# Patient Record
Sex: FEMALE | Race: BLACK OR AFRICAN AMERICAN | Employment: OTHER | URBAN - METROPOLITAN AREA
[De-identification: names, ages, dates, MRNs, and addresses within clinical notes are randomized per-mention and may not be internally consistent; named-entity substitution may affect disease eponyms.]

---

## 2017-05-23 ENCOUNTER — TELEPHONE (OUTPATIENT)
Dept: FAMILY MEDICINE CLINIC | Age: 82
End: 2017-05-23

## 2017-05-23 DIAGNOSIS — L30.9 CHRONIC ECZEMA: ICD-10-CM

## 2017-05-23 DIAGNOSIS — E78.00 PURE HYPERCHOLESTEROLEMIA: ICD-10-CM

## 2017-05-23 DIAGNOSIS — I10 ESSENTIAL HYPERTENSION, MALIGNANT: Primary | ICD-10-CM

## 2017-05-23 DIAGNOSIS — M19.90 ARTHRITIS: ICD-10-CM

## 2017-05-23 DIAGNOSIS — E03.9 ACQUIRED HYPOTHYROIDISM: ICD-10-CM

## 2017-05-24 ENCOUNTER — HOSPITAL ENCOUNTER (OUTPATIENT)
Dept: LAB | Age: 82
Discharge: HOME | End: 2017-05-24
Payer: MEDICARE

## 2017-05-24 DIAGNOSIS — I10 ESSENTIAL HYPERTENSION, MALIGNANT: ICD-10-CM

## 2017-05-24 DIAGNOSIS — M19.90 ARTHRITIS: ICD-10-CM

## 2017-05-24 DIAGNOSIS — E03.9 ACQUIRED HYPOTHYROIDISM: ICD-10-CM

## 2017-05-24 DIAGNOSIS — E78.00 PURE HYPERCHOLESTEROLEMIA: ICD-10-CM

## 2017-05-24 DIAGNOSIS — L30.9 CHRONIC ECZEMA: ICD-10-CM

## 2017-05-24 LAB
ALBUMIN SERPL BCP-MCNC: 3.6 G/DL (ref 3.4–5)
ALBUMIN/GLOB SERPL: 1 {RATIO} (ref 0.8–1.7)
ALP SERPL-CCNC: 110 U/L (ref 45–117)
ALT SERPL-CCNC: 23 U/L (ref 13–56)
ANION GAP BLD CALC-SCNC: 6 MMOL/L (ref 3–18)
APPEARANCE UR: CLEAR
AST SERPL W P-5'-P-CCNC: 21 U/L (ref 15–37)
BASOPHILS # BLD AUTO: 0 K/UL (ref 0–0.06)
BASOPHILS # BLD: 0 % (ref 0–2)
BILIRUB SERPL-MCNC: 0.4 MG/DL (ref 0.2–1)
BILIRUB UR QL: NEGATIVE
BUN SERPL-MCNC: 17 MG/DL (ref 7–18)
BUN/CREAT SERPL: 13 (ref 12–20)
CALCIUM SERPL-MCNC: 9.2 MG/DL (ref 8.5–10.1)
CHLORIDE SERPL-SCNC: 103 MMOL/L (ref 100–108)
CHOLEST SERPL-MCNC: 204 MG/DL
CO2 SERPL-SCNC: 33 MMOL/L (ref 21–32)
COLOR UR: YELLOW
CREAT SERPL-MCNC: 1.32 MG/DL (ref 0.6–1.3)
DIFFERENTIAL METHOD BLD: ABNORMAL
EOSINOPHIL # BLD: 0.6 K/UL (ref 0–0.4)
EOSINOPHIL NFR BLD: 8 % (ref 0–5)
ERYTHROCYTE [DISTWIDTH] IN BLOOD BY AUTOMATED COUNT: 13.9 % (ref 11.6–14.5)
GLOBULIN SER CALC-MCNC: 3.5 G/DL (ref 2–4)
GLUCOSE SERPL-MCNC: 79 MG/DL (ref 74–99)
GLUCOSE UR STRIP.AUTO-MCNC: NEGATIVE MG/DL
HCT VFR BLD AUTO: 40.6 % (ref 35–45)
HDLC SERPL-MCNC: 56 MG/DL (ref 40–60)
HDLC SERPL: 3.6 {RATIO} (ref 0–5)
HGB BLD-MCNC: 12.5 G/DL (ref 12–16)
HGB UR QL STRIP: NEGATIVE
KETONES UR QL STRIP.AUTO: NEGATIVE MG/DL
LDLC SERPL CALC-MCNC: 126.2 MG/DL (ref 0–100)
LEUKOCYTE ESTERASE UR QL STRIP.AUTO: NEGATIVE
LIPID PROFILE,FLP: ABNORMAL
LYMPHOCYTES # BLD AUTO: 31 % (ref 21–52)
LYMPHOCYTES # BLD: 2.2 K/UL (ref 0.9–3.6)
MCH RBC QN AUTO: 26.1 PG (ref 24–34)
MCHC RBC AUTO-ENTMCNC: 30.8 G/DL (ref 31–37)
MCV RBC AUTO: 84.8 FL (ref 74–97)
MONOCYTES # BLD: 0.5 K/UL (ref 0.05–1.2)
MONOCYTES NFR BLD AUTO: 7 % (ref 3–10)
NEUTS SEG # BLD: 3.7 K/UL (ref 1.8–8)
NEUTS SEG NFR BLD AUTO: 54 % (ref 40–73)
NITRITE UR QL STRIP.AUTO: NEGATIVE
PH UR STRIP: 7.5 [PH] (ref 5–8)
PLATELET # BLD AUTO: 159 K/UL (ref 135–420)
PMV BLD AUTO: 12.6 FL (ref 9.2–11.8)
POTASSIUM SERPL-SCNC: 4.6 MMOL/L (ref 3.5–5.5)
PROT SERPL-MCNC: 7.1 G/DL (ref 6.4–8.2)
PROT UR STRIP-MCNC: NEGATIVE MG/DL
RBC # BLD AUTO: 4.79 M/UL (ref 4.2–5.3)
SODIUM SERPL-SCNC: 142 MMOL/L (ref 136–145)
SP GR UR REFRACTOMETRY: 1.02 (ref 1–1.03)
T4 FREE SERPL-MCNC: 1.1 NG/DL (ref 0.7–1.5)
TRIGL SERPL-MCNC: 109 MG/DL (ref ?–150)
TSH SERPL DL<=0.05 MIU/L-ACNC: <0.01 UIU/ML (ref 0.36–3.74)
UROBILINOGEN UR QL STRIP.AUTO: 1 EU/DL (ref 0.2–1)
VLDLC SERPL CALC-MCNC: 21.8 MG/DL
WBC # BLD AUTO: 6.9 K/UL (ref 4.6–13.2)

## 2017-05-24 PROCEDURE — 80053 COMPREHEN METABOLIC PANEL: CPT | Performed by: FAMILY MEDICINE

## 2017-05-24 PROCEDURE — 84443 ASSAY THYROID STIM HORMONE: CPT | Performed by: FAMILY MEDICINE

## 2017-05-24 PROCEDURE — 81003 URINALYSIS AUTO W/O SCOPE: CPT | Performed by: FAMILY MEDICINE

## 2017-05-24 PROCEDURE — 36415 COLL VENOUS BLD VENIPUNCTURE: CPT | Performed by: FAMILY MEDICINE

## 2017-05-24 PROCEDURE — 80061 LIPID PANEL: CPT | Performed by: FAMILY MEDICINE

## 2017-05-24 PROCEDURE — 85025 COMPLETE CBC W/AUTO DIFF WBC: CPT | Performed by: FAMILY MEDICINE

## 2017-05-24 PROCEDURE — 84439 ASSAY OF FREE THYROXINE: CPT | Performed by: FAMILY MEDICINE

## 2017-05-30 ENCOUNTER — OFFICE VISIT (OUTPATIENT)
Dept: FAMILY MEDICINE CLINIC | Age: 82
End: 2017-05-30

## 2017-05-30 ENCOUNTER — HOSPITAL ENCOUNTER (OUTPATIENT)
Dept: LAB | Age: 82
Discharge: HOME | End: 2017-05-30
Payer: MEDICARE

## 2017-05-30 VITALS
DIASTOLIC BLOOD PRESSURE: 59 MMHG | BODY MASS INDEX: 37.61 KG/M2 | TEMPERATURE: 97.3 F | RESPIRATION RATE: 16 BRPM | OXYGEN SATURATION: 95 % | SYSTOLIC BLOOD PRESSURE: 140 MMHG | HEIGHT: 66 IN | HEART RATE: 65 BPM | WEIGHT: 234 LBS

## 2017-05-30 DIAGNOSIS — Z00.00 ROUTINE GENERAL MEDICAL EXAMINATION AT A HEALTH CARE FACILITY: ICD-10-CM

## 2017-05-30 DIAGNOSIS — E78.00 PURE HYPERCHOLESTEROLEMIA: ICD-10-CM

## 2017-05-30 DIAGNOSIS — I10 ESSENTIAL HYPERTENSION, MALIGNANT: ICD-10-CM

## 2017-05-30 DIAGNOSIS — R60.0 LOCALIZED EDEMA: ICD-10-CM

## 2017-05-30 DIAGNOSIS — M19.90 ARTHRITIS: ICD-10-CM

## 2017-05-30 DIAGNOSIS — E05.90 SUBCLINICAL HYPERTHYROIDISM: ICD-10-CM

## 2017-05-30 DIAGNOSIS — Z00.00 ROUTINE GENERAL MEDICAL EXAMINATION AT A HEALTH CARE FACILITY: Primary | ICD-10-CM

## 2017-05-30 DIAGNOSIS — L30.9 CHRONIC ECZEMA: ICD-10-CM

## 2017-05-30 PROCEDURE — 80307 DRUG TEST PRSMV CHEM ANLYZR: CPT | Performed by: FAMILY MEDICINE

## 2017-05-30 RX ORDER — HYDROCODONE BITARTRATE AND ACETAMINOPHEN 5; 325 MG/1; MG/1
1 TABLET ORAL
Qty: 60 TAB | Refills: 0 | Status: SHIPPED | OUTPATIENT
Start: 2017-05-30 | End: 2017-07-19 | Stop reason: SDUPTHER

## 2017-05-30 RX ORDER — FUROSEMIDE 40 MG/1
40 TABLET ORAL DAILY
Qty: 30 TAB | Refills: 3 | Status: SHIPPED | OUTPATIENT
Start: 2017-05-30 | End: 2017-07-19 | Stop reason: SDUPTHER

## 2017-05-30 RX ORDER — NAPROXEN 500 MG/1
500 TABLET ORAL DAILY
Qty: 90 TAB | Refills: 4 | Status: SHIPPED | OUTPATIENT
Start: 2017-05-30 | End: 2017-10-16 | Stop reason: SDUPTHER

## 2017-05-30 RX ORDER — FLUOCINONIDE 0.5 MG/G
OINTMENT TOPICAL 2 TIMES DAILY
Qty: 60 G | Refills: 4 | Status: SHIPPED | OUTPATIENT
Start: 2017-05-30 | End: 2017-10-16 | Stop reason: SDUPTHER

## 2017-05-30 RX ORDER — LISINOPRIL AND HYDROCHLOROTHIAZIDE 20; 25 MG/1; MG/1
1 TABLET ORAL DAILY
Qty: 90 TAB | Refills: 4 | Status: SHIPPED | OUTPATIENT
Start: 2017-05-30 | End: 2017-10-16 | Stop reason: SDUPTHER

## 2017-05-30 RX ORDER — OMEPRAZOLE 20 MG/1
20 CAPSULE, DELAYED RELEASE ORAL DAILY
COMMUNITY
End: 2019-03-26 | Stop reason: SDUPTHER

## 2017-05-30 NOTE — ACP (ADVANCE CARE PLANNING)
Advance Care Planning (ACP) Provider Note - Comprehensive     Date of ACP Conversation: 05/30/17  Persons included in Conversation:  patient and family  Length of ACP Conversation in minutes:  16 minutes    Authorized Decision Maker (if patient is incapable of making informed decisions): This person is:  Healthcare Agent/Medical Power of  under Advance Directive          General ACP for ALL Patients with Decision Making Capacity:   Importance of advance care planning, including choosing a healthcare agent to communicate patient's healthcare decisions if patient lost the ability to make decisions, such as after a sudden illness or accident    Review of Existing Advance Directive:       For Serious or Chronic Illness:  Understanding of medical condition      Interventions Provided:  Reviewed existing Advance Directive

## 2017-05-30 NOTE — PROGRESS NOTES
THE SUBSEQUENT MEDICARE ANNUAL WELLNESS VISIT PROGRESS NOTES    This is a Subsequent Medicare Annual Wellness Visit providing Personalized Prevention Plan Services (PPPS) (Performed 12 months after initial AWV and PPPS )    I have reviewed the patient's medical history in detail and updated the computerized patient record. Ruslan Valdez is a [de-identified] y.o.  female and presents for an subsequent annual wellness exam     Patient Active Problem List    Diagnosis Date Noted    Subclinical hyperthyroidism 05/30/2017    Arthritis 09/20/2010    Essential hypertension, malignant 03/22/2010    Pure hypercholesterolemia 03/22/2010    Edema 03/22/2010    Chronic eczema 03/22/2010     Current Outpatient Prescriptions   Medication Sig Dispense Refill    omeprazole (PRILOSEC) 20 mg capsule Take 20 mg by mouth daily.  HYDROcodone-acetaminophen (NORCO) 5-325 mg per tablet Take 1 Tab by mouth every eight (8) hours as needed for Pain (no more than 2 per day). 60 Tab 0    furosemide (LASIX) 40 mg tablet Take 1 Tab by mouth daily. Only if needed for edema 30 Tab 3    lisinopril-hydroCHLOROthiazide (PRINZIDE, ZESTORETIC) 20-25 mg per tablet Take 1 Tab by mouth daily. 90 Tab 4    naproxen (NAPROSYN) 500 mg tablet Take 1 Tab by mouth daily. 90 Tab 4    fluocinoNIDE (LIDEX) 0.05 % ointment Apply  to affected area two (2) times a day.  60 g 4     No Known Allergies  Past Medical History:   Diagnosis Date    Arthritis 9/20/2010    Chronic eczema 3/22/2010    Edema 3/22/2010    Essential hypertension, malignant 3/22/2010    Pure hypercholesterolemia 3/22/2010    Subclinical hyperthyroidism 5/30/2017    Unspecified arthropathy, shoulder region 3/22/2010    Unspecified hypothyroidism 3/22/2010     Past Surgical History:   Procedure Laterality Date    HX GYN  1983    hysterectomy    HX HYSTERECTOMY  1984    fibroids    HX KNEE REPLACEMENT Right      Family History   Problem Relation Age of Onset    Hypertension Mother     Breast Cancer Mother 70    Breast Cancer Child 36     Social History   Substance Use Topics    Smoking status: Never Smoker    Smokeless tobacco: Never Used    Alcohol use 1.5 oz/week     3 Standard drinks or equivalent per week         ROS       All other systems reviewed and are negative. History     Past Medical History:   Diagnosis Date    Arthritis 9/20/2010    Chronic eczema 3/22/2010    Edema 3/22/2010    Essential hypertension, malignant 3/22/2010    Pure hypercholesterolemia 3/22/2010    Subclinical hyperthyroidism 5/30/2017    Unspecified arthropathy, shoulder region 3/22/2010    Unspecified hypothyroidism 3/22/2010      Past Surgical History:   Procedure Laterality Date    HX GYN  1983    hysterectomy    HX HYSTERECTOMY  1984    fibroids    HX KNEE REPLACEMENT Right      Current Outpatient Prescriptions   Medication Sig Dispense Refill    omeprazole (PRILOSEC) 20 mg capsule Take 20 mg by mouth daily.  HYDROcodone-acetaminophen (NORCO) 5-325 mg per tablet Take 1 Tab by mouth every eight (8) hours as needed for Pain (no more than 2 per day). 60 Tab 0    furosemide (LASIX) 40 mg tablet Take 1 Tab by mouth daily. Only if needed for edema 30 Tab 3    lisinopril-hydroCHLOROthiazide (PRINZIDE, ZESTORETIC) 20-25 mg per tablet Take 1 Tab by mouth daily. 90 Tab 4    naproxen (NAPROSYN) 500 mg tablet Take 1 Tab by mouth daily. 90 Tab 4    fluocinoNIDE (LIDEX) 0.05 % ointment Apply  to affected area two (2) times a day.  60 g 4     No Known Allergies  Family History   Problem Relation Age of Onset    Hypertension Mother     Breast Cancer Mother 70    Breast Cancer Child 36     Social History   Substance Use Topics    Smoking status: Never Smoker    Smokeless tobacco: Never Used    Alcohol use 1.5 oz/week     3 Standard drinks or equivalent per week     Patient Active Problem List   Diagnosis Code    Essential hypertension, malignant I10    Pure hypercholesterolemia E78.00    Edema R60.9    Chronic eczema L30.9    Arthritis M19.90    Subclinical hyperthyroidism E05.90       Health Maintenance History  Immunizations reviewed, dtap utd  , pneumovax utd , flu refused , zoster refused   Colonoscopy: utd soledad,   Chest CT :,  Eye exam:ud   Mammo utd   Dexascan utd     Health Care Directive or Living Will: yes    Depression Risk Factor Screening:      Patient Health Questionnaire (PHQ-2)   Over the last 2 weeks, how often have you been bothered by any of the following problems? · Little interest or pleasure in doing things? · Not at all. [0]  · Feeling down, depressed, or hopeless? · Not at all. [0]    Total Score: 0/6  PHQ-2 Assessment Scoring:   A score of 2 or more requires further screening with the PHQ-9    Alcohol Risk Factor Screening:     Women: On any occasion during the past 3 months, have you had more than 3 drinks containing alcohol? Do you average more than 7 drinks per week? Men: On any occasion during the past 3 months, have you had more than 4 drinks containing alcohol? Do you average more than 14 drinks per week? Functional Ability and Level of Safety:     Hearing Loss    mild    Activities of Daily Living   Self-care. Requires assistance with: no ADLs    Fall Risk   No fall risk factors    Abuse Screen   None      Examination   Physical Examination  Vitals:    05/30/17 0826   BP: 140/59   Pulse: 65   Resp: 16   Temp: 97.3 °F (36.3 °C)   TempSrc: Oral   SpO2: 95%   Weight: 234 lb (106.1 kg)   Height: 5' 6\" (1.676 m)   PainSc:   7   PainLoc: Knee     Body mass index is 37.77 kg/(m^2).     Evaluation of Cognitive Function:  Mood/affect:appropriate   Appearance: well groomed   Family member/caregiver input: na     alert, well appearing, and in no distress, oriented to person, place, and time and normal appearing weight    Patient Care Team:  Mi Sheldon.,  as PCP - Shefali Parnell MD (Orthopedic Surgery)  Tanner Johnson MD Jaime (Orthopedic Surgery)    Advice/Referrals/Counseling/Plan:   Education and counseling provided:  Are appropriate based on today's review and evaluation  End-of-Life planning (with patient's consent)  Include in education list (weight loss, physical activity, smoking cessation, fall prevention, and nutrition)  current treatment plan is effective, no change in therapy  lab results and schedule of future lab studies reviewed with patient. I have discussed the diagnosis with the patient and the intended plan as seen in the above orders. The patient has received an after-visit summary and questions were answered concerning future plans. I have discussed medication side effects and warnings with the patient as well. I have reviewed the plan of care with the patient, accepted their input and they are in agreement with the treatment goals. Follow-up Disposition:  Return in about 2 months (around 7/30/2017) for EOV, Narcotic contract,  and drug screen, Bring in Meds.    _____________________________________________________________    Problem Assessment    for treatment of   Chief Complaint   Patient presents with    Hypertension    Cholesterol Problem    Thyroid Problem    Arthritis    Rash         SUBJECTIVE      Cardiovascular Review:  The patient has hypertension and hyperlipidemia. Diet and Lifestyle: not attempting to follow a low fat, low cholesterol diet, not attempting to follow a low sodium diet  Home BP Monitoring: is not measured at home. Pertinent ROS: taking medications as instructed, no medication side effects noted, no TIA's, no chest pain on exertion, no dyspnea on exertion, no swelling of ankles. Thyroid Review:  Patient is seen for followup of subclinical hyperthyroidism. Thyroid ROS: denies fatigue, weight changes, heat/cold intolerance, bowel/skin changes or CVS symptoms. Osteoarthritis and Chronic Pain:  Patient has osteoarthritis, primarily affecting the diffuse. Symptoms onset: problem is longstanding. Rheumatological ROS: no current joint or muscle symptoms, essentially pain-free. Response to treatment plan: stable. Additional Concerns: ecema stavble on meds    Edema stable on prn lasic         Visit Vitals    /59 (BP 1 Location: Right arm, BP Patient Position: Sitting)    Pulse 65    Temp 97.3 °F (36.3 °C) (Oral)    Resp 16    Ht 5' 6\" (1.676 m)    Wt 234 lb (106.1 kg)    SpO2 95%    BMI 37.77 kg/m2     General:  Alert, cooperative, no distress, appears stated age. Head:  Normocephalic, without obvious abnormality, atraumatic. Eyes:  Conjunctivae/corneas clear. PERRL, EOMs intact. Fundi benign. Ears:  Normal TMs and external ear canals both ears. Nose: Nares normal. Septum midline. Mucosa normal. No drainage or sinus tenderness. Throat: Lips, mucosa, and tongue normal. Teeth and gums normal.   Neck: Supple, symmetrical, trachea midline, no adenopathy, thyroid: no enlargement/tenderness/nodules, no carotid bruit and no JVD. Back:   Symmetric, no curvature. ROM normal. No CVA tenderness. Lungs:   Clear to auscultation bilaterally. Chest wall:  No tenderness or deformity. Heart:  Regular rate and rhythm, S1, S2 normal, no murmur, click, rub or gallop. Breast Exam:  No tenderness, masses, or nipple abnormality. Abdomen:   Soft, non-tender. Bowel sounds normal. No masses,  No organomegaly. Genitalia:  Normal female without lesion, discharge or tenderness. Rectal:  Normal tone,  no masses or tenderness  Guaiac negative stool. Extremities: Extremities normal, atraumatic, no cyanosis or edema. Pulses: 2+ and symmetric all extremities. Skin: Skin color, texture, turgor normal. No rashes or lesions. Lymph nodes: Cervical, supraclavicular, and axillary nodes normal.   Neurologic: CNII-XII intact. Normal strength, sensation and reflexes throughout.            LABS   Component      Latest Ref Rng & Units 5/24/2017 5/24/2017 5/24/2017 5/24/2017          11:48 AM 11:48 AM 11:48 AM 11:48 AM   WBC      4.6 - 13.2 K/uL       RBC      4.20 - 5.30 M/uL       HGB      12.0 - 16.0 g/dL       HCT      35.0 - 45.0 %       MCV      74.0 - 97.0 FL       MCH      24.0 - 34.0 PG       MCHC      31.0 - 37.0 g/dL       RDW      11.6 - 14.5 %       PLATELET      049 - 878 K/uL       MPV      9.2 - 11.8 FL       NEUTROPHILS      40 - 73 %       LYMPHOCYTES      21 - 52 %       MONOCYTES      3 - 10 %       EOSINOPHILS      0 - 5 %       BASOPHILS      0 - 2 %       ABS. NEUTROPHILS      1.8 - 8.0 K/UL       ABS. LYMPHOCYTES      0.9 - 3.6 K/UL       ABS. MONOCYTES      0.05 - 1.2 K/UL       ABS. EOSINOPHILS      0.0 - 0.4 K/UL       ABS. BASOPHILS      0.0 - 0.06 K/UL       DF             Sodium      136 - 145 mmol/L   142    Potassium      3.5 - 5.5 mmol/L   4.6    Chloride      100 - 108 mmol/L   103    CO2      21 - 32 mmol/L   33 (H)    Anion gap      3.0 - 18 mmol/L   6    Glucose      74 - 99 mg/dL   79    BUN      7.0 - 18 MG/DL   17    Creatinine      0.6 - 1.3 MG/DL   1.32 (H)    BUN/Creatinine ratio      12 - 20     13    GFR est AA      >60 ml/min/1.73m2   47 (L)    GFR est non-AA      >60 ml/min/1.73m2   39 (L)    Calcium      8.5 - 10.1 MG/DL   9.2    Bilirubin, total      0.2 - 1.0 MG/DL   0.4    ALT      13 - 56 U/L   23    AST      15 - 37 U/L   21    Alk.  phosphatase      45 - 117 U/L   110    Protein, total      6.4 - 8.2 g/dL   7.1    Albumin      3.4 - 5.0 g/dL   3.6    Globulin      2.0 - 4.0 g/dL   3.5    A-G Ratio      0.8 - 1.7     1.0    Color       YELLOW      Appearance       CLEAR      Specific gravity      1.005 - 1.030   1.017      pH (UA)      5.0 - 8.0   7.5      Protein      NEG mg/dL NEGATIVE      Glucose      NEG mg/dL NEGATIVE      Ketone      NEG mg/dL NEGATIVE      Bilirubin      NEG   NEGATIVE      Blood      NEG   NEGATIVE      Urobilinogen      0.2 - 1.0 EU/dL 1.0      Nitrites      NEG   NEGATIVE      Leukocyte Esterase      NEG   NEGATIVE      Cholesterol, total      <200 MG/DL    204 (H)   Triglyceride      <150 MG/DL    109   HDL Cholesterol      40 - 60 MG/DL    56   LDL, calculated      0 - 100 MG/DL    126.2 (H)   VLDL, calculated      MG/DL    21.8   CHOL/HDL Ratio      0 - 5.0      3.6   TSH      0.36 - 3.74 uIU/mL  <0.01 (L)     T4, Free      0.7 - 1.5 NG/DL         Component      Latest Ref Rng & Units 5/24/2017 5/24/2017          11:48 AM 11:48 AM   WBC      4.6 - 13.2 K/uL  6.9   RBC      4.20 - 5.30 M/uL  4.79   HGB      12.0 - 16.0 g/dL  12.5   HCT      35.0 - 45.0 %  40.6   MCV      74.0 - 97.0 FL  84.8   MCH      24.0 - 34.0 PG  26.1   MCHC      31.0 - 37.0 g/dL  30.8 (L)   RDW      11.6 - 14.5 %  13.9   PLATELET      095 - 146 K/uL  159   MPV      9.2 - 11.8 FL  12.6 (H)   NEUTROPHILS      40 - 73 %  54   LYMPHOCYTES      21 - 52 %  31   MONOCYTES      3 - 10 %  7   EOSINOPHILS      0 - 5 %  8 (H)   BASOPHILS      0 - 2 %  0   ABS. NEUTROPHILS      1.8 - 8.0 K/UL  3.7   ABS. LYMPHOCYTES      0.9 - 3.6 K/UL  2.2   ABS. MONOCYTES      0.05 - 1.2 K/UL  0.5   ABS. EOSINOPHILS      0.0 - 0.4 K/UL  0.6 (H)   ABS. BASOPHILS      0.0 - 0.06 K/UL  0.0   DF        AUTOMATED   Sodium      136 - 145 mmol/L     Potassium      3.5 - 5.5 mmol/L     Chloride      100 - 108 mmol/L     CO2      21 - 32 mmol/L     Anion gap      3.0 - 18 mmol/L     Glucose      74 - 99 mg/dL     BUN      7.0 - 18 MG/DL     Creatinine      0.6 - 1.3 MG/DL     BUN/Creatinine ratio      12 - 20       GFR est AA      >60 ml/min/1.73m2     GFR est non-AA      >60 ml/min/1.73m2     Calcium      8.5 - 10.1 MG/DL     Bilirubin, total      0.2 - 1.0 MG/DL     ALT      13 - 56 U/L     AST      15 - 37 U/L     Alk.  phosphatase      45 - 117 U/L     Protein, total      6.4 - 8.2 g/dL     Albumin      3.4 - 5.0 g/dL     Globulin      2.0 - 4.0 g/dL     A-G Ratio      0.8 - 1.7       Color           Appearance           Specific gravity      1.005 - 1.030 pH (UA)      5.0 - 8.0       Protein      NEG mg/dL     Glucose      NEG mg/dL     Ketone      NEG mg/dL     Bilirubin      NEG       Blood      NEG       Urobilinogen      0.2 - 1.0 EU/dL     Nitrites      NEG       Leukocyte Esterase      NEG       Cholesterol, total      <200 MG/DL     Triglyceride      <150 MG/DL     HDL Cholesterol      40 - 60 MG/DL     LDL, calculated      0 - 100 MG/DL     VLDL, calculated      MG/DL     CHOL/HDL Ratio      0 - 5.0       TSH      0.36 - 3.74 uIU/mL     T4, Free      0.7 - 1.5 NG/DL 1.1      TESTS  ekg  nsr      Assessment/Plan:      Hypertension - stable  Hyperlipidemia - stable  Thyroid stable recheck 6 mo  arthrits note she takes occ vicodin  Up to 2 per day usually less than 4 per week. This means chronic pain and she falls under new guidelines. Will get drug screen today and f/u 2 mo.  Needs , contract, pill count, etc etc  Edema stable  Eczema stable          Lab review: labs are reviewed, up to date and normal, orders written for new lab studies as appropriate; see orders

## 2017-05-30 NOTE — MR AVS SNAPSHOT
Visit Information Date & Time Provider Department Dept. Phone Encounter #  
 5/30/2017  8:30 AM Dejan Mao., [place] [address] [phone] [id number] Follow-up Instructions Return in about 2 months (around 7/30/2017) for EOV, Narcotic contract,  and drug screen, Bring in Meds. Upcoming Health Maintenance Date Due DTaP/Tdap/Td series (1 - Tdap) 8/22/2013 MEDICARE YEARLY EXAM 5/24/2017 INFLUENZA AGE 9 TO ADULT 8/1/2017 GLAUCOMA SCREENING Q2Y 11/3/2018 Allergies as of 5/30/2017  Review Complete On: 5/30/2017 By: Dejan Vilchis, DO No Known Allergies Current Immunizations  Reviewed on 6/21/2010 Name Date Pneumococcal Conjugate (PCV-13) 8/19/2015 Pneumococcal Polysaccharide (PPSV-23) 8/21/2013  2:15 PM  
 TD Vaccine 3/22/2003 Td, Adsorbed PF 8/21/2013  2:15 PM  
  
 Not reviewed this visit You Were Diagnosed With   
  
 Codes Comments Routine general medical examination at a health care facility    -  Primary ICD-10-CM: Z00.00 ICD-9-CM: V70.0 Essential hypertension, malignant     ICD-10-CM: I10 
ICD-9-CM: 401.0 Arthritis     ICD-10-CM: M19.90 ICD-9-CM: 716.90 Localized edema     ICD-10-CM: R60.0 ICD-9-CM: 731. 3 Chronic eczema     ICD-10-CM: L30.9 ICD-9-CM: 692.9 Pure hypercholesterolemia     ICD-10-CM: E78.00 ICD-9-CM: 272.0 Subclinical hyperthyroidism     ICD-10-CM: E05.90 ICD-9-CM: 242.90 Vitals BP Pulse Temp Resp Height(growth percentile) Weight(growth percentile) 140/59 (BP 1 Location: Right arm, BP Patient Position: Sitting) 65 97.3 °F (36.3 °C) (Oral) 16 5' 6\" (1.676 m) 234 lb (106.1 kg) SpO2 BMI OB Status Smoking Status 95% 37.77 kg/m2 Hysterectomy Never Smoker BMI and BSA Data Body Mass Index Body Surface Area  
 37.77 kg/m 2 2.22 m 2 Preferred Pharmacy Pharmacy Name Phone [facility] [address] [phone] Your Updated Medication List  
  
   
This list is accurate as of: 5/30/17  9:15 AM.  Always use your most recent med list.  
  
  
  
  
 fluocinoNIDE 0.05 % ointment Commonly known as:  LIDEX Apply  to affected area two (2) times a day. furosemide 40 mg tablet Commonly known as:  LASIX Take 1 Tab by mouth daily. Only if needed for edema HYDROcodone-acetaminophen 5-325 mg per tablet Commonly known as:  1463 Jennifer Babin Take 1 Tab by mouth every eight (8) hours as needed for Pain (no more than 2 per day). lisinopril-hydroCHLOROthiazide 20-25 mg per tablet Commonly known as:  Aminah Karvonen Take 1 Tab by mouth daily. naproxen 500 mg tablet Commonly known as:  NAPROSYN Take 1 Tab by mouth daily. PriLOSEC 20 mg capsule Generic drug:  omeprazole Take 20 mg by mouth daily. Prescriptions Printed Refills HYDROcodone-acetaminophen (NORCO) 5-325 mg per tablet 0 Sig: Take 1 Tab by mouth every eight (8) hours as needed for Pain (no more than 2 per day). Class: Print Route: Oral  
 furosemide (LASIX) 40 mg tablet 3 Sig: Take 1 Tab by mouth daily. Only if needed for edema Class: Print Route: Oral  
 lisinopril-hydroCHLOROthiazide (PRINZIDE, ZESTORETIC) 20-25 mg per tablet 4 Sig: Take 1 Tab by mouth daily. Class: Print Route: Oral  
 naproxen (NAPROSYN) 500 mg tablet 4 Sig: Take 1 Tab by mouth daily. Class: Print Route: Oral  
 fluocinoNIDE (LIDEX) 0.05 % ointment 4 Sig: Apply  to affected area two (2) times a day. Class: Print Route: Topical  
  
We Performed the Following AMB POC EKG ROUTINE W/ 12 LEADS, INTER & REP [79285 CPT(R)] Follow-up Instructions Return in about 2 months (around 7/30/2017) for EOV, Narcotic contract,  and drug screen, Bring in Meds. To-Do List   
 05/30/2017 Lab:  COMPLIANCE DRUG SCREEN/PRESCRIPTION MONITORING Introducing [facility]! Dear Cj Bejarano: Thank you for requesting a X[employer] account. Our records indicate that you already have an active Xmybox account. You can access your account anytime at [email or web address] Did you know that you can access your hospital and ER discharge instructions at any time in [employer]? You can also review all of your test results from your hospital stay or ER visit. Additional Information If you have questions, please visit the Frequently Asked Questions section of the Xmybox website at [email or web address] Remember, Xmybox is NOT to be used for urgent needs. For medical emergencies, dial 911. Now available from your iPhone and Android! Please provide this summary of care documentation to your next provider. Your primary care clinician is listed as [address]. If you have any questions after today's visit, please call [phone].

## 2017-05-30 NOTE — PROGRESS NOTES
Al Nelson is a [de-identified] y.o. female presents today for her medicare wellness. Patient reports of a cough and wheezing for over a week. Patient also reports of some left knee pain. Pt is in Room # 6        1. Have you been to the ER, urgent care clinic since your last visit? Hospitalized since your last visit? No    2. Have you seen or consulted any other health care providers outside of the [address] [place] [address] since your last visit? Include any pap smears or colon screening.  No

## 2017-06-04 LAB
DRUGS UR: NORMAL
PDF, 451356: NORMAL

## 2017-06-26 ENCOUNTER — OFFICE VISIT (OUTPATIENT)
Dept: FAMILY MEDICINE CLINIC | Age: 82
End: 2017-06-26

## 2017-06-26 ENCOUNTER — TELEPHONE (OUTPATIENT)
Dept: FAMILY MEDICINE CLINIC | Age: 82
End: 2017-06-26

## 2017-06-26 VITALS
BODY MASS INDEX: 37.03 KG/M2 | SYSTOLIC BLOOD PRESSURE: 110 MMHG | TEMPERATURE: 98.1 F | HEIGHT: 66 IN | OXYGEN SATURATION: 100 % | WEIGHT: 230.4 LBS | HEART RATE: 74 BPM | DIASTOLIC BLOOD PRESSURE: 62 MMHG | RESPIRATION RATE: 18 BRPM

## 2017-06-26 DIAGNOSIS — J06.9 VIRAL UPPER RESPIRATORY TRACT INFECTION: Primary | ICD-10-CM

## 2017-06-26 DIAGNOSIS — M19.90 ARTHRITIS: ICD-10-CM

## 2017-06-26 RX ORDER — ALBUTEROL SULFATE 90 UG/1
2 AEROSOL, METERED RESPIRATORY (INHALATION)
Qty: 1 INHALER | Refills: 0 | Status: SHIPPED | OUTPATIENT
Start: 2017-06-26 | End: 2017-07-19

## 2017-06-26 RX ORDER — AZITHROMYCIN 250 MG/1
TABLET, FILM COATED ORAL
Qty: 6 TAB | Refills: 0 | Status: SHIPPED | OUTPATIENT
Start: 2017-06-26 | End: 2017-07-19

## 2017-06-26 NOTE — PROGRESS NOTES
Karrie Coe is a [de-identified] y.o. female presented to clinic for 8/10 right shoulder pain and wheezing. Pt denies any SOB. No acute dress at this time. 1. Have you been to the ER, urgent care clinic since your last visit? Hospitalized since your last visit? No    2. Have you seen or consulted any other health care providers outside of the [address] [place] [address] since your last visit? Include any pap smears or colon screening.  No     Learning Assessment 5/21/2014   PRIMARY LEARNER Patient   BARRIERS PRIMARY LEARNER NONE   CO-LEARNER CAREGIVER No   PRIMARY LANGUAGE ENGLISH   LEARNER PREFERENCE PRIMARY LISTENING   ANSWERED BY patient   RELATIONSHIP SELF

## 2017-06-26 NOTE — TELEPHONE ENCOUNTER
Patient called requesting an appointment for pain in right shoulder and wheezing. Her next appointment is scheduled for 7/17/17 and would like to come in for sooner appointment. Please advise.

## 2017-06-26 NOTE — PROGRESS NOTES
Jasmine Pat is a [de-identified] y.o.  female and presents with    Chief Complaint   Patient presents with    Shoulder Pain    Wheezing           Subjective:  1. Has a bit of chest cold with cough and wheezing. No fever. Non productive    2. Has aching in right shoulder --      Additional Concerns:          Patient Active Problem List    Diagnosis Date Noted    Subclinical hyperthyroidism 05/30/2017    Arthritis 09/20/2010    Essential hypertension, malignant 03/22/2010    Pure hypercholesterolemia 03/22/2010    Edema 03/22/2010    Chronic eczema 03/22/2010     Current Outpatient Prescriptions   Medication Sig Dispense Refill    azithromycin (ZITHROMAX) 250 mg tablet 2 today, then 1 daily till gone. 6 Tab 0    albuterol (PROVENTIL HFA, VENTOLIN HFA, PROAIR HFA) 90 mcg/actuation inhaler Take 2 Puffs by inhalation every six (6) hours as needed for Wheezing. 1 Inhaler 0    omeprazole (PRILOSEC) 20 mg capsule Take 20 mg by mouth daily.  HYDROcodone-acetaminophen (NORCO) 5-325 mg per tablet Take 1 Tab by mouth every eight (8) hours as needed for Pain (no more than 2 per day). 60 Tab 0    furosemide (LASIX) 40 mg tablet Take 1 Tab by mouth daily. Only if needed for edema 30 Tab 3    lisinopril-hydroCHLOROthiazide (PRINZIDE, ZESTORETIC) 20-25 mg per tablet Take 1 Tab by mouth daily. 90 Tab 4    naproxen (NAPROSYN) 500 mg tablet Take 1 Tab by mouth daily. 90 Tab 4    fluocinoNIDE (LIDEX) 0.05 % ointment Apply  to affected area two (2) times a day.  60 g 4     No Known Allergies  Past Medical History:   Diagnosis Date    Arthritis 9/20/2010    Chronic eczema 3/22/2010    Edema 3/22/2010    Essential hypertension, malignant 3/22/2010    Pure hypercholesterolemia 3/22/2010    Subclinical hyperthyroidism 5/30/2017    Unspecified arthropathy, shoulder region 3/22/2010    Unspecified hypothyroidism 3/22/2010     Past Surgical History:   Procedure Laterality Date   Mario Arm GYN  3679    hysterectomy  HX HYSTERECTOMY  1984    fibroids    HX KNEE REPLACEMENT Right      Family History   Problem Relation Age of Onset    Hypertension Mother     Breast Cancer Mother 70    Breast Cancer Child 36     Social History   Substance Use Topics    Smoking status: Never Smoker    Smokeless tobacco: Never Used    Alcohol use 1.5 oz/week     3 Standard drinks or equivalent per week       ROS       All other systems reviewed and are negative. Objective:  Vitals:    06/26/17 1234   BP: 110/62   Pulse: 74   Resp: 18   Temp: 98.1 °F (36.7 °C)   TempSrc: Oral   SpO2: 100%   Weight: 230 lb 6.4 oz (104.5 kg)   Height: 5' 6\" (1.676 m)   PainSc:   8   PainLoc: Shoulder                 alert, well appearing, and in no distress, oriented to person, place, and time and normal appearing weight  Chest - wheezing noted bilat   Heart - normal rate, regular rhythm, normal S1, S2, no murmurs, rubs, clicks or gallops  Abdomen - soft, nontender, nondistended, no masses or organomegaly  shouldr with mild tenderness           LABS     TESTS      Assessment/Plan:    Shoulder arthritsi already has naprosyn and vicodin available to take as needed    Wheezing appears to be chest cold will try zithreomax and albuterol       Lab review:       I have discussed the diagnosis with the patient and the intended plan as seen in the above orders. The patient has received an after-visit summary and questions were answered concerning future plans. I have discussed medication side effects and warnings with the patient as well. I have reviewed the plan of care with the patient, accepted their input and they are in agreement with the treatment goals.      Follow-up Disposition: Not on File

## 2017-06-26 NOTE — MR AVS SNAPSHOT
Visit Information Date & Time Provider Department Dept. Phone Encounter #  
 6/26/2017 12:30 PM Aide Mcelroy., [address] [facility] [phone] Follow-up Instructions Return if symptoms worsen or fail to improve, for already scheduled. Your Appointments 7/19/2017 11:00 AM  
[place] [address] [facility]) Appt Note: Return in about 2 months (around 7/30/2017) for EOV, Narcotic contract,  and drug screen, Bring in [address]. [address] [facility] [address]  
  
   
 [address] Upcoming Health Maintenance Date Due DTaP/Tdap/Td series (1 - Tdap) 8/22/2013 INFLUENZA AGE 9 TO ADULT 8/1/2017 MEDICARE YEARLY EXAM 5/31/2018 GLAUCOMA SCREENING Q2Y 11/3/2018 Allergies as of 6/26/2017  Review Complete On: 6/26/2017 By: Aide Mishra DO No Known Allergies Current Immunizations  Reviewed on 6/21/2010 Name Date Pneumococcal Conjugate (PCV-13) 8/19/2015 Pneumococcal Polysaccharide (PPSV-23) 8/21/2013  2:15 PM  
 TD Vaccine 3/22/2003 Td, Adsorbed PF 8/21/2013  2:15 PM  
  
 Not reviewed this visit You Were Diagnosed With   
  
 Codes Comments Viral upper respiratory tract infection    -  Primary ICD-10-CM: J06.9, B97.89 ICD-9-CM: 465.9 Arthritis     ICD-10-CM: M19.90 ICD-9-CM: 716.90 Vitals BP Pulse Temp Resp Height(growth percentile) Weight(growth percentile) 110/62 (BP 1 Location: Left arm, BP Patient Position: Sitting) 74 98.1 °F (36.7 °C) (Oral) 18 5' 6\" (1.676 m) 230 lb 6.4 oz (104.5 kg) SpO2 BMI OB Status Smoking Status 100% 37.19 kg/m2 Hysterectomy Never Smoker BMI and BSA Data Body Mass Index Body Surface Area  
 37.19 kg/m 2 2.21 m 2 Preferred Pharmacy Pharmacy Name Phone [address] [phone] Your Updated Medication List  
  
   
This list is accurate as of: 17 12:43 PM.  Always use your most recent med list.  
  
  
  
  
 albuterol 90 mcg/actuation inhaler Commonly known as:  PROVENTIL HFA, VENTOLIN HFA, PROAIR HFA Take 2 Puffs by inhalation every six (6) hours as needed for Wheezing. azithromycin 250 mg tablet Commonly known as:  Radha [place]  
2 today, then 1 daily till gone. fluocinoNIDE 0.05 % ointment Commonly known as:  LIDEX Apply  to affected area two (2) times a day. furosemide 40 mg tablet Commonly known as:  LASIX Take 1 Tab by mouth daily. Only if needed for edema HYDROcodone-acetaminophen 5-325 mg per tablet Commonly known as:  Rea Safe Take 1 Tab by mouth every eight (8) hours as needed for Pain (no more than 2 per day). lisinopril-hydroCHLOROthiazide 20-25 mg per tablet Commonly known as:  Beula Zan Take 1 Tab by mouth daily. naproxen 500 mg tablet Commonly known as:  NAPROSYN Take 1 Tab by mouth daily. PriLOSEC 20 mg capsule Generic drug:  omeprazole Take 20 mg by mouth daily. Prescriptions Printed Refills  
 azithromycin (ZITHROMAX) 250 mg tablet 0 Si today, then 1 daily till gone. Class: Print  
 albuterol (PROVENTIL HFA, VENTOLIN HFA, PROAIR HFA) 90 mcg/actuation inhaler 0 Sig: Take 2 Puffs by inhalation every six (6) hours as needed for Wheezing. Class: Print Route: Inhalation Follow-up Instructions Return if symptoms worsen or fail to improve, for already scheduled. Introducing [facility] & HEALTH SERVICES! Dear Mirna Farfan: 
Thank you for requesting a JLGOV account. Our records indicate that you already have an active JLGOV account. You can access your account anytime at [email or web address] Did you know that you can access your hospital and ER discharge instructions at any time in Seculert? You can also review all of your test results from your hospital stay or ER visit. Additional Information If you have questions, please visit the Frequently Asked Questions section of the Seculert website at [email or web address] Remember, Seculert is NOT to be used for urgent needs. For medical emergencies, dial 911. Now available from your iPhone and Android! Please provide this summary of care documentation to your next provider. Your primary care clinician is listed as [id number] [facility]. If you have any questions after today's visit, please call [phone].

## 2017-07-10 ENCOUNTER — HOSPITAL ENCOUNTER (OUTPATIENT)
Dept: MAMMOGRAPHY | Age: 82
Discharge: HOME | End: 2017-07-10
Attending: FAMILY MEDICINE
Payer: MEDICARE

## 2017-07-10 DIAGNOSIS — Z12.31 VISIT FOR SCREENING MAMMOGRAM: ICD-10-CM

## 2017-07-10 PROCEDURE — 77067 SCR MAMMO BI INCL CAD: CPT

## 2017-07-19 ENCOUNTER — OFFICE VISIT (OUTPATIENT)
Dept: FAMILY MEDICINE CLINIC | Age: 82
End: 2017-07-19

## 2017-07-19 VITALS
HEART RATE: 66 BPM | BODY MASS INDEX: 37.51 KG/M2 | WEIGHT: 233.4 LBS | RESPIRATION RATE: 16 BRPM | OXYGEN SATURATION: 96 % | HEIGHT: 66 IN | DIASTOLIC BLOOD PRESSURE: 62 MMHG | SYSTOLIC BLOOD PRESSURE: 124 MMHG | TEMPERATURE: 97.8 F

## 2017-07-19 DIAGNOSIS — E78.00 PURE HYPERCHOLESTEROLEMIA: ICD-10-CM

## 2017-07-19 DIAGNOSIS — M19.90 ARTHRITIS: ICD-10-CM

## 2017-07-19 DIAGNOSIS — L30.9 CHRONIC ECZEMA: ICD-10-CM

## 2017-07-19 DIAGNOSIS — R60.0 LOCALIZED EDEMA: ICD-10-CM

## 2017-07-19 DIAGNOSIS — I10 ESSENTIAL HYPERTENSION, MALIGNANT: ICD-10-CM

## 2017-07-19 DIAGNOSIS — J06.9 VIRAL UPPER RESPIRATORY TRACT INFECTION: ICD-10-CM

## 2017-07-19 RX ORDER — FUROSEMIDE 40 MG/1
40 TABLET ORAL DAILY
Qty: 30 TAB | Refills: 3 | Status: SHIPPED | OUTPATIENT
Start: 2017-07-19 | End: 2017-10-16 | Stop reason: SDUPTHER

## 2017-07-19 RX ORDER — HYDROCODONE BITARTRATE AND ACETAMINOPHEN 5; 325 MG/1; MG/1
1 TABLET ORAL
Qty: 60 TAB | Refills: 0 | Status: SHIPPED | OUTPATIENT
Start: 2017-07-19 | End: 2017-10-16 | Stop reason: SDUPTHER

## 2017-07-19 RX ORDER — ALBUTEROL SULFATE 90 UG/1
2 AEROSOL, METERED RESPIRATORY (INHALATION)
Qty: 1 INHALER | Refills: 0 | Status: SHIPPED | OUTPATIENT
Start: 2017-07-19 | End: 2017-10-16 | Stop reason: SDUPTHER

## 2017-07-19 NOTE — PROGRESS NOTES
Cintia Rodriguez is a [de-identified] y.o. female presented to clinic for a medication evaluation. Pt denies any pain or concerns at this time. 1. Have you been to the ER, urgent care clinic since your last visit? Hospitalized since your last visit? No    2. Have you seen or consulted any other health care providers outside of the [address] [place] [address] since your last visit? Include any pap smears or colon screening.  No     Learning Assessment 5/21/2014   PRIMARY LEARNER Patient   BARRIERS PRIMARY LEARNER NONE   CO-LEARNER CAREGIVER No   PRIMARY LANGUAGE ENGLISH   LEARNER PREFERENCE PRIMARY LISTENING   ANSWERED BY patient   RELATIONSHIP SELF

## 2017-07-19 NOTE — MR AVS SNAPSHOT
Visit Information Date & Time Provider Department Dept. Phone Encounter #  
 7/19/2017 11:00 [place] [address] [address] [phone] [id number] Follow-up Instructions Return in about 3 months (around 10/19/2017) for EOV,  and drug screen. Follow-up and Disposition History Upcoming Health Maintenance Date Due DTaP/Tdap/Td series (1 - Tdap) 8/22/2013 INFLUENZA AGE 9 TO ADULT 8/1/2017 MEDICARE YEARLY EXAM 5/31/2018 GLAUCOMA SCREENING Q2Y 11/3/2018 Allergies as of 7/19/2017  Review Complete On: 7/19/2017 By: Francesco Escalante DO No Known Allergies Current Immunizations  Reviewed on 6/21/2010 Name Date Pneumococcal Conjugate (PCV-13) 8/19/2015 Pneumococcal Polysaccharide (PPSV-23) 8/21/2013  2:15 PM  
 TD Vaccine 3/22/2003 Td, Adsorbed PF 8/21/2013  2:15 PM  
  
 Not reviewed this visit You Were Diagnosed With   
  
 Codes Comments Arthritis     ICD-10-CM: M19.90 ICD-9-CM: 716.90 Localized edema     ICD-10-CM: R60.0 ICD-9-CM: 916. 3 Chronic eczema     ICD-10-CM: L30.9 ICD-9-CM: 692.9 Pure hypercholesterolemia     ICD-10-CM: E78.00 ICD-9-CM: 272.0 Essential hypertension, malignant     ICD-10-CM: I10 
ICD-9-CM: 401.0 Viral upper respiratory tract infection     ICD-10-CM: J06.9, B97.89 ICD-9-CM: 465.9 Vitals BP Pulse Temp Resp Height(growth percentile) Weight(growth percentile) 124/62 (BP 1 Location: Left arm, BP Patient Position: Sitting) 66 97.8 °F (36.6 °C) (Oral) 16 5' 6\" (1.676 m) 233 lb 6.4 oz (105.9 kg) SpO2 BMI OB Status Smoking Status 96% 37.67 kg/m2 Hysterectomy Never Smoker BMI and BSA Data Body Mass Index Body Surface Area  
 37.67 kg/m 2 2.22 m 2 Preferred Pharmacy Pharmacy Name Phone [address] [phone] Your Updated Medication List  
  
   
 This list is accurate as of: 7/19/17 11:20 AM.  Always use your most recent med list.  
  
  
  
  
 albuterol 90 mcg/actuation inhaler Commonly known as:  PROVENTIL HFA, VENTOLIN HFA, PROAIR HFA Take 2 Puffs by inhalation every six (6) hours as needed for Wheezing. fluocinoNIDE 0.05 % ointment Commonly known as:  LIDEX Apply  to affected area two (2) times a day. furosemide 40 mg tablet Commonly known as:  LASIX Take 1 Tab by mouth daily. Only if needed for edema HYDROcodone-acetaminophen 5-325 mg per tablet Commonly known as:  Ruffus Homme Take 1 Tab by mouth every eight (8) hours as needed for Pain (no more than 2 per day). lisinopril-hydroCHLOROthiazide 20-25 mg per tablet Commonly known as:  Douglas Sinning Take 1 Tab by mouth daily. naproxen 500 mg tablet Commonly known as:  NAPROSYN Take 1 Tab by mouth daily. PriLOSEC 20 mg capsule Generic drug:  omeprazole Take 20 mg by mouth daily. Prescriptions Printed Refills HYDROcodone-acetaminophen (NORCO) 5-325 mg per tablet 0 Sig: Take 1 Tab by mouth every eight (8) hours as needed for Pain (no more than 2 per day). Class: Print Route: Oral  
 furosemide (LASIX) 40 mg tablet 3 Sig: Take 1 Tab by mouth daily. Only if needed for edema Class: Print Route: Oral  
 albuterol (PROVENTIL HFA, VENTOLIN HFA, PROAIR HFA) 90 mcg/actuation inhaler 0 Sig: Take 2 Puffs by inhalation every six (6) hours as needed for Wheezing. Class: Print Route: Inhalation Follow-up Instructions Return in about 3 months (around 10/19/2017) for EOV,  and drug screen. Introducing [facility] & HEALTH SERVICES! Dear Aliya Boyer: 
Thank you for requesting a Guanghetang account. Our records indicate that you already have an active Guanghetang account. You can access your account anytime at [email or web address] Did you know that you can access your hospital and ER discharge instructions at any time in [employer]? You can also review all of your test results from your hospital stay or ER visit. Additional Information If you have questions, please visit the Frequently Asked Questions section of the [employer] website at [email or web address] Remember, [employer] is NOT to be used for urgent needs. For medical emergencies, dial 911. Now available from your iPhone and Android! Please provide this summary of care documentation to your next provider. Your primary care clinician is listed as [phone] [facility]. If you have any questions after today's visit, please call [phone].

## 2017-07-19 NOTE — PROGRESS NOTES
Darnell Crowell is a [de-identified] y.o.  female and presents with    Chief Complaint   Patient presents with    Hypertension    Pain (Chronic)    Arthritis    GERD           Subjective:    Cardiovascular Review:  The patient has hypertension. Diet and Lifestyle: not attempting to follow a low fat, low cholesterol diet, not attempting to follow a low sodium diet  Home BP Monitoring: is not measured at home. Pertinent ROS: taking medications as instructed, no medication side effects noted, no TIA's, no chest pain on exertion, no dyspnea on exertion, no swelling of ankles. Osteoarthritis and Chronic Pain:  Patient has osteoarthritis, primarily affecting the diffuse. Symptoms onset: problem is longstanding. Rheumatological ROS: no current joint or muscle symptoms, essentially pain-free. Response to treatment plan: waxing and waning but worse overall. Darnell Crowell RTC today to discuss her osteoarthritis that is affecting her diffuse. Significant changes since last visit: none. She is  able to do her normal daily activities. She reports the following adverse side effects: none. Least pain over the last week has been 2/10. Worst pain over the last week has been 2/10. Aberrant behaviors: None. Urine Drug Screen: reviewed and up to date. Pain agreement on file: Completed today.  reviewed: yes.    Pill count is consistent with her prescription: yes  Concomitant use of a benzodiazepine: no    Opioid Risk Tool Reviewedplan; sports med: Pathophysiology, recovery and rehabilitation process discussed and questions answered   yes                      Additional Concerns:          Patient Active Problem List    Diagnosis Date Noted    Subclinical hyperthyroidism 05/30/2017    Arthritis 09/20/2010    Essential hypertension, malignant 03/22/2010    Pure hypercholesterolemia 03/22/2010    Edema 03/22/2010    Chronic eczema 03/22/2010     Current Outpatient Prescriptions   Medication Sig Dispense Refill    omeprazole (PRILOSEC) 20 mg capsule Take 20 mg by mouth daily.  HYDROcodone-acetaminophen (NORCO) 5-325 mg per tablet Take 1 Tab by mouth every eight (8) hours as needed for Pain (no more than 2 per day). 60 Tab 0    furosemide (LASIX) 40 mg tablet Take 1 Tab by mouth daily. Only if needed for edema 30 Tab 3    lisinopril-hydroCHLOROthiazide (PRINZIDE, ZESTORETIC) 20-25 mg per tablet Take 1 Tab by mouth daily. 90 Tab 4    naproxen (NAPROSYN) 500 mg tablet Take 1 Tab by mouth daily. 90 Tab 4    fluocinoNIDE (LIDEX) 0.05 % ointment Apply  to affected area two (2) times a day. 60 g 4     No Known Allergies  Past Medical History:   Diagnosis Date    Arthritis 9/20/2010    Chronic eczema 3/22/2010    Edema 3/22/2010    Essential hypertension, malignant 3/22/2010    Pure hypercholesterolemia 3/22/2010    Subclinical hyperthyroidism 5/30/2017    Unspecified arthropathy, shoulder region 3/22/2010    Unspecified hypothyroidism 3/22/2010     Past Surgical History:   Procedure Laterality Date    HX GYN  1983    hysterectomy    HX HYSTERECTOMY  1984    fibroids    HX KNEE REPLACEMENT Right      Family History   Problem Relation Age of Onset    Hypertension Mother     Breast Cancer Mother 70    Breast Cancer Child 36     Social History   Substance Use Topics    Smoking status: Never Smoker    Smokeless tobacco: Never Used    Alcohol use 1.5 oz/week     3 Standard drinks or equivalent per week       ROS       All other systems reviewed and are negative.       Objective:Vitals:    07/19/17 1112   BP: 124/62   Pulse: 66   Resp: 16   Temp: 97.8 °F (36.6 °C)   TempSrc: Oral   SpO2: 96%   Weight: 233 lb 6.4 oz (105.9 kg)   Height: 5' 6\" (1.676 m)   PainSc:   0 - No pain                 alert, well appearing, and in no distress, oriented to person, place, and time and normal appearing weight  Chest - clear to auscultation, no wheezes, rales or rhonchi, symmetric air entry  Heart - normal rate, regular rhythm, normal S1, S2, no murmurs, rubs, clicks or gallops  Abdomen - soft, nontender, nondistended, no masses or organomegaly        LABS     TESTS      Assessment/Plan:    Hypertension - stable  Chronic pain mnaged  arthritsi stable  gerd stable  Note handicap form completed today      Lab review: labs are reviewed, up to date and normal      I have discussed the diagnosis with the patient and the intended plan as seen in the above orders. The patient has received an after-visit summary and questions were answered concerning future plans. I have discussed medication side effects and warnings with the patient as well. I have reviewed the plan of care with the patient, accepted their input and they are in agreement with the treatment goals. Follow-up Disposition: Not on File   More than 1/2 of this 40 minute visit was spent in counselling and coordination of care, as described above.

## 2017-10-16 ENCOUNTER — OFFICE VISIT (OUTPATIENT)
Dept: FAMILY MEDICINE CLINIC | Age: 82
End: 2017-10-16

## 2017-10-16 ENCOUNTER — HOSPITAL ENCOUNTER (OUTPATIENT)
Dept: LAB | Age: 82
Discharge: HOME | End: 2017-10-16
Payer: MEDICARE

## 2017-10-16 VITALS
BODY MASS INDEX: 37.25 KG/M2 | OXYGEN SATURATION: 96 % | WEIGHT: 231.8 LBS | HEART RATE: 65 BPM | TEMPERATURE: 95.9 F | SYSTOLIC BLOOD PRESSURE: 140 MMHG | DIASTOLIC BLOOD PRESSURE: 71 MMHG | HEIGHT: 66 IN | RESPIRATION RATE: 16 BRPM

## 2017-10-16 DIAGNOSIS — I10 ESSENTIAL HYPERTENSION, MALIGNANT: ICD-10-CM

## 2017-10-16 DIAGNOSIS — M19.90 ARTHRITIS: ICD-10-CM

## 2017-10-16 DIAGNOSIS — E78.00 PURE HYPERCHOLESTEROLEMIA: ICD-10-CM

## 2017-10-16 DIAGNOSIS — G89.4 CHRONIC PAIN SYNDROME: Primary | ICD-10-CM

## 2017-10-16 DIAGNOSIS — L30.9 CHRONIC ECZEMA: ICD-10-CM

## 2017-10-16 DIAGNOSIS — R60.0 LOCALIZED EDEMA: ICD-10-CM

## 2017-10-16 DIAGNOSIS — G89.4 CHRONIC PAIN SYNDROME: ICD-10-CM

## 2017-10-16 LAB
ALBUMIN SERPL-MCNC: 3.5 G/DL (ref 3.4–5)
ALBUMIN/GLOB SERPL: 1 {RATIO} (ref 0.8–1.7)
ALP SERPL-CCNC: 157 U/L (ref 45–117)
ALT SERPL-CCNC: 29 U/L (ref 13–56)
ANION GAP SERPL CALC-SCNC: 4 MMOL/L (ref 3–18)
AST SERPL-CCNC: 22 U/L (ref 15–37)
BILIRUB SERPL-MCNC: 0.3 MG/DL (ref 0.2–1)
BUN SERPL-MCNC: 23 MG/DL (ref 7–18)
BUN/CREAT SERPL: 15 (ref 12–20)
CALCIUM SERPL-MCNC: 9.7 MG/DL (ref 8.5–10.1)
CHLORIDE SERPL-SCNC: 105 MMOL/L (ref 100–108)
CO2 SERPL-SCNC: 33 MMOL/L (ref 21–32)
CREAT SERPL-MCNC: 1.5 MG/DL (ref 0.6–1.3)
GLOBULIN SER CALC-MCNC: 3.6 G/DL (ref 2–4)
GLUCOSE SERPL-MCNC: 88 MG/DL (ref 74–99)
POTASSIUM SERPL-SCNC: 4 MMOL/L (ref 3.5–5.5)
PROT SERPL-MCNC: 7.1 G/DL (ref 6.4–8.2)
SODIUM SERPL-SCNC: 142 MMOL/L (ref 136–145)
T4 FREE SERPL-MCNC: 1.1 NG/DL (ref 0.7–1.5)
TSH SERPL DL<=0.05 MIU/L-ACNC: <0.01 UIU/ML (ref 0.36–3.74)

## 2017-10-16 PROCEDURE — 80053 COMPREHEN METABOLIC PANEL: CPT | Performed by: FAMILY MEDICINE

## 2017-10-16 PROCEDURE — 36415 COLL VENOUS BLD VENIPUNCTURE: CPT | Performed by: FAMILY MEDICINE

## 2017-10-16 PROCEDURE — 80307 DRUG TEST PRSMV CHEM ANLYZR: CPT | Performed by: FAMILY MEDICINE

## 2017-10-16 PROCEDURE — 84439 ASSAY OF FREE THYROXINE: CPT | Performed by: FAMILY MEDICINE

## 2017-10-16 PROCEDURE — 84443 ASSAY THYROID STIM HORMONE: CPT | Performed by: FAMILY MEDICINE

## 2017-10-16 RX ORDER — FUROSEMIDE 40 MG/1
40 TABLET ORAL DAILY
Qty: 30 TAB | Refills: 3 | Status: SHIPPED | OUTPATIENT
Start: 2017-10-16 | End: 2018-01-16 | Stop reason: SDUPTHER

## 2017-10-16 RX ORDER — LISINOPRIL AND HYDROCHLOROTHIAZIDE 20; 25 MG/1; MG/1
1 TABLET ORAL DAILY
Qty: 90 TAB | Refills: 4 | Status: SHIPPED | OUTPATIENT
Start: 2017-10-16 | End: 2018-01-16 | Stop reason: SDUPTHER

## 2017-10-16 RX ORDER — FLUOCINONIDE 0.5 MG/G
OINTMENT TOPICAL 2 TIMES DAILY
Qty: 60 G | Refills: 4 | Status: SHIPPED | OUTPATIENT
Start: 2017-10-16 | End: 2018-01-16 | Stop reason: SDUPTHER

## 2017-10-16 RX ORDER — NAPROXEN 500 MG/1
500 TABLET ORAL DAILY
Qty: 90 TAB | Refills: 4 | Status: SHIPPED | OUTPATIENT
Start: 2017-10-16 | End: 2018-01-16 | Stop reason: SDUPTHER

## 2017-10-16 RX ORDER — HYDROCODONE BITARTRATE AND ACETAMINOPHEN 5; 325 MG/1; MG/1
1 TABLET ORAL
Qty: 60 TAB | Refills: 0 | Status: SHIPPED | OUTPATIENT
Start: 2017-10-16 | End: 2018-01-16 | Stop reason: SDUPTHER

## 2017-10-16 RX ORDER — ALBUTEROL SULFATE 90 UG/1
2 AEROSOL, METERED RESPIRATORY (INHALATION)
Qty: 1 INHALER | Refills: 0 | Status: SHIPPED | OUTPATIENT
Start: 2017-10-16 | End: 2018-01-16 | Stop reason: SDUPTHER

## 2017-10-16 NOTE — PROGRESS NOTES
Tony Saldivar is a [de-identified] y.o. female presents today for follow up on her arthritis. Pt is in Room # 4        1. Have you been to the ER, urgent care clinic since your last visit? Hospitalized since your last visit? No    2. Have you seen or consulted any other health care providers outside of the [address] [place] [address] since your last visit? Include any pap smears or colon screening.  No

## 2017-10-16 NOTE — PROGRESS NOTES
Rosio Sainz is a [de-identified] y.o.  female and presents with    Chief Complaint   Patient presents with    Cholesterol Problem    Hypertension    Ankle swelling    Rash    Arthritis           Subjective:    Cardiovascular Review:  The patient has hypertension and hyperlipidemia. Diet and Lifestyle: not attempting to follow a low fat, low cholesterol diet, not attempting to follow a low sodium diet  Home BP Monitoring: is not measured at home. Pertinent ROS: taking medications as instructed, no medication side effects noted, no TIA's, no chest pain on exertion, no dyspnea on exertion, no swelling of ankles. Thyroid Review:  Patient is seen for followup of subclinical hyperthyroidism. Thyroid ROS: denies fatigue, weight changes, heat/cold intolerance, bowel/skin changes or CVS symptoms. Osteoarthritis and Chronic Pain:  Patient has osteoarthritis, primarily affecting the diffuse. Symptoms onset: problem is longstanding. Rheumatological ROS: stable, mild-to-moderate joint symptoms intermittently, reasonably well controlled by PRN meds. Response to treatment plan: stable. Eczema stable on meds  Edema mild stable on meds          Additional Concerns:          Patient Active Problem List    Diagnosis Date Noted    Chronic pain syndrome 10/16/2017    Subclinical hyperthyroidism 05/30/2017    Arthritis 09/20/2010    Essential hypertension, malignant 03/22/2010    Pure hypercholesterolemia 03/22/2010    Edema 03/22/2010    Chronic eczema 03/22/2010     Current Outpatient Prescriptions   Medication Sig Dispense Refill    HYDROcodone-acetaminophen (NORCO) 5-325 mg per tablet Take 1 Tab by mouth every eight (8) hours as needed for Pain (no more than 2 per day). 60 Tab 0    furosemide (LASIX) 40 mg tablet Take 1 Tab by mouth daily.  Only if needed for edema 30 Tab 3    albuterol (PROVENTIL HFA, VENTOLIN HFA, PROAIR HFA) 90 mcg/actuation inhaler Take 2 Puffs by inhalation every six (6) hours as needed for Wheezing. 1 Inhaler 0    lisinopril-hydroCHLOROthiazide (PRINZIDE, ZESTORETIC) 20-25 mg per tablet Take 1 Tab by mouth daily. 90 Tab 4    naproxen (NAPROSYN) 500 mg tablet Take 1 Tab by mouth daily. 90 Tab 4    fluocinoNIDE (LIDEX) 0.05 % ointment Apply  to affected area two (2) times a day. 60 g 4    omeprazole (PRILOSEC) 20 mg capsule Take 20 mg by mouth daily. No Known Allergies  Past Medical History:   Diagnosis Date    Arthritis 9/20/2010    Chronic eczema 3/22/2010    Edema 3/22/2010    Essential hypertension, malignant 3/22/2010    Pure hypercholesterolemia 3/22/2010    Subclinical hyperthyroidism 5/30/2017    Unspecified arthropathy, shoulder region 3/22/2010    Unspecified hypothyroidism 3/22/2010     Past Surgical History:   Procedure Laterality Date    HX GYN  1983    hysterectomy    HX HYSTERECTOMY  1984    fibroids    HX KNEE REPLACEMENT Right      Family History   Problem Relation Age of Onset    Hypertension Mother     Breast Cancer Mother 70    Breast Cancer Child 36     Social History   Substance Use Topics    Smoking status: Never Smoker    Smokeless tobacco: Never Used    Alcohol use 1.5 oz/week     3 Standard drinks or equivalent per week       ROS       All other systems reviewed and are negative.       Objective:  Vitals:    10/16/17 1115   BP: 140/71   Pulse: 65   Resp: 16   Temp: 95.9 °F (35.5 °C)   TempSrc: Oral   SpO2: 96%   Weight: 231 lb 12.8 oz (105.1 kg)   Height: 5' 6\" (1.676 m)   PainSc:   8   PainLoc: Knee                 alert, well appearing, and in no distress, oriented to person, place, and time and normal appearing weight  Chest - clear to auscultation, no wheezes, rales or rhonchi, symmetric air entry  Heart - normal rate, regular rhythm, normal S1, S2, no murmurs, rubs, clicks or gallops  Abdomen - soft, nontender, nondistended, no masses or organomegaly        LABS     TESTS      Assessment/Plan:    Hypertension - stable  Hyperlipidemia - stable  Thyroid stable  Edema stable  Eczema stable  Amalia Galvan RTC today to follow up on chronic pain diagnosis. We discussed her osteoarthritis that is affecting her back. Significant changes since last visit: none. She is  able to do her normal daily activities. She reports the following adverse side effects: none. Least pain over the last week has been 2/10. Worst pain over the last week has been 2/10. Opioid Risk Tool Reviewed: YES    Aberrant behaviors: None. Urine Drug Screen: due - order placed. Controlled substance agreement on file: YES.  reviewed:yes    Pill count is consistent with her prescription: yes    Concomitant use of a benzodiazepine: no             Also,  abstinence syndrome was reviewed and discussed with her today N/A      Lab review: labs are reviewed, up to date and normal    Diagnoses and all orders for this visit:    1. Chronic pain syndrome  -     HYDROcodone-acetaminophen (NORCO) 5-325 mg per tablet; Take 1 Tab by mouth every eight (8) hours as needed for Pain (no more than 2 per day). -     furosemide (LASIX) 40 mg tablet; Take 1 Tab by mouth daily. Only if needed for edema  -     albuterol (PROVENTIL HFA, VENTOLIN HFA, PROAIR HFA) 90 mcg/actuation inhaler; Take 2 Puffs by inhalation every six (6) hours as needed for Wheezing.  -     lisinopril-hydroCHLOROthiazide (PRINZIDE, ZESTORETIC) 20-25 mg per tablet; Take 1 Tab by mouth daily.  -     naproxen (NAPROSYN) 500 mg tablet; Take 1 Tab by mouth daily. -     fluocinoNIDE (LIDEX) 0.05 % ointment; Apply  to affected area two (2) times a day. -     TSH 3RD GENERATION; Future  -     T4, FREE; Future  -     METABOLIC PANEL, COMPREHENSIVE; Future  -     COMPLIANCE DRUG SCREEN/PRESCRIPTION MONITORING; Future    2. Arthritis  -     HYDROcodone-acetaminophen (NORCO) 5-325 mg per tablet; Take 1 Tab by mouth every eight (8) hours as needed for Pain (no more than 2 per day).   -     furosemide (LASIX) 40 mg tablet; Take 1 Tab by mouth daily. Only if needed for edema  -     albuterol (PROVENTIL HFA, VENTOLIN HFA, PROAIR HFA) 90 mcg/actuation inhaler; Take 2 Puffs by inhalation every six (6) hours as needed for Wheezing.  -     lisinopril-hydroCHLOROthiazide (PRINZIDE, ZESTORETIC) 20-25 mg per tablet; Take 1 Tab by mouth daily.  -     naproxen (NAPROSYN) 500 mg tablet; Take 1 Tab by mouth daily. -     fluocinoNIDE (LIDEX) 0.05 % ointment; Apply  to affected area two (2) times a day. -     TSH 3RD GENERATION; Future  -     T4, FREE; Future  -     METABOLIC PANEL, COMPREHENSIVE; Future  -     COMPLIANCE DRUG SCREEN/PRESCRIPTION MONITORING; Future    3. Localized edema  -     HYDROcodone-acetaminophen (NORCO) 5-325 mg per tablet; Take 1 Tab by mouth every eight (8) hours as needed for Pain (no more than 2 per day). -     furosemide (LASIX) 40 mg tablet; Take 1 Tab by mouth daily. Only if needed for edema  -     albuterol (PROVENTIL HFA, VENTOLIN HFA, PROAIR HFA) 90 mcg/actuation inhaler; Take 2 Puffs by inhalation every six (6) hours as needed for Wheezing.  -     lisinopril-hydroCHLOROthiazide (PRINZIDE, ZESTORETIC) 20-25 mg per tablet; Take 1 Tab by mouth daily.  -     naproxen (NAPROSYN) 500 mg tablet; Take 1 Tab by mouth daily. -     fluocinoNIDE (LIDEX) 0.05 % ointment; Apply  to affected area two (2) times a day. -     TSH 3RD GENERATION; Future  -     T4, FREE; Future  -     METABOLIC PANEL, COMPREHENSIVE; Future  -     COMPLIANCE DRUG SCREEN/PRESCRIPTION MONITORING; Future    4. Chronic eczema  -     HYDROcodone-acetaminophen (NORCO) 5-325 mg per tablet; Take 1 Tab by mouth every eight (8) hours as needed for Pain (no more than 2 per day). -     furosemide (LASIX) 40 mg tablet; Take 1 Tab by mouth daily. Only if needed for edema  -     albuterol (PROVENTIL HFA, VENTOLIN HFA, PROAIR HFA) 90 mcg/actuation inhaler;  Take 2 Puffs by inhalation every six (6) hours as needed for Wheezing.  - lisinopril-hydroCHLOROthiazide (PRINZIDE, ZESTORETIC) 20-25 mg per tablet; Take 1 Tab by mouth daily.  -     naproxen (NAPROSYN) 500 mg tablet; Take 1 Tab by mouth daily. -     fluocinoNIDE (LIDEX) 0.05 % ointment; Apply  to affected area two (2) times a day. -     TSH 3RD GENERATION; Future  -     T4, FREE; Future  -     METABOLIC PANEL, COMPREHENSIVE; Future  -     COMPLIANCE DRUG SCREEN/PRESCRIPTION MONITORING; Future    5. Pure hypercholesterolemia  -     HYDROcodone-acetaminophen (NORCO) 5-325 mg per tablet; Take 1 Tab by mouth every eight (8) hours as needed for Pain (no more than 2 per day). -     furosemide (LASIX) 40 mg tablet; Take 1 Tab by mouth daily. Only if needed for edema  -     albuterol (PROVENTIL HFA, VENTOLIN HFA, PROAIR HFA) 90 mcg/actuation inhaler; Take 2 Puffs by inhalation every six (6) hours as needed for Wheezing.  -     lisinopril-hydroCHLOROthiazide (PRINZIDE, ZESTORETIC) 20-25 mg per tablet; Take 1 Tab by mouth daily.  -     naproxen (NAPROSYN) 500 mg tablet; Take 1 Tab by mouth daily. -     fluocinoNIDE (LIDEX) 0.05 % ointment; Apply  to affected area two (2) times a day. -     TSH 3RD GENERATION; Future  -     T4, FREE; Future  -     METABOLIC PANEL, COMPREHENSIVE; Future  -     COMPLIANCE DRUG SCREEN/PRESCRIPTION MONITORING; Future    6. Essential hypertension, malignant  -     HYDROcodone-acetaminophen (NORCO) 5-325 mg per tablet; Take 1 Tab by mouth every eight (8) hours as needed for Pain (no more than 2 per day). -     furosemide (LASIX) 40 mg tablet; Take 1 Tab by mouth daily. Only if needed for edema  -     albuterol (PROVENTIL HFA, VENTOLIN HFA, PROAIR HFA) 90 mcg/actuation inhaler; Take 2 Puffs by inhalation every six (6) hours as needed for Wheezing.  -     lisinopril-hydroCHLOROthiazide (PRINZIDE, ZESTORETIC) 20-25 mg per tablet; Take 1 Tab by mouth daily.  -     naproxen (NAPROSYN) 500 mg tablet; Take 1 Tab by mouth daily.   -     fluocinoNIDE (LIDEX) 0.05 % ointment; Apply  to affected area two (2) times a day. -     TSH 3RD GENERATION; Future  -     T4, FREE; Future  -     METABOLIC PANEL, COMPREHENSIVE; Future  -     COMPLIANCE DRUG SCREEN/PRESCRIPTION MONITORING; Future          I have discussed the diagnosis with the patient and the intended plan as seen in the above orders. The patient has received an after-visit summary and questions were answered concerning future plans. I have discussed medication side effects and warnings with the patient as well. I have reviewed the plan of care with the patient, accepted their input and they are in agreement with the treatment goals.      Follow-up Disposition: Not on File

## 2017-10-16 NOTE — MR AVS SNAPSHOT
Visit Information Date & Time Provider Department Dept. Phone Encounter #  
 10/16/2017 11:00 AM Katie Iglesias., [address] [facility] [phone] Follow-up Instructions Return in about 3 months (around 1/16/2018) for EOV,  and drug screen. Follow-up and Disposition History Upcoming Health Maintenance Date Due DTaP/Tdap/Td series (1 - Tdap) 8/22/2013 INFLUENZA AGE 9 TO ADULT 8/1/2017 MEDICARE YEARLY EXAM 5/31/2018 GLAUCOMA SCREENING Q2Y 11/3/2018 Allergies as of 10/16/2017  Review Complete On: 7/19/2017 By: Katie Iglesias., DO No Known Allergies Current Immunizations  Reviewed on 6/21/2010 Name Date Pneumococcal Conjugate (PCV-13) 8/19/2015 Pneumococcal Polysaccharide (PPSV-23) 8/21/2013  2:15 PM  
 TD Vaccine 3/22/2003 Td, Adsorbed PF 8/21/2013  2:15 PM  
  
 Not reviewed this visit You Were Diagnosed With   
  
 Codes Comments Chronic pain syndrome    -  Primary ICD-10-CM: G89.4 ICD-9-CM: 338.4 Arthritis     ICD-10-CM: M19.90 ICD-9-CM: 716.90 Localized edema     ICD-10-CM: R60.0 ICD-9-CM: 994. 3 Chronic eczema     ICD-10-CM: L30.9 ICD-9-CM: 692.9 Pure hypercholesterolemia     ICD-10-CM: E78.00 ICD-9-CM: 272.0 Essential hypertension, malignant     ICD-10-CM: I10 
ICD-9-CM: 401.0 Vitals BP Pulse Temp Resp Height(growth percentile) Weight(growth percentile) 140/71 (BP 1 Location: Right arm, BP Patient Position: Sitting) 65 95.9 °F (35.5 °C) (Oral) 16 5' 6\" (1.676 m) 231 lb 12.8 oz (105.1 kg) SpO2 BMI OB Status Smoking Status 96% 37.41 kg/m2 Hysterectomy Never Smoker BMI and BSA Data Body Mass Index Body Surface Area  
 37.41 kg/m 2 2.21 m 2 Preferred Pharmacy Pharmacy Name Phone [address] [phone] Your Updated Medication List  
  
   
 This list is accurate as of: 10/16/17 11:26 AM.  Always use your most recent med list.  
  
  
  
  
 albuterol 90 mcg/actuation inhaler Commonly known as:  PROVENTIL HFA, VENTOLIN HFA, PROAIR HFA Take 2 Puffs by inhalation every six (6) hours as needed for Wheezing. fluocinoNIDE 0.05 % ointment Commonly known as:  LIDEX Apply  to affected area two (2) times a day. furosemide 40 mg tablet Commonly known as:  LASIX Take 1 Tab by mouth daily. Only if needed for edema HYDROcodone-acetaminophen 5-325 mg per tablet Commonly known as:  Iven Zazueta Take 1 Tab by mouth every eight (8) hours as needed for Pain (no more than 2 per day). lisinopril-hydroCHLOROthiazide 20-25 mg per tablet Commonly known as:  Sergey Reeks Take 1 Tab by mouth daily. naproxen 500 mg tablet Commonly known as:  NAPROSYN Take 1 Tab by mouth daily. PriLOSEC 20 mg capsule Generic drug:  omeprazole Take 20 mg by mouth daily. Prescriptions Printed Refills HYDROcodone-acetaminophen (NORCO) 5-325 mg per tablet 0 Sig: Take 1 Tab by mouth every eight (8) hours as needed for Pain (no more than 2 per day). Class: Print Route: Oral  
 furosemide (LASIX) 40 mg tablet 3 Sig: Take 1 Tab by mouth daily. Only if needed for edema Class: Print Route: Oral  
 albuterol (PROVENTIL HFA, VENTOLIN HFA, PROAIR HFA) 90 mcg/actuation inhaler 0 Sig: Take 2 Puffs by inhalation every six (6) hours as needed for Wheezing. Class: Print Route: Inhalation  
 lisinopril-hydroCHLOROthiazide (PRINZIDE, ZESTORETIC) 20-25 mg per tablet 4 Sig: Take 1 Tab by mouth daily. Class: Print Route: Oral  
 naproxen (NAPROSYN) 500 mg tablet 4 Sig: Take 1 Tab by mouth daily. Class: Print Route: Oral  
 fluocinoNIDE (LIDEX) 0.05 % ointment 4 Sig: Apply  to affected area two (2) times a day. Class: Print Route: Topical  
  
Follow-up Instructions Return in about 3 months (around 1/16/2018) for EOV,  and drug screen. To-Do List   
 10/16/2017 Lab:  COMPLIANCE DRUG SCREEN/PRESCRIPTION MONITORING   
  
 10/16/2017 Lab:  METABOLIC PANEL, COMPREHENSIVE   
  
 10/16/2017 Lab:  T4, FREE   
  
 10/16/2017 Lab:  TSH 3RD GENERATION Introducing [facility] SERVICES! Dear Guadalupe Guevara: 
Thank you for requesting a [facility] account. Our records indicate that you already have an active [facility] account. You can access your account anytime at [email or web address] Did you know that you can access your hospital and ER discharge instructions at any time in [facility]? You can also review all of your test results from your hospital stay or ER visit. Additional Information If you have questions, please visit the Frequently Asked Questions section of the [facility] website at [email or web address] Remember, [facility] is NOT to be used for urgent needs. For medical emergencies, dial 911. Now available from your iPhone and Android! Please provide this summary of care documentation to your next provider. Your primary care clinician is listed as [id number] [facility]. If you have any questions after today's visit, please call [phone].

## 2017-10-22 LAB — DRUGS UR: NORMAL

## 2018-01-16 ENCOUNTER — OFFICE VISIT (OUTPATIENT)
Dept: FAMILY MEDICINE CLINIC | Age: 83
End: 2018-01-16

## 2018-01-16 VITALS
HEIGHT: 66 IN | DIASTOLIC BLOOD PRESSURE: 65 MMHG | OXYGEN SATURATION: 97 % | SYSTOLIC BLOOD PRESSURE: 125 MMHG | RESPIRATION RATE: 16 BRPM | TEMPERATURE: 97.6 F | HEART RATE: 68 BPM | WEIGHT: 221.2 LBS | BODY MASS INDEX: 35.55 KG/M2

## 2018-01-16 DIAGNOSIS — J06.9 VIRAL UPPER RESPIRATORY TRACT INFECTION: Primary | ICD-10-CM

## 2018-01-16 DIAGNOSIS — L30.9 CHRONIC ECZEMA: ICD-10-CM

## 2018-01-16 DIAGNOSIS — E05.90 SUBCLINICAL HYPERTHYROIDISM: ICD-10-CM

## 2018-01-16 DIAGNOSIS — R60.0 LOCALIZED EDEMA: ICD-10-CM

## 2018-01-16 DIAGNOSIS — G89.4 CHRONIC PAIN SYNDROME: ICD-10-CM

## 2018-01-16 DIAGNOSIS — M19.90 ARTHRITIS: ICD-10-CM

## 2018-01-16 DIAGNOSIS — I10 ESSENTIAL HYPERTENSION, MALIGNANT: ICD-10-CM

## 2018-01-16 DIAGNOSIS — E78.00 PURE HYPERCHOLESTEROLEMIA: ICD-10-CM

## 2018-01-16 LAB
QUICKVUE INFLUENZA TEST: NEGATIVE
S PYO AG THROAT QL: NEGATIVE
VALID INTERNAL CONTROL?: YES
VALID INTERNAL CONTROL?: YES

## 2018-01-16 RX ORDER — FUROSEMIDE 40 MG/1
40 TABLET ORAL DAILY
Qty: 30 TAB | Refills: 3 | Status: SHIPPED | OUTPATIENT
Start: 2018-01-16 | End: 2018-05-16 | Stop reason: SDUPTHER

## 2018-01-16 RX ORDER — FLUOCINONIDE 0.5 MG/G
OINTMENT TOPICAL 2 TIMES DAILY
Qty: 60 G | Refills: 4 | Status: SHIPPED | OUTPATIENT
Start: 2018-01-16 | End: 2018-05-16 | Stop reason: SDUPTHER

## 2018-01-16 RX ORDER — CODEINE PHOSPHATE AND GUAIFENESIN 10; 100 MG/5ML; MG/5ML
5 SOLUTION ORAL
Qty: 120 ML | Refills: 1 | Status: SHIPPED | OUTPATIENT
Start: 2018-01-16 | End: 2018-05-16

## 2018-01-16 RX ORDER — ALBUTEROL SULFATE 90 UG/1
2 AEROSOL, METERED RESPIRATORY (INHALATION)
Qty: 1 INHALER | Refills: 0 | Status: SHIPPED | OUTPATIENT
Start: 2018-01-16 | End: 2018-05-16 | Stop reason: SDUPTHER

## 2018-01-16 RX ORDER — HYDROCODONE BITARTRATE AND ACETAMINOPHEN 5; 325 MG/1; MG/1
1 TABLET ORAL
Qty: 60 TAB | Refills: 0 | Status: SHIPPED | OUTPATIENT
Start: 2018-01-16 | End: 2018-05-16 | Stop reason: SDUPTHER

## 2018-01-16 RX ORDER — LISINOPRIL AND HYDROCHLOROTHIAZIDE 20; 25 MG/1; MG/1
1 TABLET ORAL DAILY
Qty: 90 TAB | Refills: 4 | Status: SHIPPED | OUTPATIENT
Start: 2018-01-16 | End: 2018-05-16 | Stop reason: SDUPTHER

## 2018-01-16 RX ORDER — NAPROXEN 500 MG/1
500 TABLET ORAL DAILY
Qty: 90 TAB | Refills: 4 | Status: SHIPPED | OUTPATIENT
Start: 2018-01-16 | End: 2018-05-16 | Stop reason: SDUPTHER

## 2018-01-16 NOTE — PROGRESS NOTES
Yancy Butt is a [de-identified] y.o.  female and presents with    Chief Complaint   Patient presents with    URI    Arthritis    Hypertension    Cholesterol Problem    Swelling    Thyroid Problem    Pain (Chronic)           Subjective:    Cardiovascular Review:  The patient has hypertension and hyperlipidemia. Diet and Lifestyle: not attempting to follow a low fat, low cholesterol diet, not attempting to follow a low sodium diet  Home BP Monitoring: is not measured at home. Pertinent ROS: taking medications as instructed, no medication side effects noted, no TIA's, no chest pain on exertion, no dyspnea on exertion, no swelling of ankles. Thyroid Review:  Patient is seen for followup of hypothyroidism. Thyroid ROS: denies fatigue, weight changes, heat/cold intolerance, bowel/skin changes or CVS symptoms. Osteoarthritis and Chronic Pain:  Patient has osteoarthritis, primarily affecting the diffuse. Symptoms onset: problem is longstanding. Rheumatological ROS: no current joint or muscle symptoms, essentially pain-free. Response to treatment plan: stable. Additional Concerns: uri runny nose cough cold congestion         Patient Active Problem List    Diagnosis Date Noted    Chronic pain syndrome 10/16/2017    Subclinical hyperthyroidism 05/30/2017    Arthritis 09/20/2010    Essential hypertension, malignant 03/22/2010    Pure hypercholesterolemia 03/22/2010    Edema 03/22/2010    Chronic eczema 03/22/2010     Current Outpatient Prescriptions   Medication Sig Dispense Refill    HYDROcodone-acetaminophen (NORCO) 5-325 mg per tablet Take 1 Tab by mouth every eight (8) hours as needed for Pain (no more than 2 per day). 60 Tab 0    furosemide (LASIX) 40 mg tablet Take 1 Tab by mouth daily. Only if needed for edema 30 Tab 3    albuterol (PROVENTIL HFA, VENTOLIN HFA, PROAIR HFA) 90 mcg/actuation inhaler Take 2 Puffs by inhalation every six (6) hours as needed for Wheezing.  1 Inhaler 0  lisinopril-hydroCHLOROthiazide (PRINZIDE, ZESTORETIC) 20-25 mg per tablet Take 1 Tab by mouth daily. 90 Tab 4    naproxen (NAPROSYN) 500 mg tablet Take 1 Tab by mouth daily. 90 Tab 4    fluocinoNIDE (LIDEX) 0.05 % ointment Apply  to affected area two (2) times a day. 60 g 4    guaiFENesin-codeine (ROBITUSSIN AC) 100-10 mg/5 mL solution Take 5 mL by mouth four (4) times daily as needed for Cough. Max Daily Amount: 20 mL. 120 mL 1    omeprazole (PRILOSEC) 20 mg capsule Take 20 mg by mouth daily. No Known Allergies  Past Medical History:   Diagnosis Date    Arthritis 9/20/2010    Chronic eczema 3/22/2010    Edema 3/22/2010    Essential hypertension, malignant 3/22/2010    Pure hypercholesterolemia 3/22/2010    Subclinical hyperthyroidism 5/30/2017    Unspecified arthropathy, shoulder region 3/22/2010    Unspecified hypothyroidism 3/22/2010     Past Surgical History:   Procedure Laterality Date    HX GYN  1983    hysterectomy    HX HYSTERECTOMY  1984    fibroids    HX KNEE REPLACEMENT Right      Family History   Problem Relation Age of Onset    Hypertension Mother     Breast Cancer Mother 70    Breast Cancer Child 36     Social History   Substance Use Topics    Smoking status: Never Smoker    Smokeless tobacco: Never Used    Alcohol use 1.5 oz/week     3 Standard drinks or equivalent per week       ROS       All other systems reviewed and are negative.       Objective:  Vitals:    01/16/18 0921   BP: 125/65   Pulse: 68   Resp: 16   Temp: 97.6 °F (36.4 °C)   TempSrc: Oral   SpO2: 97%   Weight: 221 lb 3.2 oz (100.3 kg)   Height: 5' 6\" (1.676 m)   PainSc:   0 - No pain                 alert, well appearing, and in no distress, oriented to person, place, and time and normal appearing weight  Ears - bilateral TM's and external ear canals normal  Nose - normal and patent, no erythema, discharge or polyps  Mouth - mucous membranes moist, pharynx normal without lesions  Neck - supple, no significant adenopathy  Lymphatics - no palpable lymphadenopathy, no hepatosplenomegaly  Chest - clear to auscultation, no wheezes, rales or rhonchi, symmetric air entry  Heart - normal rate, regular rhythm, normal S1, S2, no murmurs, rubs, clicks or gallops  Abdomen - soft, nontender, nondistended, no masses or organomegaly        LABS     TESTS      Assessment/Plan:    Hypertension - stable  Hyperlipidemia - stable  arthrits ongiong  This is a chronic problem that is not changed. Per review of available records and patients , there are not sign of overuse, misuse, diversion, or concerning side effects. Today we reviewed: the risk of overdose, addiction, and dependency  The following changes were made to the patients current treatment plan: nothing, medications refilled. Edema stable  Thyroid stable  Uri strep and flu neg            Lab review: labs are reviewed, up to date and normal    Diagnoses and all orders for this visit:    1. Viral upper respiratory tract infection  -     AMB POC RAPID INFLUENZA TEST  -     AMB POC RAPID STREP A  -     guaiFENesin-codeine (ROBITUSSIN AC) 100-10 mg/5 mL solution; Take 5 mL by mouth four (4) times daily as needed for Cough. Max Daily Amount: 20 mL. 2. Chronic pain syndrome  -     HYDROcodone-acetaminophen (NORCO) 5-325 mg per tablet; Take 1 Tab by mouth every eight (8) hours as needed for Pain (no more than 2 per day). -     furosemide (LASIX) 40 mg tablet; Take 1 Tab by mouth daily. Only if needed for edema  -     albuterol (PROVENTIL HFA, VENTOLIN HFA, PROAIR HFA) 90 mcg/actuation inhaler; Take 2 Puffs by inhalation every six (6) hours as needed for Wheezing.  -     lisinopril-hydroCHLOROthiazide (PRINZIDE, ZESTORETIC) 20-25 mg per tablet; Take 1 Tab by mouth daily.  -     naproxen (NAPROSYN) 500 mg tablet; Take 1 Tab by mouth daily. -     fluocinoNIDE (LIDEX) 0.05 % ointment; Apply  to affected area two (2) times a day. 3. Subclinical hyperthyroidism    4. Arthritis  -     HYDROcodone-acetaminophen (NORCO) 5-325 mg per tablet; Take 1 Tab by mouth every eight (8) hours as needed for Pain (no more than 2 per day). -     furosemide (LASIX) 40 mg tablet; Take 1 Tab by mouth daily. Only if needed for edema  -     albuterol (PROVENTIL HFA, VENTOLIN HFA, PROAIR HFA) 90 mcg/actuation inhaler; Take 2 Puffs by inhalation every six (6) hours as needed for Wheezing.  -     lisinopril-hydroCHLOROthiazide (PRINZIDE, ZESTORETIC) 20-25 mg per tablet; Take 1 Tab by mouth daily.  -     naproxen (NAPROSYN) 500 mg tablet; Take 1 Tab by mouth daily. -     fluocinoNIDE (LIDEX) 0.05 % ointment; Apply  to affected area two (2) times a day. 5. Localized edema  -     HYDROcodone-acetaminophen (NORCO) 5-325 mg per tablet; Take 1 Tab by mouth every eight (8) hours as needed for Pain (no more than 2 per day). -     furosemide (LASIX) 40 mg tablet; Take 1 Tab by mouth daily. Only if needed for edema  -     albuterol (PROVENTIL HFA, VENTOLIN HFA, PROAIR HFA) 90 mcg/actuation inhaler; Take 2 Puffs by inhalation every six (6) hours as needed for Wheezing.  -     lisinopril-hydroCHLOROthiazide (PRINZIDE, ZESTORETIC) 20-25 mg per tablet; Take 1 Tab by mouth daily.  -     naproxen (NAPROSYN) 500 mg tablet; Take 1 Tab by mouth daily. -     fluocinoNIDE (LIDEX) 0.05 % ointment; Apply  to affected area two (2) times a day. 6. Chronic eczema  -     HYDROcodone-acetaminophen (NORCO) 5-325 mg per tablet; Take 1 Tab by mouth every eight (8) hours as needed for Pain (no more than 2 per day). -     furosemide (LASIX) 40 mg tablet; Take 1 Tab by mouth daily. Only if needed for edema  -     albuterol (PROVENTIL HFA, VENTOLIN HFA, PROAIR HFA) 90 mcg/actuation inhaler; Take 2 Puffs by inhalation every six (6) hours as needed for Wheezing.  -     lisinopril-hydroCHLOROthiazide (PRINZIDE, ZESTORETIC) 20-25 mg per tablet; Take 1 Tab by mouth daily.  -     naproxen (NAPROSYN) 500 mg tablet;  Take 1 Tab by mouth daily. -     fluocinoNIDE (LIDEX) 0.05 % ointment; Apply  to affected area two (2) times a day. 7. Pure hypercholesterolemia  -     HYDROcodone-acetaminophen (NORCO) 5-325 mg per tablet; Take 1 Tab by mouth every eight (8) hours as needed for Pain (no more than 2 per day). -     furosemide (LASIX) 40 mg tablet; Take 1 Tab by mouth daily. Only if needed for edema  -     albuterol (PROVENTIL HFA, VENTOLIN HFA, PROAIR HFA) 90 mcg/actuation inhaler; Take 2 Puffs by inhalation every six (6) hours as needed for Wheezing.  -     lisinopril-hydroCHLOROthiazide (PRINZIDE, ZESTORETIC) 20-25 mg per tablet; Take 1 Tab by mouth daily.  -     naproxen (NAPROSYN) 500 mg tablet; Take 1 Tab by mouth daily. -     fluocinoNIDE (LIDEX) 0.05 % ointment; Apply  to affected area two (2) times a day. 8. Essential hypertension, malignant  -     HYDROcodone-acetaminophen (NORCO) 5-325 mg per tablet; Take 1 Tab by mouth every eight (8) hours as needed for Pain (no more than 2 per day). -     furosemide (LASIX) 40 mg tablet; Take 1 Tab by mouth daily. Only if needed for edema  -     albuterol (PROVENTIL HFA, VENTOLIN HFA, PROAIR HFA) 90 mcg/actuation inhaler; Take 2 Puffs by inhalation every six (6) hours as needed for Wheezing.  -     lisinopril-hydroCHLOROthiazide (PRINZIDE, ZESTORETIC) 20-25 mg per tablet; Take 1 Tab by mouth daily.  -     naproxen (NAPROSYN) 500 mg tablet; Take 1 Tab by mouth daily. -     fluocinoNIDE (LIDEX) 0.05 % ointment; Apply  to affected area two (2) times a day. I have discussed the diagnosis with the patient and the intended plan as seen in the above orders. The patient has received an after-visit summary and questions were answered concerning future plans. I have discussed medication side effects and warnings with the patient as well. I have reviewed the plan of care with the patient, accepted their input and they are in agreement with the treatment goals.      Follow-up Disposition:  Return in about 4 months (around 5/16/2018) for MWE, physical, labs at next visit, EKG next visit.

## 2018-01-16 NOTE — MR AVS SNAPSHOT
Geovani Pizano 
 
 
 [address] [facility] [id number] 
[phone] Patient: Natalia Poole MRN: [id number] GMR:0/61/1985 Visit Information Date & Time Provider Department Dept. Phone Encounter #  
 1/16/2018  9:00 AM Jeanne Babin [phone] [id number] Follow-up Instructions Return in about 4 months (around 5/16/2018) for MWE, physical, labs at next visit, EKG next visit. Follow-up and Disposition History Upcoming Health Maintenance Date Due DTaP/Tdap/Td series (1 - Tdap) 8/22/2013 Influenza Age 5 to Adult 8/1/2017 MEDICARE YEARLY EXAM 5/31/2018 GLAUCOMA SCREENING Q2Y 11/3/2018 Allergies as of 1/16/2018  Review Complete On: 1/16/2018 By: Velma Perez., DO No Known Allergies Current Immunizations  Reviewed on 6/21/2010 Name Date Pneumococcal Conjugate (PCV-13) 8/19/2015 Pneumococcal Polysaccharide (PPSV-23) 8/21/2013  2:15 PM  
 TD Vaccine 3/22/2003 Td, Adsorbed PF 8/21/2013  2:15 PM  
  
 Not reviewed this visit You Were Diagnosed With   
  
 Codes Comments Viral upper respiratory tract infection    -  Primary ICD-10-CM: J06.9, B97.89 ICD-9-CM: 465.9 Chronic pain syndrome     ICD-10-CM: G89.4 ICD-9-CM: 338.4 Subclinical hyperthyroidism     ICD-10-CM: E05.90 ICD-9-CM: 242.90 Arthritis     ICD-10-CM: M19.90 ICD-9-CM: 716.90 Localized edema     ICD-10-CM: R60.0 ICD-9-CM: 935. 3 Chronic eczema     ICD-10-CM: L30.9 ICD-9-CM: 692.9 Pure hypercholesterolemia     ICD-10-CM: E78.00 ICD-9-CM: 272.0 Essential hypertension, malignant     ICD-10-CM: I10 
ICD-9-CM: 401.0 Vitals BP Pulse Temp Resp Height(growth percentile) Weight(growth percentile) 125/65 (BP 1 Location: Right arm, BP Patient Position: Sitting) 68 97.6 °F (36.4 °C) (Oral) 16 5' 6\" (1.676 m) 221 lb 3.2 oz (100.3 kg) SpO2 BMI OB Status Smoking Status 97% 35.7 kg/m2 Hysterectomy Never Smoker BMI and BSA Data Body Mass Index Body Surface Area 35.7 kg/m 2 2.16 m 2 Preferred Pharmacy Pharmacy Name Phone [address] [phone] Your Updated Medication List  
  
   
This list is accurate as of: 1/16/18  9:44 AM.  Always use your most recent med list.  
  
  
  
  
 albuterol 90 mcg/actuation inhaler Commonly known as:  PROVENTIL HFA, VENTOLIN HFA, PROAIR HFA Take 2 Puffs by inhalation every six (6) hours as needed for Wheezing. fluocinoNIDE 0.05 % ointment Commonly known as:  LIDEX Apply  to affected area two (2) times a day. furosemide 40 mg tablet Commonly known as:  LASIX Take 1 Tab by mouth daily. Only if needed for edema  
  
 guaiFENesin-codeine 100-10 mg/5 mL solution Commonly known as:  ROBITUSSIN AC Take 5 mL by mouth four (4) times daily as needed for Cough. Max Daily Amount: 20 mL. HYDROcodone-acetaminophen 5-325 mg per tablet Commonly known as:  Cheryl Rummage Take 1 Tab by mouth every eight (8) hours as needed for Pain (no more than 2 per day). lisinopril-hydroCHLOROthiazide 20-25 mg per tablet Commonly known as:  Pegge Micheal Take 1 Tab by mouth daily. naproxen 500 mg tablet Commonly known as:  NAPROSYN Take 1 Tab by mouth daily. PriLOSEC 20 mg capsule Generic drug:  omeprazole Take 20 mg by mouth daily. Prescriptions Printed Refills HYDROcodone-acetaminophen (NORCO) 5-325 mg per tablet 0 Sig: Take 1 Tab by mouth every eight (8) hours as needed for Pain (no more than 2 per day). Class: Print Route: Oral  
 furosemide (LASIX) 40 mg tablet 3 Sig: Take 1 Tab by mouth daily. Only if needed for edema Class: Print Route: Oral  
 albuterol (PROVENTIL HFA, VENTOLIN HFA, PROAIR HFA) 90 mcg/actuation inhaler 0  Sig: Take 2 Puffs by inhalation every six (6) hours as needed for Wheezing. Class: Print Route: Inhalation  
 lisinopril-hydroCHLOROthiazide (PRINZIDE, ZESTORETIC) 20-25 mg per tablet 4 Sig: Take 1 Tab by mouth daily. Class: Print Route: Oral  
 naproxen (NAPROSYN) 500 mg tablet 4 Sig: Take 1 Tab by mouth daily. Class: Print Route: Oral  
 fluocinoNIDE (LIDEX) 0.05 % ointment 4 Sig: Apply  to affected area two (2) times a day. Class: Print Route: Topical  
 guaiFENesin-codeine (ROBITUSSIN AC) 100-10 mg/5 mL solution 1 Sig: Take 5 mL by mouth four (4) times daily as needed for Cough. Max Daily Amount: 20 mL. Class: Print Route: Oral  
  
We Performed the Following AMB POC RAPID INFLUENZA TEST [46699 CPT(R)] AMB POC RAPID STREP A [99160 CPT(R)] Follow-up Instructions Return in about 4 months (around 5/16/2018) for MWE, physical, labs at next visit, EKG next visit. Introducing [facility]! Dear Jaye Cones: 
Thank you for requesting a [employer] account. Our records indicate that you already have an active [employer] account. You can access your account anytime at [email or web address] Did you know that you can access your hospital and ER discharge instructions at any time in [employer]? You can also review all of your test results from your hospital stay or ER visit. Additional Information If you have questions, please visit the Frequently Asked Questions section of the Qu[employer] website at [email or web address] Remember, [employer] is NOT to be used for urgent needs. For medical emergencies, dial 911. Now available from your iPhone and Android! Please provide this summary of care documentation to your next provider. Your primary care clinician is listed as [address]. If you have any questions after today's visit, please call [phone].

## 2018-01-16 NOTE — PROGRESS NOTES
Aram Sousa is a [de-identified] y.o. female presents today for follow up on her chronic pain, hypertension, and thyroid. She would also like to discuss continued dry cough, sore throat, chills at night for a week. Pt is in Room # 5        1. Have you been to the ER, urgent care clinic since your last visit? Hospitalized since your last visit? No    2. Have you seen or consulted any other health care providers outside of the [address] [place] [address] since your last visit? Include any pap smears or colon screening.  No

## 2018-03-06 ENCOUNTER — TELEPHONE (OUTPATIENT)
Dept: FAMILY MEDICINE CLINIC | Age: 83
End: 2018-03-06

## 2018-03-06 NOTE — TELEPHONE ENCOUNTER
Pharm called in ref to a medication change [place] Written for 30 day supply and script must state chronic pain for a 30 day supply

## 2018-03-07 NOTE — TELEPHONE ENCOUNTER
Spoke with pharmacist who reports that patient had prescription filled at [facility] instead of the pharmacy requesting clarification of medication. No further action required.

## 2018-05-16 ENCOUNTER — OFFICE VISIT (OUTPATIENT)
Dept: FAMILY MEDICINE CLINIC | Age: 83
End: 2018-05-16

## 2018-05-16 ENCOUNTER — HOSPITAL ENCOUNTER (OUTPATIENT)
Dept: LAB | Age: 83
Discharge: HOME | End: 2018-05-16
Payer: MEDICARE

## 2018-05-16 VITALS
BODY MASS INDEX: 35.58 KG/M2 | OXYGEN SATURATION: 98 % | DIASTOLIC BLOOD PRESSURE: 67 MMHG | HEART RATE: 74 BPM | SYSTOLIC BLOOD PRESSURE: 143 MMHG | HEIGHT: 66 IN | TEMPERATURE: 98.3 F | WEIGHT: 221.4 LBS | RESPIRATION RATE: 16 BRPM

## 2018-05-16 DIAGNOSIS — Z12.31 ENCOUNTER FOR SCREENING MAMMOGRAM FOR MALIGNANT NEOPLASM OF BREAST: ICD-10-CM

## 2018-05-16 DIAGNOSIS — I10 ESSENTIAL HYPERTENSION, MALIGNANT: ICD-10-CM

## 2018-05-16 DIAGNOSIS — M19.90 ARTHRITIS: ICD-10-CM

## 2018-05-16 DIAGNOSIS — E78.00 PURE HYPERCHOLESTEROLEMIA: ICD-10-CM

## 2018-05-16 DIAGNOSIS — R60.0 LOCALIZED EDEMA: ICD-10-CM

## 2018-05-16 DIAGNOSIS — G89.4 CHRONIC PAIN SYNDROME: ICD-10-CM

## 2018-05-16 DIAGNOSIS — L30.9 CHRONIC ECZEMA: ICD-10-CM

## 2018-05-16 DIAGNOSIS — Z00.00 ROUTINE GENERAL MEDICAL EXAMINATION AT A HEALTH CARE FACILITY: ICD-10-CM

## 2018-05-16 DIAGNOSIS — Z00.00 ROUTINE GENERAL MEDICAL EXAMINATION AT A HEALTH CARE FACILITY: Primary | ICD-10-CM

## 2018-05-16 PROBLEM — E66.01 SEVERE OBESITY (BMI 35.0-39.9) WITH COMORBIDITY (HCC): Status: ACTIVE | Noted: 2018-05-16

## 2018-05-16 LAB
ALBUMIN SERPL-MCNC: 3.3 G/DL (ref 3.4–5)
ALBUMIN/GLOB SERPL: 0.8 {RATIO} (ref 0.8–1.7)
ALP SERPL-CCNC: 196 U/L (ref 45–117)
ALT SERPL-CCNC: 22 U/L (ref 13–56)
ANION GAP SERPL CALC-SCNC: 5 MMOL/L (ref 3–18)
APPEARANCE UR: CLEAR
AST SERPL-CCNC: 19 U/L (ref 15–37)
BASOPHILS # BLD: 0 K/UL (ref 0–0.06)
BASOPHILS NFR BLD: 0 % (ref 0–2)
BILIRUB SERPL-MCNC: 0.6 MG/DL (ref 0.2–1)
BILIRUB UR QL: NEGATIVE
BUN SERPL-MCNC: 14 MG/DL (ref 7–18)
BUN/CREAT SERPL: 10 (ref 12–20)
CALCIUM SERPL-MCNC: 10.3 MG/DL (ref 8.5–10.1)
CHLORIDE SERPL-SCNC: 105 MMOL/L (ref 100–108)
CHOLEST SERPL-MCNC: 178 MG/DL
CO2 SERPL-SCNC: 34 MMOL/L (ref 21–32)
COLOR UR: YELLOW
CREAT SERPL-MCNC: 1.34 MG/DL (ref 0.6–1.3)
DIFFERENTIAL METHOD BLD: ABNORMAL
EOSINOPHIL # BLD: 0.5 K/UL (ref 0–0.4)
EOSINOPHIL NFR BLD: 6 % (ref 0–5)
ERYTHROCYTE [DISTWIDTH] IN BLOOD BY AUTOMATED COUNT: 13.8 % (ref 11.6–14.5)
GLOBULIN SER CALC-MCNC: 3.9 G/DL (ref 2–4)
GLUCOSE SERPL-MCNC: 86 MG/DL (ref 74–99)
GLUCOSE UR STRIP.AUTO-MCNC: NEGATIVE MG/DL
HCT VFR BLD AUTO: 38.7 % (ref 35–45)
HDLC SERPL-MCNC: 54 MG/DL (ref 40–60)
HDLC SERPL: 3.3 {RATIO} (ref 0–5)
HGB BLD-MCNC: 12.1 G/DL (ref 12–16)
HGB UR QL STRIP: NEGATIVE
KETONES UR QL STRIP.AUTO: NEGATIVE MG/DL
LDLC SERPL CALC-MCNC: 111.6 MG/DL (ref 0–100)
LEUKOCYTE ESTERASE UR QL STRIP.AUTO: NEGATIVE
LIPID PROFILE,FLP: ABNORMAL
LYMPHOCYTES # BLD: 2 K/UL (ref 0.9–3.6)
LYMPHOCYTES NFR BLD: 22 % (ref 21–52)
MCH RBC QN AUTO: 26.6 PG (ref 24–34)
MCHC RBC AUTO-ENTMCNC: 31.3 G/DL (ref 31–37)
MCV RBC AUTO: 85.1 FL (ref 74–97)
MONOCYTES # BLD: 0.9 K/UL (ref 0.05–1.2)
MONOCYTES NFR BLD: 10 % (ref 3–10)
NEUTS SEG # BLD: 5.6 K/UL (ref 1.8–8)
NEUTS SEG NFR BLD: 62 % (ref 40–73)
NITRITE UR QL STRIP.AUTO: NEGATIVE
PH UR STRIP: 5.5 [PH] (ref 5–8)
PLATELET # BLD AUTO: 175 K/UL (ref 135–420)
PMV BLD AUTO: 12.1 FL (ref 9.2–11.8)
POTASSIUM SERPL-SCNC: 4.4 MMOL/L (ref 3.5–5.5)
PROT SERPL-MCNC: 7.2 G/DL (ref 6.4–8.2)
PROT UR STRIP-MCNC: NEGATIVE MG/DL
RBC # BLD AUTO: 4.55 M/UL (ref 4.2–5.3)
SODIUM SERPL-SCNC: 144 MMOL/L (ref 136–145)
SP GR UR REFRACTOMETRY: 1.02 (ref 1–1.03)
T4 FREE SERPL-MCNC: 1.3 NG/DL (ref 0.7–1.5)
TRIGL SERPL-MCNC: 62 MG/DL (ref ?–150)
TSH SERPL DL<=0.05 MIU/L-ACNC: <0.01 UIU/ML (ref 0.36–3.74)
UROBILINOGEN UR QL STRIP.AUTO: 0.2 EU/DL (ref 0.2–1)
VLDLC SERPL CALC-MCNC: 12.4 MG/DL
WBC # BLD AUTO: 8.9 K/UL (ref 4.6–13.2)

## 2018-05-16 PROCEDURE — 36415 COLL VENOUS BLD VENIPUNCTURE: CPT | Performed by: FAMILY MEDICINE

## 2018-05-16 PROCEDURE — 80061 LIPID PANEL: CPT | Performed by: FAMILY MEDICINE

## 2018-05-16 PROCEDURE — 84443 ASSAY THYROID STIM HORMONE: CPT | Performed by: FAMILY MEDICINE

## 2018-05-16 PROCEDURE — 80307 DRUG TEST PRSMV CHEM ANLYZR: CPT | Performed by: FAMILY MEDICINE

## 2018-05-16 PROCEDURE — 81003 URINALYSIS AUTO W/O SCOPE: CPT | Performed by: FAMILY MEDICINE

## 2018-05-16 PROCEDURE — 80053 COMPREHEN METABOLIC PANEL: CPT | Performed by: FAMILY MEDICINE

## 2018-05-16 PROCEDURE — 85025 COMPLETE CBC W/AUTO DIFF WBC: CPT | Performed by: FAMILY MEDICINE

## 2018-05-16 PROCEDURE — 84439 ASSAY OF FREE THYROXINE: CPT | Performed by: FAMILY MEDICINE

## 2018-05-16 RX ORDER — FUROSEMIDE 40 MG/1
40 TABLET ORAL DAILY
Qty: 30 TAB | Refills: 3 | Status: SHIPPED | OUTPATIENT
Start: 2018-05-16 | End: 2018-11-19 | Stop reason: SDUPTHER

## 2018-05-16 RX ORDER — FLUOCINONIDE 0.5 MG/G
OINTMENT TOPICAL 2 TIMES DAILY
Qty: 60 G | Refills: 4 | Status: SHIPPED | OUTPATIENT
Start: 2018-05-16 | End: 2019-03-26 | Stop reason: SDUPTHER

## 2018-05-16 RX ORDER — HYDROCODONE BITARTRATE AND ACETAMINOPHEN 5; 325 MG/1; MG/1
1 TABLET ORAL
Qty: 60 TAB | Refills: 0 | Status: SHIPPED | OUTPATIENT
Start: 2018-05-16 | End: 2018-11-19 | Stop reason: SDUPTHER

## 2018-05-16 RX ORDER — ALBUTEROL SULFATE 90 UG/1
2 AEROSOL, METERED RESPIRATORY (INHALATION)
Qty: 1 INHALER | Refills: 0 | Status: SHIPPED | OUTPATIENT
Start: 2018-05-16 | End: 2018-11-19 | Stop reason: SDUPTHER

## 2018-05-16 RX ORDER — NAPROXEN 500 MG/1
500 TABLET ORAL DAILY
Qty: 90 TAB | Refills: 4 | Status: SHIPPED | OUTPATIENT
Start: 2018-05-16 | End: 2018-11-14

## 2018-05-16 RX ORDER — LISINOPRIL AND HYDROCHLOROTHIAZIDE 20; 25 MG/1; MG/1
1 TABLET ORAL DAILY
Qty: 90 TAB | Refills: 4 | Status: SHIPPED | OUTPATIENT
Start: 2018-05-16 | End: 2018-12-10 | Stop reason: SDUPTHER

## 2018-05-16 NOTE — PROGRESS NOTES
Latoya Goncalves is a 82 y.o. female presents today for her medicare wellness exam.  Patient reports that she fell on 5/4/18 on her right knee which she had previous knee surgery on. Patient reports that she did not seek emergency treatment. Patient reports of some right sided back pain that hurts more at night. Pt is in Room # 4        Learning Assessment (baseline): Completed  Depression Screening: Completed  Fall Risk Screening: Completed  Abuse screening: Completed  ADL Assessment: Completed    1. Have you been to the ER, urgent care clinic since your last visit? Hospitalized since your last visit? No    2. Have you seen or consulted any other health care providers outside of the [facility] since your last visit? Include any pap smears or colon screening. No     Health Maintenance reviewed - .

## 2018-05-16 NOTE — PROGRESS NOTES
THE SUBSEQUENT MEDICARE ANNUAL WELLNESS VISIT PROGRESS NOTES    This is a Subsequent Medicare Annual Wellness Visit providing Personalized Prevention Plan Services (PPPS) (Performed 12 months after initial AWV and PPPS )    I have reviewed the patient's medical history in detail and updated the computerized patient record. Wesley Farias is a 82 y.o.  female and presents for an subsequent annual wellness exam     Patient Active Problem List    Diagnosis Date Noted    Severe obesity (BMI 35.0-39. 9) with comorbidity ([address] 05/16/2018    Chronic pain syndrome 10/16/2017    Subclinical hyperthyroidism 05/30/2017    Arthritis 09/20/2010    Essential hypertension, malignant 03/22/2010    Pure hypercholesterolemia 03/22/2010    Edema 03/22/2010    Chronic eczema 03/22/2010     Current Outpatient Prescriptions   Medication Sig Dispense Refill    HYDROcodone-acetaminophen (NORCO) 5-325 mg per tablet Take 1 Tab by mouth every eight (8) hours as needed for Pain (no more than 2 per day). 60 Tab 0    furosemide (LASIX) 40 mg tablet Take 1 Tab by mouth daily. Only if needed for edema 30 Tab 3    albuterol (PROVENTIL HFA, VENTOLIN HFA, PROAIR HFA) 90 mcg/actuation inhaler Take 2 Puffs by inhalation every six (6) hours as needed for Wheezing. 1 Inhaler 0    lisinopril-hydroCHLOROthiazide (PRINZIDE, ZESTORETIC) 20-25 mg per tablet Take 1 Tab by mouth daily. 90 Tab 4    naproxen (NAPROSYN) 500 mg tablet Take 1 Tab by mouth daily. 90 Tab 4    fluocinoNIDE (LIDEX) 0.05 % ointment Apply  to affected area two (2) times a day. 60 g 4    omeprazole (PRILOSEC) 20 mg capsule Take 20 mg by mouth daily.        No Known Allergies  Past Medical History:   Diagnosis Date    Arthritis 9/20/2010    Chronic eczema 3/22/2010    Edema 3/22/2010    Essential hypertension, malignant 3/22/2010    Pure hypercholesterolemia 3/22/2010    Subclinical hyperthyroidism 5/30/2017    Unspecified arthropathy, shoulder region 3/22/2010    Unspecified hypothyroidism 3/22/2010     Past Surgical History:   Procedure Laterality Date    HX GYN  1983    hysterectomy    HX HYSTERECTOMY  1984    fibroids    HX KNEE REPLACEMENT Right      Family History   Problem Relation Age of Onset    Hypertension Mother     Breast Cancer Mother 70    Breast Cancer Child 36     Social History   Substance Use Topics    Smoking status: Never Smoker    Smokeless tobacco: Never Used    Alcohol use 1.5 oz/week     3 Standard drinks or equivalent per week         ROS       All other systems reviewed and are negative. History     Past Medical History:   Diagnosis Date    Arthritis 9/20/2010    Chronic eczema 3/22/2010    Edema 3/22/2010    Essential hypertension, malignant 3/22/2010    Pure hypercholesterolemia 3/22/2010    Subclinical hyperthyroidism 5/30/2017    Unspecified arthropathy, shoulder region 3/22/2010    Unspecified hypothyroidism 3/22/2010      Past Surgical History:   Procedure Laterality Date    HX GYN  1983    hysterectomy    HX HYSTERECTOMY  1984    fibroids    HX KNEE REPLACEMENT Right      Current Outpatient Prescriptions   Medication Sig Dispense Refill    HYDROcodone-acetaminophen (NORCO) 5-325 mg per tablet Take 1 Tab by mouth every eight (8) hours as needed for Pain (no more than 2 per day). 60 Tab 0    furosemide (LASIX) 40 mg tablet Take 1 Tab by mouth daily. Only if needed for edema 30 Tab 3    albuterol (PROVENTIL HFA, VENTOLIN HFA, PROAIR HFA) 90 mcg/actuation inhaler Take 2 Puffs by inhalation every six (6) hours as needed for Wheezing. 1 Inhaler 0    lisinopril-hydroCHLOROthiazide (PRINZIDE, ZESTORETIC) 20-25 mg per tablet Take 1 Tab by mouth daily. 90 Tab 4    naproxen (NAPROSYN) 500 mg tablet Take 1 Tab by mouth daily. 90 Tab 4    fluocinoNIDE (LIDEX) 0.05 % ointment Apply  to affected area two (2) times a day. 60 g 4    omeprazole (PRILOSEC) 20 mg capsule Take 20 mg by mouth daily.        No Known Allergies  Family History   Problem Relation Age of Onset    Hypertension Mother     Breast Cancer Mother 70    Breast Cancer Child 36     Social History   Substance Use Topics    Smoking status: Never Smoker    Smokeless tobacco: Never Used    Alcohol use 1.5 oz/week     3 Standard drinks or equivalent per week     Patient Active Problem List   Diagnosis Code    Essential hypertension, malignant I10    Pure hypercholesterolemia E78.00    Edema R60.9    Chronic eczema L30.9    Arthritis M19.90    Subclinical hyperthyroidism E05.90    Chronic pain syndrome G89.4    Severe obesity (BMI 35.0-39. 9) with comorbidity ([facility] [address] E66.01       Health Maintenance History  Immunizations reviewed, dtap utd  , pneumovax utd , flu utd , zoster utd   Colonoscopy: utd ,   Chest CT : na ,  Eye exam: na   Mammo 2017  [address] Directive or Living Will: yes    Depression Risk Factor Screening:      Patient Health Questionnaire (PHQ-2)   Over the last 2 weeks, how often have you been bothered by any of the following problems? · Little interest or pleasure in doing things? · Not at all. [0]  · Feeling down, depressed, or hopeless? · Not at all. [0]    Total Score: 0/6  PHQ-2 Assessment Scoring:   A score of 2 or more requires further screening with the PHQ-9    Alcohol Risk Factor Screening:     Women: On any occasion during the past 3 months, have you had more than 3 drinks containing alcohol? Do you average more than 7 drinks per week? Men: On any occasion during the past 3 months, have you had more than 4 drinks containing alcohol? Do you average more than 14 drinks per week? Functional Ability and Level of Safety:     Hearing Loss    Hearing is good. Activities of Daily Living   Self-care.    Requires assistance with: no ADLs    Fall Risk   No fall risk factors    Abuse Screen   None      Examination   Physical Examination  Vitals:    05/16/18 1013   BP: 143/67   Pulse: 74   Resp: 16 Temp: 98.3 °F (36.8 °C)   TempSrc: Oral   SpO2: 98%   Weight: 221 lb 6.4 oz (100.4 kg)   Height: 5' 6\" (1.676 m)   PainSc:   0 - No pain     Body mass index is 35.73 kg/(m^2). Evaluation of Cognitive Function:  Mood/affect:appropriate   Appearance: well groomed   Family member/caregiver input: daughter here     alert, well appearing, and in no distress, oriented to person, place, and time and overweight    Patient Care Team:  Ophelia Case,  as PCP - Shefali Parnell MD (Orthopedic Surgery)  Luz Elena Washington MD (Orthopedic Surgery)  Onnie Sandifer, MD (Gastroenterology)    Advice/Referrals/Counseling/Plan:   Education and counseling provided:  Are appropriate based on today's review and evaluation  Include in education list (weight loss, physical activity, smoking cessation, fall prevention, and nutrition)  current treatment plan is effective, no change in therapy. I have discussed the diagnosis with the patient and the intended plan as seen in the above orders. The patient has received an after-visit summary and questions were answered concerning future plans. I have discussed medication side effects and warnings with the patient as well. I have reviewed the plan of care with the patient, accepted their input and they are in agreement with the treatment goals. Follow-up Disposition:  Return in about 6 months (around 11/16/2018) for EOV, labs prior. _____________________________________________________________    Problem Assessment    for treatment of   Chief Complaint   Patient presents with    Hypertension    Cholesterol Problem    Ankle swelling    Rash    Thyroid Problem    Pain (Chronic)         SUBJECTIVE      Cardiovascular Review:  The patient has hypertension and hyperlipidemia. Diet and Lifestyle: not attempting to follow a low fat, low cholesterol diet, not attempting to follow a low sodium diet  Home BP Monitoring: is not measured at home.   Pertinent ROS: taking medications as instructed, no medication side effects noted, no TIA's, no chest pain on exertion, no dyspnea on exertion, no swelling of ankles. Thyroid Review:  Patient is seen for followup of hypothyroidism. Thyroid ROS: denies fatigue, weight changes, heat/cold intolerance, bowel/skin changes or CVS symptoms. Osteoarthritis and Chronic Pain:  Patient has osteoarthritis, primarily affecting the knees. Symptoms onset: problem is longstanding. Rheumatological ROS: joint swelling in knees. Response to treatment plan: gradually worsening. Chronic pain going  Edema chronic stable  Eczema stable      Additional Concerns:        Visit Vitals    /67 (BP 1 Location: Right arm, BP Patient Position: Sitting)    Pulse 74    Temp 98.3 °F (36.8 °C) (Oral)    Resp 16    Ht 5' 6\" (1.676 m)    Wt 221 lb 6.4 oz (100.4 kg)    SpO2 98%    BMI 35.73 kg/m2     General:  Alert, cooperative, no distress, appears stated age. Head:  Normocephalic, without obvious abnormality, atraumatic. Eyes:  Conjunctivae/corneas clear. PERRL, EOMs intact. Fundi benign. Ears:  Normal TMs and external ear canals both ears. Nose: Nares normal. Septum midline. Mucosa normal. No drainage or sinus tenderness. Throat: Lips, mucosa, and tongue normal. Teeth and gums normal.   Neck: Supple, symmetrical, trachea midline, no adenopathy, thyroid: no enlargement/tenderness/nodules, no carotid bruit and no JVD. Back:   Symmetric, no curvature. ROM normal. No CVA tenderness. Lungs:   Clear to auscultation bilaterally. Chest wall:  No tenderness or deformity. Heart:  Regular rate and rhythm, S1, S2 normal, no murmur, click, rub or gallop. Breast Exam:  No tenderness, masses, or nipple abnormality. Abdomen:   Soft, non-tender. Bowel sounds normal. No masses,  No organomegaly. Genitalia:  Normal female without lesion, discharge or tenderness. Rectal:  Normal tone,  no masses or tenderness  Guaiac negative stool. Extremities: Extremities normal, atraumatic, no cyanosis or edema. Pulses: 2+ and symmetric all extremities. Skin: Skin color, texture, turgor normal. No rashes or lesions. Lymph nodes: Cervical, supraclavicular, and axillary nodes normal.   Neurologic: CNII-XII intact. Normal strength, sensation and reflexes throughout. LABS   pending  TESTS    ekg  [id number]      Assessment/Plan:      Hypertension - stable  Hyperlipidemia - stable  Edema  Stable  Eczema  Stable  Thyroid ongoing  Chronic pain  This is a chronic problem that is worsened. Per review of available records and patients , there are not sign of overuse, misuse, diversion, or concerning side effects. Today we reviewed: the risk of overdose, addiction, and dependency  The following changes were made to the patients current treatment plan: nothing, medications refilled. Diagnoses and all orders for this visit:    1. Routine general medical examination at a health care facility  -     AMB POC EKG ROUTINE W/ 12 LEADS, INTER & REP  -     HYDROcodone-acetaminophen (NORCO) 5-325 mg per tablet; Take 1 Tab by mouth every eight (8) hours as needed for Pain (no more than 2 per day). -     furosemide (LASIX) 40 mg tablet; Take 1 Tab by mouth daily. Only if needed for edema  -     albuterol (PROVENTIL HFA, VENTOLIN HFA, PROAIR HFA) 90 mcg/actuation inhaler; Take 2 Puffs by inhalation every six (6) hours as needed for Wheezing.  -     lisinopril-hydroCHLOROthiazide (PRINZIDE, ZESTORETIC) 20-25 mg per tablet; Take 1 Tab by mouth daily.  -     naproxen (NAPROSYN) 500 mg tablet; Take 1 Tab by mouth daily. -     fluocinoNIDE (LIDEX) 0.05 % ointment; Apply  to affected area two (2) times a day. -     LIPID PANEL; Future  -     CBC WITH AUTOMATED DIFF; Future  -     METABOLIC PANEL, COMPREHENSIVE; Future  -     URINALYSIS W/ RFLX MICROSCOPIC; Future  -     TSH 3RD GENERATION; Future  -     T4, FREE;  Future  -     COMPLIANCE DRUG SCREEN/PRESCRIPTION MONITORING; Future  -     METABOLIC PANEL, COMPREHENSIVE; Future  -     TSH 3RD GENERATION; Future  -     T4, FREE; Future  -     [facility] MAMMO BI SCREENING INCL CAD; Future    2. Essential hypertension, malignant  -     AMB POC EKG ROUTINE W/ 12 LEADS, INTER & REP  -     HYDROcodone-acetaminophen (NORCO) 5-325 mg per tablet; Take 1 Tab by mouth every eight (8) hours as needed for Pain (no more than 2 per day). -     furosemide (LASIX) 40 mg tablet; Take 1 Tab by mouth daily. Only if needed for edema  -     albuterol (PROVENTIL HFA, VENTOLIN HFA, PROAIR HFA) 90 mcg/actuation inhaler; Take 2 Puffs by inhalation every six (6) hours as needed for Wheezing.  -     lisinopril-hydroCHLOROthiazide (PRINZIDE, ZESTORETIC) 20-25 mg per tablet; Take 1 Tab by mouth daily.  -     naproxen (NAPROSYN) 500 mg tablet; Take 1 Tab by mouth daily. -     fluocinoNIDE (LIDEX) 0.05 % ointment; Apply  to affected area two (2) times a day. -     LIPID PANEL; Future  -     CBC WITH AUTOMATED DIFF; Future  -     METABOLIC PANEL, COMPREHENSIVE; Future  -     URINALYSIS W/ RFLX MICROSCOPIC; Future  -     TSH 3RD GENERATION; Future  -     T4, FREE; Future  -     COMPLIANCE DRUG SCREEN/PRESCRIPTION MONITORING; Future  -     METABOLIC PANEL, COMPREHENSIVE; Future  -     TSH 3RD GENERATION; Future  -     T4, FREE; Future  -     [facility] MAMMO BI SCREENING INCL CAD; Future    3. Arthritis  -     AMB POC EKG ROUTINE W/ 12 LEADS, INTER & REP  -     HYDROcodone-acetaminophen (NORCO) 5-325 mg per tablet; Take 1 Tab by mouth every eight (8) hours as needed for Pain (no more than 2 per day). -     furosemide (LASIX) 40 mg tablet; Take 1 Tab by mouth daily. Only if needed for edema  -     albuterol (PROVENTIL HFA, VENTOLIN HFA, PROAIR HFA) 90 mcg/actuation inhaler; Take 2 Puffs by inhalation every six (6) hours as needed for Wheezing.  -     lisinopril-hydroCHLOROthiazide (PRINZIDE, ZESTORETIC) 20-25 mg per tablet;  Take 1 Tab by mouth daily.  -     naproxen (NAPROSYN) 500 mg tablet; Take 1 Tab by mouth daily. -     fluocinoNIDE (LIDEX) 0.05 % ointment; Apply  to affected area two (2) times a day. -     LIPID PANEL; Future  -     CBC WITH AUTOMATED DIFF; Future  -     METABOLIC PANEL, COMPREHENSIVE; Future  -     URINALYSIS W/ RFLX MICROSCOPIC; Future  -     TSH 3RD GENERATION; Future  -     T4, FREE; Future  -     COMPLIANCE DRUG SCREEN/PRESCRIPTION MONITORING; Future  -     METABOLIC PANEL, COMPREHENSIVE; Future  -     TSH 3RD GENERATION; Future  -     T4, FREE; Future  -     [facility] MAMMO BI SCREENING INCL CAD; Future    4. Localized edema  -     AMB POC EKG ROUTINE W/ 12 LEADS, INTER & REP  -     HYDROcodone-acetaminophen (NORCO) 5-325 mg per tablet; Take 1 Tab by mouth every eight (8) hours as needed for Pain (no more than 2 per day). -     furosemide (LASIX) 40 mg tablet; Take 1 Tab by mouth daily. Only if needed for edema  -     albuterol (PROVENTIL HFA, VENTOLIN HFA, PROAIR HFA) 90 mcg/actuation inhaler; Take 2 Puffs by inhalation every six (6) hours as needed for Wheezing.  -     lisinopril-hydroCHLOROthiazide (PRINZIDE, ZESTORETIC) 20-25 mg per tablet; Take 1 Tab by mouth daily.  -     naproxen (NAPROSYN) 500 mg tablet; Take 1 Tab by mouth daily. -     fluocinoNIDE (LIDEX) 0.05 % ointment; Apply  to affected area two (2) times a day. -     LIPID PANEL; Future  -     CBC WITH AUTOMATED DIFF; Future  -     METABOLIC PANEL, COMPREHENSIVE; Future  -     URINALYSIS W/ RFLX MICROSCOPIC; Future  -     TSH 3RD GENERATION; Future  -     T4, FREE; Future  -     COMPLIANCE DRUG SCREEN/PRESCRIPTION MONITORING; Future  -     METABOLIC PANEL, COMPREHENSIVE; Future  -     TSH 3RD GENERATION; Future  -     T4, FREE; Future  -     [facility] MAMMO BI SCREENING INCL CAD; Future    5. Chronic eczema  -     AMB POC EKG ROUTINE W/ 12 LEADS, INTER & REP  -     HYDROcodone-acetaminophen (NORCO) 5-325 mg per tablet;  Take 1 Tab by mouth every eight (8) hours as needed for Pain (no more than 2 per day). -     furosemide (LASIX) 40 mg tablet; Take 1 Tab by mouth daily. Only if needed for edema  -     albuterol (PROVENTIL HFA, VENTOLIN HFA, PROAIR HFA) 90 mcg/actuation inhaler; Take 2 Puffs by inhalation every six (6) hours as needed for Wheezing.  -     lisinopril-hydroCHLOROthiazide (PRINZIDE, ZESTORETIC) 20-25 mg per tablet; Take 1 Tab by mouth daily.  -     naproxen (NAPROSYN) 500 mg tablet; Take 1 Tab by mouth daily. -     fluocinoNIDE (LIDEX) 0.05 % ointment; Apply  to affected area two (2) times a day. -     LIPID PANEL; Future  -     CBC WITH AUTOMATED DIFF; Future  -     METABOLIC PANEL, COMPREHENSIVE; Future  -     URINALYSIS W/ RFLX MICROSCOPIC; Future  -     TSH 3RD GENERATION; Future  -     T4, FREE; Future  -     COMPLIANCE DRUG SCREEN/PRESCRIPTION MONITORING; Future  -     METABOLIC PANEL, COMPREHENSIVE; Future  -     TSH 3RD GENERATION; Future  -     T4, FREE; Future  -     [facility] MAMMO BI SCREENING INCL CAD; Future    6. Pure hypercholesterolemia  -     AMB POC EKG ROUTINE W/ 12 LEADS, INTER & REP  -     HYDROcodone-acetaminophen (NORCO) 5-325 mg per tablet; Take 1 Tab by mouth every eight (8) hours as needed for Pain (no more than 2 per day). -     furosemide (LASIX) 40 mg tablet; Take 1 Tab by mouth daily. Only if needed for edema  -     albuterol (PROVENTIL HFA, VENTOLIN HFA, PROAIR HFA) 90 mcg/actuation inhaler; Take 2 Puffs by inhalation every six (6) hours as needed for Wheezing.  -     lisinopril-hydroCHLOROthiazide (PRINZIDE, ZESTORETIC) 20-25 mg per tablet; Take 1 Tab by mouth daily.  -     naproxen (NAPROSYN) 500 mg tablet; Take 1 Tab by mouth daily. -     fluocinoNIDE (LIDEX) 0.05 % ointment; Apply  to affected area two (2) times a day. -     LIPID PANEL; Future  -     CBC WITH AUTOMATED DIFF; Future  -     METABOLIC PANEL, COMPREHENSIVE; Future  -     URINALYSIS W/ RFLX MICROSCOPIC; Future  -     TSH 3RD GENERATION; Future  -     T4, FREE;  Future  - COMPLIANCE DRUG SCREEN/PRESCRIPTION MONITORING; Future  -     METABOLIC PANEL, COMPREHENSIVE; Future  -     TSH 3RD GENERATION; Future  -     T4, FREE; Future  -     [facility] MAMMO BI SCREENING INCL CAD; Future    7. Chronic pain syndrome  -     AMB POC EKG ROUTINE W/ 12 LEADS, INTER & REP  -     HYDROcodone-acetaminophen (NORCO) 5-325 mg per tablet; Take 1 Tab by mouth every eight (8) hours as needed for Pain (no more than 2 per day). -     furosemide (LASIX) 40 mg tablet; Take 1 Tab by mouth daily. Only if needed for edema  -     albuterol (PROVENTIL HFA, VENTOLIN HFA, PROAIR HFA) 90 mcg/actuation inhaler; Take 2 Puffs by inhalation every six (6) hours as needed for Wheezing.  -     lisinopril-hydroCHLOROthiazide (PRINZIDE, ZESTORETIC) 20-25 mg per tablet; Take 1 Tab by mouth daily.  -     naproxen (NAPROSYN) 500 mg tablet; Take 1 Tab by mouth daily. -     fluocinoNIDE (LIDEX) 0.05 % ointment; Apply  to affected area two (2) times a day. -     LIPID PANEL; Future  -     CBC WITH AUTOMATED DIFF; Future  -     METABOLIC PANEL, COMPREHENSIVE; Future  -     URINALYSIS W/ RFLX MICROSCOPIC; Future  -     TSH 3RD GENERATION; Future  -     T4, FREE; Future  -     COMPLIANCE DRUG SCREEN/PRESCRIPTION MONITORING; Future  -     METABOLIC PANEL, COMPREHENSIVE; Future  -     TSH 3RD GENERATION; Future  -     T4, FREE; Future  -     [facility] MAMMO BI SCREENING INCL CAD; Future    8. Encounter for screening mammogram for malignant neoplasm of breast   -     [facility] MAMMO BI SCREENING INCL CAD;  Future          Lab review: orders written for new lab studies as appropriate; see orders, no lab studies available for review at time of visit

## 2018-05-16 NOTE — MR AVS SNAPSHOT
[id number] [place] [id number] 
[phone] Patient: Uday Ugalde MRN: [id number] SCD:[id number] Visit Information Date & Time Provider Department Dept. Phone Encounter #  
 5/16/2018 10:00 AM Jeanne Babin [phone] [id number] Follow-up Instructions Return in about 6 months (around 11/16/2018) for EOV, labs prior. Follow-up and Disposition History Upcoming Health Maintenance Date Due DTaP/Tdap/Td series (1 - Tdap) 8/22/2013 MEDICARE YEARLY EXAM 5/31/2018 Influenza Age 5 to Adult 8/1/2018 GLAUCOMA SCREENING Q2Y 11/3/2018 Allergies as of 5/16/2018  Review Complete On: 5/16/2018 By: Tammy Gupta., DO No Known Allergies Current Immunizations  Reviewed on 6/21/2010 Name Date Pneumococcal Conjugate (PCV-13) 8/19/2015 Pneumococcal Polysaccharide (PPSV-23) 8/21/2013  2:15 PM  
 TD Vaccine 3/22/2003 Td, Adsorbed PF 8/21/2013  2:15 PM  
  
 Not reviewed this visit You Were Diagnosed With   
  
 Codes Comments Routine general medical examination at a health care facility    -  Primary ICD-10-CM: Z00.00 ICD-9-CM: V70.0 Essential hypertension, malignant     ICD-10-CM: I10 
ICD-9-CM: 401.0 Arthritis     ICD-10-CM: M19.90 ICD-9-CM: 716.90 Localized edema     ICD-10-CM: R60.0 ICD-9-CM: 607. 3 Chronic eczema     ICD-10-CM: L30.9 ICD-9-CM: 692.9 Pure hypercholesterolemia     ICD-10-CM: E78.00 ICD-9-CM: 272.0 Chronic pain syndrome     ICD-10-CM: G89.4 ICD-9-CM: 338.4 Encounter for screening mammogram for malignant neoplasm of breast     ICD-10-CM: Z12.31 
ICD-9-CM: V76.12 Vitals BP Pulse Temp Resp Height(growth percentile) Weight(growth percentile) 143/67 (BP 1 Location: Right arm, BP Patient Position: Sitting) 74 98.3 °F (36.8 °C) (Oral) 16 5' 6\" (1.676 m) 221 lb 6.4 oz (100.4 kg) SpO2 BMI OB Status Smoking Status 98% 35.73 kg/m2 Hysterectomy Never Smoker BMI and BSA Data Body Mass Index Body Surface Area 35.73 kg/m 2 2.16 m 2 Preferred Pharmacy Pharmacy Name Phone [facility] [phone] Your Updated Medication List  
  
   
This list is accurate as of 5/16/18 10:40 AM.  Always use your most recent med list.  
  
  
  
  
 albuterol 90 mcg/actuation inhaler Commonly known as:  PROVENTIL HFA, VENTOLIN HFA, PROAIR HFA Take 2 Puffs by inhalation every six (6) hours as needed for Wheezing. fluocinoNIDE 0.05 % ointment Commonly known as:  LIDEX Apply  to affected area two (2) times a day. furosemide 40 mg tablet Commonly known as:  LASIX Take 1 Tab by mouth daily. Only if needed for edema HYDROcodone-acetaminophen 5-325 mg per tablet Commonly known as:  Janas [place] Take 1 Tab by mouth every eight (8) hours as needed for Pain (no more than 2 per day). lisinopril-hydroCHLOROthiazide 20-25 mg per tablet Commonly known as:  Pauline Dun Take 1 Tab by mouth daily. naproxen 500 mg tablet Commonly known as:  NAPROSYN Take 1 Tab by mouth daily. PriLOSEC 20 mg capsule Generic drug:  omeprazole Take 20 mg by mouth daily. Prescriptions Printed Refills HYDROcodone-acetaminophen (NORCO) 5-325 mg per tablet 0 Sig: Take 1 Tab by mouth every eight (8) hours as needed for Pain (no more than 2 per day). Class: Print Route: Oral  
 furosemide (LASIX) 40 mg tablet 3 Sig: Take 1 Tab by mouth daily. Only if needed for edema Class: Print Route: Oral  
 albuterol (PROVENTIL HFA, VENTOLIN HFA, PROAIR HFA) 90 mcg/actuation inhaler 0 Sig: Take 2 Puffs by inhalation every six (6) hours as needed for Wheezing. Class: Print  Route: Inhalation  
 lisinopril-hydroCHLOROthiazide (PRINZIDE, ZESTORETIC) 20-25 mg per tablet 4  
 Sig: Take 1 Tab by mouth daily. Class: Print Route: Oral  
 naproxen (NAPROSYN) 500 mg tablet 4 Sig: Take 1 Tab by mouth daily. Class: Print Route: Oral  
 fluocinoNIDE (LIDEX) 0.05 % ointment 4 Sig: Apply  to affected area two (2) times a day. Class: Print Route: Topical  
  
We Performed the Following AMB POC EKG ROUTINE W/ 12 LEADS, INTER & REP [56117 CPT(R)] Follow-up Instructions Return in about 6 months (around 11/16/2018) for EOV, labs prior. To-Do List   
 05/16/2018 Lab:  CBC WITH AUTOMATED DIFF   
  
 05/16/2018 Lab:  COMPLIANCE DRUG SCREEN/PRESCRIPTION MONITORING   
  
 05/16/2018 Lab:  LIPID PANEL   
  
 05/16/2018 Lab:  METABOLIC PANEL, COMPREHENSIVE   
  
 05/16/2018 Lab:  T4, FREE   
  
 05/16/2018 Lab:  TSH 3RD GENERATION   
  
 05/16/2018 Lab:  URINALYSIS W/ RFLX MICROSCOPIC   
  
 11/12/2018 Imaging:  [facility] MAMMO BI SCREENING INCL CAD Around 11/12/2018 Lab:  METABOLIC PANEL, COMPREHENSIVE Around 11/12/2018 Lab:  T4, FREE Around 11/12/2018 Lab:  TSH 3RD GENERATION [facility]! Dear Usha Velarde: 
Thank you for requesting a [employer] account. Our records indicate that you already have an active [employer] account. You can access your account anytime at [email or web address]. [employer] Did you know that you can access your hospital and ER discharge instructions at any time in [employer]? You can also review all of your test results from your hospital stay or ER visit. Additional Information If you have questions, please visit the Frequently Asked Questions section of the [employer] website at [email or web address]. [employer]/. Remember, [employer] is NOT to be used for urgent needs. For medical emergencies, dial 911. Now available from your iPhone and Android! Please provide this summary of care documentation to your next provider. Your primary care clinician is listed as [phone] [facility]. If you have any questions after today's visit, please call [phone].

## 2018-05-21 LAB — DRUGS UR: NORMAL

## 2018-07-16 ENCOUNTER — HOSPITAL ENCOUNTER (OUTPATIENT)
Dept: MAMMOGRAPHY | Age: 83
Discharge: HOME | End: 2018-07-16
Attending: FAMILY MEDICINE
Payer: MEDICARE

## 2018-07-16 DIAGNOSIS — Z12.31 ENCOUNTER FOR SCREENING MAMMOGRAM FOR MALIGNANT NEOPLASM OF BREAST: ICD-10-CM

## 2018-07-16 DIAGNOSIS — Z00.00 ROUTINE GENERAL MEDICAL EXAMINATION AT A HEALTH CARE FACILITY: ICD-10-CM

## 2018-07-16 DIAGNOSIS — L30.9 CHRONIC ECZEMA: ICD-10-CM

## 2018-07-16 DIAGNOSIS — I10 ESSENTIAL HYPERTENSION, MALIGNANT: ICD-10-CM

## 2018-07-16 DIAGNOSIS — G89.4 CHRONIC PAIN SYNDROME: ICD-10-CM

## 2018-07-16 DIAGNOSIS — E78.00 PURE HYPERCHOLESTEROLEMIA: ICD-10-CM

## 2018-07-16 DIAGNOSIS — M19.90 ARTHRITIS: ICD-10-CM

## 2018-07-16 DIAGNOSIS — R60.0 LOCALIZED EDEMA: ICD-10-CM

## 2018-07-16 PROCEDURE — 77063 BREAST TOMOSYNTHESIS BI: CPT

## 2018-10-02 ENCOUNTER — OFFICE VISIT (OUTPATIENT)
Dept: FAMILY MEDICINE CLINIC | Age: 83
End: 2018-10-02

## 2018-10-02 ENCOUNTER — HOSPITAL ENCOUNTER (OUTPATIENT)
Dept: LAB | Age: 83
Discharge: HOME | End: 2018-10-02
Payer: MEDICARE

## 2018-10-02 VITALS
WEIGHT: 224.2 LBS | OXYGEN SATURATION: 94 % | RESPIRATION RATE: 16 BRPM | HEART RATE: 64 BPM | TEMPERATURE: 98 F | BODY MASS INDEX: 36.03 KG/M2 | DIASTOLIC BLOOD PRESSURE: 60 MMHG | HEIGHT: 66 IN | SYSTOLIC BLOOD PRESSURE: 128 MMHG

## 2018-10-02 DIAGNOSIS — E78.00 PURE HYPERCHOLESTEROLEMIA: ICD-10-CM

## 2018-10-02 DIAGNOSIS — M19.90 ARTHRITIS: ICD-10-CM

## 2018-10-02 DIAGNOSIS — I10 ESSENTIAL HYPERTENSION, MALIGNANT: ICD-10-CM

## 2018-10-02 DIAGNOSIS — M79.605 PAIN OF LEFT LOWER EXTREMITY: ICD-10-CM

## 2018-10-02 DIAGNOSIS — M79.605 PAIN OF LEFT LOWER EXTREMITY: Primary | ICD-10-CM

## 2018-10-02 DIAGNOSIS — E05.90 SUBCLINICAL HYPERTHYROIDISM: ICD-10-CM

## 2018-10-02 DIAGNOSIS — G89.4 CHRONIC PAIN SYNDROME: ICD-10-CM

## 2018-10-02 LAB
ALBUMIN SERPL-MCNC: 3.6 G/DL (ref 3.4–5)
ALBUMIN/GLOB SERPL: 0.9 {RATIO} (ref 0.8–1.7)
ALP SERPL-CCNC: 160 U/L (ref 45–117)
ALT SERPL-CCNC: 31 U/L (ref 13–56)
ANION GAP SERPL CALC-SCNC: 5 MMOL/L (ref 3–18)
APPEARANCE UR: CLEAR
AST SERPL-CCNC: 26 U/L (ref 15–37)
BASOPHILS # BLD: 0 K/UL (ref 0–0.1)
BASOPHILS NFR BLD: 0 % (ref 0–2)
BILIRUB SERPL-MCNC: 0.3 MG/DL (ref 0.2–1)
BILIRUB UR QL: NEGATIVE
BUN SERPL-MCNC: 25 MG/DL (ref 7–18)
BUN/CREAT SERPL: 17 (ref 12–20)
CALCIUM SERPL-MCNC: 10 MG/DL (ref 8.5–10.1)
CHLORIDE SERPL-SCNC: 103 MMOL/L (ref 100–108)
CO2 SERPL-SCNC: 34 MMOL/L (ref 21–32)
COLOR UR: YELLOW
CREAT SERPL-MCNC: 1.5 MG/DL (ref 0.6–1.3)
DIFFERENTIAL METHOD BLD: ABNORMAL
EOSINOPHIL # BLD: 0.5 K/UL (ref 0–0.4)
EOSINOPHIL NFR BLD: 6 % (ref 0–5)
ERYTHROCYTE [DISTWIDTH] IN BLOOD BY AUTOMATED COUNT: 14.2 % (ref 11.6–14.5)
ERYTHROCYTE [SEDIMENTATION RATE] IN BLOOD: 30 MM/HR (ref 0–30)
GLOBULIN SER CALC-MCNC: 4.1 G/DL (ref 2–4)
GLUCOSE SERPL-MCNC: 85 MG/DL (ref 74–99)
GLUCOSE UR STRIP.AUTO-MCNC: NEGATIVE MG/DL
HCT VFR BLD AUTO: 41.2 % (ref 35–45)
HGB BLD-MCNC: 12.5 G/DL (ref 12–16)
HGB UR QL STRIP: NEGATIVE
KETONES UR QL STRIP.AUTO: NEGATIVE MG/DL
LEUKOCYTE ESTERASE UR QL STRIP.AUTO: NEGATIVE
LYMPHOCYTES # BLD: 2.4 K/UL (ref 0.9–3.6)
LYMPHOCYTES NFR BLD: 30 % (ref 21–52)
MCH RBC QN AUTO: 26 PG (ref 24–34)
MCHC RBC AUTO-ENTMCNC: 30.3 G/DL (ref 31–37)
MCV RBC AUTO: 85.7 FL (ref 74–97)
MONOCYTES # BLD: 0.7 K/UL (ref 0.05–1.2)
MONOCYTES NFR BLD: 9 % (ref 3–10)
NEUTS SEG # BLD: 4.2 K/UL (ref 1.8–8)
NEUTS SEG NFR BLD: 55 % (ref 40–73)
NITRITE UR QL STRIP.AUTO: NEGATIVE
PH UR STRIP: 6.5 [PH] (ref 5–8)
PLATELET # BLD AUTO: 175 K/UL (ref 135–420)
PMV BLD AUTO: 12.5 FL (ref 9.2–11.8)
POTASSIUM SERPL-SCNC: 4.6 MMOL/L (ref 3.5–5.5)
PROT SERPL-MCNC: 7.7 G/DL (ref 6.4–8.2)
PROT UR STRIP-MCNC: NEGATIVE MG/DL
RBC # BLD AUTO: 4.81 M/UL (ref 4.2–5.3)
SODIUM SERPL-SCNC: 142 MMOL/L (ref 136–145)
SP GR UR REFRACTOMETRY: 1.02 (ref 1–1.03)
T4 FREE SERPL-MCNC: 1.2 NG/DL (ref 0.7–1.5)
TSH SERPL DL<=0.05 MIU/L-ACNC: <0.01 UIU/ML (ref 0.36–3.74)
URATE SERPL-MCNC: 8.6 MG/DL (ref 2.6–7.2)
UROBILINOGEN UR QL STRIP.AUTO: 0.2 EU/DL (ref 0.2–1)
WBC # BLD AUTO: 7.8 K/UL (ref 4.6–13.2)

## 2018-10-02 PROCEDURE — 36415 COLL VENOUS BLD VENIPUNCTURE: CPT | Performed by: FAMILY MEDICINE

## 2018-10-02 PROCEDURE — 85025 COMPLETE CBC W/AUTO DIFF WBC: CPT | Performed by: FAMILY MEDICINE

## 2018-10-02 PROCEDURE — 84550 ASSAY OF BLOOD/URIC ACID: CPT | Performed by: FAMILY MEDICINE

## 2018-10-02 PROCEDURE — 84439 ASSAY OF FREE THYROXINE: CPT | Performed by: FAMILY MEDICINE

## 2018-10-02 PROCEDURE — 81003 URINALYSIS AUTO W/O SCOPE: CPT | Performed by: FAMILY MEDICINE

## 2018-10-02 PROCEDURE — 80053 COMPREHEN METABOLIC PANEL: CPT | Performed by: FAMILY MEDICINE

## 2018-10-02 PROCEDURE — 85652 RBC SED RATE AUTOMATED: CPT | Performed by: FAMILY MEDICINE

## 2018-10-02 PROCEDURE — 84443 ASSAY THYROID STIM HORMONE: CPT | Performed by: FAMILY MEDICINE

## 2018-10-02 NOTE — MR AVS SNAPSHOT
Ngozi Turner 
 
 
 [facility] [address] [place] [phone] 
[phone] Patient: Rin Watt MRN: [id number] FIP:[id number]/43/[id number] Visit Information Date & Time Provider Department Dept. Phone Encounter #  
 10/2/2018 12:30 PM Jeanne Babin [phone] [id number] Follow-up Instructions Return in about 1 day (around 10/3/2018) for EOV, labs today  duplex scan today. Follow-up and Disposition History Your Appointments 11/12/2018 10:00 AM  
R[place] [address] [address]) Appt Note: Return in about 6 months (around 11/16/2018) for EOV, labs prior. [facility] [address] [facility] [phone]  
  
   
 [facility] [address] [place] [address] [address] Upcoming Health Maintenance Date Due Shingrix Vaccine Age 50> (1 of 2) 1/18/1987 DTaP/Tdap/Td series (1 - Tdap) 8/22/2013 Influenza Age 5 to Adult 8/1/2018 GLAUCOMA SCREENING Q2Y 11/3/2018 MEDICARE YEARLY EXAM 5/17/2019 Allergies as of 10/2/2018  Review Complete On: 10/2/2018 By: Divya Garza DO No Known Allergies Current Immunizations  Reviewed on 6/21/2010 Name Date Pneumococcal Conjugate (PCV-13) 8/19/2015 Pneumococcal Polysaccharide (PPSV-23) 8/21/2013  2:15 PM  
 TD Vaccine 3/22/2003 Td, Adsorbed PF 8/21/2013  2:15 PM  
  
 Not reviewed this visit You Were Diagnosed With   
  
 Codes Comments Pain of left lower extremity    -  Primary ICD-10-CM: M79.605 ICD-9-CM: 729.5 Essential hypertension, malignant     ICD-10-CM: I10 
ICD-9-CM: 401.0 Pure hypercholesterolemia     ICD-10-CM: E78.00 ICD-9-CM: 272.0 Arthritis     ICD-10-CM: M19.90 ICD-9-CM: 716.90 Subclinical hyperthyroidism     ICD-10-CM: E05.90 ICD-9-CM: 242.90 Chronic pain syndrome     ICD-10-CM: G89.4 ICD-9-CM: 338. 4 Vitals BP Pulse Temp Resp Height(growth percentile) Weight(growth percentile) 128/60 (BP 1 Location: Right arm, BP Patient Position: Sitting) 64 98 °F (36.7 °C) (Oral) 16 5' 6\" (1.676 m) 224 lb 3.2 oz (101.7 kg) SpO2 BMI OB Status Smoking Status 94% 36.19 kg/m2 Hysterectomy Never Smoker BMI and BSA Data Body Mass Index Body Surface Area  
 36.19 kg/m 2 2.18 m 2 Preferred Pharmacy Pharmacy Name Phone [facility] [employer] [phone] Your Updated Medication List  
  
   
This list is accurate as of 10/2/18 12:49 PM.  Always use your most recent med list.  
  
  
  
  
 albuterol 90 mcg/actuation inhaler Commonly known as:  PROVENTIL HFA, VENTOLIN HFA, PROAIR HFA Take 2 Puffs by inhalation every six (6) hours as needed for Wheezing. fluocinoNIDE 0.05 % ointment Commonly known as:  LIDEX Apply  to affected area two (2) times a day. furosemide 40 mg tablet Commonly known as:  LASIX Take 1 Tab by mouth daily. Only if needed for edema HYDROcodone-acetaminophen 5-325 mg per tablet Commonly known as:  Marshia Raker Take 1 Tab by mouth every eight (8) hours as needed for Pain (no more than 2 per day). lisinopril-hydroCHLOROthiazide 20-25 mg per tablet Commonly known as:  José [place] Take 1 Tab by mouth daily. naproxen 500 mg tablet Commonly known as:  NAPROSYN Take 1 Tab by mouth daily. PriLOSEC 20 mg capsule Generic drug:  omeprazole Take 20 mg by mouth daily. We Performed the Following [address] [facility] [id number] CPT(R)] Follow-up Instructions Return in about 1 day (around 10/3/2018) for EOV, labs today  duplex scan today. To-Do List   
 10/02/2018 Lab:  CBC WITH AUTOMATED DIFF   
  
 10/02/2018 Lab:  METABOLIC PANEL, COMPREHENSIVE   
  
 10/02/2018 Lab:  SED RATE (ESR) 10/02/2018 Lab:  T4, FREE   
  
 10/02/2018 Lab:  TSH 3RD GENERATION   
  
 10/02/2018 Lab:  URIC ACID   
  
 10/02/2018 Lab:  URINALYSIS W/ RFLX MICROSCOPIC Introducing [facility] SERVICES! Dear Dearsofie Weaver: 
Thank you for requesting a [employer] account. Our records indicate that you already have an active [employer] account. You can access your account anytime at [email or web address]. [email or web address] Did you know that you can access your hospital and ER discharge instructions at any time in [employer]? You can also review all of your test results from your hospital stay or ER visit. Additional Information If you have questions, please visit the Frequently Asked Questions section of the [employer] website at [email or web address] Remember, [employer] is NOT to be used for urgent needs. For medical emergencies, dial 911. Now available from your iPhone and Android! Please provide this summary of care documentation to your next provider. Your primary care clinician is listed as [phone] [facility]. If you have any questions after today's visit, please call [phone].

## 2018-10-02 NOTE — PROGRESS NOTES
Joshua Bee is a 82 y.o.  female and presents with    Chief Complaint   Patient presents with    Leg Pain           Subjective:    Onset several weeks of swelling and pain in left leg  May have been bitten by insect? ?? No fever or chills. No dyspnea  No hemoptysis      Additional Concerns:          Patient Active Problem List    Diagnosis Date Noted    Severe obesity (BMI 35.0-39. 9) with comorbidity ([address] 05/16/2018    Chronic pain syndrome 10/16/2017    Subclinical hyperthyroidism 05/30/2017    Arthritis 09/20/2010    Essential hypertension, malignant 03/22/2010    Pure hypercholesterolemia 03/22/2010    Edema 03/22/2010    Chronic eczema 03/22/2010     Current Outpatient Prescriptions   Medication Sig Dispense Refill    HYDROcodone-acetaminophen (NORCO) 5-325 mg per tablet Take 1 Tab by mouth every eight (8) hours as needed for Pain (no more than 2 per day). 60 Tab 0    furosemide (LASIX) 40 mg tablet Take 1 Tab by mouth daily. Only if needed for edema 30 Tab 3    albuterol (PROVENTIL HFA, VENTOLIN HFA, PROAIR HFA) 90 mcg/actuation inhaler Take 2 Puffs by inhalation every six (6) hours as needed for Wheezing. 1 Inhaler 0    lisinopril-hydroCHLOROthiazide (PRINZIDE, ZESTORETIC) 20-25 mg per tablet Take 1 Tab by mouth daily. 90 Tab 4    naproxen (NAPROSYN) 500 mg tablet Take 1 Tab by mouth daily. 90 Tab 4    fluocinoNIDE (LIDEX) 0.05 % ointment Apply  to affected area two (2) times a day. 60 g 4    omeprazole (PRILOSEC) 20 mg capsule Take 20 mg by mouth daily.        No Known Allergies  Past Medical History:   Diagnosis Date    Arthritis 9/20/2010    Chronic eczema 3/22/2010    Edema 3/22/2010    Essential hypertension, malignant 3/22/2010    Menopause     Pure hypercholesterolemia 3/22/2010    Subclinical hyperthyroidism 5/30/2017    Unspecified arthropathy, shoulder region 3/22/2010    Unspecified hypothyroidism 3/22/2010     Past Surgical History:   Procedure Laterality Date    HX GYN  1983    hysterectomy    HX HYSTERECTOMY  1984    fibroids    HX KNEE REPLACEMENT Right      Family History   Problem Relation Age of Onset    Hypertension Mother     Breast Cancer Mother 70    Breast Cancer Child 36    Breast Cancer Sister 39     Social History   Substance Use Topics    Smoking status: Never Smoker    Smokeless tobacco: Never Used    Alcohol use 1.5 oz/week     3 Standard drinks or equivalent per week       ROS       All other systems reviewed and are negative. Objective:  Vitals:    10/02/18 1231   BP: 128/60   Pulse: 64   Resp: 16   Temp: 98 °F (36.7 °C)   TempSrc: Oral   SpO2: 94%   Weight: 224 lb 3.2 oz (101.7 kg)   Height: 5' 6\" (1.676 m)   PainSc:   7   PainLoc: Leg                 alert, well appearing, and in no distress and overweight  Chest - clear to auscultation, no wheezes, rales or rhonchi, symmetric air entry  Heart - normal rate, regular rhythm, normal S1, S2, no murmurs, rubs, clicks or gallops  Abdomen - soft, nontender, nondistended, no masses or organomegaly  Left leg swollen, warm,   4 to 5 mm pretib edema    Right leg minimally swollen    LABS     TESTS      Assessment/Plan:    Possible dvt vs cellulitis vs chf  Will check start duplex + labs and f/u   In 24 hours      Lab review:     Diagnoses and all orders for this visit:    1. Pain of left lower extremity  -     DUPLEX EXTREM VENOUS,UNI OR LTD  -     CBC WITH AUTOMATED DIFF; Future  -     METABOLIC PANEL, COMPREHENSIVE; Future  -     URINALYSIS W/ RFLX MICROSCOPIC; Future  -     TSH 3RD GENERATION; Future  -     T4, FREE; Future  -     SED RATE (ESR); Future  -     URIC ACID; Future    2. Essential hypertension, malignant  -     DUPLEX EXTREM VENOUS,UNI OR LTD  -     CBC WITH AUTOMATED DIFF; Future  -     METABOLIC PANEL, COMPREHENSIVE; Future  -     URINALYSIS W/ RFLX MICROSCOPIC; Future  -     TSH 3RD GENERATION; Future  -     T4, FREE; Future  -     SED RATE (ESR);  Future  - URIC ACID; Future    3. Pure hypercholesterolemia  -     DUPLEX EXTREM VENOUS,UNI OR LTD  -     CBC WITH AUTOMATED DIFF; Future  -     METABOLIC PANEL, COMPREHENSIVE; Future  -     URINALYSIS W/ RFLX MICROSCOPIC; Future  -     TSH 3RD GENERATION; Future  -     T4, FREE; Future  -     SED RATE (ESR); Future  -     URIC ACID; Future    4. Arthritis  -     DUPLEX EXTREM VENOUS,UNI OR LTD  -     CBC WITH AUTOMATED DIFF; Future  -     METABOLIC PANEL, COMPREHENSIVE; Future  -     URINALYSIS W/ RFLX MICROSCOPIC; Future  -     TSH 3RD GENERATION; Future  -     T4, FREE; Future  -     SED RATE (ESR); Future  -     URIC ACID; Future    5. Subclinical hyperthyroidism  -     DUPLEX EXTREM VENOUS,UNI OR LTD  -     CBC WITH AUTOMATED DIFF; Future  -     METABOLIC PANEL, COMPREHENSIVE; Future  -     URINALYSIS W/ RFLX MICROSCOPIC; Future  -     TSH 3RD GENERATION; Future  -     T4, FREE; Future  -     SED RATE (ESR); Future  -     URIC ACID; Future    6. Chronic pain syndrome  -     DUPLEX EXTREM VENOUS,UNI OR LTD  -     CBC WITH AUTOMATED DIFF; Future  -     METABOLIC PANEL, COMPREHENSIVE; Future  -     URINALYSIS W/ RFLX MICROSCOPIC; Future  -     TSH 3RD GENERATION; Future  -     T4, FREE; Future  -     SED RATE (ESR); Future  -     URIC ACID; Future          I have discussed the diagnosis with the patient and the intended plan as seen in the above orders. The patient has received an after-visit summary and questions were answered concerning future plans. I have discussed medication side effects and warnings with the patient as well. I have reviewed the plan of care with the patient, accepted their input and they are in agreement with the treatment goals. Follow-up Disposition:  Return in about 1 day (around 10/3/2018) for EOV, labs today  duplex scan today.

## 2018-10-02 NOTE — PROGRESS NOTES
Kelley Pedro is a 82 y.o. female presents today for follow up on her left leg swelling and pain for a month. Patient reports no change in the left leg. Patient reports that the leg itches. Palpated patient left leg and leg is warm to touch with +1 pitting edema. Patient reports that she does wear her compression stockings and soaks her feet but no change in swelling. Patient reports that the affected area is a 7/10. Pt is in Room # 5        1. Have you been to the ER, urgent care clinic since your last visit? Hospitalized since your last visit? No    2. Have you seen or consulted any other health care providers outside of the [address] [place] [address] since your last visit? Include any pap smears or colon screening. No     Health Maintenance reviewed - .

## 2018-10-03 ENCOUNTER — OFFICE VISIT (OUTPATIENT)
Dept: FAMILY MEDICINE CLINIC | Age: 83
End: 2018-10-03

## 2018-10-03 VITALS
RESPIRATION RATE: 19 BRPM | BODY MASS INDEX: 36.22 KG/M2 | DIASTOLIC BLOOD PRESSURE: 64 MMHG | HEIGHT: 66 IN | OXYGEN SATURATION: 93 % | WEIGHT: 225.4 LBS | HEART RATE: 66 BPM | SYSTOLIC BLOOD PRESSURE: 111 MMHG | TEMPERATURE: 97.8 F

## 2018-10-03 DIAGNOSIS — M19.90 ARTHRITIS: ICD-10-CM

## 2018-10-03 DIAGNOSIS — G89.4 CHRONIC PAIN SYNDROME: ICD-10-CM

## 2018-10-03 DIAGNOSIS — I82.90 BLOOD CLOT IN VEIN: Primary | ICD-10-CM

## 2018-10-03 DIAGNOSIS — I10 ESSENTIAL HYPERTENSION, MALIGNANT: ICD-10-CM

## 2018-10-03 DIAGNOSIS — E05.90 SUBCLINICAL HYPERTHYROIDISM: ICD-10-CM

## 2018-10-03 DIAGNOSIS — M1A.09X0 CHRONIC GOUT OF MULTIPLE SITES, UNSPECIFIED CAUSE: ICD-10-CM

## 2018-10-03 DIAGNOSIS — R60.9 EDEMA, UNSPECIFIED TYPE: ICD-10-CM

## 2018-10-03 RX ORDER — WARFARIN SODIUM 5 MG/1
5 TABLET ORAL DAILY
Qty: 30 TAB | Refills: 0 | Status: SHIPPED | OUTPATIENT
Start: 2018-10-03 | End: 2018-10-22

## 2018-10-03 RX ORDER — ALLOPURINOL 300 MG/1
300 TABLET ORAL DAILY
Qty: 90 TAB | Refills: 4 | Status: SHIPPED | OUTPATIENT
Start: 2018-10-03 | End: 2018-12-10 | Stop reason: SDUPTHER

## 2018-10-03 NOTE — PATIENT INSTRUCTIONS
1. Start coumadin 5mg daily in afternoon  2. Elevate leg and apply warm packs  3. Walk   4. Start allopurinal once a day  5. F/u with Dr Cecile Fair in 1 wk          Gout: Care Instructions  Your Care Instructions    Gout is a form of arthritis caused by a buildup of uric acid crystals in a joint. It causes sudden attacks of pain, swelling, redness, and stiffness, usually in one joint, especially the big toe. Gout usually comes on without a cause. But it can be brought on by drinking alcohol (especially beer) or eating seafood and red meat. Taking certain medicines, such as diuretics or aspirin, also can bring on an attack of gout. Taking your medicines as prescribed and following up with your doctor regularly can help you avoid gout attacks in the future. Follow-up care is a key part of your treatment and safety. Be sure to make and go to all appointments, and call your doctor if you are having problems. It's also a good idea to know your test results and keep a list of the medicines you take. How can you care for yourself at home? · If the joint is swollen, put ice or a cold pack on the area for 10 to 20 minutes at a time. Put a thin cloth between the ice and your skin. · Prop up the sore limb on a pillow when you ice it or anytime you sit or lie down during the next 3 days. Try to keep it above the level of your heart. This will help reduce swelling. · Rest sore joints. Avoid activities that put weight or strain on the joints for a few days. Take short rest breaks from your regular activities during the day. · Take your medicines exactly as prescribed. Call your doctor if you think you are having a problem with your medicine. · Take pain medicines exactly as directed. ¨ If the doctor gave you a prescription medicine for pain, take it as prescribed. ¨ If you are not taking a prescription pain medicine, ask your doctor if you can take an over-the-counter medicine. · Eat less seafood and red meat.   · Check with your doctor before drinking alcohol. · Losing weight, if you are overweight, may help reduce attacks of gout. But do not go on a [employer]. \" Losing a lot of weight in a short amount of time can cause a gout attack. When should you call for help? Call your doctor now or seek immediate medical care if:    · You have a fever.     · The joint is so painful you cannot use it.     · You have sudden, unexplained swelling, redness, warmth, or severe pain in one or more joints.    Watch closely for changes in your health, and be sure to contact your doctor if:    · You have joint pain.     · Your symptoms get worse or are not improving after 2 or 3 days. Where can you learn more? Go to http://eddie-ayah.info/. Enter X032 in the search box to learn more about \"Gout: Care Instructions. \"  Current as of: October 10, 2017  Content Version: 11.7  © 6316-2028 [employer]. Care instructions adapted under license by [employer] (which disclaims liability or warranty for this information). If you have questions about a medical condition or this instruction, always ask your healthcare professional. [place] [address] any warranty or liability for your use of this information. Consistent Vitamin K Diet: Care Instructions  Your Care Instructions    Your body needs vitamin K to clot blood and keep your bones strong. It's found in leafy green vegetables such as kale and spinach. If you take the blood thinner warfarin (Coumadin), you need to be careful about how much vitamin K you get. Vitamin K can keep your warfarin from working as it should. Most people who take warfarin can eat normally. The important thing is to get about the same amount of vitamin K each day. Don't suddenly start eating foods with a lot more or a lot less vitamin K. You can choose how much vitamin K you eat. For example, if you already eat a lot of leafy green vegetables, that's fine. Just keep it about the same amount each day. Follow-up care is a key part of your treatment and safety. Be sure to make and go to all appointments, and call your doctor if you are having problems. It's also a good idea to know your test results and keep a list of the medicines you take. How can you care for yourself at home? You don't need to stop eating food high in vitamin K. But you do need to know what foods contain vitamin K. Then you can try to eat about the same amount of vitamin K each day. · You might limit foods that are high in vitamin K to about 1 serving a day. These foods have about 250 to 500 micrograms (mcg) of vitamin K in each serving. They include:  ¨ Cooked leafy green vegetables. Examples are kale, spinach, turnip greens, debi greens, Swiss chard, and mustard greens. One serving is ½ cup. · You might limit foods that are medium-high in vitamin K to about 3 servings a day. These foods have about 50 to 150 mcg of vitamin K in each serving. These include:  ¨ Cooked brussels sprouts, broccoli, cabbage, and asparagus. One serving is ½ cup. ¨ Raw leafy green vegetables. Examples are spinach, green leaf lettuce, ronnie lettuce, and endive. One serving is 1 cup. · Vitamin K also is found in many multivitamins. You don't need to stop taking your multivitamin if it has vitamin K. But you do need to take it every day. · Check with your doctor before you start or stop taking any supplements or herbal products. Some of these may contain vitamin K. Where can you learn more? Go to http://eddie-ayah.info/. Enter R644 in the search box to learn more about \"Consistent Vitamin K Diet: Care Instructions. \"  Current as of: May 10, 2017  Content Version: 11.7  © 7521-3873 [employer] Care instructions adapted under license by [employer] (which disclaims liability or warranty for this information).  If you have questions about a medical condition or this instruction, always ask your healthcare professional. [address] any warranty or liability for your use of this information. Ã¯Â»Â¿  Anticoagulants: After Your Visit  Your Care Instructions  Your doctor prescribed an anticoagulant medicine. Anticoagulants, often called blood thinners, prevent new blood clots from forming and keep existing clots from getting larger. They do not actually thin the blood, but they make the blood take longer to clot. This lowers the risk of a blood clot moving to the lungs (pulmonary embolism) or moving to the brain and causing a stroke. Blood thinners come in two forms. Heparin is given by shot, either under your skin or through a needle in your vein, and starts working right away. Warfarin (Coumadin) comes in pill form and takes longer to work. Your doctor may have you begin taking both forms at the same time. As soon as the pills start to work, you will stop the shots but continue to take the pills. If you have a blood clot in your leg, you may need to take warfarin for several months. People who have heart conditions such as atrial fibrillation often need to take it for the rest of their lives. The right dose of this medicine is different for each person. You will need regular blood tests to see if your dose is correct. Follow-up care is a key part of your treatment and safety. Be sure to make and go to all appointments, and call your doctor if you are having problems. Itâs also a good idea to know your test results and keep a list of the medicines you take. How do you take blood thinners? · Take your medicines exactly as prescribed. Call your doctor if you think you are having a problem with your medicine. · Call your doctor if you are not sure what to do if you missed a dose of blood thinner. ¨ Your doctor can tell you exactly what to do so you do not take too much or too little blood thinner. Then you will be as safe as possible.   · Some general rules for what to do if you miss a dose:  ¨ If you remember it in the same day, take the missed dose. Then go back to your regular schedule. ¨ If it is the next day, or almost time to take the next dose, do not take the missed dose. Do not double the dose to make up for the missed one. At your next regularly scheduled time, take your normal dose. ¨ If you miss your dose for 2 or more days, call your doctor. · To help you stay on schedule, use a calendar to remind you when to take your blood thinner. When you take the medicine, note it on the calendar. · If you are going to give yourself shots, your doctor will give you instructions for how to safely inject the medicine. Follow the directions carefully. · Do not take any vitamins, over-the-counter medicines, or herbal products without talking to your doctor first.  · Avoid contact sports and other activities that could lead to injury. Make your home safe and take other measures to reduce your risk of falling. Always wear a seat belt while in a car. · Do not suddenly change your intake of vitamin Kârich foods, such as broccoli, cabbage, asparagus, lettuce, and spinach. This will help blood thinners work evenly from day to day. · Limit alcohol to 2 drinks a day for men and 1 drink a day for women. Alcohol may interfere with blood thinners. It also increases your risk of falls, which can cause bruising and bleeding. · Tell your dentist, pharmacist, and other health professionals that you take blood thinners. Wear medical alert jewelry that says you take blood thinners. You can buy this at most drugsDlyte.comes. When should you call for help? Call 911 anytime you think you may need emergency care. For example, call if:  · You cough up blood. · You vomit blood or what looks like coffee grounds. · You pass maroon or very bloody stools. Call your doctor now or seek immediate medical care if:  · You have new bruises or blood spots under your skin.   · You have a nosebleed. · Your gums bleed when you brush your teeth. · You have blood in your urine. · Your stools are black and tarlike or have streaks of blood. · You have vaginal bleeding when you are not having your period, or heavy period bleeding. Watch closely for changes in your health, and be sure to contact your doctor if:  · You have questions about your medicine. Where can you learn more? Go to [email or web address].be  Enter P256 in the search box to learn more about \"Anticoagulants: After Your Visit. \"   Â© 9747-5891 [employer] Incorporated. Care instructions adapted under license by [facility] (which disclaims liability or warranty for this information). This care instruction is for use with your licensed healthcare professional. If you have questions about a medical condition or this instruction, always ask your healthcare professional. [address] any warranty or liability for your use of this information. Content Version: 7.5.25405; Last Revised: July 1, 2009     Taking Warfarin Safely: Care Instructions  Your Care Instructions    Warfarin is a medicine that you take to prevent blood clots. It is often called a blood thinner. Doctors give warfarin (such as Coumadin) to reduce the risk of blood clots. You may be at risk for blood clots if you have atrial fibrillation or deep vein thrombosis. Some other health problems may also put you at risk. Warfarin slows the amount of time it takes for your blood to clot. It can cause bleeding problems. Even if you've been taking warfarin for a while, it's important to know how to take it safely. Foods and other medicines can affect the way warfarin works. Some can make warfarin work too well. This can cause bleeding problems. And some can make it work poorly, so that it does not prevent blood clots very well. You will need regular blood tests to check how long it takes for your blood to form a clot.  This test is called a PT or prothrombin time test. The result of the test is called an INR level. Depending on the test results, your doctor or anticoagulation clinic may adjust your dose of warfarin. Follow-up care is a key part of your treatment and safety. Be sure to make and go to all appointments, and call your doctor if you are having problems. It's also a good idea to know your test results and keep a list of the medicines you take. How can you care for yourself at home? Take warfarin safely  · Take your warfarin at the same time each day. · If you miss a dose of warfarin, don't take an extra dose to make up for it. Your doctor can tell you exactly what to do so you don't take too much or too little. · Wear medical alert jewelry that lets others know that you take warfarin. You can buy this at most drugstores. · Don't take warfarin if you are pregnant or planning to get pregnant. Talk to your doctor about how you can prevent getting pregnant while you are taking it. · Don't change your dose or stop taking warfarin unless your doctor tells you to. Effects of medicines and food on warfarin  · Don't start or stop taking any medicines, vitamins, or natural remedies unless you first talk to your doctor. Many medicines can affect how warfarin works. These include aspirin and other pain relievers, over-the-counter medicines, multivitamins, dietary supplements, and herbal products. · Tell all of your doctors and pharmacists that you take warfarin. Some prescription medicines can affect how warfarin works. · Keep the amount of vitamin K in your diet about the same from day to day. Do not suddenly eat a lot more or a lot less food that is rich in vitamin K than you usually do. Vitamin K affects how warfarin works and how your blood clots. Talk with your doctor before making big changes in your diet.  Foods that have a lot of vitamin K include cooked green vegetables, such as:  ¨ Kale, spinach, turnip greens, debi greens, Swiss chard, and mustard greens. ¨ [place] sprouts, broccoli, and asparagus. · Limit your use of alcohol. Avoid bleeding by preventing falls and injuries  · Wear slippers or shoes with nonskid soles. · Remove throw rugs and clutter. · Rearrange furniture and electrical cords to keep them out of walking paths. · Keep stairways, porches, and outside walkways well lit. Use night-lights in hallways and bathrooms. · Be extra careful when you work with sharp tools or knives. When should you call for help? Call 911 anytime you think you may need emergency care. For example, call if:    · You have a sudden, severe headache that is different from past headaches.    Call your doctor now or seek immediate medical care if:    · You have any abnormal bleeding, such as:  ¨ Nosebleeds. ¨ Vaginal bleeding that is different (heavier, more frequent, at a different time of the month) than what you are used to. ¨ Bloody or black stools, or rectal bleeding. ¨ Bloody or pink urine.    Watch closely for changes in your health, and be sure to contact your doctor if you have any problems. Where can you learn more? Go to http://eddieRetiDiagayah.info/. Enter N384 in the search box to learn more about \"Taking Warfarin Safely: Care Instructions. \"  Current as of: December 6, 2017  Content Version: 11.7  © 4453-8910 [employer]. Care instructions adapted under license by [employer] (which disclaims liability or warranty for this information). If you have questions about a medical condition or this instruction, always ask your healthcare professional. [place] [address] any warranty or liability for your use of this information. Taking Warfarin Safely (Short-Term): After Your Visit  Your Care Instructions  Warfarin is a medicine that you take to prevent blood clots. It is often called a blood thinner. Doctors give warfarin (such as Coumadin) to reduce the risk of blood clots.  You may be at risk for blood clots if you have certain kinds of surgery or some other health problems. Your doctor will tell you when you can stop taking warfarin. Warfarin slows the amount of time it takes for your blood to clot. It can cause bleeding problems. Even if you've been taking warfarin for a while, it's important to know how to take it safely. Foods and other medicines can affect the way warfarin works. Some can make warfarin work too well. This can cause bleeding problems. And some can make it work poorly, so that it does not prevent blood clots very well. You will need regular blood tests to check how long it takes for your blood to form a clot. This test is called a PT or prothrombin time test. The result of the test is called an INR level. Depending on the test results, your doctor or anticoagulation clinic may adjust your dose of warfarin. Follow-up care is a key part of your treatment and safety. Be sure to make and go to all appointments, and call your doctor if you are having problems. It's also a good idea to know your test results and keep a list of the medicines you take. How can you care for yourself at home? Take warfarin safely   · Take your warfarin at the same time each day. · If you miss a dose of warfarin, don't take an extra dose to make up for it. Your doctor can tell you exactly what to do so you don't take too much or too little. · Wear medical alert jewelry that lets others know that you take warfarin. You can buy this at most drugstores. · Don't take warfarin if you are pregnant or planning to get pregnant. Talk to your doctor about how you can prevent getting pregnant while you are taking it. · Don't change your dose or stop taking warfarin unless your doctor tells you to. Effects of medicines and food on warfarin  · Don't start or stop taking any medicines, vitamins, or natural remedies unless you first talk to your doctor. Many medicines can affect how warfarin works. These include aspirin and other pain relievers, over-the-counter medicines, multivitamins, dietary supplements, and herbal products. · Tell all of your doctors and pharmacists that you take warfarin. Some prescription medicines can affect how warfarin works. · Keep the amount of vitamin K in your diet about the same from day to day. Do not suddenly eat a lot more or a lot less food that is rich in vitamin K than you usually do. Vitamin K affects how warfarin works and how your blood clots. Talk with your doctor before making big changes in your diet. Vitamin K is in many foods, such as:  ¨ Leafy greens, such as kale, cabbage, spinach, Swiss chard, and lettuce. ¨ Canola and soybean oils. ¨ Green vegetables, such as asparagus, broccoli, and [place] sprouts. ¨ Vegetable drinks, green tea leaves, and some dietary supplement drinks. · Avoid cranberry juice and other cranberry products. They can increase the effects of warfarin. · Limit your use of alcohol. Avoid bleeding by preventing falls and injuries  · Wear slippers or shoes with nonskid soles. · Remove throw rugs and clutter. · Rearrange furniture and electrical cords to keep them out of walking paths. · Keep stairways, porches, and outside walkways well lit. Use night-lights in hallways and bathrooms. · Be extra careful when you work with sharp tools or knives. When should you call for help? Call 911 anytime you think you may need emergency care. For example, call if:  · You have a sudden, severe headache that is different from past headaches. Call your doctor now or seek immediate medical care if:  · You have any abnormal bleeding, such as:  ¨ Nosebleeds. ¨ Vaginal bleeding that is different (heavier, more frequent, at a different time of the month) than what you are used to. ¨ Bloody or black stools, or rectal bleeding. ¨ Bloody or pink urine.   Watch closely for changes in your health, and be sure to contact your doctor if you have any problems. Where can you learn more? Go to [employer]be  Enter N172 in the search box to learn more about \"Taking Warfarin Safely (Short-Term): After Your Visit. \"   © 2441-7404 Healthwise, Incorporated. Care instructions adapted under license by [employer] (which disclaims liability or warranty for this information). This care instruction is for use with your licensed healthcare professional. If you have questions about a medical condition or this instruction, always ask your healthcare professional. [place] [address] any warranty or liability for your use of this information.   Content Version: 57.0.727735; Current as of: March 12, 2014

## 2018-10-03 NOTE — MR AVS SNAPSHOT
[address] [facility], [place], operated by [facility] 
 
 
 [address] [facility] [address] [address] 
[phone] Patient: Erasto Srivastava MRN: [id number] MFY:[id number]/[id number] Visit Information Date & Time Provider Department Dept. Phone Encounter #  
 10/3/2018  8:30 AM Rm DelacruzNeda, [address] [place] [address] [phone] [id number] Follow-up Instructions Return in about 1 week (around 10/10/2018) for EOV, inr next visit. Follow-up and Disposition History Your Appointments 11/12/2018 10:00 AM  
ROUT[place] [id number] [address]) Appt Note: Return in about 6 months (around 11/16/2018) for EOV, labs prior. [address] [facility] [address] [facility]  
  
   
 [address] [facility] Upcoming Health Maintenance Date Due Shingrix Vaccine Age 50> (1 of 2) 1/18/1987 DTaP/Tdap/Td series (1 - Tdap) 8/22/2013 Influenza Age 5 to Adult 8/1/2018 GLAUCOMA SCREENING Q2Y 11/3/2018 MEDICARE YEARLY EXAM 5/17/2019 Allergies as of 10/3/2018  Review Complete On: 10/3/2018 By: Niurka Carrillo LPN No Known Allergies Current Immunizations  Reviewed on 6/21/2010 Name Date Pneumococcal Conjugate (PCV-13) 8/19/2015 Pneumococcal Polysaccharide (PPSV-23) 8/21/2013  2:15 PM  
 TD Vaccine 3/22/2003 Td, Adsorbed PF 8/21/2013  2:15 PM  
  
 Not reviewed this visit You Were Diagnosed With   
  
 Codes Comments Blood clot in vein    -  Primary ICD-10-CM: I82.90 ICD-9-CM: 453.9 Chronic gout of multiple sites, unspecified cause     ICD-10-CM: M1A. 56G5 ICD-9-CM: 274.02 Chronic pain syndrome     ICD-10-CM: G89.4 ICD-9-CM: 338.4 Subclinical hyperthyroidism     ICD-10-CM: E05.90 ICD-9-CM: 242.90 Arthritis     ICD-10-CM: M19.90 ICD-9-CM: 716.90 Edema, unspecified type     ICD-10-CM: R60.9 ICD-9-CM: 782.3 Essential hypertension, malignant     ICD-10-CM: I10 
ICD-9-CM: 401.0 Vitals BP Pulse Temp Resp Height(growth percentile) Weight(growth percentile) 111/64 (BP 1 Location: Right arm, BP Patient Position: Sitting) 66 97.8 °F (36.6 °C) (Oral) 19 5' 6\" (1.676 m) 225 lb 6.4 oz (102.2 kg) SpO2 BMI OB Status Smoking Status 93% 36.38 kg/m2 Hysterectomy Never Smoker Vitals History BMI and BSA Data Body Mass Index Body Surface Area  
 36.38 kg/m 2 2.18 m 2 Preferred Pharmacy Pharmacy Name Phone [facility] [phone] Your Updated Medication List  
  
   
This list is accurate as of 10/3/18  9:10 AM.  Always use your most recent med list.  
  
  
  
  
 albuterol 90 mcg/actuation inhaler Commonly known as:  PROVENTIL HFA, VENTOLIN HFA, PROAIR HFA Take 2 Puffs by inhalation every six (6) hours as needed for Wheezing. allopurinol 300 mg tablet Commonly known as:  Jacqueline Julissa Take 1 Tab by mouth daily. fluocinoNIDE 0.05 % ointment Commonly known as:  LIDEX Apply  to affected area two (2) times a day. furosemide 40 mg tablet Commonly known as:  LASIX Take 1 Tab by mouth daily. Only if needed for edema HYDROcodone-acetaminophen 5-325 mg per tablet Commonly known as:  Ericka Anand Take 1 Tab by mouth every eight (8) hours as needed for Pain (no more than 2 per day). lisinopril-hydroCHLOROthiazide 20-25 mg per tablet Commonly known as:  Salo Neas Take 1 Tab by mouth daily. naproxen 500 mg tablet Commonly known as:  NAPROSYN Take 1 Tab by mouth daily. PriLOSEC 20 mg capsule Generic drug:  omeprazole Take 20 mg by mouth daily. warfarin 5 mg tablet Commonly known as:  COUMADIN Take 1 Tab by mouth daily. Prescriptions Printed Refills  
 warfarin (COUMADIN) 5 mg tablet 0 Sig: Take 1 Tab by mouth daily. Class: Print Route: Oral  
 allopurinol (ZYLOPRIM) 300 mg tablet 4 Sig: Take 1 Tab by mouth daily. Class: Print Route: Oral  
  
Follow-up Instructions Return in about 1 week (around 10/10/2018) for EOV, inr next visit. Patient Instructions 1. Start coumadin 5mg daily in afternoon 2. Elevate leg and apply warm packs 3. Walk 4. Start allopurinal once a day 5. F/u with Dr Reagan Mayers in 1 wk Gout: Care Instructions Your Care Instructions Gout is a form of arthritis caused by a buildup of uric acid crystals in a joint. It causes sudden attacks of pain, swelling, redness, and stiffness, usually in one joint, especially the big toe. Gout usually comes on without a cause. But it can be brought on by drinking alcohol (especially beer) or eating seafood and red meat. Taking certain medicines, such as diuretics or aspirin, also can bring on an attack of gout. Taking your medicines as prescribed and following up with your doctor regularly can help you avoid gout attacks in the future. Follow-up care is a key part of your treatment and safety. Be sure to make and go to all appointments, and call your doctor if you are having problems. It's also a good idea to know your test results and keep a list of the medicines you take. How can you care for yourself at home? · If the joint is swollen, put ice or a cold pack on the area for 10 to 20 minutes at a time. Put a thin cloth between the ice and your skin. · Prop up the sore limb on a pillow when you ice it or anytime you sit or lie down during the next 3 days. Try to keep it above the level of your heart. This will help reduce swelling. · Rest sore joints. Avoid activities that put weight or strain on the joints for a few days. Take short rest breaks from your regular activities during the day. · Take your medicines exactly as prescribed. Call your doctor if you think you are having a problem with your medicine. · Take pain medicines exactly as directed. ¨ If the doctor gave you a prescription medicine for pain, take it as prescribed. ¨ If you are not taking a prescription pain medicine, ask your doctor if you can take an over-the-counter medicine. · Eat less seafood and red meat. · Check with your doctor before drinking alcohol. · Losing weight, if you are overweight, may help reduce attacks of gout. But do not go on a [employer]. \" Losing a lot of weight in a short amount of time can cause a gout attack. When should you call for help? Call your doctor now or seek immediate medical care if: 
  · You have a fever.  
  · The joint is so painful you cannot use it.  
  · You have sudden, unexplained swelling, redness, warmth, or severe pain in one or more joints.  
 Watch closely for changes in your health, and be sure to contact your doctor if: 
  · You have joint pain.  
  · Your symptoms get worse or are not improving after 2 or 3 days. Where can you learn more? Go to http://eddie-ayah.info/. Enter E614 in the search box to learn more about \"Gout: Care Instructions. \" Current as of: October 10, 2017 Content Version: 11.7 © 8528-7008 [employer]. Care instructions adapted under license by [employer] (which disclaims liability or warranty for this information). If you have questions about a medical condition or this instruction, always ask your healthcare professional. [place] [address] any warranty or liability for your use of this information. Consistent Vitamin K Diet: Care Instructions Your Care Instructions Your body needs vitamin K to clot blood and keep your bones strong. It's found in leafy green vegetables such as kale and spinach. If you take the blood thinner warfarin (Coumadin), you need to be careful about how much vitamin K you get. Vitamin K can keep your warfarin from working as it should. Most people who take warfarin can eat normally. The important thing is to get about the same amount of vitamin K each day. Don't suddenly start eating foods with a lot more or a lot less vitamin K. You can choose how much vitamin K you eat. For example, if you already eat a lot of leafy green vegetables, that's fine. Just keep it about the same amount each day. Follow-up care is a key part of your treatment and safety. Be sure to make and go to all appointments, and call your doctor if you are having problems. It's also a good idea to know your test results and keep a list of the medicines you take. How can you care for yourself at home? You don't need to stop eating food high in vitamin K. But you do need to know what foods contain vitamin K. Then you can try to eat about the same amount of vitamin K each day. · You might limit foods that are high in vitamin K to about 1 serving a day. These foods have about 250 to 500 micrograms (mcg) of vitamin K in each serving. They include: ¨ Cooked leafy green vegetables. Examples are kale, spinach, turnip greens, debi greens, Swiss chard, and mustard greens. One serving is ½ cup. · You might limit foods that are medium-high in vitamin K to about 3 servings a day. These foods have about 50 to 150 mcg of vitamin K in each serving. These include: ¨ Cooked brussels sprouts, broccoli, cabbage, and asparagus. One serving is ½ cup. ¨ Raw leafy green vegetables. Examples are spinach, green leaf lettuce, ronnie lettuce, and endive. One serving is 1 cup. · Vitamin K also is found in many multivitamins. You don't need to stop taking your multivitamin if it has vitamin K. But you do need to take it every day. · Check with your doctor before you start or stop taking any supplements or herbal products. Some of these may contain vitamin K. Where can you learn more? Go to http://eddie-ayah.info/. Enter N399 in the search box to learn more about \"Consistent Vitamin K Diet: Care Instructions. \" Current as of: May 10, 2017 Content Version: 11.7 © 8453-6224 [employer]. Care instructions adapted under license by [employer] (which disclaims liability or warranty for this information). If you have questions about a medical condition or this instruction, always ask your healthcare professional. [address] any warranty or liability for your use of this information. Ã¯Â»Â Anticoagulants: After Your Visit Your Care Instructions Your doctor prescribed an anticoagulant medicine. Anticoagulants, often called blood thinners, prevent new blood clots from forming and keep existing clots from getting larger. They do not actually thin the blood, but they make the blood take longer to clot. This lowers the risk of a blood clot moving to the lungs (pulmonary embolism) or moving to the brain and causing a stroke. Blood thinners come in two forms. Heparin is given by shot, either under your skin or through a needle in your vein, and starts working right away. Warfarin (Coumadin) comes in pill form and takes longer to work. Your doctor may have you begin taking both forms at the same time. As soon as the pills start to work, you will stop the shots but continue to take the pills. If you have a blood clot in your leg, you may need to take warfarin for several months. People who have heart conditions such as atrial fibrillation often need to take it for the rest of their lives. The right dose of this medicine is different for each person. You will need regular blood tests to see if your dose is correct. Follow-up care is a key part of your treatment and safety. Be sure to make and go to all appointments, and call your doctor if you are having problems. Itâs also a good idea to know your test results and keep a list of the medicines you take. How do you take blood thinners? · Take your medicines exactly as prescribed. Call your doctor if you think you are having a problem with your medicine. · Call your doctor if you are not sure what to do if you missed a dose of blood thinner. ¨ Your doctor can tell you exactly what to do so you do not take too much or too little blood thinner. Then you will be as safe as possible. · Some general rules for what to do if you miss a dose: ¨ If you remember it in the same day, take the missed dose. Then go back to your regular schedule. ¨ If it is the next day, or almost time to take the next dose, do not take the missed dose. Do not double the dose to make up for the missed one. At your next regularly scheduled time, take your normal dose. ¨ If you miss your dose for 2 or more days, call your doctor. · To help you stay on schedule, use a calendar to remind you when to take your blood thinner. When you take the medicine, note it on the calendar. · If you are going to give yourself shots, your doctor will give you instructions for how to safely inject the medicine. Follow the directions carefully. · Do not take any vitamins, over-the-counter medicines, or herbal products without talking to your doctor first. 
· Avoid contact sports and other activities that could lead to injury. Make your home safe and take other measures to reduce your risk of falling. Always wear a seat belt while in a car. · Do not suddenly change your intake of vitamin Kârich foods, such as broccoli, cabbage, asparagus, lettuce, and spinach. This will help blood thinners work evenly from day to day. · Limit alcohol to 2 drinks a day for men and 1 drink a day for women. Alcohol may interfere with blood thinners. It also increases your risk of falls, which can cause bruising and bleeding. · Tell your dentist, pharmacist, and other health professionals that you take blood thinners.  Wear medical alert jewelry that says you take blood thinners. You can buy this at most drugstores. When should you call for help? Call 911 anytime you think you may need emergency care. For example, call if: 
· You cough up blood. · You vomit blood or what looks like coffee grounds. · You pass maroon or very bloody stools. Call your doctor now or seek immediate medical care if: 
· You have new bruises or blood spots under your skin. · You have a nosebleed. · Your gums bleed when you brush your teeth. · You have blood in your urine. · Your stools are black and tarlike or have streaks of blood. · You have vaginal bleeding when you are not having your period, or heavy period bleeding. Watch closely for changes in your health, and be sure to contact your doctor if: 
· You have questions about your medicine. Where can you learn more? Go to [employer].be Enter G889 in the search box to learn more about \"Anticoagulants: After Your Visit. \" Â© 3654-0211 Healthwise, Incorporated. Care instructions adapted under license by [employer] (which disclaims liability or warranty for this information). This care instruction is for use with your licensed healthcare professional. If you have questions about a medical condition or this instruction, always ask your healthcare professional. [place] [address] any warranty or liability for your use of this information. Content Version: 7.4.77919; Last Revised: July 1, 2009 Taking Warfarin Safely: Care Instructions Your Care Instructions Warfarin is a medicine that you take to prevent blood clots. It is often called a blood thinner. Doctors give warfarin (such as Coumadin) to reduce the risk of blood clots. You may be at risk for blood clots if you have atrial fibrillation or deep vein thrombosis. Some other health problems may also put you at risk. Warfarin slows the amount of time it takes for your blood to clot.  It can cause bleeding problems. Even if you've been taking warfarin for a while, it's important to know how to take it safely. Foods and other medicines can affect the way warfarin works. Some can make warfarin work too well. This can cause bleeding problems. And some can make it work poorly, so that it does not prevent blood clots very well. You will need regular blood tests to check how long it takes for your blood to form a clot. This test is called a PT or prothrombin time test. The result of the test is called an INR level. Depending on the test results, your doctor or anticoagulation clinic may adjust your dose of warfarin. Follow-up care is a key part of your treatment and safety. Be sure to make and go to all appointments, and call your doctor if you are having problems. It's also a good idea to know your test results and keep a list of the medicines you take. How can you care for yourself at home? Take warfarin safely · Take your warfarin at the same time each day. · If you miss a dose of warfarin, don't take an extra dose to make up for it. Your doctor can tell you exactly what to do so you don't take too much or too little. · Wear medical alert jewelry that lets others know that you take warfarin. You can buy this at most drugstores. · Don't take warfarin if you are pregnant or planning to get pregnant. Talk to your doctor about how you can prevent getting pregnant while you are taking it. · Don't change your dose or stop taking warfarin unless your doctor tells you to. Effects of medicines and food on warfarin · Don't start or stop taking any medicines, vitamins, or natural remedies unless you first talk to your doctor. Many medicines can affect how warfarin works. These include aspirin and other pain relievers, over-the-counter medicines, multivitamins, dietary supplements, and herbal products. · Tell all of your doctors and pharmacists that you take warfarin.  Some prescription medicines can affect how warfarin works. · Keep the amount of vitamin K in your diet about the same from day to day. Do not suddenly eat a lot more or a lot less food that is rich in vitamin K than you usually do. Vitamin K affects how warfarin works and how your blood clots. Talk with your doctor before making big changes in your diet. Foods that have a lot of vitamin K include cooked green vegetables, such as: ¨ Kale, spinach, turnip greens, debi greens, Swiss chard, and mustard greens. ¨ [place] sprouts, broccoli, and asparagus. · Limit your use of alcohol. Avoid bleeding by preventing falls and injuries · Wear slippers or shoes with nonskid soles. · Remove throw rugs and clutter. · Rearrange furniture and electrical cords to keep them out of walking paths. · Keep stairways, porches, and outside walkways well lit. Use night-lights in hallways and bathrooms. · Be extra careful when you work with sharp tools or knives. When should you call for help? Call 911 anytime you think you may need emergency care. For example, call if: 
  · You have a sudden, severe headache that is different from past headaches.  
 Call your doctor now or seek immediate medical care if: 
  · You have any abnormal bleeding, such as: 
¨ Nosebleeds. ¨ Vaginal bleeding that is different (heavier, more frequent, at a different time of the month) than what you are used to. ¨ Bloody or black stools, or rectal bleeding. ¨ Bloody or pink urine.  
 Watch closely for changes in your health, and be sure to contact your doctor if you have any problems. Where can you learn more? Go to http://eddie-ayah.info/. Enter F062 in the search box to learn more about \"Taking Warfarin Safely: Care Instructions. \" Current as of: December 6, 2017 Content Version: 11.7 © 0749-4428 [employer].  Care instructions adapted under license by [employer] (which disclaims liability or warranty for this information). If you have questions about a medical condition or this instruction, always ask your healthcare professional. [address] any warranty or liability for your use of this information. Taking Warfarin Safely (Short-Term): After Your Visit Your Care Instructions Warfarin is a medicine that you take to prevent blood clots. It is often called a blood thinner. Doctors give warfarin (such as Coumadin) to reduce the risk of blood clots. You may be at risk for blood clots if you have certain kinds of surgery or some other health problems. Your doctor will tell you when you can stop taking warfarin. Warfarin slows the amount of time it takes for your blood to clot. It can cause bleeding problems. Even if you've been taking warfarin for a while, it's important to know how to take it safely. Foods and other medicines can affect the way warfarin works. Some can make warfarin work too well. This can cause bleeding problems. And some can make it work poorly, so that it does not prevent blood clots very well. You will need regular blood tests to check how long it takes for your blood to form a clot. This test is called a PT or prothrombin time test. The result of the test is called an INR level. Depending on the test results, your doctor or anticoagulation clinic may adjust your dose of warfarin. Follow-up care is a key part of your treatment and safety. Be sure to make and go to all appointments, and call your doctor if you are having problems. It's also a good idea to know your test results and keep a list of the medicines you take. How can you care for yourself at home? Take warfarin safely · Take your warfarin at the same time each day. · If you miss a dose of warfarin, don't take an extra dose to make up for it. Your doctor can tell you exactly what to do so you don't take too much or too little. · Wear medical alert jewelry that lets others know that you take warfarin. You can buy this at most drugstores. · Don't take warfarin if you are pregnant or planning to get pregnant. Talk to your doctor about how you can prevent getting pregnant while you are taking it. · Don't change your dose or stop taking warfarin unless your doctor tells you to. Effects of medicines and food on warfarin · Don't start or stop taking any medicines, vitamins, or natural remedies unless you first talk to your doctor. Many medicines can affect how warfarin works. These include aspirin and other pain relievers, over-the-counter medicines, multivitamins, dietary supplements, and herbal products. · Tell all of your doctors and pharmacists that you take warfarin. Some prescription medicines can affect how warfarin works. · Keep the amount of vitamin K in your diet about the same from day to day. Do not suddenly eat a lot more or a lot less food that is rich in vitamin K than you usually do. Vitamin K affects how warfarin works and how your blood clots. Talk with your doctor before making big changes in your diet. Vitamin K is in many foods, such as: 
¨ Leafy greens, such as kale, cabbage, spinach, Swiss chard, and lettuce. ¨ Canola and soybean oils. ¨ Green vegetables, such as asparagus, broccoli, and [place] sprouts. ¨ Vegetable drinks, green tea leaves, and some dietary supplement drinks. · Avoid cranberry juice and other cranberry products. They can increase the effects of warfarin. · Limit your use of alcohol. Avoid bleeding by preventing falls and injuries · Wear slippers or shoes with nonskid soles. · Remove throw rugs and clutter. · Rearrange furniture and electrical cords to keep them out of walking paths. · Keep stairways, porches, and outside walkways well lit. Use night-lights in hallways and bathrooms. · Be extra careful when you work with sharp tools or knives. When should you call for help? Call 911 anytime you think you may need emergency care. For example, call if: 
· You have a sudden, severe headache that is different from past headaches. Call your doctor now or seek immediate medical care if: 
· You have any abnormal bleeding, such as: 
¨ Nosebleeds. ¨ Vaginal bleeding that is different (heavier, more frequent, at a different time of the month) than what you are used to. ¨ Bloody or black stools, or rectal bleeding. ¨ Bloody or pink urine. Watch closely for changes in your health, and be sure to contact your doctor if you have any problems. Where can you learn more? Go to [employer].be Enter N172 in the search box to learn more about \"Taking Warfarin Safely (Short-Term): After Your Visit. \"  
© 3201-0022 Healthwise, Incorporated. Care instructions adapted under license by [employer] (which disclaims liability or warranty for this information). This care instruction is for use with your licensed healthcare professional. If you have questions about a medical condition or this instruction, always ask your healthcare professional. [place] [address] any warranty or liability for your use of this information. Content Version: 39.1.550890; Current as of: March 12, 2014 Patient Instructions History Introducing [facility]! Dear Bianca Gunn: 
Thank you for requesting a Vyu account. Our records indicate that you already have an active Vyu account. You can access your account anytime at [email or web address] Did you know that you can access your hospital and ER discharge instructions at any time in Vyu? You can also review all of your test results from your hospital stay or ER visit. Additional Information If you have questions, please visit the Frequently Asked Questions section of the Vyu website at [email or web address] Remember, MyChart is NOT to be used for urgent needs. For medical emergencies, dial 911. Now available from your iPhone and Android! Please provide this summary of care documentation to your next provider. Your primary care clinician is listed as [phone] [place] [address]. If you have any questions after today's visit, please call [phone].

## 2018-10-03 NOTE — PROGRESS NOTES
Room #      SUBJECTIVE:    Lian Fernandes is a 82 y.o. female who presents today for follow up for DVT    1. Have you been to the ER, urgent care clinic since your last visit? Hospitalized since your last visit? YES    2. Have you seen or consulted any other health care providers outside of the [address] [place] [address] since your last visit? Include any pap smears or colon screening. YES  When :  Reason:    Health Maintenance reviewed Yes    Health Maintenance Due   Topic Date Due    Shingrix Vaccine Age 49> (1 of 2) 01/18/1987    DTaP/Tdap/Td series (1 - Tdap) 08/22/2013    Influenza Age 5 to Adult  08/01/2018    GLAUCOMA SCREENING Q2Y  11/03/2018

## 2018-10-03 NOTE — PROGRESS NOTES
Rin Watt is a 82 y.o.  female and presents with    Chief Complaint   Patient presents with    Leg Pain           Subjective:    Pt came in yesterday with swelling, lump , pain in left leg for over a month  Turns out she hurt that leg a month or so ago. Went to dr Johnny Bhardwaj. xrays were neg        Additional Concerns:          Patient Active Problem List    Diagnosis Date Noted    Blood clot in vein 10/03/2018    Chronic gout of multiple sites 10/03/2018    Severe obesity (BMI 35.0-39. 9) with comorbidity ([facility] [address] 05/16/2018    Chronic pain syndrome 10/16/2017    Subclinical hyperthyroidism 05/30/2017    Arthritis 09/20/2010    Essential hypertension, malignant 03/22/2010    Pure hypercholesterolemia 03/22/2010    Edema 03/22/2010    Chronic eczema 03/22/2010     Current Outpatient Prescriptions   Medication Sig Dispense Refill    warfarin (COUMADIN) 5 mg tablet Take 1 Tab by mouth daily. 30 Tab 0    allopurinol (ZYLOPRIM) 300 mg tablet Take 1 Tab by mouth daily. 90 Tab 4    HYDROcodone-acetaminophen (NORCO) 5-325 mg per tablet Take 1 Tab by mouth every eight (8) hours as needed for Pain (no more than 2 per day). 60 Tab 0    furosemide (LASIX) 40 mg tablet Take 1 Tab by mouth daily. Only if needed for edema 30 Tab 3    albuterol (PROVENTIL HFA, VENTOLIN HFA, PROAIR HFA) 90 mcg/actuation inhaler Take 2 Puffs by inhalation every six (6) hours as needed for Wheezing. 1 Inhaler 0    lisinopril-hydroCHLOROthiazide (PRINZIDE, ZESTORETIC) 20-25 mg per tablet Take 1 Tab by mouth daily. 90 Tab 4    naproxen (NAPROSYN) 500 mg tablet Take 1 Tab by mouth daily. 90 Tab 4    fluocinoNIDE (LIDEX) 0.05 % ointment Apply  to affected area two (2) times a day. 60 g 4    omeprazole (PRILOSEC) 20 mg capsule Take 20 mg by mouth daily.        No Known Allergies  Past Medical History:   Diagnosis Date    Arthritis 9/20/2010    Chronic eczema 3/22/2010    Edema 3/22/2010    Essential hypertension, malignant 3/22/2010    Menopause     Pure hypercholesterolemia 3/22/2010    Subclinical hyperthyroidism 5/30/2017    Unspecified arthropathy, shoulder region 3/22/2010    Unspecified hypothyroidism 3/22/2010     Past Surgical History:   Procedure Laterality Date    HX GYN  1983    hysterectomy    HX HYSTERECTOMY  1984    fibroids    HX KNEE REPLACEMENT Right      Family History   Problem Relation Age of Onset    Hypertension Mother     Breast Cancer Mother 70    Breast Cancer Child 36    Breast Cancer Sister 39     Social History   Substance Use Topics    Smoking status: Never Smoker    Smokeless tobacco: Never Used    Alcohol use 1.5 oz/week     3 Standard drinks or equivalent per week       ROS       All other systems reviewed and are negative. Objective:  Vitals:    10/03/18 0837   BP: 111/64   Pulse: 66   Resp: 19   Temp: 97.8 °F (36.6 °C)   TempSrc: Oral   SpO2: 93%   Weight: 225 lb 6.4 oz (102.2 kg)   Height: 5' 6\" (1.676 m)   PainSc:   6   PainLoc: Leg                 alert, well appearing, and in no distress and oriented to person, place, and time  Leg is swollen, painful, warm. Has palpable lump anterior lower letg        LABS   Component      Latest Ref Rng & Units 10/2/2018 10/2/2018 10/2/2018 10/2/2018           1:09 PM  1:09 PM  1:09 PM  1:09 PM   WBC      4.6 - 13.2 K/uL       RBC      4.20 - 5.30 M/uL       HGB      12.0 - 16.0 g/dL       HCT      35.0 - 45.0 %       MCV      74.0 - 97.0 FL       MCH      24.0 - 34.0 PG       MCHC      31.0 - 37.0 g/dL       RDW      11.6 - 14.5 %       PLATELET      855 - 297 K/uL       MPV      9.2 - 11.8 FL       NEUTROPHILS      40 - 73 %       LYMPHOCYTES      21 - 52 %       MONOCYTES      3 - 10 %       EOSINOPHILS      0 - 5 %       BASOPHILS      0 - 2 %       ABS. NEUTROPHILS      1.8 - 8.0 K/UL       ABS. LYMPHOCYTES      0.9 - 3.6 K/UL       ABS. MONOCYTES      0.05 - 1.2 K/UL       ABS. EOSINOPHILS      0.0 - 0.4 K/UL       ABS. BASOPHILS      0.0 - 0.1 K/UL       DF             Sodium      136 - 145 mmol/L       Potassium      3.5 - 5.5 mmol/L       Chloride      100 - 108 mmol/L       CO2      21 - 32 mmol/L       Anion gap      3.0 - 18 mmol/L       Glucose      74 - 99 mg/dL       BUN      7.0 - 18 MG/DL       Creatinine      0.6 - 1.3 MG/DL       BUN/Creatinine ratio      12 - 20         GFR est AA      >60 ml/min/1.73m2       GFR est non-AA      >60 ml/min/1.73m2       Calcium      8.5 - 10.1 MG/DL       Bilirubin, total      0.2 - 1.0 MG/DL       ALT (SGPT)      13 - 56 U/L       AST      15 - 37 U/L       Alk. phosphatase      45 - 117 U/L       Protein, total      6.4 - 8.2 g/dL       Albumin      3.4 - 5.0 g/dL       Globulin      2.0 - 4.0 g/dL       A-G Ratio      0.8 - 1.7         Color             Appearance             Specific gravity      1.005 - 1.030         pH (UA)      5.0 - 8.0         Protein      NEG mg/dL       Glucose      NEG mg/dL       Ketone      NEG mg/dL       Bilirubin      NEG         Blood      NEG         Urobilinogen      0.2 - 1.0 EU/dL       Nitrites      NEG         Leukocyte Esterase      NEG         TSH      0.36 - 3.74 uIU/mL    <0.01 (L)   T4, Free      0.7 - 1.5 NG/DL 1.2      Sed rate, automated      0 - 30 mm/hr  30     Uric acid      2.6 - 7.2 MG/DL   8.6 (H)      Component      Latest Ref Rng & Units 10/2/2018 10/2/2018 10/2/2018           1:09 PM  1:09 PM  1:09 PM   WBC      4.6 - 13.2 K/uL  7.8    RBC      4.20 - 5.30 M/uL  4.81    HGB      12.0 - 16.0 g/dL  12.5    HCT      35.0 - 45.0 %  41.2    MCV      74.0 - 97.0 FL  85.7    MCH      24.0 - 34.0 PG  26.0    MCHC      31.0 - 37.0 g/dL  30.3 (L)    RDW      11.6 - 14.5 %  14.2    PLATELET      659 - 565 K/uL  175    MPV      9.2 - 11.8 FL  12.5 (H)    NEUTROPHILS      40 - 73 %  55    LYMPHOCYTES      21 - 52 %  30    MONOCYTES      3 - 10 %  9    EOSINOPHILS      0 - 5 %  6 (H)    BASOPHILS      0 - 2 %  0    ABS.  NEUTROPHILS      1.8 - 8.0 K/UL  4.2    ABS. LYMPHOCYTES      0.9 - 3.6 K/UL  2.4    ABS. MONOCYTES      0.05 - 1.2 K/UL  0.7    ABS. EOSINOPHILS      0.0 - 0.4 K/UL  0.5 (H)    ABS. BASOPHILS      0.0 - 0.1 K/UL  0.0    DF        AUTOMATED    Sodium      136 - 145 mmol/L 142     Potassium      3.5 - 5.5 mmol/L 4.6     Chloride      100 - 108 mmol/L 103     CO2      21 - 32 mmol/L 34 (H)     Anion gap      3.0 - 18 mmol/L 5     Glucose      74 - 99 mg/dL 85     BUN      7.0 - 18 MG/DL 25 (H)     Creatinine      0.6 - 1.3 MG/DL 1.50 (H)     BUN/Creatinine ratio      12 - 20   17     GFR est AA      >60 ml/min/1.73m2 40 (L)     GFR est non-AA      >60 ml/min/1.73m2 33 (L)     Calcium      8.5 - 10.1 MG/DL 10.0     Bilirubin, total      0.2 - 1.0 MG/DL 0.3     ALT (SGPT)      13 - 56 U/L 31     AST      15 - 37 U/L 26     Alk. phosphatase      45 - 117 U/L 160 (H)     Protein, total      6.4 - 8.2 g/dL 7.7     Albumin      3.4 - 5.0 g/dL 3.6     Globulin      2.0 - 4.0 g/dL 4.1 (H)     A-G Ratio      0.8 - 1.7   0.9     Color         YELLOW   Appearance         CLEAR   Specific gravity      1.005 - 1.030     1.017   pH (UA)      5.0 - 8.0     6.5   Protein      NEG mg/dL   NEGATIVE   Glucose      NEG mg/dL   NEGATIVE   Ketone      NEG mg/dL   NEGATIVE   Bilirubin      NEG     NEGATIVE   Blood      NEG     NEGATIVE   Urobilinogen      0.2 - 1.0 EU/dL   0.2   Nitrites      NEG     NEGATIVE   Leukocyte Esterase      NEG     NEGATIVE   TSH      0.36 - 3.74 uIU/mL      T4, Free      0.7 - 1.5 NG/DL      Sed rate, automated      0 - 30 mm/hr      Uric acid      2.6 - 7.2 MG/DL        TESTS  Left: The deep venous system of the left lower extremity was examined using duplex ultrasound.  The deep venous system of the left lower extremity was examined using duplex ultrasound.  B-mode imaging demonstrates normal compressibility of the common femoral, femoral, deep femoral and popliteal veins. There is no thrombus identified in these segments.  Imaging of the posterior tibial and peroneal was suboptimal; with color Doppler these veins are grossly patent. The mid calf demonstrates varicosities (3-4 mm) with an isolated non occlusive thrombus at the area of patient pain.  Doppler flow signals are spontaneous and phasic at all levels. Venous valvular reflux > 1000 ms was identified in the left common femoral and popliteal deep venous system and > 500 ms in the left great saphenous vein at the saphenofemoral junction. Conclusions: No evidence of deep venous thrombosis in the left common femoral, femoral, deep femoral and popliteal veins. Technically compromised study therefore non-occlusive deep venous thrombosis cannot be excluded in the left posterior tibial and peroneal veins as described in the findings.    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     Venous valvular reflux in the left as described. Assessment/Plan:    Gout -- will start allopurinal and af/u   1 or 2 mo  Blood clot. Concerned with extension . Will go ahead with short term coumadin therapy , rest, elevation, heat, mobilization    Lab review: labs are reviewed, up to date and normal    Diagnoses and all orders for this visit:    1. Blood clot in vein  -     warfarin (COUMADIN) 5 mg tablet; Take 1 Tab by mouth daily. 2. Chronic gout of multiple sites, unspecified cause  -     allopurinol (ZYLOPRIM) 300 mg tablet; Take 1 Tab by mouth daily. 3. Chronic pain syndrome    4. Subclinical hyperthyroidism    5. Arthritis    6. Edema, unspecified type    7. Essential hypertension, malignant          I have discussed the diagnosis with the patient and the intended plan as seen in the above orders. The patient has received an after-visit summary and questions were answered concerning future plans. I have discussed medication side effects and warnings with the patient as well.  I have reviewed the plan of care with the patient, accepted their input and they are in agreement with the treatment goals. Follow-up Disposition:  Return in about 1 week (around 10/10/2018) for EOV, inr next visit.

## 2018-10-10 ENCOUNTER — OFFICE VISIT (OUTPATIENT)
Dept: FAMILY MEDICINE CLINIC | Age: 83
End: 2018-10-10

## 2018-10-10 VITALS
HEIGHT: 66 IN | RESPIRATION RATE: 12 BRPM | TEMPERATURE: 97.5 F | SYSTOLIC BLOOD PRESSURE: 120 MMHG | HEART RATE: 71 BPM | OXYGEN SATURATION: 94 % | DIASTOLIC BLOOD PRESSURE: 58 MMHG

## 2018-10-10 DIAGNOSIS — I82.90 BLOOD CLOT IN VEIN: Primary | ICD-10-CM

## 2018-10-10 LAB
INR BLD: 2.1
PT POC: 24.6 SECONDS
VALID INTERNAL CONTROL?: YES

## 2018-10-10 NOTE — PROGRESS NOTES
Dorina Estrella is a 82 y.o.  female and presents with    Chief Complaint   Patient presents with    Blood Clot    Gout           Subjective:    Cardiovascular Review:  The patient has diabetes, hypertension and hyperlipidemia. Diet and Lifestyle: not attempting to follow a low fat, low cholesterol diet, not attempting to follow a low sodium diet  Home BP Monitoring: is not measured at home. Pertinent ROS: taking medications as instructed, no medication side effects noted, no TIA's, no chest pain on exertion, no dyspnea on exertion, no swelling of ankles. Anticoagulation  Patient here for followup of chronic anticoagulation. Indication: DVT. Bleeding Signs/Symptoms:  None. Thromboembolic Signs/Symptoms:  None. INR's are drawn regularly and have been therapeutic. Lab Results   Component Value Date/Time    INR 1.0 08/13/2013 12:00 AM    INR POC 2.1 10/10/2018 08:38 AM     Osteoarthritis and Chronic Pain:  Patient has gout, primarily affecting the diffuse. Symptoms onset: problem is longstanding. Rheumatological ROS: no current joint or muscle symptoms, essentially pain-free. Response to treatment plan: stable. Additional Concerns:          Patient Active Problem List    Diagnosis Date Noted    Blood clot in vein 10/03/2018    Chronic gout of multiple sites 10/03/2018    Severe obesity (BMI 35.0-39. 9) with comorbidity ([facility] [address] 05/16/2018    Chronic pain syndrome 10/16/2017    Subclinical hyperthyroidism 05/30/2017    Arthritis 09/20/2010    Essential hypertension, malignant 03/22/2010    Pure hypercholesterolemia 03/22/2010    Edema 03/22/2010    Chronic eczema 03/22/2010     Current Outpatient Prescriptions   Medication Sig Dispense Refill    warfarin (COUMADIN) 5 mg tablet Take 1 Tab by mouth daily. 30 Tab 0    allopurinol (ZYLOPRIM) 300 mg tablet Take 1 Tab by mouth daily.  90 Tab 4    HYDROcodone-acetaminophen (NORCO) 5-325 mg per tablet Take 1 Tab by mouth every eight (8) hours as needed for Pain (no more than 2 per day). 60 Tab 0    furosemide (LASIX) 40 mg tablet Take 1 Tab by mouth daily. Only if needed for edema 30 Tab 3    albuterol (PROVENTIL HFA, VENTOLIN HFA, PROAIR HFA) 90 mcg/actuation inhaler Take 2 Puffs by inhalation every six (6) hours as needed for Wheezing. 1 Inhaler 0    lisinopril-hydroCHLOROthiazide (PRINZIDE, ZESTORETIC) 20-25 mg per tablet Take 1 Tab by mouth daily. 90 Tab 4    naproxen (NAPROSYN) 500 mg tablet Take 1 Tab by mouth daily. 90 Tab 4    fluocinoNIDE (LIDEX) 0.05 % ointment Apply  to affected area two (2) times a day. 60 g 4    omeprazole (PRILOSEC) 20 mg capsule Take 20 mg by mouth daily. No Known Allergies  Past Medical History:   Diagnosis Date    Arthritis 9/20/2010    Chronic eczema 3/22/2010    Edema 3/22/2010    Essential hypertension, malignant 3/22/2010    Menopause     Pure hypercholesterolemia 3/22/2010    Subclinical hyperthyroidism 5/30/2017    Unspecified arthropathy, shoulder region 3/22/2010    Unspecified hypothyroidism 3/22/2010     Past Surgical History:   Procedure Laterality Date    HX GYN  1983    hysterectomy    HX HYSTERECTOMY  1984    fibroids    HX KNEE REPLACEMENT Right      Family History   Problem Relation Age of Onset    Hypertension Mother     Breast Cancer Mother 70    Breast Cancer Child 36    Breast Cancer Sister 39     Social History   Substance Use Topics    Smoking status: Never Smoker    Smokeless tobacco: Never Used    Alcohol use 1.5 oz/week     3 Standard drinks or equivalent per week       ROS       All other systems reviewed and are negative.       Objective:  Vitals:    10/10/18 0832   BP: 120/58   Pulse: 71   Resp: 12   Temp: 97.5 °F (36.4 °C)   TempSrc: Oral   SpO2: 94%   Height: 5' 6\" (1.676 m)   PainSc:   6   PainLoc: Leg                 alert, well appearing, and in no distress and oriented to person, place, and time  Chest - clear to auscultation, no wheezes, rales or rhonchi, symmetric air entry  Heart - normal rate, regular rhythm, normal S1, S2, no murmurs, rubs, clicks or gallops  Abdomen - soft, nontender, nondistended, no masses or organomegaly  Left leg with 3 to 4 mm edema and some tenderness        LABS     TESTS      Assessment/Plan:    dvt left leg on coumadin x 1 wk. INR fine. Will recheck in 8 to 10 days. Plan repeat duplex 3 mo and hopefuly can d/c coumadin   Gout will recheck levels in 1 mo      Lab review: labs are reviewed, up to date and normal    Diagnoses and all orders for this visit:    1. Blood clot in vein  -     AMB POC PT/INR          I have discussed the diagnosis with the patient and the intended plan as seen in the above orders. The patient has received an after-visit summary and questions were answered concerning future plans. I have discussed medication side effects and warnings with the patient as well. I have reviewed the plan of care with the patient, accepted their input and they are in agreement with the treatment goals. Follow-up Disposition:  Return in about 8 days (around 10/18/2018), or f/u 8 to 10 days for INR and dvt recheck, for rov.

## 2018-10-10 NOTE — MR AVS SNAPSHOT
Kofi Arshad 
 
 
 [facility] [address] [place] [address] [id number] 
[phone] Patient: Shannon Loving MRN: [id number] Visit Information Date & Time Provider Department Dept. Phone Encounter #  
 10/10/2018  8:30 AM Chel Lamas [phone] [id number] Follow-up Instructions Return in about 8 days (around 10/18/2018), or f/u 8 to 10 days for INR and dvt recheck, for rov. Follow-up and Disposition History Your Appointments 11/12/2018 10:00 AM  
[place] [id number] [address]) Appt Note: Return in about 6 months (around 11/16/2018) for EOV, labs prior. [facility] [address] 222 [facility] [address] [place] [address] Upcoming Health Maintenance Date Due Shingrix Vaccine Age 50> (1 of 2) 1/18/1987 DTaP/Tdap/Td series (1 - Tdap) 8/22/2013 Influenza Age 5 to Adult 8/1/2018 GLAUCOMA SCREENING Q2Y 11/3/2018 MEDICARE YEARLY EXAM 5/17/2019 Allergies as of 10/10/2018  Review Complete On: 10/10/2018 By: Joceline Goetz No Known Allergies Current Immunizations  Reviewed on 6/21/2010 Name Date Pneumococcal Conjugate (PCV-13) 8/19/2015 Pneumococcal Polysaccharide (PPSV-23) 8/21/2013  2:15 PM  
 TD Vaccine 3/22/2003 Td, Adsorbed PF 8/21/2013  2:15 PM  
  
 Not reviewed this visit You Were Diagnosed With   
  
 Codes Comments Blood clot in vein    -  Primary ICD-10-CM: I82.90 ICD-9-CM: 453.9 Vitals BP Pulse Temp Resp Height(growth percentile) SpO2  
 120/58 71 97.5 °F (36.4 °C) (Oral) 12 5' 6\" (1.676 m) 94% OB Status Smoking Status Hysterectomy Never Smoker Vitals History Preferred Pharmacy Pharmacy Name Phone [facility] [phone] Your Updated Medication List  
  
   
This list is accurate as of 10/10/18  9:48 AM.  Always use your most recent med list.  
  
  
  
  
 albuterol 90 mcg/actuation inhaler Commonly known as:  PROVENTIL HFA, VENTOLIN HFA, PROAIR HFA Take 2 Puffs by inhalation every six (6) hours as needed for Wheezing. allopurinol 300 mg tablet Commonly known as:  Jamas Elks Take 1 Tab by mouth daily. fluocinoNIDE 0.05 % ointment Commonly known as:  LIDEX Apply  to affected area two (2) times a day. furosemide 40 mg tablet Commonly known as:  LASIX Take 1 Tab by mouth daily. Only if needed for edema HYDROcodone-acetaminophen 5-325 mg per tablet Commonly known as:  [address] Take 1 Tab by mouth every eight (8) hours as needed for Pain (no more than 2 per day). lisinopril-hydroCHLOROthiazide 20-25 mg per tablet Commonly known as:  Hansa Cooler Take 1 Tab by mouth daily. naproxen 500 mg tablet Commonly known as:  NAPROSYN Take 1 Tab by mouth daily. PriLOSEC 20 mg capsule Generic drug:  omeprazole Take 20 mg by mouth daily. warfarin 5 mg tablet Commonly known as:  COUMADIN Take 1 Tab by mouth daily. We Performed the Following AMB POC PT/INR [58819 CPT(R)] Follow-up Instructions Return in about 8 days (around 10/18/2018), or f/u 8 to 10 days for INR and dvt recheck, for rov. Introducing [facility]! Dear Alison Maza: 
Thank you for requesting a 3sun account. Our records indicate that you already have an active 3sun account. You can access your account anytime at [email or web address] Did you know that you can access your hospital and ER discharge instructions at any time in 3sun? You can also review all of your test results from your hospital stay or ER visit. Additional Information If you have questions, please visit the Frequently Asked Questions section of the LibraryThing website at [email or web address] Remember, LibraryThing is NOT to be used for urgent needs. For medical emergencies, dial 911. Now available from your iPhone and Android! Please provide this summary of care documentation to your next provider. Your primary care clinician is listed as [id number] [facility]. If you have any questions after today's visit, please call [phone].

## 2018-10-22 ENCOUNTER — OFFICE VISIT (OUTPATIENT)
Dept: FAMILY MEDICINE CLINIC | Age: 83
End: 2018-10-22

## 2018-10-22 VITALS
RESPIRATION RATE: 16 BRPM | OXYGEN SATURATION: 94 % | HEART RATE: 66 BPM | HEIGHT: 66 IN | TEMPERATURE: 96.4 F | DIASTOLIC BLOOD PRESSURE: 65 MMHG | BODY MASS INDEX: 36.67 KG/M2 | SYSTOLIC BLOOD PRESSURE: 157 MMHG | WEIGHT: 228.2 LBS

## 2018-10-22 DIAGNOSIS — I82.592 CHRONIC DEEP VEIN THROMBOSIS (DVT) OF OTHER VEIN OF LEFT LOWER EXTREMITY (HCC): ICD-10-CM

## 2018-10-22 DIAGNOSIS — I82.90 BLOOD CLOT IN VEIN: Primary | ICD-10-CM

## 2018-10-22 LAB
INR BLD: 4.2
INR BLD: 4.6
PT POC: 50.2 SECONDS
PT POC: 55 SECONDS
VALID INTERNAL CONTROL?: YES
VALID INTERNAL CONTROL?: YES

## 2018-10-22 RX ORDER — WARFARIN 3 MG/1
3 TABLET ORAL DAILY
Qty: 30 TAB | Refills: 0 | Status: SHIPPED | OUTPATIENT
Start: 2018-10-22 | End: 2018-10-29

## 2018-10-22 NOTE — PATIENT INSTRUCTIONS
1. Stop taking coumadin 5mg  2. On wednesday start taking coumadin 3mg/d  3.  Take allopurinal with food  4 come back in 1 wk for recheck

## 2018-10-22 NOTE — PROGRESS NOTES
Luma Levy is a 82 y.o.  female and presents with    Chief Complaint   Patient presents with    Knee Pain    Blood Clot    Gout    Thyroid Problem    Pain (Chronic)    Arthritis    Hypertension    Cholesterol Problem    Ankle swelling           Subjective:    Cardiovascular Review:  The patient has hypertension and hyperlipidemia. Diet and Lifestyle: not attempting to follow a low fat, low cholesterol diet, not attempting to follow a low sodium diet  Home BP Monitoring: is not measured at home. Pertinent ROS: taking medications as instructed, no medication side effects noted, no TIA's, no chest pain on exertion, no dyspnea on exertion, no swelling of ankles. Anticoagulation  Patient here for followup of chronic anticoagulation. Indication: DVT. Bleeding Signs/Symptoms:  None. Thromboembolic Signs/Symptoms:  None. INR's are drawn regularly and have been therapeutic. Lab Results   Component Value Date/Time    INR 1.0 08/13/2013 12:00 AM    INR POC 4.2 10/22/2018 08:53 AM     Thyroid Review:  Patient is seen for followup of subclinical hyperthyroidism. Thyroid ROS: denies fatigue, weight changes, heat/cold intolerance, bowel/skin changes or CVS symptoms. Osteoarthritis and Chronic Pain:  Patient has osteoarthritis, primarily affecting the knees. Symptoms onset: problem is longstanding. Rheumatological ROS: ongoing significant pain in knees which is stable and controlled by PRN meds. Response to treatment plan: gradually worsening. Additional Concerns: chronic edema ongoing  Gout ongiong now on zyloprim            Patient Active Problem List    Diagnosis Date Noted    Blood clot in vein 10/03/2018    Chronic gout of multiple sites 10/03/2018    Severe obesity (BMI 35.0-39. 9) with comorbidity ([facility] [address] 05/16/2018    Chronic pain syndrome 10/16/2017    Subclinical hyperthyroidism 05/30/2017    Arthritis 09/20/2010    Essential hypertension, malignant 03/22/2010    Pure hypercholesterolemia 03/22/2010    Edema 03/22/2010    Chronic eczema 03/22/2010     Current Outpatient Medications   Medication Sig Dispense Refill    warfarin (COUMADIN) 3 mg tablet Take 1 Tab by mouth daily. 30 Tab 0    allopurinol (ZYLOPRIM) 300 mg tablet Take 1 Tab by mouth daily. 90 Tab 4    HYDROcodone-acetaminophen (NORCO) 5-325 mg per tablet Take 1 Tab by mouth every eight (8) hours as needed for Pain (no more than 2 per day). 60 Tab 0    furosemide (LASIX) 40 mg tablet Take 1 Tab by mouth daily. Only if needed for edema 30 Tab 3    albuterol (PROVENTIL HFA, VENTOLIN HFA, PROAIR HFA) 90 mcg/actuation inhaler Take 2 Puffs by inhalation every six (6) hours as needed for Wheezing. 1 Inhaler 0    lisinopril-hydroCHLOROthiazide (PRINZIDE, ZESTORETIC) 20-25 mg per tablet Take 1 Tab by mouth daily. 90 Tab 4    naproxen (NAPROSYN) 500 mg tablet Take 1 Tab by mouth daily. 90 Tab 4    fluocinoNIDE (LIDEX) 0.05 % ointment Apply  to affected area two (2) times a day. 60 g 4    omeprazole (PRILOSEC) 20 mg capsule Take 20 mg by mouth daily. No Known Allergies  Past Medical History:   Diagnosis Date    Arthritis 9/20/2010    Chronic eczema 3/22/2010    Edema 3/22/2010    Essential hypertension, malignant 3/22/2010    Menopause     Pure hypercholesterolemia 3/22/2010    Subclinical hyperthyroidism 5/30/2017    Unspecified arthropathy, shoulder region 3/22/2010    Unspecified hypothyroidism 3/22/2010     Past Surgical History:   Procedure Laterality Date    HX GYN  1983    hysterectomy    HX HYSTERECTOMY  1984    fibroids    HX KNEE REPLACEMENT Right      Family History   Problem Relation Age of Onset    Hypertension Mother     Breast Cancer Mother 70    Breast Cancer Child 36    Breast Cancer Sister 39     Social History     Tobacco Use    Smoking status: Never Smoker    Smokeless tobacco: Never Used   Substance Use Topics    Alcohol use:  Yes     Alcohol/week: 1.5 oz     Types: 3 Standard drinks or equivalent per week       ROS       All other systems reviewed and are negative. Objective:  Vitals:    10/22/18 0852   BP: 157/65   Pulse: 66   Resp: 16   Temp: 96.4 °F (35.8 °C)   TempSrc: Oral   SpO2: 94%   Weight: 228 lb 3.2 oz (103.5 kg)   Height: 5' 6\" (1.676 m)   PainSc:   4   PainLoc: Knee                 alert, well appearing, and in no distress, oriented to person, place, and time and overweight  Chest - clear to auscultation, no wheezes, rales or rhonchi, symmetric air entry  Heart - normal rate, regular rhythm, normal S1, S2, no murmurs, rubs, clicks or gallops  Abdomen - soft, nontender, nondistended, no masses or organomegaly  Knee somewhat painful and swollen  Leg left site of blood clot much better, less swollen, and painful         LABS     TESTS      Assessment/Plan:    Hypertension - stable  Hyperlipidemia - stable  Coumadin is too high will decrase to 3mg/d and f/u 1wk  Gout on zyloprim   Th;yroid stable  Chronic pain stable  Edema ongiong  Knee arthritgis worse -- consider cortisone when inr stable      Lab review: labs are reviewed, up to date and normal    Diagnoses and all orders for this visit:    Blood clot in vein  -     AMB POC PT/INR  -     AMB POC PT/INR  -     warfarin (COUMADIN) 3 mg tablet; Take 1 Tab by mouth daily. Chronic deep vein thrombosis (DVT) of other vein of left lower extremity (HCC)  -     warfarin (COUMADIN) 3 mg tablet; Take 1 Tab by mouth daily. I have discussed the diagnosis with the patient and the intended plan as seen in the above orders. The patient has received an after-visit summary and questions were answered concerning future plans. I have discussed medication side effects and warnings with the patient as well. I have reviewed the plan of care with the patient, accepted their input and they are in agreement with the treatment goals.      Follow-up Disposition:  Return in about 1 week (around 10/29/2018) for EOV, procedure cortisone shot knee and INR , inr next visit.

## 2018-10-22 NOTE — PROGRESS NOTES
Josué Franklin is a 82 y.o. female presents today for follow up on her blood clot. Patient reports of some left knee and leg pain. Patient reports leg pain is 4/10. Pt is in Room # 5      1. Have you been to the ER, urgent care clinic since your last visit? Hospitalized since your last visit? No    2. Have you seen or consulted any other health care providers outside of the [address] [place] [address] since your last visit? Include any pap smears or colon screening. No     Health Maintenance reviewed - .

## 2018-10-29 ENCOUNTER — OFFICE VISIT (OUTPATIENT)
Dept: FAMILY MEDICINE CLINIC | Age: 83
End: 2018-10-29

## 2018-10-29 VITALS
WEIGHT: 224.4 LBS | HEART RATE: 66 BPM | OXYGEN SATURATION: 96 % | RESPIRATION RATE: 19 BRPM | SYSTOLIC BLOOD PRESSURE: 108 MMHG | BODY MASS INDEX: 36.07 KG/M2 | TEMPERATURE: 98.1 F | DIASTOLIC BLOOD PRESSURE: 68 MMHG | HEIGHT: 66 IN

## 2018-10-29 DIAGNOSIS — I82.A12 ACUTE DEEP VEIN THROMBOSIS (DVT) OF AXILLARY VEIN OF LEFT UPPER EXTREMITY (HCC): Primary | ICD-10-CM

## 2018-10-29 DIAGNOSIS — M17.12 PRIMARY OSTEOARTHRITIS OF LEFT KNEE: ICD-10-CM

## 2018-10-29 DIAGNOSIS — M19.90 ARTHRITIS: ICD-10-CM

## 2018-10-29 DIAGNOSIS — E05.90 SUBCLINICAL HYPERTHYROIDISM: ICD-10-CM

## 2018-10-29 DIAGNOSIS — E78.00 PURE HYPERCHOLESTEROLEMIA: ICD-10-CM

## 2018-10-29 DIAGNOSIS — I10 ESSENTIAL HYPERTENSION, MALIGNANT: ICD-10-CM

## 2018-10-29 DIAGNOSIS — G89.4 CHRONIC PAIN SYNDROME: ICD-10-CM

## 2018-10-29 DIAGNOSIS — M1A.09X0 CHRONIC GOUT OF MULTIPLE SITES, UNSPECIFIED CAUSE: ICD-10-CM

## 2018-10-29 LAB
INR BLD: 1.4
PT POC: 16.7 SECONDS
VALID INTERNAL CONTROL?: YES

## 2018-10-29 RX ORDER — TRIAMCINOLONE ACETONIDE 40 MG/ML
40 INJECTION, SUSPENSION INTRA-ARTICULAR; INTRAMUSCULAR ONCE
Qty: 1 ML | Refills: 0
Start: 2018-10-29 | End: 2018-10-29

## 2018-10-29 RX ORDER — WARFARIN 4 MG/1
4 TABLET ORAL DAILY
Qty: 30 TAB | Refills: 1 | Status: SHIPPED | OUTPATIENT
Start: 2018-10-29 | End: 2018-12-10 | Stop reason: SDUPTHER

## 2018-10-29 NOTE — PROGRESS NOTES
Room #  5     SUBJECTIVE:    Ashley Bahena is a 82 y.o. female who presents today for follow up for DVT. PT/INR done 1.4/ 16.7    1. Have you been to the ER, urgent care clinic since your last visit? Hospitalized since your last visit? NO    2. Have you seen or consulted any other health care providers outside of the [address] [place] [address] since your last visit? Include any pap smears or colon screening. NO  When :  Reason:    Health Maintenance reviewed Yes    Health Maintenance Due   Topic Date Due    Shingrix Vaccine Age 49> (1 of 2) 01/18/1987    DTaP/Tdap/Td series (1 - Tdap) 08/22/2013    Influenza Age 5 to Adult  08/01/2018    GLAUCOMA SCREENING Q2Y  11/03/2018

## 2018-10-29 NOTE — PATIENT INSTRUCTIONS
Knee Arthritis: Care Instructions  Your Care Instructions    Knee arthritis is a breakdown of the cartilage that cushions your knee joint. When the cartilage wears down, your bones rub against each other. This causes pain and stiffness. Knee arthritis tends to get worse with time. Treatment for knee arthritis involves reducing pain, making the leg muscles stronger, and staying at a healthy body weight. The treatment usually does not improve the health of the cartilage, but it can reduce pain and improve how well your knee works. You can take simple measures to protect your knee joints, ease your pain, and help you stay active. Follow-up care is a key part of your treatment and safety. Be sure to make and go to all appointments, and call your doctor if you are having problems. It's also a good idea to know your test results and keep a list of the medicines you take. How can you care for yourself at home? · Know that knee arthritis will cause more pain on some days than on others. · Stay at a healthy weight. Lose weight if you are overweight. When you stand up, the pressure on your knees from every pound of body weight is multiplied four times. So if you lose 10 pounds, you will reduce the pressure on your knees by 40 pounds. · Talk to your doctor or physical therapist about exercises that will help ease joint pain. ? Stretch to help prevent stiffness and to prevent injury before you exercise. You may enjoy gentle forms of yoga to help keep your knee joints and muscles flexible. ? Walk instead of jog.  ? Ride a bike. This makes your thigh muscles stronger and takes pressure off your knee. ? Wear well-fitting and comfortable shoes. ? Exercise in chest-deep water. This can help you exercise longer with less pain. ? Avoid exercises that include squatting or kneeling. They can put a lot of strain on your knees.   ? Talk to your doctor to make sure that the exercise you do is not making the arthritis worse.  · Do not sit for long periods of time. Try to walk once in a while to keep your knee from getting stiff. · Ask your doctor or physical therapist whether shoe inserts may reduce your arthritis pain. · If you can afford it, get new athletic shoes at least every year. This can help reduce the strain on your knees. · Use a device to help you do everyday activities. ? A cane or walking stick can help you keep your balance when you walk. Hold the cane or walking stick in the hand opposite the painful knee. ? If you feel like you may fall when you walk, try using crutches or a front-wheeled walker. These can prevent falls that could cause more damage to your knee. ? A knee brace may help keep your knee stable and prevent pain. ? You also can use other things to make life easier, such as a higher toilet seat and handrails in the bathtub or shower. · Take pain medicines exactly as directed. ? Do not wait until you are in severe pain. You will get better results if you take it sooner. ? If you are not taking a prescription pain medicine, take an over-the-counter medicine such as acetaminophen (Tylenol), ibuprofen (Advil, Motrin), or naproxen (Aleve). Read and follow all instructions on the label. ? Do not take two or more pain medicines at the same time unless the doctor told you to. Many pain medicines have acetaminophen, which is Tylenol. Too much acetaminophen (Tylenol) can be harmful. ? Tell your doctor if you take a blood thinner, have diabetes, or have allergies to shellfish. · Ask your doctor if you might benefit from a shot of steroid medicine into your knee. This may provide pain relief for several months. · Many people take the supplements glucosamine and chondroitin for osteoarthritis. Some people feel they help, but the medical research does not show that they work. Talk to your doctor before you take these supplements. When should you call for help?   Call your doctor now or seek immediate medical care if:    · You have sudden swelling, warmth, or pain in your knee.     · You have knee pain and a fever or rash.     · You have such bad pain that you cannot use your knee.    Watch closely for changes in your health, and be sure to contact your doctor if you have any problems. Where can you learn more? Go to http://eddie-ayah.info/. Enter B097 in the search box to learn more about \"Knee Arthritis: Care Instructions. \"  Current as of: June 11, 2018  Content Version: 11.8  © 0714-6913 [employer]. Care instructions adapted under license by [employer] (which disclaims liability or warranty for this information). If you have questions about a medical condition or this instruction, always ask your healthcare professional. [address] any warranty or liability for your use of this information.

## 2018-10-29 NOTE — PROGRESS NOTES
Barbette Nageotte is a 82 y.o.  female and presents with    Chief Complaint   Patient presents with    Arthritis    Blood Clot    Anticoagulation    Hypertension    Gout    Thyroid Problem    Pain (Chronic)           Subjective:  Here with ongiong pain in left knee req cortisone shot for arthrits  Cardiovascular Review:  The patient has hypertension and hyperlipidemia. Diet and Lifestyle: not attempting to follow a low fat, low cholesterol diet, not attempting to follow a low sodium diet  Home BP Monitoring: is not measured at home. Pertinent ROS: taking medications as instructed, no medication side effects noted, no TIA's, no chest pain on exertion, no dyspnea on exertion, no swelling of ankles. Anticoagulation  Patient here for followup of chronic anticoagulation. Indication: DVT. Bleeding Signs/Symptoms:  None. Thromboembolic Signs/Symptoms:  None. INR's are drawn regularly and have been therapeutic. Lab Results   Component Value Date/Time    INR 1.0 08/13/2013 12:00 AM    INR POC 1.4 10/29/2018 08:43 AM     Thyroid Review:  Patient is seen for followup of subclinical hyperthyroidism. Thyroid ROS: denies fatigue, weight changes, heat/cold intolerance, bowel/skin changes or CVS symptoms. Osteoarthritis and Chronic Pain:  Patient has osteoarthritis, primarily affecting the knees. Symptoms onset: problem is longstanding. Rheumatological ROS: increasing significant pain in knees. Response to treatment plan: stable. Additional Concerns:          Patient Active Problem List    Diagnosis Date Noted    Blood clot in vein 10/03/2018    Chronic gout of multiple sites 10/03/2018    Severe obesity (BMI 35.0-39. 9) with comorbidity ([address] 05/16/2018    Chronic pain syndrome 10/16/2017    Subclinical hyperthyroidism 05/30/2017    Arthritis 09/20/2010    Essential hypertension, malignant 03/22/2010    Pure hypercholesterolemia 03/22/2010    Edema 03/22/2010    Chronic eczema 03/22/2010     Current Outpatient Medications   Medication Sig Dispense Refill    warfarin (COUMADIN) 4 mg tablet Take 1 Tab by mouth daily. 30 Tab 1    allopurinol (ZYLOPRIM) 300 mg tablet Take 1 Tab by mouth daily. 90 Tab 4    HYDROcodone-acetaminophen (NORCO) 5-325 mg per tablet Take 1 Tab by mouth every eight (8) hours as needed for Pain (no more than 2 per day). 60 Tab 0    furosemide (LASIX) 40 mg tablet Take 1 Tab by mouth daily. Only if needed for edema 30 Tab 3    albuterol (PROVENTIL HFA, VENTOLIN HFA, PROAIR HFA) 90 mcg/actuation inhaler Take 2 Puffs by inhalation every six (6) hours as needed for Wheezing. 1 Inhaler 0    lisinopril-hydroCHLOROthiazide (PRINZIDE, ZESTORETIC) 20-25 mg per tablet Take 1 Tab by mouth daily. 90 Tab 4    naproxen (NAPROSYN) 500 mg tablet Take 1 Tab by mouth daily. 90 Tab 4    fluocinoNIDE (LIDEX) 0.05 % ointment Apply  to affected area two (2) times a day. 60 g 4    omeprazole (PRILOSEC) 20 mg capsule Take 20 mg by mouth daily. No Known Allergies  Past Medical History:   Diagnosis Date    Arthritis 9/20/2010    Chronic eczema 3/22/2010    Edema 3/22/2010    Essential hypertension, malignant 3/22/2010    Menopause     Pure hypercholesterolemia 3/22/2010    Subclinical hyperthyroidism 5/30/2017    Unspecified arthropathy, shoulder region 3/22/2010    Unspecified hypothyroidism 3/22/2010     Past Surgical History:   Procedure Laterality Date    HX GYN  1983    hysterectomy    HX HYSTERECTOMY  1984    fibroids    HX KNEE REPLACEMENT Right      Family History   Problem Relation Age of Onset    Hypertension Mother     Breast Cancer Mother 70    Breast Cancer Child 36    Breast Cancer Sister 39     Social History     Tobacco Use    Smoking status: Never Smoker    Smokeless tobacco: Never Used   Substance Use Topics    Alcohol use:  Yes     Alcohol/week: 1.5 oz     Types: 3 Standard drinks or equivalent per week       ROS       All other systems reviewed and are negative. Objective:  Vitals:    10/29/18 0839   BP: 108/68   Pulse: 66   Resp: 19   Temp: 98.1 °F (36.7 °C)   TempSrc: Oral   SpO2: 96%   Weight: 224 lb 6.4 oz (101.8 kg)   Height: 5' 6\" (1.676 m)   PainSc:   4   PainLoc: Knee                 alert, well appearing, and in no distress, oriented to person, place, and time and normal appearing weight  Chest - clear to auscultation, no wheezes, rales or rhonchi, symmetric air entry  Heart - normal rate, regular rhythm, normal S1, S2, no murmurs, rubs, clicks or gallops  Abdomen - soft, nontender, nondistended, no masses or organomegaly  Leg dvt minimally swollen adn painful       LABS     TESTS      Assessment/Plan:    Hypertension - stable  Hyperlipidemia - stable  hyperthryoid stable  Gout stable  Chronic pain ognoing  dvt on coumaind improving  Coumadin level a little low today will incrase to 4mg/d and f/u 2 wk        Lab review: labs are reviewed, up to date and normal    Diagnoses and all orders for this visit:    1. Acute deep vein thrombosis (DVT) of axillary vein of left upper extremity (HCC)  -     AMB POC PT/INR    Other orders  -     warfarin (COUMADIN) 4 mg tablet; Take 1 Tab by mouth daily. I have discussed the diagnosis with the patient and the intended plan as seen in the above orders. The patient has received an after-visit summary and questions were answered concerning future plans. I have discussed medication side effects and warnings with the patient as well. I have reviewed the plan of care with the patient, accepted their input and they are in agreement with the treatment goals. Follow-up Disposition: Not on File                  Indications:   Symptom relief from osteoarthritis    Procedure:  After consent was obtained, using sterile technique the left knee joint was prepped using alcohol. Kenalog 40 mg was mixed with 0.5% marcaine 5 ml  and injected into the joint and the needle withdrawn.   The procedure was well tolerated. The patient is asked to continue to rest the joint for a few more days before resuming regular activities. It may be more painful for the first 1-2 days. Watch for fever, or increased swelling or persistent pain in the joint. Call or return to clinic prn if such symptoms occur or there is failure to improve as anticipated.

## 2018-11-12 ENCOUNTER — OFFICE VISIT (OUTPATIENT)
Dept: FAMILY MEDICINE CLINIC | Age: 83
End: 2018-11-12

## 2018-11-12 VITALS
HEART RATE: 78 BPM | SYSTOLIC BLOOD PRESSURE: 119 MMHG | WEIGHT: 222.2 LBS | TEMPERATURE: 98.2 F | RESPIRATION RATE: 20 BRPM | HEIGHT: 68 IN | DIASTOLIC BLOOD PRESSURE: 73 MMHG | OXYGEN SATURATION: 100 % | BODY MASS INDEX: 33.68 KG/M2

## 2018-11-12 DIAGNOSIS — I10 ESSENTIAL HYPERTENSION, MALIGNANT: ICD-10-CM

## 2018-11-12 DIAGNOSIS — I48.20 CHRONIC ATRIAL FIBRILLATION (HCC): Primary | ICD-10-CM

## 2018-11-12 DIAGNOSIS — E03.9 ACQUIRED HYPOTHYROIDISM: ICD-10-CM

## 2018-11-12 DIAGNOSIS — E05.90 SUBCLINICAL HYPERTHYROIDISM: ICD-10-CM

## 2018-11-12 DIAGNOSIS — E78.00 PURE HYPERCHOLESTEROLEMIA: ICD-10-CM

## 2018-11-12 DIAGNOSIS — M19.90 ARTHRITIS: ICD-10-CM

## 2018-11-12 DIAGNOSIS — G89.4 CHRONIC PAIN SYNDROME: ICD-10-CM

## 2018-11-12 DIAGNOSIS — M1A.09X0 CHRONIC GOUT OF MULTIPLE SITES, UNSPECIFIED CAUSE: ICD-10-CM

## 2018-11-12 DIAGNOSIS — I82.A12 ACUTE DEEP VEIN THROMBOSIS (DVT) OF AXILLARY VEIN OF LEFT UPPER EXTREMITY (HCC): ICD-10-CM

## 2018-11-12 LAB
INR BLD: 2.4
PT POC: 29.2 SECONDS
VALID INTERNAL CONTROL?: YES

## 2018-11-12 NOTE — PROGRESS NOTES
Room #      SUBJECTIVE:    Cornelia Amador is a 82 y.o. female who presents today for PT/INR check    1. Have you been to the ER, urgent care clinic since your last visit? Hospitalized since your last visit? NO    2. Have you seen or consulted any other health care providers outside of the [address] [place] [address] since your last visit? Include any pap smears or colon screening. NO  When :  Reason:    Health Maintenance reviewed Yes    Health Maintenance Due   Topic Date Due    Shingrix Vaccine Age 49> (1 of 2) 01/18/1987    DTaP/Tdap/Td series (1 - Tdap) 08/22/2013    Influenza Age 5 to Adult  08/01/2018    GLAUCOMA SCREENING Q2Y  11/03/2018

## 2018-11-12 NOTE — PROGRESS NOTES
Payam Peterson is a 82 y.o.  female and presents with    Chief Complaint   Patient presents with    Blood Clot    Arthritis    Hypertension    Gout    Thyroid Problem    Pain (Chronic)           Subjective:    Cardiovascular Review:  The patient has hypertension and hyperlipidemia. Diet and Lifestyle: not attempting to follow a low fat, low cholesterol diet, not attempting to follow a low sodium diet  Home BP Monitoring: is not measured at home. Pertinent ROS: taking medications as instructed, no medication side effects noted, no TIA's, no chest pain on exertion, no dyspnea on exertion, no swelling of ankles. Anticoagulation  Patient here for followup of chronic anticoagulation. Indication: DVT. Bleeding Signs/Symptoms:  None. Thromboembolic Signs/Symptoms:  None. INR's are drawn regularly and have been therapeutic. Lab Results   Component Value Date/Time    INR 1.0 08/13/2013 12:00 AM    INR POC 2.4 11/12/2018 08:12 AM     Thyroid Review:  Patient is seen for followup of hypothyroidism. Thyroid ROS: denies fatigue, weight changes, heat/cold intolerance, bowel/skin changes or CVS symptoms. Osteoarthritis and Chronic Pain:  Patient has osteoarthritis, primarily affecting the diffuse. Symptoms onset: problem is longstanding. Rheumatological ROS: no current joint or muscle symptoms, essentially pain-free. Response to treatment plan: stable. Additional Concerns:  Gout stable on meds           Patient Active Problem List    Diagnosis Date Noted    Blood clot in vein 10/03/2018    Chronic gout of multiple sites 10/03/2018    Severe obesity (BMI 35.0-39. 9) with comorbidity ([facility] [address] 05/16/2018    Chronic pain syndrome 10/16/2017    Subclinical hyperthyroidism 05/30/2017    Arthritis 09/20/2010    Essential hypertension, malignant 03/22/2010    Pure hypercholesterolemia 03/22/2010    Edema 03/22/2010    Chronic eczema 03/22/2010     Current Outpatient Medications Medication Sig Dispense Refill    warfarin (COUMADIN) 4 mg tablet Take 1 Tab by mouth daily. 30 Tab 1    allopurinol (ZYLOPRIM) 300 mg tablet Take 1 Tab by mouth daily. 90 Tab 4    HYDROcodone-acetaminophen (NORCO) 5-325 mg per tablet Take 1 Tab by mouth every eight (8) hours as needed for Pain (no more than 2 per day). 60 Tab 0    furosemide (LASIX) 40 mg tablet Take 1 Tab by mouth daily. Only if needed for edema 30 Tab 3    albuterol (PROVENTIL HFA, VENTOLIN HFA, PROAIR HFA) 90 mcg/actuation inhaler Take 2 Puffs by inhalation every six (6) hours as needed for Wheezing. 1 Inhaler 0    lisinopril-hydroCHLOROthiazide (PRINZIDE, ZESTORETIC) 20-25 mg per tablet Take 1 Tab by mouth daily. 90 Tab 4    naproxen (NAPROSYN) 500 mg tablet Take 1 Tab by mouth daily. 90 Tab 4    fluocinoNIDE (LIDEX) 0.05 % ointment Apply  to affected area two (2) times a day. 60 g 4    omeprazole (PRILOSEC) 20 mg capsule Take 20 mg by mouth daily. No Known Allergies  Past Medical History:   Diagnosis Date    Arthritis 9/20/2010    Chronic eczema 3/22/2010    Edema 3/22/2010    Essential hypertension, malignant 3/22/2010    Menopause     Pure hypercholesterolemia 3/22/2010    Subclinical hyperthyroidism 5/30/2017    Unspecified arthropathy, shoulder region 3/22/2010    Unspecified hypothyroidism 3/22/2010     Past Surgical History:   Procedure Laterality Date    HX GYN  1983    hysterectomy    HX HYSTERECTOMY  1984    fibroids    HX KNEE REPLACEMENT Right      Family History   Problem Relation Age of Onset    Hypertension Mother     Breast Cancer Mother 70    Breast Cancer Child 36    Breast Cancer Sister 39     Social History     Tobacco Use    Smoking status: Never Smoker    Smokeless tobacco: Never Used   Substance Use Topics    Alcohol use: Yes     Alcohol/week: 1.5 oz     Types: 3 Standard drinks or equivalent per week       ROS       All other systems reviewed and are negative.       Objective:  Vitals: 11/12/18 0812   BP: 119/73   Pulse: 78   Resp: 20   Temp: 98.2 °F (36.8 °C)   TempSrc: Oral   SpO2: 100%   Weight: 222 lb 3.2 oz (100.8 kg)   Height: 5' 8\" (1.727 m)   PainSc:   0 - No pain                 alert, well appearing, and in no distress, oriented to person, place, and time and overweight  Chest - clear to auscultation, no wheezes, rales or rhonchi, symmetric air entry  Heart - normal rate, regular rhythm, normal S1, S2, no murmurs, rubs, clicks or gallops  Abdomen - soft, nontender, nondistended, no masses or organomegaly  Extremities - peripheral pulses normal, no pedal edema, no clubbing or cyanosis        LABS     TESTS      Assessment/Plan:    Hypertension - stable  Hyperlipidemia - stable  Blood clot stable improved now on coumadin for 6 wks. Plan to recheck duplex and d/c coumadin after 3 mo  Gout stable  thryoid stable  Chronic pain minimal not using pain meds aty this time. Lab review: labs are reviewed, up to date and normal    Diagnoses and all orders for this visit:    1. Chronic atrial fibrillation (HCC)  -     AMB POC PT/INR    2. Acute deep vein thrombosis (DVT) of axillary vein of left upper extremity (HCC)    3. Chronic gout of multiple sites, unspecified cause    4. Chronic pain syndrome    5. Subclinical hyperthyroidism    6. Arthritis    7. Essential hypertension, malignant    8. Pure hypercholesterolemia    9. Acquired hypothyroidism          I have discussed the diagnosis with the patient and the intended plan as seen in the above orders. The patient has received an after-visit summary and questions were answered concerning future plans. I have discussed medication side effects and warnings with the patient as well. I have reviewed the plan of care with the patient, accepted their input and they are in agreement with the treatment goals.      Follow-up Disposition: Not on File

## 2018-11-14 ENCOUNTER — HOSPITAL ENCOUNTER (OUTPATIENT)
Dept: ULTRASOUND IMAGING | Age: 83
Discharge: HOME | End: 2018-11-14
Attending: FAMILY MEDICINE
Payer: MEDICARE

## 2018-11-14 ENCOUNTER — HOSPITAL ENCOUNTER (OUTPATIENT)
Dept: LAB | Age: 83
Discharge: HOME | End: 2018-11-14
Payer: MEDICARE

## 2018-11-14 ENCOUNTER — OFFICE VISIT (OUTPATIENT)
Dept: FAMILY MEDICINE CLINIC | Age: 83
End: 2018-11-14

## 2018-11-14 ENCOUNTER — TELEPHONE (OUTPATIENT)
Dept: FAMILY MEDICINE CLINIC | Age: 83
End: 2018-11-14

## 2018-11-14 VITALS
SYSTOLIC BLOOD PRESSURE: 130 MMHG | WEIGHT: 220 LBS | TEMPERATURE: 98.3 F | OXYGEN SATURATION: 97 % | BODY MASS INDEX: 33.34 KG/M2 | RESPIRATION RATE: 16 BRPM | HEART RATE: 75 BPM | HEIGHT: 68 IN | DIASTOLIC BLOOD PRESSURE: 74 MMHG

## 2018-11-14 DIAGNOSIS — I10 ESSENTIAL HYPERTENSION, MALIGNANT: ICD-10-CM

## 2018-11-14 DIAGNOSIS — R10.11 RIGHT UPPER QUADRANT ABDOMINAL PAIN: ICD-10-CM

## 2018-11-14 DIAGNOSIS — I82.90 BLOOD CLOT IN VEIN: ICD-10-CM

## 2018-11-14 DIAGNOSIS — E05.90 SUBCLINICAL HYPERTHYROIDISM: ICD-10-CM

## 2018-11-14 DIAGNOSIS — M19.90 ARTHRITIS: ICD-10-CM

## 2018-11-14 DIAGNOSIS — G89.4 CHRONIC PAIN SYNDROME: ICD-10-CM

## 2018-11-14 DIAGNOSIS — M1A.09X0 CHRONIC GOUT OF MULTIPLE SITES, UNSPECIFIED CAUSE: ICD-10-CM

## 2018-11-14 DIAGNOSIS — E78.00 PURE HYPERCHOLESTEROLEMIA: ICD-10-CM

## 2018-11-14 DIAGNOSIS — I10 ESSENTIAL HYPERTENSION, MALIGNANT: Primary | ICD-10-CM

## 2018-11-14 LAB
ALBUMIN SERPL-MCNC: 3.6 G/DL (ref 3.4–5)
ALBUMIN/GLOB SERPL: 1.1 {RATIO} (ref 0.8–1.7)
ALP SERPL-CCNC: 120 U/L (ref 45–117)
ALT SERPL-CCNC: 19 U/L (ref 13–56)
AMYLASE SERPL-CCNC: 102 U/L (ref 25–115)
ANION GAP SERPL CALC-SCNC: 7 MMOL/L (ref 3–18)
APPEARANCE UR: ABNORMAL
AST SERPL-CCNC: 18 U/L (ref 15–37)
BACTERIA URNS QL MICRO: ABNORMAL /HPF
BASOPHILS # BLD: 0 K/UL (ref 0–0.1)
BASOPHILS NFR BLD: 0 % (ref 0–2)
BILIRUB SERPL-MCNC: 0.5 MG/DL (ref 0.2–1)
BILIRUB UR QL: ABNORMAL
BUN SERPL-MCNC: 17 MG/DL (ref 7–18)
BUN/CREAT SERPL: 12 (ref 12–20)
CALCIUM SERPL-MCNC: 9.8 MG/DL (ref 8.5–10.1)
CHLORIDE SERPL-SCNC: 101 MMOL/L (ref 100–108)
CO2 SERPL-SCNC: 33 MMOL/L (ref 21–32)
COLOR UR: ABNORMAL
CREAT SERPL-MCNC: 1.4 MG/DL (ref 0.6–1.3)
DIFFERENTIAL METHOD BLD: NORMAL
EOSINOPHIL # BLD: 0.2 K/UL (ref 0–0.4)
EOSINOPHIL NFR BLD: 2 % (ref 0–5)
EPITH CASTS URNS QL MICRO: ABNORMAL /LPF (ref 0–5)
ERYTHROCYTE [DISTWIDTH] IN BLOOD BY AUTOMATED COUNT: 13.5 % (ref 11.6–14.5)
GLOBULIN SER CALC-MCNC: 3.3 G/DL (ref 2–4)
GLUCOSE SERPL-MCNC: 82 MG/DL (ref 74–99)
GLUCOSE UR STRIP.AUTO-MCNC: NEGATIVE MG/DL
HCT VFR BLD AUTO: 39.9 % (ref 35–45)
HGB BLD-MCNC: 12.7 G/DL (ref 12–16)
HGB UR QL STRIP: ABNORMAL
INR BLD: 3
KETONES UR QL STRIP.AUTO: ABNORMAL MG/DL
LEUKOCYTE ESTERASE UR QL STRIP.AUTO: ABNORMAL
LIPASE SERPL-CCNC: 421 U/L (ref 73–393)
LYMPHOCYTES # BLD: 2.4 K/UL (ref 0.9–3.6)
LYMPHOCYTES NFR BLD: 24 % (ref 21–52)
MCH RBC QN AUTO: 26.5 PG (ref 24–34)
MCHC RBC AUTO-ENTMCNC: 31.8 G/DL (ref 31–37)
MCV RBC AUTO: 83.3 FL (ref 74–97)
MONOCYTES # BLD: 0.9 K/UL (ref 0.05–1.2)
MONOCYTES NFR BLD: 9 % (ref 3–10)
NEUTS SEG # BLD: 6.7 K/UL (ref 1.8–8)
NEUTS SEG NFR BLD: 65 % (ref 40–73)
NITRITE UR QL STRIP.AUTO: NEGATIVE
PH UR STRIP: 5.5 [PH] (ref 5–8)
PLATELET # BLD AUTO: 196 K/UL (ref 135–420)
PMV BLD AUTO: 11.8 FL (ref 9.2–11.8)
POTASSIUM SERPL-SCNC: 4.3 MMOL/L (ref 3.5–5.5)
PROT SERPL-MCNC: 6.9 G/DL (ref 6.4–8.2)
PROT UR STRIP-MCNC: NEGATIVE MG/DL
PT POC: 36.3 SECONDS
RBC # BLD AUTO: 4.79 M/UL (ref 4.2–5.3)
RBC #/AREA URNS HPF: ABNORMAL /HPF (ref 0–5)
SODIUM SERPL-SCNC: 141 MMOL/L (ref 136–145)
SP GR UR REFRACTOMETRY: 1.02 (ref 1–1.03)
T4 FREE SERPL-MCNC: 1.5 NG/DL (ref 0.7–1.5)
TSH SERPL DL<=0.05 MIU/L-ACNC: <0.01 UIU/ML (ref 0.36–3.74)
URATE SERPL-MCNC: 6.5 MG/DL (ref 2.6–7.2)
UROBILINOGEN UR QL STRIP.AUTO: 1 EU/DL (ref 0.2–1)
VALID INTERNAL CONTROL?: YES
WBC # BLD AUTO: 10.1 K/UL (ref 4.6–13.2)
WBC URNS QL MICRO: ABNORMAL /HPF (ref 0–4)

## 2018-11-14 PROCEDURE — 84550 ASSAY OF BLOOD/URIC ACID: CPT

## 2018-11-14 PROCEDURE — 80053 COMPREHEN METABOLIC PANEL: CPT

## 2018-11-14 PROCEDURE — 84439 ASSAY OF FREE THYROXINE: CPT

## 2018-11-14 PROCEDURE — 36415 COLL VENOUS BLD VENIPUNCTURE: CPT

## 2018-11-14 PROCEDURE — 81001 URINALYSIS AUTO W/SCOPE: CPT

## 2018-11-14 PROCEDURE — 82977 ASSAY OF GGT: CPT

## 2018-11-14 PROCEDURE — 85025 COMPLETE CBC W/AUTO DIFF WBC: CPT

## 2018-11-14 PROCEDURE — 84443 ASSAY THYROID STIM HORMONE: CPT

## 2018-11-14 PROCEDURE — 83690 ASSAY OF LIPASE: CPT

## 2018-11-14 PROCEDURE — 82150 ASSAY OF AMYLASE: CPT

## 2018-11-14 PROCEDURE — 76700 US EXAM ABDOM COMPLETE: CPT

## 2018-11-14 PROCEDURE — 87086 URINE CULTURE/COLONY COUNT: CPT

## 2018-11-14 NOTE — PROGRESS NOTES
Adilia Alejandro is a 82 y.o. female presents today for stomach pain since last Saturday. Patient reports her pain is 10/10. Patient reports that she is afraid to eat because of the pain. Pt is in Room # 5      1. Have you been to the ER, urgent care clinic since your last visit? Hospitalized since your last visit? No    2. Have you seen or consulted any other health care providers outside of the [facility] since your last visit? Include any pap smears or colon screening. No     Health Maintenance reviewed - .

## 2018-11-14 NOTE — PROGRESS NOTES
Adilia Alejandro is a 82 y.o.  female and presents with    Chief Complaint   Patient presents with    Abdominal Pain    Blood Clot    Hypertension    Cholesterol Problem    Ankle swelling    Thyroid Problem    Gout    Pain (Chronic)           Subjective:  Having episodes of mid abd pain. Some radiation to back. Nothing to chest or arms or neck. No ass NVDC. No FC  Not takibng naprosyn  Prev egd from [facility] with gerd on long term prilosec    Cardiovascular Review:  The patient has hypertension and hyperlipidemia. Diet and Lifestyle: generally follows a low fat low cholesterol diet  Home BP Monitoring: is not measured at home. Pertinent ROS: taking medications as instructed, no medication side effects noted, no TIA's, no chest pain on exertion, no dyspnea on exertion, no swelling of ankles. Anticoagulation  Patient here for followup of chronic anticoagulation. Indication: DVT. Bleeding Signs/Symptoms:  None. Thromboembolic Signs/Symptoms:  None. INR's are drawn regularly and have been therapeutic. Lab Results   Component Value Date/Time    INR 1.0 08/13/2013 12:00 AM    INR POC 2.4 11/12/2018 08:12 AM     Thyroid Review:  Patient is seen for followup of hypothyroidism. Thyroid ROS: denies fatigue, weight changes, heat/cold intolerance, bowel/skin changes or CVS symptoms. Osteoarthritis and Chronic Pain:  Patient has osteoarthritis, primarily affecting the diffuse. Symptoms onset: problem is longstanding. Rheumatological ROS: no current joint or muscle symptoms, essentially pain-free. Response to treatment plan: stable. Additional Concerns: gout stable on meds           Patient Active Problem List    Diagnosis Date Noted    Blood clot in vein 10/03/2018    Chronic gout of multiple sites 10/03/2018    Severe obesity (BMI 35.0-39. 9) with comorbidity ([address] 05/16/2018    Chronic pain syndrome 10/16/2017    Subclinical hyperthyroidism 05/30/2017    Arthritis 09/20/2010    Essential hypertension, malignant 03/22/2010    Pure hypercholesterolemia 03/22/2010    Edema 03/22/2010    Chronic eczema 03/22/2010     Current Outpatient Medications   Medication Sig Dispense Refill    warfarin (COUMADIN) 4 mg tablet Take 1 Tab by mouth daily. 30 Tab 1    allopurinol (ZYLOPRIM) 300 mg tablet Take 1 Tab by mouth daily. 90 Tab 4    HYDROcodone-acetaminophen (NORCO) 5-325 mg per tablet Take 1 Tab by mouth every eight (8) hours as needed for Pain (no more than 2 per day). 60 Tab 0    furosemide (LASIX) 40 mg tablet Take 1 Tab by mouth daily. Only if needed for edema 30 Tab 3    albuterol (PROVENTIL HFA, VENTOLIN HFA, PROAIR HFA) 90 mcg/actuation inhaler Take 2 Puffs by inhalation every six (6) hours as needed for Wheezing. 1 Inhaler 0    lisinopril-hydroCHLOROthiazide (PRINZIDE, ZESTORETIC) 20-25 mg per tablet Take 1 Tab by mouth daily. 90 Tab 4    fluocinoNIDE (LIDEX) 0.05 % ointment Apply  to affected area two (2) times a day. 60 g 4    omeprazole (PRILOSEC) 20 mg capsule Take 20 mg by mouth daily. No Known Allergies  Past Medical History:   Diagnosis Date    Arthritis 9/20/2010    Chronic eczema 3/22/2010    Edema 3/22/2010    Essential hypertension, malignant 3/22/2010    Menopause     Pure hypercholesterolemia 3/22/2010    Subclinical hyperthyroidism 5/30/2017    Unspecified arthropathy, shoulder region 3/22/2010    Unspecified hypothyroidism 3/22/2010     Past Surgical History:   Procedure Laterality Date    HX GYN  1983    hysterectomy    HX HYSTERECTOMY  1984    fibroids    HX KNEE REPLACEMENT Right      Family History   Problem Relation Age of Onset    Hypertension Mother     Breast Cancer Mother 70    Breast Cancer Child 36    Breast Cancer Sister 39     Social History     Tobacco Use    Smoking status: Never Smoker    Smokeless tobacco: Never Used   Substance Use Topics    Alcohol use:  Yes     Alcohol/week: 1.5 oz     Types: 3 Standard drinks or equivalent per week       ROS       All other systems reviewed and are negative. Objective:  Vitals:    11/14/18 1437   BP: 130/74   Pulse: 75   Resp: 16   Temp: 98.3 °F (36.8 °C)   TempSrc: Oral   SpO2: 97%   Weight: 220 lb (99.8 kg)   Height: 5' 8\" (1.727 m)   PainSc:  10 - Worst pain ever   PainLoc: Abdomen                 alert, well appearing, and in no distress, oriented to person, place, and time and overweight  Chest - clear to auscultation, no wheezes, rales or rhonchi, symmetric air entry  Heart - normal rate, regular rhythm, normal S1, S2, no murmurs, rubs, clicks or gallops  Abdomen - soft, nontender, nondistended, no masses or organomegaly  Musculoskeletal - no joint tenderness, deformity or swelling  Extremities - peripheral pulses normal, no pedal edema, no clubbing or cyanosis        LABS     TESTS      Assessment/Plan:    Hypertension - stable  Hyperlipidemia - stable  dvt on coumadin doing well  Edema  Stable  Thyroid stable  Gout stable  Chronic pain stable  abd pain ? ? Gallbladder vs GI vs ?? Check labs and us  F/u  3 or 4 days  Very bland diet at this time        Lab review: labs are reviewed, up to date and normal    Diagnoses and all orders for this visit:    1. Essential hypertension, malignant  -     LIPASE; Future  -     AMYLASE; Future  -     METABOLIC PANEL, COMPREHENSIVE; Future  -     GGT; Future  -     CBC WITH AUTOMATED DIFF; Future  -     URINALYSIS W/ RFLX MICROSCOPIC; Future  -     TSH 3RD GENERATION; Future  -     T4, FREE; Future  -     CULTURE, URINE; Future  -     US ABD COMP; Future  -     URIC ACID; Future    2. Pure hypercholesterolemia  -     LIPASE; Future  -     AMYLASE; Future  -     METABOLIC PANEL, COMPREHENSIVE; Future  -     GGT; Future  -     CBC WITH AUTOMATED DIFF; Future  -     URINALYSIS W/ RFLX MICROSCOPIC; Future  -     TSH 3RD GENERATION; Future  -     T4, FREE; Future  -     CULTURE, URINE;  Future  -     US ABD COMP; Future  -     URIC ACID; Future    3. Arthritis  -     LIPASE; Future  -     AMYLASE; Future  -     METABOLIC PANEL, COMPREHENSIVE; Future  -     GGT; Future  -     CBC WITH AUTOMATED DIFF; Future  -     URINALYSIS W/ RFLX MICROSCOPIC; Future  -     TSH 3RD GENERATION; Future  -     T4, FREE; Future  -     CULTURE, URINE; Future  -     US ABD COMP; Future  -     URIC ACID; Future    4. Subclinical hyperthyroidism  -     LIPASE; Future  -     AMYLASE; Future  -     METABOLIC PANEL, COMPREHENSIVE; Future  -     GGT; Future  -     CBC WITH AUTOMATED DIFF; Future  -     URINALYSIS W/ RFLX MICROSCOPIC; Future  -     TSH 3RD GENERATION; Future  -     T4, FREE; Future  -     CULTURE, URINE; Future  -     US ABD COMP; Future  -     URIC ACID; Future    5. Chronic pain syndrome  -     LIPASE; Future  -     AMYLASE; Future  -     METABOLIC PANEL, COMPREHENSIVE; Future  -     GGT; Future  -     CBC WITH AUTOMATED DIFF; Future  -     URINALYSIS W/ RFLX MICROSCOPIC; Future  -     TSH 3RD GENERATION; Future  -     T4, FREE; Future  -     CULTURE, URINE; Future  -     US ABD COMP; Future  -     URIC ACID; Future    6. Blood clot in vein  -     LIPASE; Future  -     AMYLASE; Future  -     METABOLIC PANEL, COMPREHENSIVE; Future  -     GGT; Future  -     CBC WITH AUTOMATED DIFF; Future  -     URINALYSIS W/ RFLX MICROSCOPIC; Future  -     TSH 3RD GENERATION; Future  -     T4, FREE; Future  -     CULTURE, URINE; Future  -     US ABD COMP; Future  -     URIC ACID; Future    7. Chronic gout of multiple sites, unspecified cause  -     LIPASE; Future  -     AMYLASE; Future  -     METABOLIC PANEL, COMPREHENSIVE; Future  -     GGT; Future  -     CBC WITH AUTOMATED DIFF; Future  -     URINALYSIS W/ RFLX MICROSCOPIC; Future  -     TSH 3RD GENERATION; Future  -     T4, FREE; Future  -     CULTURE, URINE; Future  -     US ABD COMP; Future  -     URIC ACID; Future    8. Right upper quadrant abdominal pain  -     URIC ACID;  Future          I have discussed the diagnosis with the patient and the intended plan as seen in the above orders. The patient has received an after-visit summary and questions were answered concerning future plans. I have discussed medication side effects and warnings with the patient as well. I have reviewed the plan of care with the patient, accepted their input and they are in agreement with the treatment goals.      Follow-up Disposition: Not on File

## 2018-11-14 NOTE — TELEPHONE ENCOUNTER
Left patient's EC- Volanda Cover a message regarding patient's STAT abdominal US. Caller informed that patient has been scheduled for her STAT US on 11/14/18 @515pm, patient is to arrive 30minutes prior in the main entrance and check in at patient registration. Caller advised that patient should have a list of her medications and that she has had nothing by mouth for 8 hours to complete the exam. Call back number was left if any further questions or comments regarding above.

## 2018-11-14 NOTE — PATIENT INSTRUCTIONS
No greasy food  No spicy food  No dairy products  No tomato products  No booze  No smokes  No gatorade    7-up or gingerale fine  campbells chicken noodle soup or chicken rice soup fine  Saltines good

## 2018-11-15 ENCOUNTER — TELEPHONE (OUTPATIENT)
Dept: FAMILY MEDICINE CLINIC | Age: 83
End: 2018-11-15

## 2018-11-15 DIAGNOSIS — K80.20 GALLSTONES: Primary | ICD-10-CM

## 2018-11-15 LAB — GGT SERPL-CCNC: 18 IU/L (ref 0–60)

## 2018-11-15 NOTE — TELEPHONE ENCOUNTER
Notify pt that scan shows gallstones and labs show elevated pancreatic enzymes most likely cause of her pain. Refer to surgery.

## 2018-11-15 NOTE — TELEPHONE ENCOUNTER
Spoke with Ge Montes, Verified 2 patient identifiers. Spoke with patient in regards to lab results. Relayed Doctor's notes. Patient acknowledges understanding and voices no further questions or concerns at this time    Patient was also provided surgery referral contact information.

## 2018-11-16 LAB
BACTERIA SPEC CULT: NORMAL
SERVICE CMNT-IMP: NORMAL

## 2018-11-19 ENCOUNTER — OFFICE VISIT (OUTPATIENT)
Dept: FAMILY MEDICINE CLINIC | Age: 83
End: 2018-11-19

## 2018-11-19 VITALS
HEART RATE: 74 BPM | HEIGHT: 68 IN | TEMPERATURE: 97.8 F | OXYGEN SATURATION: 97 % | RESPIRATION RATE: 18 BRPM | DIASTOLIC BLOOD PRESSURE: 63 MMHG | WEIGHT: 216 LBS | BODY MASS INDEX: 32.74 KG/M2 | SYSTOLIC BLOOD PRESSURE: 109 MMHG

## 2018-11-19 DIAGNOSIS — G89.4 CHRONIC PAIN SYNDROME: ICD-10-CM

## 2018-11-19 DIAGNOSIS — Z00.00 ROUTINE GENERAL MEDICAL EXAMINATION AT A HEALTH CARE FACILITY: ICD-10-CM

## 2018-11-19 DIAGNOSIS — E78.00 PURE HYPERCHOLESTEROLEMIA: ICD-10-CM

## 2018-11-19 DIAGNOSIS — R60.0 LOCALIZED EDEMA: ICD-10-CM

## 2018-11-19 DIAGNOSIS — I10 ESSENTIAL HYPERTENSION, MALIGNANT: ICD-10-CM

## 2018-11-19 DIAGNOSIS — M19.90 ARTHRITIS: ICD-10-CM

## 2018-11-19 DIAGNOSIS — L30.9 CHRONIC ECZEMA: ICD-10-CM

## 2018-11-19 RX ORDER — FUROSEMIDE 40 MG/1
40 TABLET ORAL DAILY
Qty: 30 TAB | Refills: 3 | Status: SHIPPED | OUTPATIENT
Start: 2018-11-19 | End: 2018-12-10 | Stop reason: SDUPTHER

## 2018-11-19 RX ORDER — ALBUTEROL SULFATE 90 UG/1
2 AEROSOL, METERED RESPIRATORY (INHALATION)
Qty: 1 INHALER | Refills: 0 | Status: SHIPPED | OUTPATIENT
Start: 2018-11-19 | End: 2018-12-10 | Stop reason: SDUPTHER

## 2018-11-19 RX ORDER — HYDROCODONE BITARTRATE AND ACETAMINOPHEN 5; 325 MG/1; MG/1
1 TABLET ORAL
Qty: 60 TAB | Refills: 0 | Status: SHIPPED | OUTPATIENT
Start: 2018-11-19 | End: 2018-12-10 | Stop reason: SDUPTHER

## 2018-11-19 NOTE — PROGRESS NOTES
Dorina Estrella is a 82 y.o. female  1. Have you been to the ER, urgent care clinic since your last visit? Hospitalized since your last visit? No    2. Have you seen or consulted any other health care providers outside of the [address] [place] [address] since your last visit? Include any pap smears or colon screening.  No

## 2018-11-19 NOTE — Clinical Note
Ashley Saunders referred her to you last wk. Apparently she called your office and was told to wait until her number came up ? ?  Not sure what is going on Buddy

## 2018-11-19 NOTE — PROGRESS NOTES
Evelio Santos is a 82 y.o.  female and presents with    Chief Complaint   Patient presents with    Abdominal Pain    Hypertension    Cholesterol Problem    Arthritis    Thyroid Problem    Pain (Chronic)    Blood Clot    Gout           Subjective:  1. abd pain much better on low fat bland diet. Cardiovascular Review:  The patient has hypertension and hyperlipidemia. Diet and Lifestyle: not attempting to follow a low fat, low cholesterol diet, not attempting to follow a low sodium diet  Home BP Monitoring: is not measured at home. Pertinent ROS: taking medications as instructed, no medication side effects noted, no TIA's, no chest pain on exertion, no dyspnea on exertion, no swelling of ankles. Anticoagulation  Patient here for followup of chronic anticoagulation. Indication: DVT. Bleeding Signs/Symptoms:  None. Thromboembolic Signs/Symptoms:  None. INR's are drawn regularly and have been therapeutic. Lab Results   Component Value Date/Time    INR 1.0 08/13/2013 12:00 AM    INR POC 3.0 11/14/2018 02:36 PM     Thyroid Review:  Patient is seen for followup of subclinical hyperthyroidism. Thyroid ROS: denies fatigue, weight changes, heat/cold intolerance, bowel/skin changes or CVS symptoms. Osteoarthritis and Chronic Pain:  Patient has osteoarthritis, primarily affecting the diffuse. Symptoms onset: problem is longstanding. Rheumatological ROS: no current joint or muscle symptoms, essentially pain-free. Response to treatment plan: stable. Additional Concerns: gout stable on meds          Patient Active Problem List    Diagnosis Date Noted    Blood clot in vein 10/03/2018    Chronic gout of multiple sites 10/03/2018    Severe obesity (BMI 35.0-39. 9) with comorbidity ([facility] [address] 05/16/2018    Chronic pain syndrome 10/16/2017    Subclinical hyperthyroidism 05/30/2017    Arthritis 09/20/2010    Essential hypertension, malignant 03/22/2010    Pure hypercholesterolemia 03/22/2010    Edema 03/22/2010    Chronic eczema 03/22/2010     Current Outpatient Medications   Medication Sig Dispense Refill    HYDROcodone-acetaminophen (NORCO) 5-325 mg per tablet Take 1 Tab by mouth every eight (8) hours as needed for Pain (no more than 2 per day). 60 Tab 0    furosemide (LASIX) 40 mg tablet Take 1 Tab by mouth daily. Only if needed for edema 30 Tab 3    albuterol (PROVENTIL HFA, VENTOLIN HFA, PROAIR HFA) 90 mcg/actuation inhaler Take 2 Puffs by inhalation every six (6) hours as needed for Wheezing. 1 Inhaler 0    warfarin (COUMADIN) 4 mg tablet Take 1 Tab by mouth daily. 30 Tab 1    allopurinol (ZYLOPRIM) 300 mg tablet Take 1 Tab by mouth daily. 90 Tab 4    lisinopril-hydroCHLOROthiazide (PRINZIDE, ZESTORETIC) 20-25 mg per tablet Take 1 Tab by mouth daily. 90 Tab 4    fluocinoNIDE (LIDEX) 0.05 % ointment Apply  to affected area two (2) times a day. 60 g 4    omeprazole (PRILOSEC) 20 mg capsule Take 20 mg by mouth daily. No Known Allergies  Past Medical History:   Diagnosis Date    Arthritis 9/20/2010    Chronic eczema 3/22/2010    Edema 3/22/2010    Essential hypertension, malignant 3/22/2010    Menopause     Pure hypercholesterolemia 3/22/2010    Subclinical hyperthyroidism 5/30/2017    Unspecified arthropathy, shoulder region 3/22/2010    Unspecified hypothyroidism 3/22/2010     Past Surgical History:   Procedure Laterality Date    HX GYN  1983    hysterectomy    HX HYSTERECTOMY  1984    fibroids    HX KNEE REPLACEMENT Right      Family History   Problem Relation Age of Onset    Hypertension Mother     Breast Cancer Mother 70    Breast Cancer Child 36    Breast Cancer Sister 39     Social History     Tobacco Use    Smoking status: Never Smoker    Smokeless tobacco: Never Used   Substance Use Topics    Alcohol use:  Yes     Alcohol/week: 1.5 oz     Types: 3 Standard drinks or equivalent per week       ROS       All other systems reviewed and are negative. Objective:  Vitals:    11/19/18 0840   BP: 109/63   Pulse: 74   Resp: 18   Temp: 97.8 °F (36.6 °C)   SpO2: 97%   Weight: 216 lb (98 kg)   Height: 5' 8\" (1.727 m)   PainSc:   3   PainLoc: Abdomen                 alert, well appearing, and in no distress, oriented to person, place, and time and overweight  Chest - clear to auscultation, no wheezes, rales or rhonchi, symmetric air entry  Heart - normal rate, regular rhythm, normal S1, S2, no murmurs, rubs, clicks or gallops  Abdomen - soft, nontender, nondistended, no masses or organomegaly        LABS   Component      Latest Ref Rng & Units 11/14/2018 11/14/2018 11/14/2018           6:37 PM  6:37 PM  6:37 PM   WBC      4.6 - 13.2 K/uL      RBC      4.20 - 5.30 M/uL      HGB      12.0 - 16.0 g/dL      HCT      35.0 - 45.0 %      MCV      74.0 - 97.0 FL      MCH      24.0 - 34.0 PG      MCHC      31.0 - 37.0 g/dL      RDW      11.6 - 14.5 %      PLATELET      600 - 155 K/uL      MPV      9.2 - 11.8 FL      NEUTROPHILS      40 - 73 %      LYMPHOCYTES      21 - 52 %      MONOCYTES      3 - 10 %      EOSINOPHILS      0 - 5 %      BASOPHILS      0 - 2 %      ABS. NEUTROPHILS      1.8 - 8.0 K/UL      ABS. LYMPHOCYTES      0.9 - 3.6 K/UL      ABS. MONOCYTES      0.05 - 1.2 K/UL      ABS. EOSINOPHILS      0.0 - 0.4 K/UL      ABS. BASOPHILS      0.0 - 0.1 K/UL      DF            Sodium      136 - 145 mmol/L   141   Potassium      3.5 - 5.5 mmol/L   4.3   Chloride      100 - 108 mmol/L   101   CO2      21 - 32 mmol/L   33 (H)   Anion gap      3.0 - 18 mmol/L   7   Glucose      74 - 99 mg/dL   82   BUN      7.0 - 18 MG/DL   17   Creatinine      0.6 - 1.3 MG/DL   1.40 (H)   BUN/Creatinine ratio      12 - 20     12   GFR est AA      >60 ml/min/1.73m2   44 (L)   GFR est non-AA      >60 ml/min/1.73m2   36 (L)   Calcium      8.5 - 10.1 MG/DL   9.8   Bilirubin, total      0.2 - 1.0 MG/DL   0.5   ALT (SGPT)      13 - 56 U/L   19   AST      15 - 37 U/L   18   Alk. phosphatase      45 - 117 U/L   120 (H)   Protein, total      6.4 - 8.2 g/dL   6.9   Albumin      3.4 - 5.0 g/dL   3.6   Globulin      2.0 - 4.0 g/dL   3.3   A-G Ratio      0.8 - 1.7     1.1   Color            Appearance            Specific gravity      1.005 - 1.030        pH (UA)      5.0 - 8.0        Protein      NEG mg/dL      Glucose      NEG mg/dL      Ketone      NEG mg/dL      Bilirubin      NEG        Blood      NEG        Urobilinogen      0.2 - 1.0 EU/dL      Nitrites      NEG        Leukocyte Esterase      NEG        WBC      0 - 4 /hpf      RBC      0 - 5 /hpf      Epithelial cells      0 - 5 /lpf      Bacteria      NEG /hpf      VALID INTERNAL CONTROL POC            Prothrombin time (POC)      seconds      INR            Special Requests:            Culture result:            Lipase      73 - 393 U/L      Amylase      25 - 115 U/L      GGT      0 - 60 IU/L 18     TSH      0.36 - 3.74 uIU/mL      T4, Free      0.7 - 1.5 NG/DL  1.5    Uric acid      2.6 - 7.2 MG/DL        Component      Latest Ref Rng & Units 11/14/2018 11/14/2018 11/14/2018           6:37 PM  6:37 PM  6:37 PM   WBC      4.6 - 13.2 K/uL      RBC      4.20 - 5.30 M/uL      HGB      12.0 - 16.0 g/dL      HCT      35.0 - 45.0 %      MCV      74.0 - 97.0 FL      MCH      24.0 - 34.0 PG      MCHC      31.0 - 37.0 g/dL      RDW      11.6 - 14.5 %      PLATELET      653 - 889 K/uL      MPV      9.2 - 11.8 FL      NEUTROPHILS      40 - 73 %      LYMPHOCYTES      21 - 52 %      MONOCYTES      3 - 10 %      EOSINOPHILS      0 - 5 %      BASOPHILS      0 - 2 %      ABS. NEUTROPHILS      1.8 - 8.0 K/UL      ABS. LYMPHOCYTES      0.9 - 3.6 K/UL      ABS. MONOCYTES      0.05 - 1.2 K/UL      ABS. EOSINOPHILS      0.0 - 0.4 K/UL      ABS.  BASOPHILS      0.0 - 0.1 K/UL      DF            Sodium      136 - 145 mmol/L      Potassium      3.5 - 5.5 mmol/L      Chloride      100 - 108 mmol/L      CO2      21 - 32 mmol/L      Anion gap      3.0 - 18 mmol/L Glucose      74 - 99 mg/dL      BUN      7.0 - 18 MG/DL      Creatinine      0.6 - 1.3 MG/DL      BUN/Creatinine ratio      12 - 20        GFR est AA      >60 ml/min/1.73m2      GFR est non-AA      >60 ml/min/1.73m2      Calcium      8.5 - 10.1 MG/DL      Bilirubin, total      0.2 - 1.0 MG/DL      ALT (SGPT)      13 - 56 U/L      AST      15 - 37 U/L      Alk. phosphatase      45 - 117 U/L      Protein, total      6.4 - 8.2 g/dL      Albumin      3.4 - 5.0 g/dL      Globulin      2.0 - 4.0 g/dL      A-G Ratio      0.8 - 1.7        Color            Appearance            Specific gravity      1.005 - 1.030        pH (UA)      5.0 - 8.0        Protein      NEG mg/dL      Glucose      NEG mg/dL      Ketone      NEG mg/dL      Bilirubin      NEG        Blood      NEG        Urobilinogen      0.2 - 1.0 EU/dL      Nitrites      NEG        Leukocyte Esterase      NEG        WBC      0 - 4 /hpf      RBC      0 - 5 /hpf      Epithelial cells      0 - 5 /lpf      Bacteria      NEG /hpf      VALID INTERNAL CONTROL POC            Prothrombin time (POC)      seconds      INR            Special Requests:            Culture result:            Lipase      73 - 393 U/L   421 (H)   Amylase      25 - 115 U/L  102    GGT      0 - 60 IU/L      TSH      0.36 - 3.74 uIU/mL <0.01 (L)     T4, Free      0.7 - 1.5 NG/DL      Uric acid      2.6 - 7.2 MG/DL        Component      Latest Ref Rng & Units 11/14/2018 11/14/2018           6:37 PM  2:46 PM   WBC      4.6 - 13.2 K/uL 10.1    RBC      4.20 - 5.30 M/uL 4.79    HGB      12.0 - 16.0 g/dL 12.7    HCT      35.0 - 45.0 % 39.9    MCV      74.0 - 97.0 FL 83.3    MCH      24.0 - 34.0 PG 26.5    MCHC      31.0 - 37.0 g/dL 31.8    RDW      11.6 - 14.5 % 13.5    PLATELET      463 - 902 K/uL 196    MPV      9.2 - 11.8 FL 11.8    NEUTROPHILS      40 - 73 % 65    LYMPHOCYTES      21 - 52 % 24    MONOCYTES      3 - 10 % 9    EOSINOPHILS      0 - 5 % 2    BASOPHILS      0 - 2 % 0    ABS.  NEUTROPHILS 1.8 - 8.0 K/UL 6.7    ABS. LYMPHOCYTES      0.9 - 3.6 K/UL 2.4    ABS. MONOCYTES      0.05 - 1.2 K/UL 0.9    ABS. EOSINOPHILS      0.0 - 0.4 K/UL 0.2    ABS. BASOPHILS      0.0 - 0.1 K/UL 0.0    DF       AUTOMATED    Sodium      136 - 145 mmol/L     Potassium      3.5 - 5.5 mmol/L     Chloride      100 - 108 mmol/L     CO2      21 - 32 mmol/L     Anion gap      3.0 - 18 mmol/L     Glucose      74 - 99 mg/dL     BUN      7.0 - 18 MG/DL     Creatinine      0.6 - 1.3 MG/DL     BUN/Creatinine ratio      12 - 20       GFR est AA      >60 ml/min/1.73m2     GFR est non-AA      >60 ml/min/1.73m2     Calcium      8.5 - 10.1 MG/DL     Bilirubin, total      0.2 - 1.0 MG/DL     ALT (SGPT)      13 - 56 U/L     AST      15 - 37 U/L     Alk. phosphatase      45 - 117 U/L     Protein, total      6.4 - 8.2 g/dL     Albumin      3.4 - 5.0 g/dL     Globulin      2.0 - 4.0 g/dL     A-G Ratio      0.8 - 1.7       Color           Appearance           Specific gravity      1.005 - 1.030       pH (UA)      5.0 - 8.0       Protein      NEG mg/dL     Glucose      NEG mg/dL     Ketone      NEG mg/dL     Bilirubin      NEG       Blood      NEG       Urobilinogen      0.2 - 1.0 EU/dL     Nitrites      NEG       Leukocyte Esterase      NEG       WBC      0 - 4 /hpf     RBC      0 - 5 /hpf     Epithelial cells      0 - 5 /lpf     Bacteria      NEG /hpf     VALID INTERNAL CONTROL POC           Prothrombin time (POC)      seconds     INR           Special Requests:        NO SPECIAL REQUESTS   Culture result:        [id number] .  . .   Lipase      73 - 393 U/L     Amylase      25 - 115 U/L     GGT      0 - 60 IU/L     TSH      0.36 - 3.74 uIU/mL     T4, Free      0.7 - 1.5 NG/DL     Uric acid      2.6 - 7.2 MG/DL       Component      Latest Ref Rng & Units 11/14/2018 11/14/2018 11/14/2018           2:46 PM  2:46 PM  2:46 PM   WBC      4.6 - 13.2 K/uL      RBC      4.20 - 5.30 M/uL      HGB      12.0 - 16.0 g/dL      HCT      35.0 - 45.0 %      MCV 74.0 - 97.0 FL      MCH      24.0 - 34.0 PG      MCHC      31.0 - 37.0 g/dL      RDW      11.6 - 14.5 %      PLATELET      756 - 695 K/uL      MPV      9.2 - 11.8 FL      NEUTROPHILS      40 - 73 %      LYMPHOCYTES      21 - 52 %      MONOCYTES      3 - 10 %      EOSINOPHILS      0 - 5 %      BASOPHILS      0 - 2 %      ABS. NEUTROPHILS      1.8 - 8.0 K/UL      ABS. LYMPHOCYTES      0.9 - 3.6 K/UL      ABS. MONOCYTES      0.05 - 1.2 K/UL      ABS. EOSINOPHILS      0.0 - 0.4 K/UL      ABS. BASOPHILS      0.0 - 0.1 K/UL      DF            Sodium      136 - 145 mmol/L      Potassium      3.5 - 5.5 mmol/L      Chloride      100 - 108 mmol/L      CO2      21 - 32 mmol/L      Anion gap      3.0 - 18 mmol/L      Glucose      74 - 99 mg/dL      BUN      7.0 - 18 MG/DL      Creatinine      0.6 - 1.3 MG/DL      BUN/Creatinine ratio      12 - 20        GFR est AA      >60 ml/min/1.73m2      GFR est non-AA      >60 ml/min/1.73m2      Calcium      8.5 - 10.1 MG/DL      Bilirubin, total      0.2 - 1.0 MG/DL      ALT (SGPT)      13 - 56 U/L      AST      15 - 37 U/L      Alk.  phosphatase      45 - 117 U/L      Protein, total      6.4 - 8.2 g/dL      Albumin      3.4 - 5.0 g/dL      Globulin      2.0 - 4.0 g/dL      A-G Ratio      0.8 - 1.7        Color         DARK YELLOW   Appearance         CLOUDY   Specific gravity      1.005 - 1.030     1.019   pH (UA)      5.0 - 8.0     5.5   Protein      NEG mg/dL   NEGATIVE   Glucose      NEG mg/dL   NEGATIVE   Ketone      NEG mg/dL   TRACE (A)   Bilirubin      NEG     SMALL (A)   Blood      NEG     TRACE (A)   Urobilinogen      0.2 - 1.0 EU/dL   1.0   Nitrites      NEG     NEGATIVE   Leukocyte Esterase      NEG     SMALL (A)   WBC      0 - 4 /hpf  6 to 8    RBC      0 - 5 /hpf  1 to 2    Epithelial cells      0 - 5 /lpf  2+    Bacteria      NEG /hpf  1+ (A)    VALID INTERNAL CONTROL POC            Prothrombin time (POC)      seconds      INR            Special Requests:            Culture result:            Lipase      73 - 393 U/L      Amylase      25 - 115 U/L      GGT      0 - 60 IU/L      TSH      0.36 - 3.74 uIU/mL      T4, Free      0.7 - 1.5 NG/DL      Uric acid      2.6 - 7.2 MG/DL 6.5       Component      Latest Ref Rng & Units 11/14/2018 11/12/2018 10/29/2018           2:36 PM  8:12 AM  8:43 AM   WBC      4.6 - 13.2 K/uL      RBC      4.20 - 5.30 M/uL      HGB      12.0 - 16.0 g/dL      HCT      35.0 - 45.0 %      MCV      74.0 - 97.0 FL      MCH      24.0 - 34.0 PG      MCHC      31.0 - 37.0 g/dL      RDW      11.6 - 14.5 %      PLATELET      358 - 631 K/uL      MPV      9.2 - 11.8 FL      NEUTROPHILS      40 - 73 %      LYMPHOCYTES      21 - 52 %      MONOCYTES      3 - 10 %      EOSINOPHILS      0 - 5 %      BASOPHILS      0 - 2 %      ABS. NEUTROPHILS      1.8 - 8.0 K/UL      ABS. LYMPHOCYTES      0.9 - 3.6 K/UL      ABS. MONOCYTES      0.05 - 1.2 K/UL      ABS. EOSINOPHILS      0.0 - 0.4 K/UL      ABS. BASOPHILS      0.0 - 0.1 K/UL      DF            Sodium      136 - 145 mmol/L      Potassium      3.5 - 5.5 mmol/L      Chloride      100 - 108 mmol/L      CO2      21 - 32 mmol/L      Anion gap      3.0 - 18 mmol/L      Glucose      74 - 99 mg/dL      BUN      7.0 - 18 MG/DL      Creatinine      0.6 - 1.3 MG/DL      BUN/Creatinine ratio      12 - 20        GFR est AA      >60 ml/min/1.73m2      GFR est non-AA      >60 ml/min/1.73m2      Calcium      8.5 - 10.1 MG/DL      Bilirubin, total      0.2 - 1.0 MG/DL      ALT (SGPT)      13 - 56 U/L      AST      15 - 37 U/L      Alk.  phosphatase      45 - 117 U/L      Protein, total      6.4 - 8.2 g/dL      Albumin      3.4 - 5.0 g/dL      Globulin      2.0 - 4.0 g/dL      A-G Ratio      0.8 - 1.7        Color            Appearance            Specific gravity      1.005 - 1.030        pH (UA)      5.0 - 8.0        Protein      NEG mg/dL      Glucose      NEG mg/dL      Ketone      NEG mg/dL      Bilirubin      NEG        Blood      NEG Urobilinogen      0.2 - 1.0 EU/dL      Nitrites      NEG        Leukocyte Esterase      NEG        WBC      0 - 4 /hpf      RBC      0 - 5 /hpf      Epithelial cells      0 - 5 /lpf      Bacteria      NEG /hpf      VALID INTERNAL CONTROL POC       Yes Yes Yes   Prothrombin time (POC)      seconds 36.3 29.2 16.7   INR       3.0 2.4 1.4   Special Requests:            Culture result:            Lipase      73 - 393 U/L      Amylase      25 - 115 U/L      GGT      0 - 60 IU/L      TSH      0.36 - 3.74 uIU/mL      T4, Free      0.7 - 1.5 NG/DL      Uric acid      2.6 - 7.2 MG/DL        TESTS  Indication: Abdominal pain.     Comparison: None     TECHNIQUE: Real-time abdomen/right upper quadrant sonography in multiple planes  was performed with image documentation. Grayscale, color flow Doppler imaging,  and velocity spectral waveform analysis of the portal vein was performed (duplex  imaging).     ========     FINDINGS:     PANCREAS: Visualized portions of proximal pancreas are unremarkable.     LIVER: Liver is normal  in size, largest transverse dimension of the right  hepatic lobe measures 15.2 cm. Normal echogenicity. No evidence of solid mass. Color flow Doppler and velocity spectral waveform analysis of the portal vein  shows normal (hepatopedal) direction of flow. Portal vein measures up to 10 mm.     BILIARY: No biliary dilation. Normal diameter of common bile duct.     GALLBLADDER: No wall thickening or pericholecystic fluid. Several small  gallstones. No gallbladder dilatation Yanez's sign negative.     RIGHT KIDNEY: Normal in size, measuring 9.7 cm in length. No hydronephrosis. No  evidence of solid mass or calculus. Cortical echogenicity/thickness appears  normal.     LEFT KIDNEY:  Normal in size, measuring 9.4 cm in length. No hydronephrosis. No  evidence of solid mass or calculus. Cortical echogenicity/thickness appears  normal.     SPLEEN: Normal.     VASCULATURE: No evidence of aortic aneurysm.  Flow seen within the IVC.     OTHER: No evidence of ascites.     ========     IMPRESSION  IMPRESSION:     1. Cholelithiasis with no sonographic evidence of acute cholecystitis.     2. No other significant finding. Assessment/Plan:    Hypertension - stable  Hyperlipidemia - stable  arthrits stable  Thyroid stable  Chronic pain stable  Blood clot on coumadin x 2 mo stable improved  Gout stable  abd pain  Most likely secoanry to gall stones  Have already referred to gen surg     Lab review: labs are reviewed, up to date and normal    Diagnoses and all orders for this visit:    1. Arthritis  -     HYDROcodone-acetaminophen (NORCO) 5-325 mg per tablet; Take 1 Tab by mouth every eight (8) hours as needed for Pain (no more than 2 per day). -     furosemide (LASIX) 40 mg tablet; Take 1 Tab by mouth daily. Only if needed for edema  -     albuterol (PROVENTIL HFA, VENTOLIN HFA, PROAIR HFA) 90 mcg/actuation inhaler; Take 2 Puffs by inhalation every six (6) hours as needed for Wheezing. 2. Localized edema  -     HYDROcodone-acetaminophen (NORCO) 5-325 mg per tablet; Take 1 Tab by mouth every eight (8) hours as needed for Pain (no more than 2 per day). -     furosemide (LASIX) 40 mg tablet; Take 1 Tab by mouth daily. Only if needed for edema  -     albuterol (PROVENTIL HFA, VENTOLIN HFA, PROAIR HFA) 90 mcg/actuation inhaler; Take 2 Puffs by inhalation every six (6) hours as needed for Wheezing. 3. Chronic eczema  -     HYDROcodone-acetaminophen (NORCO) 5-325 mg per tablet; Take 1 Tab by mouth every eight (8) hours as needed for Pain (no more than 2 per day). -     furosemide (LASIX) 40 mg tablet; Take 1 Tab by mouth daily. Only if needed for edema  -     albuterol (PROVENTIL HFA, VENTOLIN HFA, PROAIR HFA) 90 mcg/actuation inhaler; Take 2 Puffs by inhalation every six (6) hours as needed for Wheezing. 4. Pure hypercholesterolemia  -     HYDROcodone-acetaminophen (NORCO) 5-325 mg per tablet;  Take 1 Tab by mouth every eight (8) hours as needed for Pain (no more than 2 per day). -     furosemide (LASIX) 40 mg tablet; Take 1 Tab by mouth daily. Only if needed for edema  -     albuterol (PROVENTIL HFA, VENTOLIN HFA, PROAIR HFA) 90 mcg/actuation inhaler; Take 2 Puffs by inhalation every six (6) hours as needed for Wheezing. 5. Essential hypertension, malignant  -     HYDROcodone-acetaminophen (NORCO) 5-325 mg per tablet; Take 1 Tab by mouth every eight (8) hours as needed for Pain (no more than 2 per day). -     furosemide (LASIX) 40 mg tablet; Take 1 Tab by mouth daily. Only if needed for edema  -     albuterol (PROVENTIL HFA, VENTOLIN HFA, PROAIR HFA) 90 mcg/actuation inhaler; Take 2 Puffs by inhalation every six (6) hours as needed for Wheezing. 6. Chronic pain syndrome  -     HYDROcodone-acetaminophen (NORCO) 5-325 mg per tablet; Take 1 Tab by mouth every eight (8) hours as needed for Pain (no more than 2 per day). -     furosemide (LASIX) 40 mg tablet; Take 1 Tab by mouth daily. Only if needed for edema  -     albuterol (PROVENTIL HFA, VENTOLIN HFA, PROAIR HFA) 90 mcg/actuation inhaler; Take 2 Puffs by inhalation every six (6) hours as needed for Wheezing. 7. Routine general medical examination at a health care facility  -     HYDROcodone-acetaminophen (NORCO) 5-325 mg per tablet; Take 1 Tab by mouth every eight (8) hours as needed for Pain (no more than 2 per day). -     furosemide (LASIX) 40 mg tablet; Take 1 Tab by mouth daily. Only if needed for edema  -     albuterol (PROVENTIL HFA, VENTOLIN HFA, PROAIR HFA) 90 mcg/actuation inhaler; Take 2 Puffs by inhalation every six (6) hours as needed for Wheezing. I have discussed the diagnosis with the patient and the intended plan as seen in the above orders. The patient has received an after-visit summary and questions were answered concerning future plans. I have discussed medication side effects and warnings with the patient as well.  I have reviewed the plan of care with the patient, accepted their input and they are in agreement with the treatment goals. Follow-up Disposition:  Return in about 3 weeks (around 12/10/2018) for EOV, inr next visit.

## 2018-11-26 ENCOUNTER — OFFICE VISIT (OUTPATIENT)
Dept: SURGERY | Age: 83
End: 2018-11-26

## 2018-11-26 VITALS
WEIGHT: 220 LBS | OXYGEN SATURATION: 98 % | HEART RATE: 66 BPM | HEIGHT: 68 IN | BODY MASS INDEX: 33.34 KG/M2 | SYSTOLIC BLOOD PRESSURE: 145 MMHG | DIASTOLIC BLOOD PRESSURE: 72 MMHG | TEMPERATURE: 97.5 F

## 2018-11-26 DIAGNOSIS — R10.11 RIGHT UPPER QUADRANT ABDOMINAL PAIN: ICD-10-CM

## 2018-11-26 DIAGNOSIS — R10.13 EPIGASTRIC PAIN: ICD-10-CM

## 2018-11-26 DIAGNOSIS — K80.20 GALLSTONES: Primary | ICD-10-CM

## 2018-11-26 NOTE — PROGRESS NOTES
Chief Complaint   Patient presents with    Gallbladder Attack     gallstones      Patient reports that she had abdominal pain just above her stomch. She went to the doctor who told her to reduced fried and fsatyty food as well reduce hot an spicy from her diet. She states that she has been compliant and that the pain has diminished. 1. Have you been to the ER, urgent care clinic since your last visit? Hospitalized since your last visit? No    2. Have you seen or consulted any other health care providers outside of the [address] [place] [address] since your last visit? Include any pap smears or colon screening.  No

## 2018-11-26 NOTE — PROGRESS NOTES
General Surgery Consult      [facility] [place]  Admit date: (Not on file)    MRN: [id number]     : 1935     Age: 82 y.o. Attending Physician: Stephanie Burroughs MD, FACS      Subjective:     Ирина Blevins is a 82 y.o. female who is presenting with abdominal pain. The patient has a long history of abdominal pain, however recently she was seen for right upper quadrant pain that radiated to her right shoulder. Chronic cholecystitis was suspected so an ultrasound was ordered and it showed cholelithiasis with no evidence of cholecystitis. The patient stated that since Dr. Surya Abbasi has put her on a low-fat diet she has been feeling much better and she did not have any attacks. She currently denies any nausea or vomiting . She was also recently diagnosed with DVT of the left leg and she is on Coumadin. Currently she has no pain, she stated that when the pain happen it was localized in the right upper quadrant and epigastric area. The patient  has not had jaundice, acholic stools or dark urine and has not had a history of pancreatitis or hepatitis. The patient had a previous lower midline laparotomy for a , but no previous upper abdominal surgeries. Patient Active Problem List    Diagnosis Date Noted    Blood clot in vein 10/03/2018    Chronic gout of multiple sites 10/03/2018    Severe obesity (BMI 35.0-39. 9) with comorbidity ([address] 2018    Chronic pain syndrome 10/16/2017    Subclinical hyperthyroidism 2017    Arthritis 2010    Essential hypertension, malignant 2010    Pure hypercholesterolemia 2010    Edema 2010    Chronic eczema 2010     Past Medical History:   Diagnosis Date    Arthritis 2010    Chronic eczema 3/22/2010    Clot 10/2018    lower left exremity, shin     Edema 3/22/2010    Essential hypertension, malignant 3/22/2010    Menopause     Pure hypercholesterolemia 3/22/2010    Subclinical hyperthyroidism 2017    Unspecified arthropathy, shoulder region 3/22/2010    Unspecified hypothyroidism 3/22/2010      Past Surgical History:   Procedure Laterality Date    HX GYN  1983    hysterectomy    HX HYSTERECTOMY  1984    fibroids    HX KNEE REPLACEMENT Right       Social History     Tobacco Use    Smoking status: Never Smoker    Smokeless tobacco: Never Used   Substance Use Topics    Alcohol use: Yes     Alcohol/week: 1.5 oz     Types: 3 Standard drinks or equivalent per week      Social History     Tobacco Use   Smoking Status Never Smoker   Smokeless Tobacco Never Used     Family History   Problem Relation Age of Onset    Hypertension Mother     Breast Cancer Mother 70    Breast Cancer Child 36    Breast Cancer Sister 39      Current Outpatient Medications   Medication Sig    furosemide (LASIX) 40 mg tablet Take 1 Tab by mouth daily. Only if needed for edema    albuterol (PROVENTIL HFA, VENTOLIN HFA, PROAIR HFA) 90 mcg/actuation inhaler Take 2 Puffs by inhalation every six (6) hours as needed for Wheezing.  warfarin (COUMADIN) 4 mg tablet Take 1 Tab by mouth daily.  lisinopril-hydroCHLOROthiazide (PRINZIDE, ZESTORETIC) 20-25 mg per tablet Take 1 Tab by mouth daily.  omeprazole (PRILOSEC) 20 mg capsule Take 20 mg by mouth daily.  HYDROcodone-acetaminophen (NORCO) 5-325 mg per tablet Take 1 Tab by mouth every eight (8) hours as needed for Pain (no more than 2 per day).  allopurinol (ZYLOPRIM) 300 mg tablet Take 1 Tab by mouth daily.  fluocinoNIDE (LIDEX) 0.05 % ointment Apply  to affected area two (2) times a day. No current facility-administered medications for this visit.        No Known Allergies     Review of Systems:  Constitutional: negative  Eyes: negative  Ears, Nose, Mouth, Throat, and Face: negative  Respiratory: negative  Cardiovascular: negative  Gastrointestinal: positive for abdominal pain  Genitourinary:negative  Integument/Breast: negative  Hematologic/Lymphatic: negative  Musculoskeletal:negative  Neurological: negative  Behavioral/Psychiatric: negative  Endocrine: negative  Allergic/Immunologic: negative    Objective:     Visit Vitals  /72 (BP Patient Position: Sitting)   Pulse 66   Temp 97.5 °F (36.4 °C) (Oral)   Ht 5' 8\" (1.727 m)   Wt 99.8 kg (220 lb)   SpO2 98%   BMI 33.45 kg/m²       Physical Exam:      General:  in no apparent distress, alert, oriented times 3, anicteric and cooperative   Eyes:  conjunctivae and sclerae normal, pupils equal, round, reactive to light   Throat & Neck: no erythema or exudates noted and neck supple and symmetrical; no palpable masses   Lungs:   clear to auscultation bilaterally   Heart:  Regular rate and rhythm   Abdomen:   rounded, soft, nontender, nondistended, no masses or organomegaly. There is a midline abdominal scar but no evidence of abdominal wall hernia.     Extremities: extremities normal, atraumatic, no cyanosis or edema   Skin: Normal.         Imaging and Lab Review:     CBC:   Lab Results   Component Value Date/Time    WBC 10.1 11/14/2018 06:37 PM    RBC 4.79 11/14/2018 06:37 PM    HGB 12.7 11/14/2018 06:37 PM    HCT 39.9 11/14/2018 06:37 PM    PLATELET 722 29/94/6259 06:37 PM     BMP:   Lab Results   Component Value Date/Time    Glucose 82 11/14/2018 06:37 PM    Sodium 141 11/14/2018 06:37 PM    Potassium 4.3 11/14/2018 06:37 PM    Chloride 101 11/14/2018 06:37 PM    CO2 33 (H) 11/14/2018 06:37 PM    BUN 17 11/14/2018 06:37 PM    Creatinine 1.40 (H) 11/14/2018 06:37 PM    Calcium 9.8 11/14/2018 06:37 PM     CMP:  Lab Results   Component Value Date/Time    Glucose 82 11/14/2018 06:37 PM    Sodium 141 11/14/2018 06:37 PM    Potassium 4.3 11/14/2018 06:37 PM    Chloride 101 11/14/2018 06:37 PM    CO2 33 (H) 11/14/2018 06:37 PM    BUN 17 11/14/2018 06:37 PM    Creatinine 1.40 (H) 11/14/2018 06:37 PM    Calcium 9.8 11/14/2018 06:37 PM    Anion gap 7 11/14/2018 06:37 PM    BUN/Creatinine ratio 12 11/14/2018 06:37 PM Alk. phosphatase 120 (H) 11/14/2018 06:37 PM    Protein, total 6.9 11/14/2018 06:37 PM    Albumin 3.6 11/14/2018 06:37 PM    Globulin 3.3 11/14/2018 06:37 PM    A-G Ratio 1.1 11/14/2018 06:37 PM       No results found for this or any previous visit (from the past 24 hour(s)). images and reports reviewed    Assessment:   Ariel Blanco is a 82 y.o. female who is presenting with nonspecific abdominal pain. Though some of her symptoms could be related to biliary colic, especially her right upper quadrant pain that is associated with fatty food that radiates to her right shoulder, however I am not sure that the patient has really biliary colic or biliary dyskinesia. Also given the fact that the patient was recently diagnosed with a DVT and she is on Coumadin, I think that the best option is to order a HIDA scan and continue with her diet management and follow-up in few weeks. I explained the indications for robotic cholecystectomy as well as the alternatives. I discussed the potential risks, including but not limited to bleeding, wound infection, trocar injuries, bile duct injury and leak, and also the possible need for conversion to open procedure. I also explained the firefly technology and how it allow us to visualize the biliary tree to avoid bile duct injury or leak.      Plan:     HIDA scan rule out biliary dyskinesia/chronic cholecystitis  Try to lose more weight  Continue with the diet management  Follow-up with me in 1-2 months    Please call me if you have any questions (cell phone: [phone])     Signed By: Mack Nolen MD     November 26, 2018

## 2018-12-10 ENCOUNTER — OFFICE VISIT (OUTPATIENT)
Dept: FAMILY MEDICINE CLINIC | Age: 83
End: 2018-12-10

## 2018-12-10 VITALS
TEMPERATURE: 97.8 F | WEIGHT: 220 LBS | HEART RATE: 64 BPM | RESPIRATION RATE: 18 BRPM | DIASTOLIC BLOOD PRESSURE: 58 MMHG | OXYGEN SATURATION: 98 % | BODY MASS INDEX: 35.36 KG/M2 | HEIGHT: 66 IN | SYSTOLIC BLOOD PRESSURE: 110 MMHG

## 2018-12-10 DIAGNOSIS — M1A.09X0 CHRONIC GOUT OF MULTIPLE SITES, UNSPECIFIED CAUSE: ICD-10-CM

## 2018-12-10 DIAGNOSIS — E78.00 PURE HYPERCHOLESTEROLEMIA: ICD-10-CM

## 2018-12-10 DIAGNOSIS — Z00.00 ROUTINE GENERAL MEDICAL EXAMINATION AT A HEALTH CARE FACILITY: ICD-10-CM

## 2018-12-10 DIAGNOSIS — I10 ESSENTIAL HYPERTENSION, MALIGNANT: ICD-10-CM

## 2018-12-10 DIAGNOSIS — M19.90 ARTHRITIS: ICD-10-CM

## 2018-12-10 DIAGNOSIS — R60.0 LOCALIZED EDEMA: ICD-10-CM

## 2018-12-10 DIAGNOSIS — L30.9 CHRONIC ECZEMA: ICD-10-CM

## 2018-12-10 DIAGNOSIS — G89.4 CHRONIC PAIN SYNDROME: ICD-10-CM

## 2018-12-10 RX ORDER — HYDROCODONE BITARTRATE AND ACETAMINOPHEN 5; 325 MG/1; MG/1
1 TABLET ORAL
Qty: 60 TAB | Refills: 0 | Status: SHIPPED | OUTPATIENT
Start: 2018-12-10 | End: 2019-01-14 | Stop reason: SDUPTHER

## 2018-12-10 RX ORDER — ALLOPURINOL 300 MG/1
300 TABLET ORAL DAILY
Qty: 90 TAB | Refills: 4 | Status: SHIPPED | OUTPATIENT
Start: 2018-12-10 | End: 2019-03-26 | Stop reason: SDUPTHER

## 2018-12-10 RX ORDER — ALBUTEROL SULFATE 90 UG/1
2 AEROSOL, METERED RESPIRATORY (INHALATION)
Qty: 1 INHALER | Refills: 0 | Status: SHIPPED | OUTPATIENT
Start: 2018-12-10 | End: 2019-03-26 | Stop reason: SDUPTHER

## 2018-12-10 RX ORDER — WARFARIN 4 MG/1
4 TABLET ORAL DAILY
Qty: 30 TAB | Refills: 1 | Status: SHIPPED | OUTPATIENT
Start: 2018-12-10 | End: 2019-01-14

## 2018-12-10 RX ORDER — FUROSEMIDE 40 MG/1
40 TABLET ORAL DAILY
Qty: 30 TAB | Refills: 3 | Status: SHIPPED | OUTPATIENT
Start: 2018-12-10 | End: 2019-03-26 | Stop reason: SDUPTHER

## 2018-12-10 RX ORDER — LISINOPRIL AND HYDROCHLOROTHIAZIDE 20; 25 MG/1; MG/1
1 TABLET ORAL DAILY
Qty: 90 TAB | Refills: 4 | Status: SHIPPED | OUTPATIENT
Start: 2018-12-10 | End: 2019-03-26

## 2018-12-10 NOTE — PROGRESS NOTES
1. Have you been to the ER, urgent care clinic since your last visit? Hospitalized since your last visit? No    2. Have you seen or consulted any other health care providers outside of the [address] [place] [address] since your last visit? Include any pap smears or colon screening.  No

## 2018-12-10 NOTE — PROGRESS NOTES
Gurpreet Zambrano is a 82 y.o.  female and presents with    Chief Complaint   Patient presents with    Hypertension    Cholesterol Problem    Blood Clot    Thyroid Problem    Arthritis    Pain (Chronic)    Gout           Subjective:    Cardiovascular Review:  The patient has hypertension and hyperlipidemia. Diet and Lifestyle: not attempting to follow a low fat, low cholesterol diet, not attempting to follow a low sodium diet  Home BP Monitoring: is not measured at home. Pertinent ROS: taking medications as instructed, no medication side effects noted, no TIA's, no chest pain on exertion, no dyspnea on exertion, no swelling of ankles. Anticoagulation  Patient here for followup of chronic anticoagulation. Indication: DVT. Bleeding Signs/Symptoms:  None. Thromboembolic Signs/Symptoms:  None. INR's are drawn regularly and have been therapeutic. Lab Results   Component Value Date/Time    INR 1.0 08/13/2013 12:00 AM    INR POC 3.0 11/14/2018 02:36 PM     Thyroid Review:  Patient is seen for followup of hypothyroidism. Thyroid ROS: denies fatigue, weight changes, heat/cold intolerance, bowel/skin changes or CVS symptoms. Osteoarthritis and Chronic Pain:  Patient has osteoarthritis and gdout, primarily affecting the diffuse. Symptoms onset: problem is longstanding. Rheumatological ROS: no current joint or muscle symptoms, essentially pain-free. Response to treatment plan: stable. Additional Concerns:          Patient Active Problem List    Diagnosis Date Noted    Blood clot in vein 10/03/2018    Chronic gout of multiple sites 10/03/2018    Severe obesity (BMI 35.0-39. 9) with comorbidity ([facility] [address] 05/16/2018    Chronic pain syndrome 10/16/2017    Subclinical hyperthyroidism 05/30/2017    Arthritis 09/20/2010    Essential hypertension, malignant 03/22/2010    Pure hypercholesterolemia 03/22/2010    Edema 03/22/2010    Chronic eczema 03/22/2010     Current Outpatient Medications Medication Sig Dispense Refill    warfarin (COUMADIN) 4 mg tablet Take 1 Tab by mouth daily. 30 Tab 1    lisinopril-hydroCHLOROthiazide (PRINZIDE, ZESTORETIC) 20-25 mg per tablet Take 1 Tab by mouth daily. 90 Tab 4    allopurinol (ZYLOPRIM) 300 mg tablet Take 1 Tab by mouth daily. 90 Tab 4    albuterol (PROVENTIL HFA, VENTOLIN HFA, PROAIR HFA) 90 mcg/actuation inhaler Take 2 Puffs by inhalation every six (6) hours as needed for Wheezing. 1 Inhaler 0    furosemide (LASIX) 40 mg tablet Take 1 Tab by mouth daily. Only if needed for edema 30 Tab 3    HYDROcodone-acetaminophen (NORCO) 5-325 mg per tablet Take 1 Tab by mouth every eight (8) hours as needed for Pain (no more than 2 per day). 60 Tab 0    fluocinoNIDE (LIDEX) 0.05 % ointment Apply  to affected area two (2) times a day. 60 g 4    omeprazole (PRILOSEC) 20 mg capsule Take 20 mg by mouth daily. No Known Allergies  Past Medical History:   Diagnosis Date    Arthritis 9/20/2010    Chronic eczema 3/22/2010    Clot 10/2018    lower left exremity, shin     Edema 3/22/2010    Essential hypertension, malignant 3/22/2010    Menopause     Pure hypercholesterolemia 3/22/2010    Subclinical hyperthyroidism 5/30/2017    Unspecified arthropathy, shoulder region 3/22/2010    Unspecified hypothyroidism 3/22/2010     Past Surgical History:   Procedure Laterality Date    HX GYN  1983    hysterectomy    HX HYSTERECTOMY  1984    fibroids    HX KNEE REPLACEMENT Right      Family History   Problem Relation Age of Onset    Hypertension Mother     Breast Cancer Mother 70    Breast Cancer Child 36    Breast Cancer Sister 39     Social History     Tobacco Use    Smoking status: Never Smoker    Smokeless tobacco: Never Used   Substance Use Topics    Alcohol use: Yes     Alcohol/week: 1.5 oz     Types: 3 Standard drinks or equivalent per week       ROS       All other systems reviewed and are negative.       Objective:  Vitals:    12/10/18 0803   BP: 110/58   Pulse: 64   Resp: 18   Temp: 97.8 °F (36.6 °C)   TempSrc: Oral   SpO2: 98%   Weight: 220 lb (99.8 kg)   Height: 5' 6\" (1.676 m)   PainSc:   2   PainLoc: Knee                 alert, well appearing, and in no distress and overweight  Chest - clear to auscultation, no wheezes, rales or rhonchi, symmetric air entry  Heart - normal rate, regular rhythm, normal S1, S2, no murmurs, rubs, clicks or gallops  Abdomen - soft, nontender, nondistended, no masses or organomegaly  Extremities - peripheral pulses normal, no pedal edema, no clubbing or cyanosis        LABS     TESTS      Assessment/Plan:    Hypertension - stable  Hyperlipidemia - stable  Blood clot on coumadin doing very well plan to conatinue until next month. Then recjheck duplex and hopefully stop coumadin  Athyroid stable  Gout stable  arthris ongoing  chrobnic pa9in  This is a chronic problem that is stable. Per review of available records and patients , there are not sign of overuse, misuse, diversion, or concerning side effects. Today we reviewed: the risk of overdose, addiction, and dependency  The following changes were made to the patients current treatment plan: nothing, medications refilled. Lab review: labs are reviewed, up to date and normal    Diagnoses and all orders for this visit:    1. Localized edema  -     lisinopril-hydroCHLOROthiazide (PRINZIDE, ZESTORETIC) 20-25 mg per tablet; Take 1 Tab by mouth daily. -     albuterol (PROVENTIL HFA, VENTOLIN HFA, PROAIR HFA) 90 mcg/actuation inhaler; Take 2 Puffs by inhalation every six (6) hours as needed for Wheezing.  -     furosemide (LASIX) 40 mg tablet; Take 1 Tab by mouth daily. Only if needed for edema  -     HYDROcodone-acetaminophen (NORCO) 5-325 mg per tablet; Take 1 Tab by mouth every eight (8) hours as needed for Pain (no more than 2 per day). 2. Chronic eczema  -     lisinopril-hydroCHLOROthiazide (PRINZIDE, ZESTORETIC) 20-25 mg per tablet;  Take 1 Tab by mouth daily.  -     albuterol (PROVENTIL HFA, VENTOLIN HFA, PROAIR HFA) 90 mcg/actuation inhaler; Take 2 Puffs by inhalation every six (6) hours as needed for Wheezing.  -     furosemide (LASIX) 40 mg tablet; Take 1 Tab by mouth daily. Only if needed for edema  -     HYDROcodone-acetaminophen (NORCO) 5-325 mg per tablet; Take 1 Tab by mouth every eight (8) hours as needed for Pain (no more than 2 per day). 3. Pure hypercholesterolemia  -     lisinopril-hydroCHLOROthiazide (PRINZIDE, ZESTORETIC) 20-25 mg per tablet; Take 1 Tab by mouth daily. -     albuterol (PROVENTIL HFA, VENTOLIN HFA, PROAIR HFA) 90 mcg/actuation inhaler; Take 2 Puffs by inhalation every six (6) hours as needed for Wheezing.  -     furosemide (LASIX) 40 mg tablet; Take 1 Tab by mouth daily. Only if needed for edema  -     HYDROcodone-acetaminophen (NORCO) 5-325 mg per tablet; Take 1 Tab by mouth every eight (8) hours as needed for Pain (no more than 2 per day). 4. Arthritis  -     lisinopril-hydroCHLOROthiazide (PRINZIDE, ZESTORETIC) 20-25 mg per tablet; Take 1 Tab by mouth daily. -     albuterol (PROVENTIL HFA, VENTOLIN HFA, PROAIR HFA) 90 mcg/actuation inhaler; Take 2 Puffs by inhalation every six (6) hours as needed for Wheezing.  -     furosemide (LASIX) 40 mg tablet; Take 1 Tab by mouth daily. Only if needed for edema  -     HYDROcodone-acetaminophen (NORCO) 5-325 mg per tablet; Take 1 Tab by mouth every eight (8) hours as needed for Pain (no more than 2 per day). 5. Essential hypertension, malignant  -     lisinopril-hydroCHLOROthiazide (PRINZIDE, ZESTORETIC) 20-25 mg per tablet; Take 1 Tab by mouth daily. -     albuterol (PROVENTIL HFA, VENTOLIN HFA, PROAIR HFA) 90 mcg/actuation inhaler; Take 2 Puffs by inhalation every six (6) hours as needed for Wheezing.  -     furosemide (LASIX) 40 mg tablet; Take 1 Tab by mouth daily. Only if needed for edema  -     HYDROcodone-acetaminophen (NORCO) 5-325 mg per tablet;  Take 1 Tab by mouth every eight (8) hours as needed for Pain (no more than 2 per day). 6. Chronic pain syndrome  -     lisinopril-hydroCHLOROthiazide (PRINZIDE, ZESTORETIC) 20-25 mg per tablet; Take 1 Tab by mouth daily. -     albuterol (PROVENTIL HFA, VENTOLIN HFA, PROAIR HFA) 90 mcg/actuation inhaler; Take 2 Puffs by inhalation every six (6) hours as needed for Wheezing.  -     furosemide (LASIX) 40 mg tablet; Take 1 Tab by mouth daily. Only if needed for edema  -     HYDROcodone-acetaminophen (NORCO) 5-325 mg per tablet; Take 1 Tab by mouth every eight (8) hours as needed for Pain (no more than 2 per day). 7. Routine general medical examination at a health care facility  -     lisinopril-hydroCHLOROthiazide (PRINZIDE, ZESTORETIC) 20-25 mg per tablet; Take 1 Tab by mouth daily. -     albuterol (PROVENTIL HFA, VENTOLIN HFA, PROAIR HFA) 90 mcg/actuation inhaler; Take 2 Puffs by inhalation every six (6) hours as needed for Wheezing.  -     furosemide (LASIX) 40 mg tablet; Take 1 Tab by mouth daily. Only if needed for edema  -     HYDROcodone-acetaminophen (NORCO) 5-325 mg per tablet; Take 1 Tab by mouth every eight (8) hours as needed for Pain (no more than 2 per day). 8. Chronic gout of multiple sites, unspecified cause  -     allopurinol (ZYLOPRIM) 300 mg tablet; Take 1 Tab by mouth daily. Other orders  -     warfarin (COUMADIN) 4 mg tablet; Take 1 Tab by mouth daily. I have discussed the diagnosis with the patient and the intended plan as seen in the above orders. The patient has received an after-visit summary and questions were answered concerning future plans. I have discussed medication side effects and warnings with the patient as well. I have reviewed the plan of care with the patient, accepted their input and they are in agreement with the treatment goals. Follow-up Disposition:  Return in about 5 weeks (around 1/14/2019) for EOV, inr next visit,  and drug screen.

## 2019-01-14 ENCOUNTER — TELEPHONE (OUTPATIENT)
Dept: SURGERY | Age: 84
End: 2019-01-14

## 2019-01-14 ENCOUNTER — DOCUMENTATION ONLY (OUTPATIENT)
Dept: SURGERY | Age: 84
End: 2019-01-14

## 2019-01-14 ENCOUNTER — OFFICE VISIT (OUTPATIENT)
Dept: FAMILY MEDICINE CLINIC | Age: 84
End: 2019-01-14

## 2019-01-14 VITALS
RESPIRATION RATE: 20 BRPM | HEIGHT: 68 IN | SYSTOLIC BLOOD PRESSURE: 122 MMHG | OXYGEN SATURATION: 98 % | HEART RATE: 62 BPM | DIASTOLIC BLOOD PRESSURE: 73 MMHG | TEMPERATURE: 98.8 F | WEIGHT: 214.6 LBS | BODY MASS INDEX: 32.52 KG/M2

## 2019-01-14 DIAGNOSIS — R60.0 LOCALIZED EDEMA: ICD-10-CM

## 2019-01-14 DIAGNOSIS — K80.00 CALCULUS OF GALLBLADDER WITH ACUTE CHOLECYSTITIS WITHOUT OBSTRUCTION: Primary | ICD-10-CM

## 2019-01-14 DIAGNOSIS — M19.90 ARTHRITIS: ICD-10-CM

## 2019-01-14 DIAGNOSIS — K80.20 CALCULUS OF GALLBLADDER WITHOUT CHOLECYSTITIS WITHOUT OBSTRUCTION: ICD-10-CM

## 2019-01-14 DIAGNOSIS — E78.00 PURE HYPERCHOLESTEROLEMIA: ICD-10-CM

## 2019-01-14 DIAGNOSIS — G89.4 CHRONIC PAIN SYNDROME: ICD-10-CM

## 2019-01-14 DIAGNOSIS — Z00.00 ROUTINE GENERAL MEDICAL EXAMINATION AT A HEALTH CARE FACILITY: ICD-10-CM

## 2019-01-14 DIAGNOSIS — I82.90 CLOT: Primary | ICD-10-CM

## 2019-01-14 DIAGNOSIS — I10 ESSENTIAL HYPERTENSION, MALIGNANT: ICD-10-CM

## 2019-01-14 DIAGNOSIS — L30.9 CHRONIC ECZEMA: ICD-10-CM

## 2019-01-14 LAB
INR BLD: 2.6
PT POC: 31.7 SECONDS
VALID INTERNAL CONTROL?: YES

## 2019-01-14 RX ORDER — HYDROCODONE BITARTRATE AND ACETAMINOPHEN 5; 325 MG/1; MG/1
1 TABLET ORAL
Qty: 60 TAB | Refills: 0 | Status: SHIPPED | OUTPATIENT
Start: 2019-01-14 | End: 2019-05-13 | Stop reason: SDUPTHER

## 2019-01-14 NOTE — PROGRESS NOTES
MsNeda Jacob Montoya stopped by the office to inquire about a scan Dr. Kristopher Begum had recommended when she was seen in November 2018. Informed Ms. Silvina Vila talk with Dr. Kristopher Begum regarding ordering HIDA scan and once order is placed will call her to schedule procedure and follow up appointment with Dr. Kristopher Begum.

## 2019-01-14 NOTE — PROGRESS NOTES
Uday Ugalde is a 82 y.o.  female and presents with    Chief Complaint   Patient presents with    Blood Clot    Hypertension    Cholesterol Problem    Arthritis    Thyroid Problem    Gout    Gallstones           Subjective:  1. Having more problems with RUQ pain. Prev us showed gallstones. Sent to surgeon but they wanted to wait until she was off coumadin      Cardiovascular Review:  The patient has hypertension and hyperlipidemia. Diet and Lifestyle: not attempting to follow a low fat, low cholesterol diet, not attempting to follow a low sodium diet  Home BP Monitoring: is not measured at home. Pertinent ROS: taking medications as instructed, no medication side effects noted, no TIA's, no chest pain on exertion, no dyspnea on exertion, no swelling of ankles. Thyroid Review:  Patient is seen for followup of hypothyroidism. Thyroid ROS: denies fatigue, weight changes, heat/cold intolerance, bowel/skin changes or CVS symptoms. Osteoarthritis and Chronic Pain:  Patient has osteoarthritis and gout , primarily affecting the diffuse. Symptoms onset: problem is longstanding. Rheumatological ROS: no current joint or muscle symptoms, essentially pain-free. Response to treatment plan: stable. Additional Concerns:          Patient Active Problem List    Diagnosis Date Noted    Blood clot in vein 10/03/2018    Chronic gout of multiple sites 10/03/2018    Severe obesity (BMI 35.0-39. 9) with comorbidity ([facility] [address] 05/16/2018    Chronic pain syndrome 10/16/2017    Subclinical hyperthyroidism 05/30/2017    Arthritis 09/20/2010    Essential hypertension, malignant 03/22/2010    Pure hypercholesterolemia 03/22/2010    Edema 03/22/2010    Chronic eczema 03/22/2010     Current Outpatient Medications   Medication Sig Dispense Refill    HYDROcodone-acetaminophen (NORCO) 5-325 mg per tablet Take 1 Tab by mouth every eight (8) hours as needed for Pain (no more than 2 per day).  60 Tab 0    lisinopril-hydroCHLOROthiazide (PRINZIDE, ZESTORETIC) 20-25 mg per tablet Take 1 Tab by mouth daily. 90 Tab 4    allopurinol (ZYLOPRIM) 300 mg tablet Take 1 Tab by mouth daily. 90 Tab 4    albuterol (PROVENTIL HFA, VENTOLIN HFA, PROAIR HFA) 90 mcg/actuation inhaler Take 2 Puffs by inhalation every six (6) hours as needed for Wheezing. 1 Inhaler 0    furosemide (LASIX) 40 mg tablet Take 1 Tab by mouth daily. Only if needed for edema 30 Tab 3    fluocinoNIDE (LIDEX) 0.05 % ointment Apply  to affected area two (2) times a day. 60 g 4    omeprazole (PRILOSEC) 20 mg capsule Take 20 mg by mouth daily. No Known Allergies  Past Medical History:   Diagnosis Date    Arthritis 9/20/2010    Chronic eczema 3/22/2010    Clot 10/2018    lower left exremity, shin     Edema 3/22/2010    Essential hypertension, malignant 3/22/2010    Menopause     Pure hypercholesterolemia 3/22/2010    Subclinical hyperthyroidism 5/30/2017    Unspecified arthropathy, shoulder region 3/22/2010    Unspecified hypothyroidism 3/22/2010     Past Surgical History:   Procedure Laterality Date    HX GYN  1983    hysterectomy    HX HYSTERECTOMY  1984    fibroids    HX KNEE REPLACEMENT Right      Family History   Problem Relation Age of Onset    Hypertension Mother     Breast Cancer Mother 70    Breast Cancer Child 36    Breast Cancer Sister 39     Social History     Tobacco Use    Smoking status: Never Smoker    Smokeless tobacco: Never Used   Substance Use Topics    Alcohol use: Yes     Alcohol/week: 1.5 oz     Types: 3 Standard drinks or equivalent per week       ROS       All other systems reviewed and are negative.       Objective:  Vitals:    01/14/19 0832   BP: 122/73   Pulse: 62   Resp: 20   Temp: 98.8 °F (37.1 °C)   TempSrc: Oral   SpO2: 98%   Weight: 214 lb 9.6 oz (97.3 kg)   Height: 5' 8\" (1.727 m)   PainSc:   5   PainLoc: Back                 alert, well appearing, and in no distress, oriented to person, place, and time and overweight  Chest - clear to auscultation, no wheezes, rales or rhonchi, symmetric air entry  Heart - normal rate, regular rhythm, normal S1, S2, no murmurs, rubs, clicks or gallops  Abdomen - soft, nontender, nondistended, no masses or organomegaly        LABS   inr  2.6  TESTS      Assessment/Plan:    Hypertension - stable  Hyperlipidemia - stable  arthitis stable  Chronic pain This is a chronic problem that is stable. Per review of available records and patients , there are not sign of overuse, misuse, diversion, or concerning side effects. Today we reviewed: the risk of overdose, addiction, and dependency  The following changes were made to the patients current treatment plan: nothing, medications refilled. Thyroid optimal  Gout stable  Blood clot resolved s/p coumadin x 4 mo will d/c today and observe  Cholelithiasis  Still having ruq pain will refer back to surg for eval    Lab review: labs are reviewed, up to date and normal    Diagnoses and all orders for this visit:    1. Clot  -     AMB POC PT/INR    2. Arthritis  -     HYDROcodone-acetaminophen (NORCO) 5-325 mg per tablet; Take 1 Tab by mouth every eight (8) hours as needed for Pain (no more than 2 per day). 3. Localized edema  -     HYDROcodone-acetaminophen (NORCO) 5-325 mg per tablet; Take 1 Tab by mouth every eight (8) hours as needed for Pain (no more than 2 per day). 4. Chronic eczema  -     HYDROcodone-acetaminophen (NORCO) 5-325 mg per tablet; Take 1 Tab by mouth every eight (8) hours as needed for Pain (no more than 2 per day). 5. Pure hypercholesterolemia  -     HYDROcodone-acetaminophen (NORCO) 5-325 mg per tablet; Take 1 Tab by mouth every eight (8) hours as needed for Pain (no more than 2 per day). 6. Essential hypertension, malignant  -     HYDROcodone-acetaminophen (NORCO) 5-325 mg per tablet; Take 1 Tab by mouth every eight (8) hours as needed for Pain (no more than 2 per day).     7. Chronic pain syndrome  -     HYDROcodone-acetaminophen (NORCO) 5-325 mg per tablet; Take 1 Tab by mouth every eight (8) hours as needed for Pain (no more than 2 per day). 8. Routine general medical examination at a health care facility  -     HYDROcodone-acetaminophen (NORCO) 5-325 mg per tablet; Take 1 Tab by mouth every eight (8) hours as needed for Pain (no more than 2 per day). I have discussed the diagnosis with the patient and the intended plan as seen in the above orders. The patient has received an after-visit summary and questions were answered concerning future plans. I have discussed medication side effects and warnings with the patient as well. I have reviewed the plan of care with the patient, accepted their input and they are in agreement with the treatment goals. Follow-up Disposition:  Return in about 4 months (around 5/14/2019) for MWE, physical, labs at next visit, EKG next visit.

## 2019-01-14 NOTE — PROGRESS NOTES
Room #      SUBJECTIVE:    Latoya Goncalves is a 82 y.o. female who presents today for follow up for INR    1. Have you been to the ER, urgent care clinic since your last visit? Hospitalized since your last visit? NO    2. Have you seen or consulted any other health care providers outside of the [address] [place] [address] since your last visit? Include any pap smears or colon screening. NO  When :  Reason:    Health Maintenance reviewed Yes    Health Maintenance Due   Topic Date Due    Shingrix Vaccine Age 49> (1 of 2) 01/18/1987    DTaP/Tdap/Td series (1 - Tdap) 08/22/2013    Influenza Age 5 to Adult  08/01/2018    GLAUCOMA SCREENING Q2Y  11/03/2018

## 2019-01-21 ENCOUNTER — HOSPITAL ENCOUNTER (OUTPATIENT)
Dept: NUCLEAR MEDICINE | Age: 84
Discharge: HOME | End: 2019-01-21
Attending: SURGERY
Payer: MEDICARE

## 2019-01-21 DIAGNOSIS — K80.00 CALCULUS OF GALLBLADDER WITH ACUTE CHOLECYSTITIS WITHOUT OBSTRUCTION: ICD-10-CM

## 2019-01-21 PROCEDURE — 78226 HEPATOBILIARY SYSTEM IMAGING: CPT

## 2019-01-28 ENCOUNTER — OFFICE VISIT (OUTPATIENT)
Dept: SURGERY | Age: 84
End: 2019-01-28

## 2019-01-28 VITALS
OXYGEN SATURATION: 95 % | DIASTOLIC BLOOD PRESSURE: 68 MMHG | SYSTOLIC BLOOD PRESSURE: 128 MMHG | WEIGHT: 220 LBS | TEMPERATURE: 95.6 F | HEIGHT: 68 IN | HEART RATE: 69 BPM | BODY MASS INDEX: 33.34 KG/M2

## 2019-01-28 DIAGNOSIS — R10.11 RIGHT UPPER QUADRANT ABDOMINAL PAIN: Primary | ICD-10-CM

## 2019-01-28 NOTE — PROGRESS NOTES
General Surgery Consult      [facility] Course  Admit date: (Not on file)    MRN: [id number]     : 1935     Age: 82 y.o. Attending Physician: Elias Golden MD, FACS      Subjective:     James Vega is a 82 y.o. female who is known to me. I have seen the patient about 1 month ago for right upper quadrant pain and cholelithiasis. The patient has a long history of abdominal pain, however recently she was seen for right upper quadrant pain that radiated to her right shoulder. Chronic cholecystitis was suspected so an ultrasound was ordered and it showed cholelithiasis with no evidence of cholecystitis. The patient stated that since Dr. Arthur Neves has put her on a low-fat diet she has been feeling much better and she did not have any attacks. She currently denies any nausea or vomiting . She was diagnosed with DVT of the left leg and she was on Coumadin that was stopped recently. Currently she has no pain, she stated that when the pain happen it was localized in the right upper quadrant and epigastric area. The patient  has not had jaundice, acholic stools or dark urine and has not had a history of pancreatitis or hepatitis. The patient had a previous lower midline laparotomy for a , but no previous upper abdominal surgeries. When I saw her I ordered a HIDA scan. Unfortunately did not do the ejection fraction with the study but her HIDA scan showed no evidence of acute cholecystitis. She is here today with her daughter to discuss her options. Patient Active Problem List    Diagnosis Date Noted    Blood clot in vein 10/03/2018    Chronic gout of multiple sites 10/03/2018    Severe obesity (BMI 35.0-39. 9) with comorbidity ([address] 2018    Chronic pain syndrome 10/16/2017    Subclinical hyperthyroidism 2017    Arthritis 2010    Essential hypertension, malignant 2010    Pure hypercholesterolemia 2010    Edema 2010    Chronic eczema 2010     Past Medical History:   Diagnosis Date    Arthritis 9/20/2010    Chronic eczema 3/22/2010    Clot 10/2018    lower left exremity, shin     Edema 3/22/2010    Essential hypertension, malignant 3/22/2010    Menopause     Pure hypercholesterolemia 3/22/2010    Subclinical hyperthyroidism 5/30/2017    Unspecified arthropathy, shoulder region 3/22/2010    Unspecified hypothyroidism 3/22/2010      Past Surgical History:   Procedure Laterality Date    HX GYN  1983    hysterectomy    HX HYSTERECTOMY  1984    fibroids    HX KNEE REPLACEMENT Right       Social History     Tobacco Use    Smoking status: Never Smoker    Smokeless tobacco: Never Used   Substance Use Topics    Alcohol use: Yes     Alcohol/week: 1.5 oz     Types: 3 Standard drinks or equivalent per week      Social History     Tobacco Use   Smoking Status Never Smoker   Smokeless Tobacco Never Used     Family History   Problem Relation Age of Onset    Hypertension Mother     Breast Cancer Mother 70    Breast Cancer Child 36    Breast Cancer Sister 39      Current Outpatient Medications   Medication Sig    HYDROcodone-acetaminophen (NORCO) 5-325 mg per tablet Take 1 Tab by mouth every eight (8) hours as needed for Pain (no more than 2 per day).  lisinopril-hydroCHLOROthiazide (PRINZIDE, ZESTORETIC) 20-25 mg per tablet Take 1 Tab by mouth daily.  allopurinol (ZYLOPRIM) 300 mg tablet Take 1 Tab by mouth daily.  albuterol (PROVENTIL HFA, VENTOLIN HFA, PROAIR HFA) 90 mcg/actuation inhaler Take 2 Puffs by inhalation every six (6) hours as needed for Wheezing.  furosemide (LASIX) 40 mg tablet Take 1 Tab by mouth daily. Only if needed for edema    fluocinoNIDE (LIDEX) 0.05 % ointment Apply  to affected area two (2) times a day.  omeprazole (PRILOSEC) 20 mg capsule Take 20 mg by mouth daily. No current facility-administered medications for this visit.        No Known Allergies     Review of Systems:  Pertinent items are noted in the History of Present Illness. Objective:     Visit Vitals  /68 (BP Patient Position: Sitting)   Pulse 69   Temp 95.6 °F (35.3 °C) (Oral)   Ht 5' 8\" (1.727 m)   Wt 99.8 kg (220 lb)   SpO2 95%   BMI 33.45 kg/m²       Physical Exam:      General:  in no apparent distress, oriented times 3, afebrile, normal vitals, anicteric and cooperative   Eyes:  conjunctivae and sclerae normal, pupils equal, round, reactive to light   Throat & Neck: no erythema or exudates noted and neck supple and symmetrical; no palpable masses           Abdomen:   rounded, soft, non tender except for mild right upper quadrant tenderness, nondistended, no masses or organomegaly. No evidence of abdominal wall hernias. Imaging and Lab Review:     CBC:   Lab Results   Component Value Date/Time    WBC 10.1 11/14/2018 06:37 PM    RBC 4.79 11/14/2018 06:37 PM    HGB 12.7 11/14/2018 06:37 PM    HCT 39.9 11/14/2018 06:37 PM    PLATELET 535 92/12/2319 06:37 PM     BMP:   Lab Results   Component Value Date/Time    Glucose 82 11/14/2018 06:37 PM    Sodium 141 11/14/2018 06:37 PM    Potassium 4.3 11/14/2018 06:37 PM    Chloride 101 11/14/2018 06:37 PM    CO2 33 (H) 11/14/2018 06:37 PM    BUN 17 11/14/2018 06:37 PM    Creatinine 1.40 (H) 11/14/2018 06:37 PM    Calcium 9.8 11/14/2018 06:37 PM     CMP:  Lab Results   Component Value Date/Time    Glucose 82 11/14/2018 06:37 PM    Sodium 141 11/14/2018 06:37 PM    Potassium 4.3 11/14/2018 06:37 PM    Chloride 101 11/14/2018 06:37 PM    CO2 33 (H) 11/14/2018 06:37 PM    BUN 17 11/14/2018 06:37 PM    Creatinine 1.40 (H) 11/14/2018 06:37 PM    Calcium 9.8 11/14/2018 06:37 PM    Anion gap 7 11/14/2018 06:37 PM    BUN/Creatinine ratio 12 11/14/2018 06:37 PM    Alk.  phosphatase 120 (H) 11/14/2018 06:37 PM    Protein, total 6.9 11/14/2018 06:37 PM    Albumin 3.6 11/14/2018 06:37 PM    Globulin 3.3 11/14/2018 06:37 PM    A-G Ratio 1.1 11/14/2018 06:37 PM       No results found for this or any previous visit (from the past 24 hour(s)). images and reports reviewed    Assessment:   Adalgisa Ortiz is a 82 y.o. female is presenting with abdominal pain and cholelithiasis. Despite her HIDA scan is negative, I still think that her symptoms could be related to her gallstones and probably she has biliary colic or chronic cholecystitis. Especially that she is tender on exam in the right upper quadrant and that she has been feeling better after she adjusted her diet. Though I explained to the patient that I cannot for sure confirmed that her symptoms are related to cholelithiasis. I explained the indications for robotic cholecystectomy as well as the alternatives. I discussed the potential risks, including but not limited to bleeding, wound infection, trocar injuries, bile duct injury and leak, and also the possible need for conversion to open procedure. I also explained the firefly technology and how it allow us to visualize the biliary tree to avoid bile duct injury or leak. I discussed with her and her daughter the option of proceeding with surgery versus close observation. They decided to proceed with observation for now and follow-up with me as needed.      Plan:     Patient would like to proceed with observation for now  Follow-up with me if she develops pain or if she decided to proceed with the cholecystectomy    Please call me if you have any questions (cell phone: [phone])     Signed By: Fide Lee MD     January 28, 2019

## 2019-01-28 NOTE — PROGRESS NOTES
Chief Complaint   Patient presents with    Follow-up     review PIOTR      Patient reports that she is feeling well but had an episode of abdominal pain about two weeks ago which was different because it was more in the back and radiated into the ribs. 1. Have you been to the ER, urgent care clinic since your last visit? Hospitalized since your last visit? No    2. Have you seen or consulted any other health care providers outside of the [address] [place] [address] since your last visit? Include any pap smears or colon screening.  No

## 2019-01-28 NOTE — PATIENT INSTRUCTIONS
If you have any questions or concerns about today's appointment, the verbal and/or written instructions you were given for follow up care, please call our office at [phone].     [facility] - [address]    [phone] office  [phone]

## 2019-03-26 ENCOUNTER — OFFICE VISIT (OUTPATIENT)
Dept: FAMILY MEDICINE CLINIC | Age: 84
End: 2019-03-26

## 2019-03-26 ENCOUNTER — HOSPITAL ENCOUNTER (OUTPATIENT)
Dept: LAB | Age: 84
Discharge: HOME | End: 2019-03-26
Payer: MEDICARE

## 2019-03-26 VITALS
RESPIRATION RATE: 20 BRPM | TEMPERATURE: 97.3 F | HEIGHT: 68 IN | BODY MASS INDEX: 33.62 KG/M2 | OXYGEN SATURATION: 97 % | WEIGHT: 221.8 LBS | DIASTOLIC BLOOD PRESSURE: 72 MMHG | HEART RATE: 97 BPM | SYSTOLIC BLOOD PRESSURE: 125 MMHG

## 2019-03-26 DIAGNOSIS — L30.9 CHRONIC ECZEMA: ICD-10-CM

## 2019-03-26 DIAGNOSIS — I10 ESSENTIAL HYPERTENSION, MALIGNANT: ICD-10-CM

## 2019-03-26 DIAGNOSIS — Z00.00 ROUTINE GENERAL MEDICAL EXAMINATION AT A HEALTH CARE FACILITY: ICD-10-CM

## 2019-03-26 DIAGNOSIS — M1A.09X0 CHRONIC GOUT OF MULTIPLE SITES, UNSPECIFIED CAUSE: ICD-10-CM

## 2019-03-26 DIAGNOSIS — G89.4 CHRONIC PAIN SYNDROME: ICD-10-CM

## 2019-03-26 DIAGNOSIS — R60.0 LOCALIZED EDEMA: ICD-10-CM

## 2019-03-26 DIAGNOSIS — E78.00 PURE HYPERCHOLESTEROLEMIA: ICD-10-CM

## 2019-03-26 DIAGNOSIS — M19.90 ARTHRITIS: ICD-10-CM

## 2019-03-26 LAB
ALBUMIN SERPL-MCNC: 3.5 G/DL (ref 3.4–5)
ALBUMIN/GLOB SERPL: 1 {RATIO} (ref 0.8–1.7)
ALP SERPL-CCNC: 128 U/L (ref 45–117)
ALT SERPL-CCNC: 52 U/L (ref 13–56)
ANION GAP SERPL CALC-SCNC: 5 MMOL/L (ref 3–18)
APPEARANCE UR: CLEAR
AST SERPL-CCNC: 45 U/L (ref 15–37)
BASOPHILS # BLD: 0 K/UL (ref 0–0.1)
BASOPHILS NFR BLD: 0 % (ref 0–2)
BILIRUB SERPL-MCNC: 0.3 MG/DL (ref 0.2–1)
BILIRUB UR QL: NEGATIVE
BUN SERPL-MCNC: 27 MG/DL (ref 7–18)
BUN/CREAT SERPL: 16 (ref 12–20)
CALCIUM SERPL-MCNC: 9.1 MG/DL (ref 8.5–10.1)
CHLORIDE SERPL-SCNC: 104 MMOL/L (ref 100–108)
CO2 SERPL-SCNC: 33 MMOL/L (ref 21–32)
COLOR UR: YELLOW
CREAT SERPL-MCNC: 1.71 MG/DL (ref 0.6–1.3)
DIFFERENTIAL METHOD BLD: ABNORMAL
EOSINOPHIL # BLD: 0.5 K/UL (ref 0–0.4)
EOSINOPHIL NFR BLD: 5 % (ref 0–5)
ERYTHROCYTE [DISTWIDTH] IN BLOOD BY AUTOMATED COUNT: 14 % (ref 11.6–14.5)
ERYTHROCYTE [SEDIMENTATION RATE] IN BLOOD: 21 MM/HR (ref 0–30)
GLOBULIN SER CALC-MCNC: 3.4 G/DL (ref 2–4)
GLUCOSE SERPL-MCNC: 103 MG/DL (ref 74–99)
GLUCOSE UR STRIP.AUTO-MCNC: NEGATIVE MG/DL
HCT VFR BLD AUTO: 38.9 % (ref 35–45)
HGB BLD-MCNC: 11.6 G/DL (ref 12–16)
HGB UR QL STRIP: NEGATIVE
KETONES UR QL STRIP.AUTO: NEGATIVE MG/DL
LEUKOCYTE ESTERASE UR QL STRIP.AUTO: NEGATIVE
LYMPHOCYTES # BLD: 3 K/UL (ref 0.9–3.6)
LYMPHOCYTES NFR BLD: 33 % (ref 21–52)
MCH RBC QN AUTO: 25.8 PG (ref 24–34)
MCHC RBC AUTO-ENTMCNC: 29.8 G/DL (ref 31–37)
MCV RBC AUTO: 86.4 FL (ref 74–97)
MONOCYTES # BLD: 0.6 K/UL (ref 0.05–1.2)
MONOCYTES NFR BLD: 7 % (ref 3–10)
NEUTS SEG # BLD: 5.1 K/UL (ref 1.8–8)
NEUTS SEG NFR BLD: 55 % (ref 40–73)
NITRITE UR QL STRIP.AUTO: NEGATIVE
PH UR STRIP: 7 [PH] (ref 5–8)
PLATELET # BLD AUTO: 163 K/UL (ref 135–420)
PMV BLD AUTO: 12.1 FL (ref 9.2–11.8)
POTASSIUM SERPL-SCNC: 4.8 MMOL/L (ref 3.5–5.5)
PROT SERPL-MCNC: 6.9 G/DL (ref 6.4–8.2)
PROT UR STRIP-MCNC: NEGATIVE MG/DL
RBC # BLD AUTO: 4.5 M/UL (ref 4.2–5.3)
SODIUM SERPL-SCNC: 142 MMOL/L (ref 136–145)
SP GR UR REFRACTOMETRY: 1.01 (ref 1–1.03)
UROBILINOGEN UR QL STRIP.AUTO: 1 EU/DL (ref 0.2–1)
WBC # BLD AUTO: 9.3 K/UL (ref 4.6–13.2)

## 2019-03-26 PROCEDURE — 80053 COMPREHEN METABOLIC PANEL: CPT

## 2019-03-26 PROCEDURE — 85025 COMPLETE CBC W/AUTO DIFF WBC: CPT

## 2019-03-26 PROCEDURE — 85652 RBC SED RATE AUTOMATED: CPT

## 2019-03-26 PROCEDURE — 84550 ASSAY OF BLOOD/URIC ACID: CPT

## 2019-03-26 PROCEDURE — 36415 COLL VENOUS BLD VENIPUNCTURE: CPT

## 2019-03-26 PROCEDURE — 81003 URINALYSIS AUTO W/O SCOPE: CPT

## 2019-03-26 RX ORDER — FUROSEMIDE 40 MG/1
40 TABLET ORAL DAILY
Qty: 30 TAB | Refills: 3 | Status: SHIPPED | OUTPATIENT
Start: 2019-03-26 | End: 2019-05-13 | Stop reason: SDUPTHER

## 2019-03-26 RX ORDER — ALLOPURINOL 300 MG/1
300 TABLET ORAL DAILY
Qty: 90 TAB | Refills: 4 | Status: SHIPPED | OUTPATIENT
Start: 2019-03-26 | End: 2019-05-13 | Stop reason: SDUPTHER

## 2019-03-26 RX ORDER — ALBUTEROL SULFATE 90 UG/1
2 AEROSOL, METERED RESPIRATORY (INHALATION)
Qty: 1 INHALER | Refills: 0 | Status: SHIPPED | OUTPATIENT
Start: 2019-03-26 | End: 2019-05-13 | Stop reason: SDUPTHER

## 2019-03-26 RX ORDER — LISINOPRIL 20 MG/1
20 TABLET ORAL DAILY
Qty: 90 TAB | Refills: 4 | Status: SHIPPED | OUTPATIENT
Start: 2019-03-26 | End: 2019-05-13 | Stop reason: SDUPTHER

## 2019-03-26 RX ORDER — FLUOCINONIDE 0.5 MG/G
OINTMENT TOPICAL 2 TIMES DAILY
Qty: 60 G | Refills: 4 | Status: SHIPPED | OUTPATIENT
Start: 2019-03-26 | End: 2019-05-13 | Stop reason: SDUPTHER

## 2019-03-26 RX ORDER — HYDROCHLOROTHIAZIDE 25 MG/1
25 TABLET ORAL DAILY
Qty: 90 TAB | Refills: 4 | Status: SHIPPED | OUTPATIENT
Start: 2019-03-26 | End: 2019-05-13 | Stop reason: SDUPTHER

## 2019-03-26 RX ORDER — OMEPRAZOLE 20 MG/1
20 CAPSULE, DELAYED RELEASE ORAL DAILY
Qty: 90 CAP | Refills: 4 | Status: SHIPPED | OUTPATIENT
Start: 2019-03-26 | End: 2019-05-13 | Stop reason: SDUPTHER

## 2019-03-26 NOTE — PROGRESS NOTES
Room #      SUBJECTIVE:    Darnell Crowell is a 82 y.o. female who presents today for c/o pain toback of ankle    1. Have you been to the ER, urgent care clinic since your last visit? Hospitalized since your last visit? NO    2. Have you seen or consulted any other health care providers outside of the [address] [place] [address] since your last visit? Include any pap smears or colon screening. NO  When :  Reason:    Health Maintenance reviewed Yes    Health Maintenance Due   Topic Date Due    Shingrix Vaccine Age 49> (1 of 2) 01/18/1987    DTaP/Tdap/Td series (1 - Tdap) 08/22/2013    Influenza Age 5 to Adult  08/01/2018    GLAUCOMA SCREENING Q2Y  11/03/2018

## 2019-03-26 NOTE — PROGRESS NOTES
Chelsie Pierce is a 82 y.o.  female and presents with    Chief Complaint   Patient presents with    Hypertension    Gout    Pain (Chronic)           Subjective:    Cardiovascular Review:  The patient has hypertension. Diet and Lifestyle: not attempting to follow a low fat, low cholesterol diet  Home BP Monitoring: is not measured at home. Pertinent ROS: taking medications as instructed, no medication side effects noted, no TIA's, no chest pain on exertion, no dyspnea on exertion, no swelling of ankles. Osteoarthritis and Chronic Pain:  Patient has osteoarthritis and gout, primarily affecting the diffuse. Symptoms onset: problem is longstanding. Rheumatological ROS: no current joint or muscle symptoms, essentially pain-free. Response to treatment plan: stable. Additional Concerns:          Patient Active Problem List    Diagnosis Date Noted    Blood clot in vein 10/03/2018    Chronic gout of multiple sites 10/03/2018    Severe obesity (BMI 35.0-39. 9) with comorbidity ([facility] [address] 05/16/2018    Chronic pain syndrome 10/16/2017    Subclinical hyperthyroidism 05/30/2017    Arthritis 09/20/2010    Essential hypertension, malignant 03/22/2010    Pure hypercholesterolemia 03/22/2010    Edema 03/22/2010    Chronic eczema 03/22/2010     Current Outpatient Medications   Medication Sig Dispense Refill    lisinopril (PRINIVIL, ZESTRIL) 20 mg tablet Take 1 Tab by mouth daily. 90 Tab 4    hydroCHLOROthiazide (HYDRODIURIL) 25 mg tablet Take 1 Tab by mouth daily. 90 Tab 4    allopurinol (ZYLOPRIM) 300 mg tablet Take 1 Tab by mouth daily. 90 Tab 4    albuterol (PROVENTIL HFA, VENTOLIN HFA, PROAIR HFA) 90 mcg/actuation inhaler Take 2 Puffs by inhalation every six (6) hours as needed for Wheezing. 1 Inhaler 0    furosemide (LASIX) 40 mg tablet Take 1 Tab by mouth daily. Only if needed for edema 30 Tab 3    fluocinoNIDE (LIDEX) 0.05 % ointment Apply  to affected area two (2) times a day.  60 g 4    omeprazole (PRILOSEC) 20 mg capsule Take 1 Cap by mouth daily. 90 Cap 4    HYDROcodone-acetaminophen (NORCO) 5-325 mg per tablet Take 1 Tab by mouth every eight (8) hours as needed for Pain (no more than 2 per day). 61 Tab 0     No Known Allergies  Past Medical History:   Diagnosis Date    Arthritis 9/20/2010    Chronic eczema 3/22/2010    Clot 10/2018    lower left exremity, shin     Edema 3/22/2010    Essential hypertension, malignant 3/22/2010    Menopause     Pure hypercholesterolemia 3/22/2010    Subclinical hyperthyroidism 5/30/2017    Unspecified arthropathy, shoulder region 3/22/2010    Unspecified hypothyroidism 3/22/2010     Past Surgical History:   Procedure Laterality Date    HX GYN  1983    hysterectomy    HX HYSTERECTOMY  1984    fibroids    HX KNEE REPLACEMENT Right      Family History   Problem Relation Age of Onset    Hypertension Mother     Breast Cancer Mother 70    Breast Cancer Child 36    Breast Cancer Sister 39     Social History     Tobacco Use    Smoking status: Never Smoker    Smokeless tobacco: Never Used   Substance Use Topics    Alcohol use: Yes     Alcohol/week: 1.5 oz     Types: 3 Standard drinks or equivalent per week       ROS       All other systems reviewed and are negative.       Objective:  Vitals:    03/26/19 1429   BP: 125/72   Pulse: 97   Resp: 20   Temp: 97.3 °F (36.3 °C)   TempSrc: Oral   SpO2: 97%   Weight: 221 lb 12.8 oz (100.6 kg)   Height: 5' 8\" (1.727 m)   PainSc:   7   PainLoc: Ankle                 alert, well appearing, and in no distress and oriented to person, place, and time  Chest - clear to auscultation, no wheezes, rales or rhonchi, symmetric air entry  Heart - normal rate, regular rhythm, normal S1, S2, no murmurs, rubs, clicks or gallops  Abdomen - soft, nontender, nondistended, no masses or organomegaly        LABS     TESTS      Assessment/Plan:    Hypertension - stable  Gout stable  Edema worse today will check labs f/u if abnormal  meds refilled      Lab review:     Diagnoses and all orders for this visit:    1. Chronic gout of multiple sites, unspecified cause  -     allopurinol (ZYLOPRIM) 300 mg tablet; Take 1 Tab by mouth daily.  -     CBC WITH AUTOMATED DIFF; Future  -     METABOLIC PANEL, COMPREHENSIVE; Future  -     URIC ACID; Future  -     URINALYSIS W/ RFLX MICROSCOPIC; Future  -     SED RATE (ESR); Future    2. Arthritis  -     albuterol (PROVENTIL HFA, VENTOLIN HFA, PROAIR HFA) 90 mcg/actuation inhaler; Take 2 Puffs by inhalation every six (6) hours as needed for Wheezing.  -     furosemide (LASIX) 40 mg tablet; Take 1 Tab by mouth daily. Only if needed for edema  -     fluocinoNIDE (LIDEX) 0.05 % ointment; Apply  to affected area two (2) times a day. -     omeprazole (PRILOSEC) 20 mg capsule; Take 1 Cap by mouth daily.  -     CBC WITH AUTOMATED DIFF; Future  -     METABOLIC PANEL, COMPREHENSIVE; Future  -     URIC ACID; Future  -     URINALYSIS W/ RFLX MICROSCOPIC; Future  -     SED RATE (ESR); Future    3. Localized edema  -     albuterol (PROVENTIL HFA, VENTOLIN HFA, PROAIR HFA) 90 mcg/actuation inhaler; Take 2 Puffs by inhalation every six (6) hours as needed for Wheezing.  -     furosemide (LASIX) 40 mg tablet; Take 1 Tab by mouth daily. Only if needed for edema  -     fluocinoNIDE (LIDEX) 0.05 % ointment; Apply  to affected area two (2) times a day. -     omeprazole (PRILOSEC) 20 mg capsule; Take 1 Cap by mouth daily.  -     CBC WITH AUTOMATED DIFF; Future  -     METABOLIC PANEL, COMPREHENSIVE; Future  -     URIC ACID; Future  -     URINALYSIS W/ RFLX MICROSCOPIC; Future  -     SED RATE (ESR); Future    4. Chronic eczema  -     albuterol (PROVENTIL HFA, VENTOLIN HFA, PROAIR HFA) 90 mcg/actuation inhaler; Take 2 Puffs by inhalation every six (6) hours as needed for Wheezing.  -     furosemide (LASIX) 40 mg tablet; Take 1 Tab by mouth daily. Only if needed for edema  -     fluocinoNIDE (LIDEX) 0.05 % ointment;  Apply to affected area two (2) times a day. -     omeprazole (PRILOSEC) 20 mg capsule; Take 1 Cap by mouth daily.  -     CBC WITH AUTOMATED DIFF; Future  -     METABOLIC PANEL, COMPREHENSIVE; Future  -     URIC ACID; Future  -     URINALYSIS W/ RFLX MICROSCOPIC; Future  -     SED RATE (ESR); Future    5. Pure hypercholesterolemia  -     albuterol (PROVENTIL HFA, VENTOLIN HFA, PROAIR HFA) 90 mcg/actuation inhaler; Take 2 Puffs by inhalation every six (6) hours as needed for Wheezing.  -     furosemide (LASIX) 40 mg tablet; Take 1 Tab by mouth daily. Only if needed for edema  -     fluocinoNIDE (LIDEX) 0.05 % ointment; Apply  to affected area two (2) times a day. -     omeprazole (PRILOSEC) 20 mg capsule; Take 1 Cap by mouth daily.  -     CBC WITH AUTOMATED DIFF; Future  -     METABOLIC PANEL, COMPREHENSIVE; Future  -     URIC ACID; Future  -     URINALYSIS W/ RFLX MICROSCOPIC; Future  -     SED RATE (ESR); Future    6. Essential hypertension, malignant  -     albuterol (PROVENTIL HFA, VENTOLIN HFA, PROAIR HFA) 90 mcg/actuation inhaler; Take 2 Puffs by inhalation every six (6) hours as needed for Wheezing.  -     furosemide (LASIX) 40 mg tablet; Take 1 Tab by mouth daily. Only if needed for edema  -     fluocinoNIDE (LIDEX) 0.05 % ointment; Apply  to affected area two (2) times a day. -     omeprazole (PRILOSEC) 20 mg capsule; Take 1 Cap by mouth daily.  -     CBC WITH AUTOMATED DIFF; Future  -     METABOLIC PANEL, COMPREHENSIVE; Future  -     URIC ACID; Future  -     URINALYSIS W/ RFLX MICROSCOPIC; Future  -     SED RATE (ESR); Future    7. Chronic pain syndrome  -     albuterol (PROVENTIL HFA, VENTOLIN HFA, PROAIR HFA) 90 mcg/actuation inhaler; Take 2 Puffs by inhalation every six (6) hours as needed for Wheezing.  -     furosemide (LASIX) 40 mg tablet; Take 1 Tab by mouth daily. Only if needed for edema  -     fluocinoNIDE (LIDEX) 0.05 % ointment; Apply  to affected area two (2) times a day.   -     CBC WITH AUTOMATED DIFF; Future  -     METABOLIC PANEL, COMPREHENSIVE; Future  -     URIC ACID; Future  -     URINALYSIS W/ RFLX MICROSCOPIC; Future  -     SED RATE (ESR); Future    8. Routine general medical examination at a health care facility  -     albuterol (PROVENTIL HFA, VENTOLIN HFA, PROAIR HFA) 90 mcg/actuation inhaler; Take 2 Puffs by inhalation every six (6) hours as needed for Wheezing.  -     furosemide (LASIX) 40 mg tablet; Take 1 Tab by mouth daily. Only if needed for edema  -     fluocinoNIDE (LIDEX) 0.05 % ointment; Apply  to affected area two (2) times a day. -     omeprazole (PRILOSEC) 20 mg capsule; Take 1 Cap by mouth daily.  -     CBC WITH AUTOMATED DIFF; Future  -     METABOLIC PANEL, COMPREHENSIVE; Future  -     URIC ACID; Future  -     URINALYSIS W/ RFLX MICROSCOPIC; Future  -     SED RATE (ESR); Future    Other orders  -     lisinopril (PRINIVIL, ZESTRIL) 20 mg tablet; Take 1 Tab by mouth daily. -     hydroCHLOROthiazide (HYDRODIURIL) 25 mg tablet; Take 1 Tab by mouth daily. I have discussed the diagnosis with the patient and the intended plan as seen in the above orders. The patient has received an after-visit summary and questions were answered concerning future plans. I have discussed medication side effects and warnings with the patient as well. I have reviewed the plan of care with the patient, accepted their input and they are in agreement with the treatment goals. Follow-up and Dispositions    · Return in about 3 months (around 6/26/2019) for EOV.

## 2019-03-27 LAB — URATE SERPL-MCNC: 7.1 MG/DL (ref 2.6–7.2)

## 2019-05-13 ENCOUNTER — OFFICE VISIT (OUTPATIENT)
Dept: FAMILY MEDICINE CLINIC | Age: 84
End: 2019-05-13

## 2019-05-13 VITALS
HEART RATE: 62 BPM | OXYGEN SATURATION: 100 % | DIASTOLIC BLOOD PRESSURE: 75 MMHG | WEIGHT: 222.6 LBS | BODY MASS INDEX: 33.74 KG/M2 | SYSTOLIC BLOOD PRESSURE: 128 MMHG | HEIGHT: 68 IN | TEMPERATURE: 97.6 F | RESPIRATION RATE: 16 BRPM

## 2019-05-13 DIAGNOSIS — M19.90 ARTHRITIS: ICD-10-CM

## 2019-05-13 DIAGNOSIS — L30.9 CHRONIC ECZEMA: ICD-10-CM

## 2019-05-13 DIAGNOSIS — Z00.00 ROUTINE GENERAL MEDICAL EXAMINATION AT A HEALTH CARE FACILITY: ICD-10-CM

## 2019-05-13 DIAGNOSIS — I10 ESSENTIAL HYPERTENSION, MALIGNANT: Primary | ICD-10-CM

## 2019-05-13 DIAGNOSIS — E78.00 PURE HYPERCHOLESTEROLEMIA: ICD-10-CM

## 2019-05-13 DIAGNOSIS — R60.0 LOCALIZED EDEMA: ICD-10-CM

## 2019-05-13 DIAGNOSIS — G89.4 CHRONIC PAIN SYNDROME: ICD-10-CM

## 2019-05-13 DIAGNOSIS — M1A.09X0 CHRONIC GOUT OF MULTIPLE SITES, UNSPECIFIED CAUSE: ICD-10-CM

## 2019-05-13 PROBLEM — I82.90 BLOOD CLOT IN VEIN: Status: RESOLVED | Noted: 2018-10-03 | Resolved: 2019-05-13

## 2019-05-13 RX ORDER — OMEPRAZOLE 20 MG/1
20 CAPSULE, DELAYED RELEASE ORAL DAILY
Qty: 90 CAP | Refills: 4 | Status: SHIPPED | OUTPATIENT
Start: 2019-05-13 | End: 2019-10-01 | Stop reason: ALTCHOICE

## 2019-05-13 RX ORDER — FUROSEMIDE 40 MG/1
40 TABLET ORAL DAILY
Qty: 30 TAB | Refills: 3 | Status: SHIPPED | OUTPATIENT
Start: 2019-05-13 | End: 2020-12-08 | Stop reason: SDUPTHER

## 2019-05-13 RX ORDER — ALLOPURINOL 300 MG/1
300 TABLET ORAL DAILY
Qty: 90 TAB | Refills: 4 | Status: SHIPPED | OUTPATIENT
Start: 2019-05-13 | End: 2020-12-08 | Stop reason: SDUPTHER

## 2019-05-13 RX ORDER — LISINOPRIL 20 MG/1
20 TABLET ORAL DAILY
Qty: 90 TAB | Refills: 4 | Status: SHIPPED | OUTPATIENT
Start: 2019-05-13 | End: 2020-06-15 | Stop reason: ALTCHOICE

## 2019-05-13 RX ORDER — HYDROCODONE BITARTRATE AND ACETAMINOPHEN 5; 325 MG/1; MG/1
1 TABLET ORAL
Qty: 60 TAB | Refills: 0 | Status: SHIPPED | OUTPATIENT
Start: 2019-05-13 | End: 2019-06-12

## 2019-05-13 RX ORDER — HYDROCHLOROTHIAZIDE 25 MG/1
25 TABLET ORAL DAILY
Qty: 90 TAB | Refills: 4 | Status: SHIPPED | OUTPATIENT
Start: 2019-05-13 | End: 2020-06-15 | Stop reason: SDUPTHER

## 2019-05-13 RX ORDER — ALBUTEROL SULFATE 90 UG/1
2 AEROSOL, METERED RESPIRATORY (INHALATION)
Qty: 1 INHALER | Refills: 0 | Status: SHIPPED | OUTPATIENT
Start: 2019-05-13 | End: 2019-10-01 | Stop reason: SDUPTHER

## 2019-05-13 RX ORDER — FLUOCINONIDE 0.5 MG/G
OINTMENT TOPICAL 2 TIMES DAILY
Qty: 60 G | Refills: 4 | Status: SHIPPED | OUTPATIENT
Start: 2019-05-13 | End: 2020-01-29 | Stop reason: SDUPTHER

## 2019-05-13 NOTE — PROGRESS NOTES
THE SUBSEQUENT MEDICARE ANNUAL WELLNESS VISIT PROGRESS NOTES    This is a Subsequent Medicare Annual Wellness Visit providing Personalized Prevention Plan Services (PPPS) (Performed 12 months after initial AWV and PPPS )    I have reviewed the patient's medical history in detail and updated the computerized patient record. Lorelei Worley is a 82 y.o.  female and presents for an subsequent annual wellness exam     Patient Active Problem List    Diagnosis Date Noted    Chronic gout of multiple sites 10/03/2018    Severe obesity (BMI 35.0-39. 9) with comorbidity ([address] 05/16/2018    Chronic pain syndrome 10/16/2017    Subclinical hyperthyroidism 05/30/2017    Arthritis 09/20/2010    Essential hypertension, malignant 03/22/2010    Pure hypercholesterolemia 03/22/2010    Edema 03/22/2010    Chronic eczema 03/22/2010     Current Outpatient Medications   Medication Sig Dispense Refill    omeprazole (PRILOSEC) 20 mg capsule Take 1 Cap by mouth daily. 90 Cap 4    HYDROcodone-acetaminophen (NORCO) 5-325 mg per tablet Take 1 Tab by mouth every eight (8) hours as needed for Pain (no more than 2 per day) for up to 30 days. 60 Tab 0    fluocinoNIDE (LIDEX) 0.05 % ointment Apply  to affected area two (2) times a day. 60 g 4    furosemide (LASIX) 40 mg tablet Take 1 Tab by mouth daily. Only if needed for edema 30 Tab 3    albuterol (PROVENTIL HFA, VENTOLIN HFA, PROAIR HFA) 90 mcg/actuation inhaler Take 2 Puffs by inhalation every six (6) hours as needed for Wheezing. 1 Inhaler 0    allopurinol (ZYLOPRIM) 300 mg tablet Take 1 Tab by mouth daily. 90 Tab 4    hydroCHLOROthiazide (HYDRODIURIL) 25 mg tablet Take 1 Tab by mouth daily. 90 Tab 4    lisinopril (PRINIVIL, ZESTRIL) 20 mg tablet Take 1 Tab by mouth daily.  90 Tab 4     No Known Allergies  Past Medical History:   Diagnosis Date    Arthritis 9/20/2010    Chronic eczema 3/22/2010    Clot 10/2018    lower left exremity, shin     Edema 3/22/2010    Essential hypertension, malignant 3/22/2010    Menopause     Pure hypercholesterolemia 3/22/2010    Subclinical hyperthyroidism 5/30/2017    Unspecified arthropathy, shoulder region 3/22/2010    Unspecified hypothyroidism 3/22/2010     Past Surgical History:   Procedure Laterality Date    HX GYN  1983    hysterectomy    HX HYSTERECTOMY  1984    fibroids    HX KNEE REPLACEMENT Right      Family History   Problem Relation Age of Onset    Hypertension Mother     Breast Cancer Mother 70    Breast Cancer Child 36    Breast Cancer Sister 39     Social History     Tobacco Use    Smoking status: Never Smoker    Smokeless tobacco: Never Used   Substance Use Topics    Alcohol use: Yes     Alcohol/week: 1.5 oz     Types: 3 Standard drinks or equivalent per week         ROS       All other systems reviewed and are negative. History     Past Medical History:   Diagnosis Date    Arthritis 9/20/2010    Chronic eczema 3/22/2010    Clot 10/2018    lower left exremity, shin     Edema 3/22/2010    Essential hypertension, malignant 3/22/2010    Menopause     Pure hypercholesterolemia 3/22/2010    Subclinical hyperthyroidism 5/30/2017    Unspecified arthropathy, shoulder region 3/22/2010    Unspecified hypothyroidism 3/22/2010      Past Surgical History:   Procedure Laterality Date    HX GYN  1983    hysterectomy    HX HYSTERECTOMY  1984    fibroids    HX KNEE REPLACEMENT Right      Current Outpatient Medications   Medication Sig Dispense Refill    omeprazole (PRILOSEC) 20 mg capsule Take 1 Cap by mouth daily. 90 Cap 4    HYDROcodone-acetaminophen (NORCO) 5-325 mg per tablet Take 1 Tab by mouth every eight (8) hours as needed for Pain (no more than 2 per day) for up to 30 days. 60 Tab 0    fluocinoNIDE (LIDEX) 0.05 % ointment Apply  to affected area two (2) times a day. 60 g 4    furosemide (LASIX) 40 mg tablet Take 1 Tab by mouth daily.  Only if needed for edema 30 Tab 3    albuterol (PROVENTIL HFA, VENTOLIN HFA, PROAIR HFA) 90 mcg/actuation inhaler Take 2 Puffs by inhalation every six (6) hours as needed for Wheezing. 1 Inhaler 0    allopurinol (ZYLOPRIM) 300 mg tablet Take 1 Tab by mouth daily. 90 Tab 4    hydroCHLOROthiazide (HYDRODIURIL) 25 mg tablet Take 1 Tab by mouth daily. 90 Tab 4    lisinopril (PRINIVIL, ZESTRIL) 20 mg tablet Take 1 Tab by mouth daily. 80 Tab 4     No Known Allergies  Family History   Problem Relation Age of Onset    Hypertension Mother     Breast Cancer Mother 70    Breast Cancer Child 36    Breast Cancer Sister 39     Social History     Tobacco Use    Smoking status: Never Smoker    Smokeless tobacco: Never Used   Substance Use Topics    Alcohol use: Yes     Alcohol/week: 1.5 oz     Types: 3 Standard drinks or equivalent per week     Patient Active Problem List   Diagnosis Code    Essential hypertension, malignant I10    Pure hypercholesterolemia E78.00    Edema R60.9    Chronic eczema L30.9    Arthritis M19.90    Subclinical hyperthyroidism E05.90    Chronic pain syndrome G89.4    Severe obesity (BMI 35.0-39. 9) with comorbidity ([facility] [address] E66.01    Chronic gout of multiple sites M1A. 72H6       Health Maintenance History  Immunizations reviewed, dtap utd  , pneumovax utd , flu utd , zoster utd   Colonoscopy:utd , AAA screening  Na  (male over 72 smoker)  Chest CT : na ,  Eye exam: utd   Mammo utd nl   Dexascan utd nl     Health Care Directive or Living Will: yes    Depression Risk Factor Screening:      Patient Health Questionnaire (PHQ-2)   Over the last 2 weeks, how often have you been bothered by any of the following problems? · Little interest or pleasure in doing things? · Not at all. [0]  · Feeling down, depressed, or hopeless? · Not at all. [0]    Total Score: 0/6  PHQ-2 Assessment Scoring:   A score of 2 or more requires further screening with the PHQ-9    Alcohol Risk Factor Screening:     Women:  On any occasion during the past 3 months, have you had more than 3 drinks containing alcohol? Do you average more than 7 drinks per week? Men: On any occasion during the past 3 months, have you had more than 4 drinks containing alcohol? Do you average more than 14 drinks per week? Functional Ability and Level of Safety:     Hearing Loss    Hearing is good. Activities of Daily Living   Self-care. Requires assistance with: no ADLs    Fall Risk   No fall risk factors    Abuse Screen   None      Examination   Physical Examination  Vitals:    05/13/19 0818   BP: 128/75   Pulse: 62   Resp: 16   Temp: 97.6 °F (36.4 °C)   TempSrc: Oral   SpO2: 100%   Weight: 222 lb 9.6 oz (101 kg)   Height: 5' 8\" (1.727 m)   PainSc:   7   PainLoc: Ankle     Body mass index is 33.85 kg/m². Evaluation of Cognitive Function:  Mood/affect:appropriate   Appearance:well groomed   Family member/caregiver input: daughter in in room    alert, well appearing, and in no distress and oriented to person, place, and time    Patient Care Team:  Esau Dickerson DO as PCP - Jane Rodrigez MD (Orthopedic Surgery)  Zhanna Posada MD (Orthopedic Surgery)  Jock Councilman, MD (Gastroenterology)  Aye Horton MD (Surgery)    Advice/Referrals/Counseling/Plan:   Education and counseling provided:  Are appropriate based on today's review and evaluation  Include in education list (weight loss, physical activity, smoking cessation, fall prevention, and nutrition)  current treatment plan is effective, no change in therapy. I have discussed the diagnosis with the patient and the intended plan as seen in the above orders. The patient has received an after-visit summary and questions were answered concerning future plans. I have discussed medication side effects and warnings with the patient as well. I have reviewed the plan of care with the patient, accepted their input and they are in agreement with the treatment goals.          Follow-up and Dispositions    · Return in about 3 months (around 8/13/2019) for EOV.         _____________________________________________________________    Problem Assessment    for treatment of   Chief Complaint   Patient presents with    Thyroid Problem    Cholesterol Problem    Hypertension    Pain (Chronic)    Gout    Rash    Arthritis         SUBJECTIVE      Cardiovascular Review:  The patient has hypertension and hyperlipidemia. Diet and Lifestyle: not attempting to follow a low fat, low cholesterol diet  Home BP Monitoring: is not measured at home. Pertinent ROS: taking medications as instructed, no medication side effects noted, no TIA's, no chest pain on exertion, no dyspnea on exertion, no swelling of ankles. Thyroid Review:  Patient is seen for followup of hypothyroidism. Thyroid ROS: denies fatigue, weight changes, heat/cold intolerance, bowel/skin changes or CVS symptoms. Osteoarthritis and Chronic Pain:  Patient has osteoarthritis, primarily affecting the diffuse. Symptoms onset: problem is longstanding. Rheumatological ROS: no current joint or muscle symptoms, essentially pain-free. Response to treatment plan: stable. Gout stable  Eczema stable      Additional Concerns:        Visit Vitals  /75 (BP 1 Location: Right arm, BP Patient Position: Sitting)   Pulse 62   Temp 97.6 °F (36.4 °C) (Oral)   Resp 16   Ht 5' 8\" (1.727 m)   Wt 222 lb 9.6 oz (101 kg)   SpO2 100%   BMI 33.85 kg/m²     General:  Alert, cooperative, no distress, appears stated age. Head:  Normocephalic, without obvious abnormality, atraumatic. Eyes:  Conjunctivae/corneas clear. PERRL, EOMs intact. Fundi benign. Ears:  Normal TMs and external ear canals both ears. Nose: Nares normal. Septum midline. Mucosa normal. No drainage or sinus tenderness.    Throat: Lips, mucosa, and tongue normal. Teeth and gums normal.   Neck: Supple, symmetrical, trachea midline, no adenopathy, thyroid: no enlargement/tenderness/nodules, no carotid bruit and no JVD.   Back:   Symmetric, no curvature. ROM normal. No CVA tenderness. Lungs:   Clear to auscultation bilaterally. Chest wall:  No tenderness or deformity. Heart:  Regular rate and rhythm, S1, S2 normal, no murmur, click, rub or gallop. Breast Exam:  No tenderness, masses, or nipple abnormality. Abdomen:   Soft, non-tender. Bowel sounds normal. No masses,  No organomegaly. Genitalia:  Normal female without lesion, discharge or tenderness. Rectal:  Normal tone,  no masses or tenderness  Guaiac negative stool. Extremities: Extremities normal, atraumatic, no cyanosis or edema. Pulses: 2+ and symmetric all extremities. Skin: Skin color, texture, turgor normal. No rashes or lesions. Lymph nodes: Cervical, supraclavicular, and axillary nodes normal.   Neurologic: CNII-XII intact. Normal strength, sensation and reflexes throughout. LABS   Component      Latest Ref Rng & Units 3/26/2019 3/26/2019 3/26/2019 3/26/2019           2:50 PM  2:50 PM  2:50 PM  2:50 PM   WBC      4.6 - 13.2 K/uL       RBC      4.20 - 5.30 M/uL       HGB      12.0 - 16.0 g/dL       HCT      35.0 - 45.0 %       MCV      74.0 - 97.0 FL       MCH      24.0 - 34.0 PG       MCHC      31.0 - 37.0 g/dL       RDW      11.6 - 14.5 %       PLATELET      896 - 185 K/uL       MPV      9.2 - 11.8 FL       NEUTROPHILS      40 - 73 %       LYMPHOCYTES      21 - 52 %       MONOCYTES      3 - 10 %       EOSINOPHILS      0 - 5 %       BASOPHILS      0 - 2 %       ABS. NEUTROPHILS      1.8 - 8.0 K/UL       ABS. LYMPHOCYTES      0.9 - 3.6 K/UL       ABS. MONOCYTES      0.05 - 1.2 K/UL       ABS. EOSINOPHILS      0.0 - 0.4 K/UL       ABS.  BASOPHILS      0.0 - 0.1 K/UL       DF             Sodium      136 - 145 mmol/L  142     Potassium      3.5 - 5.5 mmol/L  4.8     Chloride      100 - 108 mmol/L  104     CO2      21 - 32 mmol/L  33 (H)     Anion gap      3.0 - 18 mmol/L  5     Glucose      74 - 99 mg/dL  103 (H)     BUN      7.0 - 18 MG/DL  27 (H)     Creatinine      0.6 - 1.3 MG/DL  1.71 (H)     BUN/Creatinine ratio      12 - 20    16     GFR est AA      >60 ml/min/1.73m2  35 (L)     GFR est non-AA      >60 ml/min/1.73m2  29 (L)     Calcium      8.5 - 10.1 MG/DL  9.1     Bilirubin, total      0.2 - 1.0 MG/DL  0.3     ALT (SGPT)      13 - 56 U/L  52     AST      15 - 37 U/L  45 (H)     Alk. phosphatase      45 - 117 U/L  128 (H)     Protein, total      6.4 - 8.2 g/dL  6.9     Albumin      3.4 - 5.0 g/dL  3.5     Globulin      2.0 - 4.0 g/dL  3.4     A-G Ratio      0.8 - 1.7    1.0     Color         YELLOW    Appearance         CLEAR    Specific gravity      1.005 - 1.030     1.015    pH (UA)      5.0 - 8.0     7.0    Protein      NEG mg/dL   NEGATIVE    Glucose      NEG mg/dL   NEGATIVE    Ketone      NEG mg/dL   NEGATIVE    Bilirubin      NEG     NEGATIVE    Blood      NEG     NEGATIVE    Urobilinogen      0.2 - 1.0 EU/dL   1.0    Nitrites      NEG     NEGATIVE    Leukocyte Esterase      NEG     NEGATIVE    Uric acid      2.6 - 7.2 MG/DL 7.1      Sed rate, automated      0 - 30 mm/hr    21     Component      Latest Ref Rng & Units 3/26/2019           2:50 PM   WBC      4.6 - 13.2 K/uL 9.3   RBC      4.20 - 5.30 M/uL 4.50   HGB      12.0 - 16.0 g/dL 11.6 (L)   HCT      35.0 - 45.0 % 38.9   MCV      74.0 - 97.0 FL 86.4   MCH      24.0 - 34.0 PG 25.8   MCHC      31.0 - 37.0 g/dL 29.8 (L)   RDW      11.6 - 14.5 % 14.0   PLATELET      658 - 824 K/uL 163   MPV      9.2 - 11.8 FL 12.1 (H)   NEUTROPHILS      40 - 73 % 55   LYMPHOCYTES      21 - 52 % 33   MONOCYTES      3 - 10 % 7   EOSINOPHILS      0 - 5 % 5   BASOPHILS      0 - 2 % 0   ABS. NEUTROPHILS      1.8 - 8.0 K/UL 5.1   ABS. LYMPHOCYTES      0.9 - 3.6 K/UL 3.0   ABS. MONOCYTES      0.05 - 1.2 K/UL 0.6   ABS. EOSINOPHILS      0.0 - 0.4 K/UL 0.5 (H)   ABS.  BASOPHILS      0.0 - 0.1 K/UL 0.0   DF       AUTOMATED   Sodium      136 - 145 mmol/L    Potassium      3.5 - 5.5 mmol/L    Chloride      100 - 108 mmol/L    CO2      21 - 32 mmol/L    Anion gap      3.0 - 18 mmol/L    Glucose      74 - 99 mg/dL    BUN      7.0 - 18 MG/DL    Creatinine      0.6 - 1.3 MG/DL    BUN/Creatinine ratio      12 - 20      GFR est AA      >60 ml/min/1.73m2    GFR est non-AA      >60 ml/min/1.73m2    Calcium      8.5 - 10.1 MG/DL    Bilirubin, total      0.2 - 1.0 MG/DL    ALT (SGPT)      13 - 56 U/L    AST      15 - 37 U/L    Alk. phosphatase      45 - 117 U/L    Protein, total      6.4 - 8.2 g/dL    Albumin      3.4 - 5.0 g/dL    Globulin      2.0 - 4.0 g/dL    A-G Ratio      0.8 - 1.7      Color          Appearance          Specific gravity      1.005 - 1.030      pH (UA)      5.0 - 8.0      Protein      NEG mg/dL    Glucose      NEG mg/dL    Ketone      NEG mg/dL    Bilirubin      NEG      Blood      NEG      Urobilinogen      0.2 - 1.0 EU/dL    Nitrites      NEG      Leukocyte Esterase      NEG      Uric acid      2.6 - 7.2 MG/DL    Sed rate, automated      0 - 30 mm/hr      TESTS  ekg  nsr      Assessment/Plan:      Hypertension - stable  Hyperlipidemia - stable  Thyroid stable  chronc pain  Eduard Garza RTC today to follow up on chronic pain diagnosis. We discussed her osteoarthritis that is affecting her back. Significant changes since last visit: none. She is  able to do her normal daily activities. She reports the following adverse side effects: none. Least pain over the last week has been 2/10. Worst pain over the last week has been 2/10. Opioid Risk Tool Reviewed: YES    Aberrant behaviors: None. Urine Drug Screen: due - order placed. Controlled substance agreement on file: YES.  reviewed:yes    Pill count is consistent with her prescription: yes    Concomitant use of a benzodiazepine: no             Also,  abstinence syndrome was reviewed and discussed with her today N/A      Diagnoses and all orders for this visit:    1.  Essential hypertension, malignant  -     AMB POC EKG ROUTINE W/ 12 LEADS, INTER & REP  -     omeprazole (PRILOSEC) 20 mg capsule; Take 1 Cap by mouth daily.  -     HYDROcodone-acetaminophen (NORCO) 5-325 mg per tablet; Take 1 Tab by mouth every eight (8) hours as needed for Pain (no more than 2 per day) for up to 30 days. -     fluocinoNIDE (LIDEX) 0.05 % ointment; Apply  to affected area two (2) times a day. -     furosemide (LASIX) 40 mg tablet; Take 1 Tab by mouth daily. Only if needed for edema  -     albuterol (PROVENTIL HFA, VENTOLIN HFA, PROAIR HFA) 90 mcg/actuation inhaler; Take 2 Puffs by inhalation every six (6) hours as needed for Wheezing. 2. Arthritis  -     omeprazole (PRILOSEC) 20 mg capsule; Take 1 Cap by mouth daily.  -     HYDROcodone-acetaminophen (NORCO) 5-325 mg per tablet; Take 1 Tab by mouth every eight (8) hours as needed for Pain (no more than 2 per day) for up to 30 days. -     fluocinoNIDE (LIDEX) 0.05 % ointment; Apply  to affected area two (2) times a day. -     furosemide (LASIX) 40 mg tablet; Take 1 Tab by mouth daily. Only if needed for edema  -     albuterol (PROVENTIL HFA, VENTOLIN HFA, PROAIR HFA) 90 mcg/actuation inhaler; Take 2 Puffs by inhalation every six (6) hours as needed for Wheezing. 3. Localized edema  -     omeprazole (PRILOSEC) 20 mg capsule; Take 1 Cap by mouth daily.  -     HYDROcodone-acetaminophen (NORCO) 5-325 mg per tablet; Take 1 Tab by mouth every eight (8) hours as needed for Pain (no more than 2 per day) for up to 30 days. -     fluocinoNIDE (LIDEX) 0.05 % ointment; Apply  to affected area two (2) times a day. -     furosemide (LASIX) 40 mg tablet; Take 1 Tab by mouth daily. Only if needed for edema  -     albuterol (PROVENTIL HFA, VENTOLIN HFA, PROAIR HFA) 90 mcg/actuation inhaler; Take 2 Puffs by inhalation every six (6) hours as needed for Wheezing. 4. Chronic eczema  -     omeprazole (PRILOSEC) 20 mg capsule;  Take 1 Cap by mouth daily.  -     HYDROcodone-acetaminophen (NORCO) 5-325 mg per tablet; Take 1 Tab by mouth every eight (8) hours as needed for Pain (no more than 2 per day) for up to 30 days. -     fluocinoNIDE (LIDEX) 0.05 % ointment; Apply  to affected area two (2) times a day. -     furosemide (LASIX) 40 mg tablet; Take 1 Tab by mouth daily. Only if needed for edema  -     albuterol (PROVENTIL HFA, VENTOLIN HFA, PROAIR HFA) 90 mcg/actuation inhaler; Take 2 Puffs by inhalation every six (6) hours as needed for Wheezing. 5. Pure hypercholesterolemia  -     omeprazole (PRILOSEC) 20 mg capsule; Take 1 Cap by mouth daily.  -     HYDROcodone-acetaminophen (NORCO) 5-325 mg per tablet; Take 1 Tab by mouth every eight (8) hours as needed for Pain (no more than 2 per day) for up to 30 days. -     fluocinoNIDE (LIDEX) 0.05 % ointment; Apply  to affected area two (2) times a day. -     furosemide (LASIX) 40 mg tablet; Take 1 Tab by mouth daily. Only if needed for edema  -     albuterol (PROVENTIL HFA, VENTOLIN HFA, PROAIR HFA) 90 mcg/actuation inhaler; Take 2 Puffs by inhalation every six (6) hours as needed for Wheezing. 6. Routine general medical examination at a health care facility  -     omeprazole (PRILOSEC) 20 mg capsule; Take 1 Cap by mouth daily.  -     HYDROcodone-acetaminophen (NORCO) 5-325 mg per tablet; Take 1 Tab by mouth every eight (8) hours as needed for Pain (no more than 2 per day) for up to 30 days. -     fluocinoNIDE (LIDEX) 0.05 % ointment; Apply  to affected area two (2) times a day. -     furosemide (LASIX) 40 mg tablet; Take 1 Tab by mouth daily. Only if needed for edema  -     albuterol (PROVENTIL HFA, VENTOLIN HFA, PROAIR HFA) 90 mcg/actuation inhaler; Take 2 Puffs by inhalation every six (6) hours as needed for Wheezing. 7. Chronic pain syndrome  -     HYDROcodone-acetaminophen (NORCO) 5-325 mg per tablet; Take 1 Tab by mouth every eight (8) hours as needed for Pain (no more than 2 per day) for up to 30 days. -     fluocinoNIDE (LIDEX) 0.05 % ointment;  Apply to affected area two (2) times a day. -     furosemide (LASIX) 40 mg tablet; Take 1 Tab by mouth daily. Only if needed for edema  -     albuterol (PROVENTIL HFA, VENTOLIN HFA, PROAIR HFA) 90 mcg/actuation inhaler; Take 2 Puffs by inhalation every six (6) hours as needed for Wheezing. 8. Chronic gout of multiple sites, unspecified cause  -     allopurinol (ZYLOPRIM) 300 mg tablet; Take 1 Tab by mouth daily. Other orders  -     hydroCHLOROthiazide (HYDRODIURIL) 25 mg tablet; Take 1 Tab by mouth daily. -     lisinopril (PRINIVIL, ZESTRIL) 20 mg tablet; Take 1 Tab by mouth daily.           Lab review: labs are reviewed, up to date and normal

## 2019-05-13 NOTE — PROGRESS NOTES
Eduard Garza is a 82 y.o. female presents today for her medicare wellness exam.  She would also like to discuss right ankle pain in the morning. Patient reports her pain a 7/10 in the morning. Pt is in Room # 5      Learning Assessment (baseline): Completed  Depression Screening: Completed  Fall Risk Screening: Completed  Abuse screening: Completed  ADL Assessment: Completed    1. Have you been to the ER, urgent care clinic since your last visit? Hospitalized since your last visit? No    2. Have you seen or consulted any other health care providers outside of the [address] [place] [address] since your last visit? Include any pap smears or colon screening. No     Health Maintenance reviewed - .

## 2019-07-11 ENCOUNTER — OFFICE VISIT (OUTPATIENT)
Dept: FAMILY MEDICINE CLINIC | Age: 84
End: 2019-07-11

## 2019-07-11 VITALS
TEMPERATURE: 98 F | HEART RATE: 64 BPM | SYSTOLIC BLOOD PRESSURE: 135 MMHG | BODY MASS INDEX: 33.92 KG/M2 | DIASTOLIC BLOOD PRESSURE: 62 MMHG | RESPIRATION RATE: 20 BRPM | OXYGEN SATURATION: 98 % | HEIGHT: 68 IN | WEIGHT: 223.8 LBS

## 2019-07-11 DIAGNOSIS — M54.12 CERVICAL RADICULOPATHY: ICD-10-CM

## 2019-07-11 DIAGNOSIS — Z12.39 SCREENING FOR BREAST CANCER: ICD-10-CM

## 2019-07-11 DIAGNOSIS — I10 ESSENTIAL HYPERTENSION: Primary | ICD-10-CM

## 2019-07-11 PROBLEM — G89.4 CHRONIC PAIN SYNDROME: Status: RESOLVED | Noted: 2017-10-16 | Resolved: 2019-07-11
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
## 2019-07-11 NOTE — PATIENT INSTRUCTIONS
Neck: Exercises  Your Care Instructions  Here are some examples of typical rehabilitation exercises for your condition. Start each exercise slowly. Ease off the exercise if you start to have pain. Your doctor or physical therapist will tell you when you can start these exercises and which ones will work best for you. How to do the exercises  Neck stretch    1. This stretch works best if you keep your shoulder down as you lean away from it. To help you remember to do this, start by relaxing your shoulders and lightly holding on to your thighs or your chair. 2. Tilt your head toward your shoulder and hold for 15 to 30 seconds. Let the weight of your head stretch your muscles. 3. If you would like a little added stretch, use your hand to gently and steadily pull your head toward your shoulder. For example, keeping your right shoulder down, lean your head to the left. 4. Repeat 2 to 4 times toward each shoulder. Diagonal neck stretch    1. Turn your head slightly toward the direction you will be stretching, and tilt your head diagonally toward your chest and hold for 15 to 30 seconds. 2. If you would like a little added stretch, use your hand to gently and steadily pull your head forward on the diagonal.  3. Repeat 2 to 4 times toward each side. Dorsal glide stretch    1. Sit or stand tall and look straight ahead. 2. Slowly tuck your chin as you glide your head backward over your body  3. Hold for a count of 6, and then relax for up to 10 seconds. 4. Repeat 8 to 12 times. Chest and shoulder stretch    1. Sit or stand tall and glide your head backward as in the dorsal glide stretch. 2. Raise both arms so that your hands are next to your ears. 3. Take a deep breath, and as you breathe out, lower your elbows down and behind your back. You will feel your shoulder blades slide down and together, and at the same time you will feel a stretch across your chest and the front of your shoulders.   4. Hold for about 6 seconds, and then relax for up to 10 seconds. 5. Repeat 8 to 12 times. Strengthening: Hands on head    1. Move your head backward, forward, and side to side against gentle pressure from your hands, holding each position for about 6 seconds. 2. Repeat 8 to 12 times. Follow-up care is a key part of your treatment and safety. Be sure to make and go to all appointments, and call your doctor if you are having problems. It's also a good idea to know your test results and keep a list of the medicines you take. Where can you learn more? Go to http://eddie-ayah.info/. Enter P975 in the search box to learn more about \"Neck: Exercises. \"  Current as of: September 20, 2018  Content Version: 11.9  © 1635-0871 [employer]. Care instructions adapted under license by [employer] (which disclaims liability or warranty for this information). If you have questions about a medical condition or this instruction, always ask your healthcare professional. [address] any warranty or liability for your use of this information.

## 2019-07-31 ENCOUNTER — HOSPITAL ENCOUNTER (OUTPATIENT)
Dept: MAMMOGRAPHY | Age: 84
Discharge: HOME | End: 2019-07-31
Attending: NURSE PRACTITIONER
Payer: MEDICARE

## 2019-07-31 DIAGNOSIS — Z12.31 VISIT FOR SCREENING MAMMOGRAM: ICD-10-CM

## 2019-07-31 PROCEDURE — 77063 BREAST TOMOSYNTHESIS BI: CPT

## 2019-08-13 ENCOUNTER — TELEPHONE (OUTPATIENT)
Dept: FAMILY MEDICINE CLINIC | Age: 84
End: 2019-08-13

## 2019-10-01 ENCOUNTER — OFFICE VISIT (OUTPATIENT)
Dept: FAMILY MEDICINE CLINIC | Age: 84
End: 2019-10-01

## 2019-10-01 ENCOUNTER — HOSPITAL ENCOUNTER (OUTPATIENT)
Dept: LAB | Age: 84
Discharge: HOME | End: 2019-10-01
Payer: MEDICARE

## 2019-10-01 VITALS
HEIGHT: 68 IN | DIASTOLIC BLOOD PRESSURE: 60 MMHG | BODY MASS INDEX: 34.56 KG/M2 | TEMPERATURE: 98 F | OXYGEN SATURATION: 100 % | HEART RATE: 74 BPM | SYSTOLIC BLOOD PRESSURE: 146 MMHG | WEIGHT: 228 LBS | RESPIRATION RATE: 20 BRPM

## 2019-10-01 DIAGNOSIS — R60.0 LOWER EXTREMITY EDEMA: ICD-10-CM

## 2019-10-01 DIAGNOSIS — I10 ESSENTIAL HYPERTENSION: Primary | ICD-10-CM

## 2019-10-01 DIAGNOSIS — E05.90 SUBCLINICAL HYPERTHYROIDISM: ICD-10-CM

## 2019-10-01 DIAGNOSIS — K21.9 GASTROESOPHAGEAL REFLUX DISEASE, ESOPHAGITIS PRESENCE NOT SPECIFIED: ICD-10-CM

## 2019-10-01 DIAGNOSIS — E78.00 PURE HYPERCHOLESTEROLEMIA: ICD-10-CM

## 2019-10-01 DIAGNOSIS — J45.909 REACTIVE AIRWAY DISEASE WITHOUT COMPLICATION, UNSPECIFIED ASTHMA SEVERITY, UNSPECIFIED WHETHER PERSISTENT: ICD-10-CM

## 2019-10-01 DIAGNOSIS — I10 ESSENTIAL HYPERTENSION: ICD-10-CM

## 2019-10-01 LAB
ALBUMIN SERPL-MCNC: 3.6 G/DL (ref 3.4–5)
ALBUMIN/GLOB SERPL: 1 {RATIO} (ref 0.8–1.7)
ALP SERPL-CCNC: 131 U/L (ref 45–117)
ALT SERPL-CCNC: 25 U/L (ref 13–56)
ANION GAP SERPL CALC-SCNC: 4 MMOL/L (ref 3–18)
APPEARANCE UR: CLEAR
AST SERPL-CCNC: 23 U/L (ref 10–38)
BACTERIA URNS QL MICRO: ABNORMAL /HPF
BASOPHILS # BLD: 0 K/UL (ref 0–0.1)
BASOPHILS NFR BLD: 0 % (ref 0–2)
BILIRUB SERPL-MCNC: 0.4 MG/DL (ref 0.2–1)
BILIRUB UR QL: NEGATIVE
BNP SERPL-MCNC: 77 PG/ML (ref 0–1800)
BUN SERPL-MCNC: 24 MG/DL (ref 7–18)
BUN/CREAT SERPL: 17 (ref 12–20)
CALCIUM SERPL-MCNC: 9.6 MG/DL (ref 8.5–10.1)
CHLORIDE SERPL-SCNC: 104 MMOL/L (ref 100–111)
CO2 SERPL-SCNC: 33 MMOL/L (ref 21–32)
COLOR UR: YELLOW
CREAT SERPL-MCNC: 1.39 MG/DL (ref 0.6–1.3)
DIFFERENTIAL METHOD BLD: ABNORMAL
EOSINOPHIL # BLD: 0.4 K/UL (ref 0–0.4)
EOSINOPHIL NFR BLD: 5 % (ref 0–5)
EPITH CASTS URNS QL MICRO: ABNORMAL /LPF (ref 0–5)
ERYTHROCYTE [DISTWIDTH] IN BLOOD BY AUTOMATED COUNT: 14.3 % (ref 11.6–14.5)
GLOBULIN SER CALC-MCNC: 3.5 G/DL (ref 2–4)
GLUCOSE SERPL-MCNC: 96 MG/DL (ref 74–99)
GLUCOSE UR STRIP.AUTO-MCNC: NEGATIVE MG/DL
HCT VFR BLD AUTO: 39.9 % (ref 35–45)
HGB BLD-MCNC: 12 G/DL (ref 12–16)
HGB UR QL STRIP: NEGATIVE
KETONES UR QL STRIP.AUTO: NEGATIVE MG/DL
LEUKOCYTE ESTERASE UR QL STRIP.AUTO: ABNORMAL
LYMPHOCYTES # BLD: 2.2 K/UL (ref 0.9–3.6)
LYMPHOCYTES NFR BLD: 31 % (ref 21–52)
MCH RBC QN AUTO: 25.8 PG (ref 24–34)
MCHC RBC AUTO-ENTMCNC: 30.1 G/DL (ref 31–37)
MCV RBC AUTO: 85.8 FL (ref 74–97)
MONOCYTES # BLD: 0.4 K/UL (ref 0.05–1.2)
MONOCYTES NFR BLD: 6 % (ref 3–10)
NEUTS SEG # BLD: 4.1 K/UL (ref 1.8–8)
NEUTS SEG NFR BLD: 58 % (ref 40–73)
NITRITE UR QL STRIP.AUTO: NEGATIVE
PH UR STRIP: 5.5 [PH] (ref 5–8)
PLATELET # BLD AUTO: 172 K/UL (ref 135–420)
PMV BLD AUTO: 12.3 FL (ref 9.2–11.8)
POTASSIUM SERPL-SCNC: 4.3 MMOL/L (ref 3.5–5.5)
PROT SERPL-MCNC: 7.1 G/DL (ref 6.4–8.2)
PROT UR STRIP-MCNC: NEGATIVE MG/DL
RBC # BLD AUTO: 4.65 M/UL (ref 4.2–5.3)
RBC #/AREA URNS HPF: 0 /HPF (ref 0–5)
SODIUM SERPL-SCNC: 141 MMOL/L (ref 136–145)
SP GR UR REFRACTOMETRY: 1.02 (ref 1–1.03)
T4 FREE SERPL-MCNC: 1.2 NG/DL (ref 0.7–1.5)
TSH SERPL DL<=0.05 MIU/L-ACNC: <0.01 UIU/ML (ref 0.36–3.74)
UROBILINOGEN UR QL STRIP.AUTO: 0.2 EU/DL (ref 0.2–1)
WBC # BLD AUTO: 7.2 K/UL (ref 4.6–13.2)
WBC URNS QL MICRO: ABNORMAL /HPF (ref 0–4)
YEAST URNS QL MICRO: ABNORMAL

## 2019-10-01 PROCEDURE — 85025 COMPLETE CBC W/AUTO DIFF WBC: CPT

## 2019-10-01 PROCEDURE — 36415 COLL VENOUS BLD VENIPUNCTURE: CPT

## 2019-10-01 PROCEDURE — 83880 ASSAY OF NATRIURETIC PEPTIDE: CPT

## 2019-10-01 PROCEDURE — 80053 COMPREHEN METABOLIC PANEL: CPT

## 2019-10-01 PROCEDURE — 84443 ASSAY THYROID STIM HORMONE: CPT

## 2019-10-01 PROCEDURE — 84439 ASSAY OF FREE THYROXINE: CPT

## 2019-10-01 PROCEDURE — 81001 URINALYSIS AUTO W/SCOPE: CPT

## 2019-10-01 RX ORDER — ALBUTEROL SULFATE 90 UG/1
2 AEROSOL, METERED RESPIRATORY (INHALATION)
Qty: 1 INHALER | Refills: 0 | Status: SHIPPED | OUTPATIENT
Start: 2019-10-01 | End: 2020-01-29 | Stop reason: SDUPTHER

## 2019-10-01 RX ORDER — RANITIDINE 150 MG/1
150 TABLET, FILM COATED ORAL 2 TIMES DAILY
Qty: 60 TAB | Refills: 5 | Status: SHIPPED | OUTPATIENT
Start: 2019-10-01 | End: 2020-01-29 | Stop reason: SDUPTHER

## 2019-10-01 NOTE — PROGRESS NOTES
1. Have you been to the ER, urgent care clinic since your last visit? Hospitalized since your last visit? No    2. Have you seen or consulted any other health care providers outside of the [address] [place] [address] since your last visit? Include any pap smears or colon screening.  No

## 2019-10-01 NOTE — PROGRESS NOTES
Subjective     Patient ID:  Sandra Austin is a 82 y.o. ( 1935) female who presents for the following: Follow-up      HPI   She presents for follow-up. She reports a 2 to 3-week history of increased bilateral lower leg and foot swelling. She had not taken her Lasix for about 2 weeks. Did take 1 dose 3 days ago which helped a little. She has support stockings but is not using them. Chronic kidney disease:  Continues on hydrochlorothiazide and lisinopril, Lasix as needed. Not taking NSAIDs. Hypertension follow up:  Taking medications as prescribed: Yes  Checking BP at home: No   Symptoms: none  Low sodium diet: sometimes  Exercise: limited walking      Review of Systems   Constitutional: Negative for appetite change, diaphoresis, fatigue and unexpected weight change. Eyes: Negative for visual disturbance. Respiratory: Negative for cough, chest tightness and shortness of breath. Cardiovascular: Positive for leg swelling. Negative for chest pain and palpitations. Gastrointestinal: Negative for abdominal distention, abdominal pain, blood in stool, constipation, diarrhea, nausea, rectal pain and vomiting. Endocrine: Negative for polydipsia, polyphagia and polyuria. Genitourinary: Negative for decreased urine volume, dysuria and frequency. Musculoskeletal: Positive for arthralgias (Chronic right ankle pain). Negative for joint swelling and myalgias. Skin: Negative for rash and wound. Neurological: Negative for dizziness, weakness, light-headedness, numbness and headaches. Psychiatric/Behavioral: Negative for dysphoric mood and sleep disturbance. The patient is not nervous/anxious. Past Medical History, Past Surgery History, Allergies, Social History, and Family History were reviewed and updated.       Past Medical History:   Diagnosis Date    Arthritis 2010    Chronic eczema 3/22/2010    Chronic kidney disease     Clot 10/2018    lower left exremity, shin  Edema 3/22/2010    Environmental allergies     year round    Essential hypertension, malignant 3/22/2010    Menopause     Pure hypercholesterolemia 3/22/2010    Subclinical hyperthyroidism 5/30/2017    Unspecified arthropathy, shoulder region 3/22/2010    Unspecified hypothyroidism 3/22/2010     Past Surgical History:   Procedure Laterality Date    HX GYN  1983    hysterectomy    HX HYSTERECTOMY  1984    fibroids    HX KNEE REPLACEMENT Right      Family History   Problem Relation Age of Onset    Hypertension Mother     Breast Cancer Mother 70    Breast Cancer Child 36    Breast Cancer Sister 39     Social History     Socioeconomic History    Marital status:      Spouse name: Not on file    Number of children: Not on file    Years of education: Not on file    Highest education level: Not on file   Occupational History    Not on file   Social Needs    Financial resource strain: Not on file    Food insecurity:     Worry: Not on file     Inability: Not on file    Transportation needs:     Medical: Not on file     Non-medical: Not on file   Tobacco Use    Smoking status: Never Smoker    Smokeless tobacco: Never Used   Substance and Sexual Activity    Alcohol use:  Yes     Alcohol/week: 2.5 standard drinks     Types: 3 Standard drinks or equivalent per week    Drug use: No    Sexual activity: Yes     Partners: Male   Lifestyle    Physical activity:     Days per week: Not on file     Minutes per session: Not on file    Stress: Not on file   Relationships    Social connections:     Talks on phone: Not on file     Gets together: Not on file     Attends Nondenominational service: Not on file     Active member of club or organization: Not on file     Attends meetings of clubs or organizations: Not on file     Relationship status: Not on file    Intimate partner violence:     Fear of current or ex partner: Not on file     Emotionally abused: Not on file     Physically abused: Not on file Forced sexual activity: Not on file   Other Topics Concern     Service Not Asked    Blood Transfusions Not Asked    Caffeine Concern Not Asked    Occupational Exposure Not Asked    Hobby Hazards Not Asked    Sleep Concern Not Asked    Stress Concern Not Asked    Weight Concern Not Asked    Special Diet Yes     Comment: somewhat healthy, low sodium sometimes    Back Care Not Asked    Exercise Yes     Comment: walking, limited by ankle pain    Bike Helmet Not Asked    Seat Belt Not Asked    Self-Exams Not Asked   Social History Narrative    Not on file     No Known Allergies  Current Outpatient Medications on File Prior to Visit   Medication Sig Dispense Refill    fluocinoNIDE (LIDEX) 0.05 % ointment Apply  to affected area two (2) times a day. 60 g 4    furosemide (LASIX) 40 mg tablet Take 1 Tab by mouth daily. Only if needed for edema 30 Tab 3    allopurinol (ZYLOPRIM) 300 mg tablet Take 1 Tab by mouth daily. 90 Tab 4    hydroCHLOROthiazide (HYDRODIURIL) 25 mg tablet Take 1 Tab by mouth daily. 90 Tab 4    lisinopril (PRINIVIL, ZESTRIL) 20 mg tablet Take 1 Tab by mouth daily. 90 Tab 4     No current facility-administered medications on file prior to visit. Objective     Visit Vitals  /60   Pulse 74   Temp 98 °F (36.7 °C) (Oral)   Resp 20   Ht 5' 8\" (1.727 m)   Wt 228 lb (103.4 kg)   SpO2 100%   BMI 34.67 kg/m²     No LMP recorded. Patient has had a hysterectomy. Physical Exam   Constitutional: She is oriented to person, place, and time. She appears well-developed and well-nourished. No distress. Eyes: Conjunctivae and EOM are normal. Pupils are equal, round, and reactive to light. Neck: Carotid bruit is not present. Cardiovascular: Normal rate, regular rhythm, normal heart sounds, intact distal pulses and normal pulses. No murmur heard. Pulmonary/Chest: Effort normal and breath sounds normal. No respiratory distress. Abdominal: Soft.  Normal appearance and bowel sounds are normal. She exhibits no distension, no ascites and no mass. There is no hepatosplenomegaly. There is no tenderness. Musculoskeletal: She exhibits edema (3+ edema of bilateral  lower legs and feet. ). Neurological: She is alert and oriented to person, place, and time. Skin: Skin is warm and dry. No rash noted. She is not diaphoretic. Psychiatric: She has a normal mood and affect. LABS   Component      Latest Ref Rng & Units 3/26/2019 3/26/2019 3/26/2019 3/26/2019           2:50 PM  2:50 PM  2:50 PM  2:50 PM   WBC      4.6 - 13.2 K/uL       RBC      4.20 - 5.30 M/uL       HGB      12.0 - 16.0 g/dL       HCT      35.0 - 45.0 %       MCV      74.0 - 97.0 FL       MCH      24.0 - 34.0 PG       MCHC      31.0 - 37.0 g/dL       RDW      11.6 - 14.5 %       PLATELET      564 - 006 K/uL       MPV      9.2 - 11.8 FL       NEUTROPHILS      40 - 73 %       LYMPHOCYTES      21 - 52 %       MONOCYTES      3 - 10 %       EOSINOPHILS      0 - 5 %       BASOPHILS      0 - 2 %       ABS. NEUTROPHILS      1.8 - 8.0 K/UL       ABS. LYMPHOCYTES      0.9 - 3.6 K/UL       ABS. MONOCYTES      0.05 - 1.2 K/UL       ABS. EOSINOPHILS      0.0 - 0.4 K/UL       ABS. BASOPHILS      0.0 - 0.1 K/UL       DF             Sodium      136 - 145 mmol/L  142     Potassium      3.5 - 5.5 mmol/L  4.8     Chloride      100 - 108 mmol/L  104     CO2      21 - 32 mmol/L  33 (H)     Anion gap      3.0 - 18 mmol/L  5     Glucose      74 - 99 mg/dL  103 (H)     BUN      7.0 - 18 MG/DL  27 (H)     Creatinine      0.6 - 1.3 MG/DL  1.71 (H)     BUN/Creatinine ratio      12 - 20    16     GFR est AA      >60 ml/min/1.73m2  35 (L)     GFR est non-AA      >60 ml/min/1.73m2  29 (L)     Calcium      8.5 - 10.1 MG/DL  9.1     Bilirubin, total      0.2 - 1.0 MG/DL  0.3     ALT (SGPT)      13 - 56 U/L  52     AST      15 - 37 U/L  45 (H)     Alk.  phosphatase      45 - 117 U/L  128 (H)     Protein, total      6.4 - 8.2 g/dL  6.9     Albumin 3.4 - 5.0 g/dL  3.5     Globulin      2.0 - 4.0 g/dL  3.4     A-G Ratio      0.8 - 1.7    1.0     Color         YELLOW    Appearance         CLEAR    Specific gravity      1.005 - 1.030     1.015    pH (UA)      5.0 - 8.0     7.0    Protein      NEG mg/dL   NEGATIVE    Glucose      NEG mg/dL   NEGATIVE    Ketone      NEG mg/dL   NEGATIVE    Bilirubin      NEG     NEGATIVE    Blood      NEG     NEGATIVE    Urobilinogen      0.2 - 1.0 EU/dL   1.0    Nitrites      NEG     NEGATIVE    Leukocyte Esterase      NEG     NEGATIVE    Uric acid      2.6 - 7.2 MG/DL 7.1      Sed rate, automated      0 - 30 mm/hr    21     Component      Latest Ref Rng & Units 3/26/2019           2:50 PM   WBC      4.6 - 13.2 K/uL 9.3   RBC      4.20 - 5.30 M/uL 4.50   HGB      12.0 - 16.0 g/dL 11.6 (L)   HCT      35.0 - 45.0 % 38.9   MCV      74.0 - 97.0 FL 86.4   MCH      24.0 - 34.0 PG 25.8   MCHC      31.0 - 37.0 g/dL 29.8 (L)   RDW      11.6 - 14.5 % 14.0   PLATELET      964 - 485 K/uL 163   MPV      9.2 - 11.8 FL 12.1 (H)   NEUTROPHILS      40 - 73 % 55   LYMPHOCYTES      21 - 52 % 33   MONOCYTES      3 - 10 % 7   EOSINOPHILS      0 - 5 % 5   BASOPHILS      0 - 2 % 0   ABS. NEUTROPHILS      1.8 - 8.0 K/UL 5.1   ABS. LYMPHOCYTES      0.9 - 3.6 K/UL 3.0   ABS. MONOCYTES      0.05 - 1.2 K/UL 0.6   ABS. EOSINOPHILS      0.0 - 0.4 K/UL 0.5 (H)   ABS. BASOPHILS      0.0 - 0.1 K/UL 0.0   DF       AUTOMATED   Sodium      136 - 145 mmol/L    Potassium      3.5 - 5.5 mmol/L    Chloride      100 - 108 mmol/L    CO2      21 - 32 mmol/L    Anion gap      3.0 - 18 mmol/L    Glucose      74 - 99 mg/dL    BUN      7.0 - 18 MG/DL    Creatinine      0.6 - 1.3 MG/DL    BUN/Creatinine ratio      12 - 20      GFR est AA      >60 ml/min/1.73m2    GFR est non-AA      >60 ml/min/1.73m2    Calcium      8.5 - 10.1 MG/DL    Bilirubin, total      0.2 - 1.0 MG/DL    ALT (SGPT)      13 - 56 U/L    AST      15 - 37 U/L    Alk.  phosphatase      45 - 117 U/L    Protein, total      6.4 - 8.2 g/dL    Albumin      3.4 - 5.0 g/dL    Globulin      2.0 - 4.0 g/dL    A-G Ratio      0.8 - 1.7      Color          Appearance          Specific gravity      1.005 - 1.030      pH (UA)      5.0 - 8.0      Protein      NEG mg/dL    Glucose      NEG mg/dL    Ketone      NEG mg/dL    Bilirubin      NEG      Blood      NEG      Urobilinogen      0.2 - 1.0 EU/dL    Nitrites      NEG      Leukocyte Esterase      NEG      Uric acid      2.6 - 7.2 MG/DL    Sed rate, automated      0 - 30 mm/hr      TESTS      Assessment and Plan     1. Essential hypertension  Well-controlled. Continue current medication.  - URINALYSIS W/ RFLX MICROSCOPIC; Future    2. Lower extremity edema  Take Lasix daily for the next 3 days then as needed several times per week. Elevate legs and use compression stockings during the day. - CBC WITH AUTOMATED DIFF; Future  - METABOLIC PANEL, COMPREHENSIVE; Future  - NT-PRO BNP; Future    4. Reactive airway disease without complication, unspecified asthma severity, unspecified whether persistent  Continue albuterol inhaler as needed. - albuterol (PROVENTIL HFA, VENTOLIN HFA, PROAIR HFA) 90 mcg/actuation inhaler; Take 2 Puffs by inhalation every six (6) hours as needed for Wheezing. Dispense: 1 Inhaler; Refill: 0    5. Subclinical hyperthyroidism    - TSH 3RD GENERATION; Future  - T4, FREE; Future    6. Gastroesophageal reflux disease, esophagitis presence not specified  Stop omeprazole due to renal disease. Start Zantac twice daily. - raNITIdine (ZANTAC) 150 mg tablet; Take 1 Tab by mouth two (2) times a day. Dispense: 60 Tab; Refill: 5      Follow-up and Dispositions    · Return in about 3 months (around 1/1/2020) for Medication follow up, High blood pressure follow up. Risks, benefits, and alternatives of the medications and treatment plan prescribed today were discussed, and patient expressed understanding.  Printed after visit summary was given to patient and reviewed. All patient questions and concerns were addressed. Plan follow-up as discussed or as needed if any worsening symptoms or change in condition.            Signed electronically by Olivia Pacheco DNP, FNP-BC

## 2019-10-01 NOTE — PATIENT INSTRUCTIONS
High Blood Pressure: Care Instructions  Overview    It's normal for blood pressure to go up and down throughout the day. But if it stays up, you have high blood pressure. Another name for high blood pressure is hypertension. Despite what a lot of people think, high blood pressure usually doesn't cause headaches or make you feel dizzy or lightheaded. It usually has no symptoms. But it does increase your risk of stroke, heart attack, and other problems. You and your doctor will talk about your risks of these problems based on your blood pressure. Your doctor will give you a goal for your blood pressure. Your goal will be based on your health and your age. Lifestyle changes, such as eating healthy and being active, are always important to help lower blood pressure. You might also take medicine to reach your blood pressure goal.  Follow-up care is a key part of your treatment and safety. Be sure to make and go to all appointments, and call your doctor if you are having problems. It's also a good idea to know your test results and keep a list of the medicines you take. How can you care for yourself at home? Medical treatment  · If you stop taking your medicine, your blood pressure will go back up. You may take one or more types of medicine to lower your blood pressure. Be safe with medicines. Take your medicine exactly as prescribed. Call your doctor if you think you are having a problem with your medicine. · Talk to your doctor before you start taking aspirin every day. Aspirin can help certain people lower their risk of a heart attack or stroke. But taking aspirin isn't right for everyone, because it can cause serious bleeding. · See your doctor regularly. You may need to see the doctor more often at first or until your blood pressure comes down. · If you are taking blood pressure medicine, talk to your doctor before you take decongestants or anti-inflammatory medicine, such as ibuprofen.  Some of these medicines can raise blood pressure. · Learn how to check your blood pressure at home. Lifestyle changes  · Stay at a healthy weight. This is especially important if you put on weight around the waist. Losing even 10 pounds can help you lower your blood pressure. · If your doctor recommends it, get more exercise. Walking is a good choice. Bit by bit, increase the amount you walk every day. Try for at least 30 minutes on most days of the week. You also may want to swim, bike, or do other activities. · Avoid or limit alcohol. Talk to your doctor about whether you can drink any alcohol. · Try to limit how much sodium you eat to less than 2,300 milligrams (mg) a day. Your doctor may ask you to try to eat less than 1,500 mg a day. · Eat plenty of fruits (such as bananas and oranges), vegetables, legumes, whole grains, and low-fat dairy products. · Do not smoke. Smoking increases your risk for heart attack and stroke. If you need help quitting, talk to your doctor about stop-smoking programs and medicines. These can increase your chances of quitting for good. When should you call for help? Call 911 anytime you think you may need emergency care. This may mean having symptoms that suggest that your blood pressure is causing a serious heart or blood vessel problem. Your blood pressure may be over 180/120. For example, call 911 if:    · You have symptoms of a heart attack. These may include:  ? Chest pain or pressure, or a strange feeling in the chest.  ? Sweating. ? Shortness of breath. ? Nausea or vomiting. ? Pain, pressure, or a strange feeling in the back, neck, jaw, or upper belly or in one or both shoulders or arms. ? Lightheadedness or sudden weakness. ? A fast or irregular heartbeat. · You have symptoms of a stroke. These may include:  ? Sudden numbness, tingling, weakness, or loss of movement in your face, arm, or leg, especially on only one side of your body. ? Sudden vision changes.   ? Sudden trouble speaking. ? Sudden confusion or trouble understanding simple statements. ? Sudden problems with walking or balance. ? A sudden, severe headache that is different from past headaches. · You have severe back or belly pain. Do not wait until your blood pressure comes down on its own. Get help right away. Call your doctor now or seek immediate care if:    · Your blood pressure is much higher than normal (such as 180/120 or higher), but you don't have symptoms. · You think high blood pressure is causing symptoms, such as:  ? Severe headache.  ? Blurry vision. Watch closely for changes in your health, and be sure to contact your doctor if:    · Your blood pressure measures higher than your doctor recommends at least 2 times. That means the top number is higher or the bottom number is higher, or both. · You think you may be having side effects from your blood pressure medicine. Where can you learn more? Go to http://eddie-ayah.info/. Enter R391 in the search box to learn more about \"High Blood Pressure: Care Instructions. \"  Current as of: July 22, 2018  Content Version: 11.9  © 4277-0545 [employer]. Care instructions adapted under license by [employer] (which disclaims liability or warranty for this information). If you have questions about a medical condition or this instruction, always ask your healthcare professional. [place] [address] any warranty or liability for your use of this information. Leg and Ankle Edema: Care Instructions  Your Care Instructions  Swelling in the legs, ankles, and feet is called edema. It is common after you sit or stand for a while. Long plane flights or car rides often cause swelling in the legs and feet. You may also have swelling if you have to stand for long periods of time at your job.  Problems with the veins in the legs (varicose veins) and changes in hormones can also cause swelling. Sometimes the swelling in the ankles and feet is caused by a more serious problem, such as heart failure, infection, blood clots, or liver or kidney disease. Follow-up care is a key part of your treatment and safety. Be sure to make and go to all appointments, and call your doctor if you are having problems. It's also a good idea to know your test results and keep a list of the medicines you take. How can you care for yourself at home? · If your doctor gave you medicine, take it as prescribed. Call your doctor if you think you are having a problem with your medicine. · Whenever you are resting, raise your legs up. Try to keep the swollen area higher than the level of your heart. · Take breaks from standing or sitting in one position. ? Walk around to increase the blood flow in your lower legs. ? Move your feet and ankles often while you stand, or tighten and relax your leg muscles. · Wear support stockings. Put them on in the morning, before swelling gets worse. · Eat a balanced diet. Lose weight if you need to. · Limit the amount of salt (sodium) in your diet. Salt holds fluid in the body and may increase swelling. When should you call for help? Call 911 anytime you think you may need emergency care. For example, call if:    · You have symptoms of a blood clot in your lung (called a pulmonary embolism). These may include:  ? Sudden chest pain. ? Trouble breathing. ? Coughing up blood.    Call your doctor now or seek immediate medical care if:    · You have signs of a blood clot, such as:  ? Pain in your calf, back of the knee, thigh, or groin. ? Redness and swelling in your leg or groin.     · You have symptoms of infection, such as:  ? Increased pain, swelling, warmth, or redness. ? Red streaks or pus.   ? A fever.    Watch closely for changes in your health, and be sure to contact your doctor if:    · Your swelling is getting worse.     · You have new or worsening pain in your legs.     · You do not get better as expected. Where can you learn more? Go to http://eddie-ayah.info/. Enter I451 in the search box to learn more about \"Leg and Ankle Edema: Care Instructions. \"  Current as of: June 26, 2019  Content Version: 12.2  © 4911-8208 [employer]. Care instructions adapted under license by [facility] (which disclaims liability or warranty for this information). If you have questions about a medical condition or this instruction, always ask your healthcare professional. [address] any warranty or liability for your use of this information.

## 2020-01-29 ENCOUNTER — OFFICE VISIT (OUTPATIENT)
Dept: FAMILY MEDICINE CLINIC | Age: 85
End: 2020-01-29

## 2020-01-29 ENCOUNTER — HOSPITAL ENCOUNTER (OUTPATIENT)
Dept: LAB | Age: 85
Discharge: HOME | End: 2020-01-29
Payer: MEDICARE

## 2020-01-29 VITALS
DIASTOLIC BLOOD PRESSURE: 53 MMHG | RESPIRATION RATE: 20 BRPM | BODY MASS INDEX: 33.19 KG/M2 | WEIGHT: 219 LBS | HEART RATE: 60 BPM | SYSTOLIC BLOOD PRESSURE: 136 MMHG | HEIGHT: 68 IN | TEMPERATURE: 98.7 F | OXYGEN SATURATION: 98 %

## 2020-01-29 DIAGNOSIS — E05.90 SUBCLINICAL HYPERTHYROIDISM: ICD-10-CM

## 2020-01-29 DIAGNOSIS — R82.71 ASYMPTOMATIC BACTERIURIA: ICD-10-CM

## 2020-01-29 DIAGNOSIS — K21.9 GASTROESOPHAGEAL REFLUX DISEASE, ESOPHAGITIS PRESENCE NOT SPECIFIED: ICD-10-CM

## 2020-01-29 DIAGNOSIS — J45.909 REACTIVE AIRWAY DISEASE WITHOUT COMPLICATION, UNSPECIFIED ASTHMA SEVERITY, UNSPECIFIED WHETHER PERSISTENT: ICD-10-CM

## 2020-01-29 DIAGNOSIS — E78.00 PURE HYPERCHOLESTEROLEMIA: ICD-10-CM

## 2020-01-29 DIAGNOSIS — I10 ESSENTIAL HYPERTENSION: ICD-10-CM

## 2020-01-29 DIAGNOSIS — J06.9 VIRAL URI WITH COUGH: ICD-10-CM

## 2020-01-29 DIAGNOSIS — R60.0 LOWER EXTREMITY EDEMA: ICD-10-CM

## 2020-01-29 DIAGNOSIS — I10 ESSENTIAL HYPERTENSION: Primary | ICD-10-CM

## 2020-01-29 DIAGNOSIS — L30.9 CHRONIC ECZEMA: ICD-10-CM

## 2020-01-29 LAB
ALBUMIN SERPL-MCNC: 3.5 G/DL (ref 3.4–5)
ALBUMIN/GLOB SERPL: 1 {RATIO} (ref 0.8–1.7)
ALP SERPL-CCNC: 131 U/L (ref 45–117)
ALT SERPL-CCNC: 19 U/L (ref 13–56)
ANION GAP SERPL CALC-SCNC: 2 MMOL/L (ref 3–18)
APPEARANCE UR: CLEAR
AST SERPL-CCNC: 20 U/L (ref 10–38)
BASOPHILS # BLD: 0 K/UL (ref 0–0.1)
BASOPHILS NFR BLD: 0 % (ref 0–2)
BILIRUB SERPL-MCNC: 0.4 MG/DL (ref 0.2–1)
BILIRUB UR QL: NEGATIVE
BUN SERPL-MCNC: 17 MG/DL (ref 7–18)
BUN/CREAT SERPL: 12 (ref 12–20)
CALCIUM SERPL-MCNC: 9.9 MG/DL (ref 8.5–10.1)
CHLORIDE SERPL-SCNC: 105 MMOL/L (ref 100–111)
CHOLEST SERPL-MCNC: 199 MG/DL
CO2 SERPL-SCNC: 35 MMOL/L (ref 21–32)
COLOR UR: YELLOW
CREAT SERPL-MCNC: 1.45 MG/DL (ref 0.6–1.3)
DIFFERENTIAL METHOD BLD: ABNORMAL
EOSINOPHIL # BLD: 0.4 K/UL (ref 0–0.4)
EOSINOPHIL NFR BLD: 5 % (ref 0–5)
ERYTHROCYTE [DISTWIDTH] IN BLOOD BY AUTOMATED COUNT: 13.9 % (ref 11.6–14.5)
GLOBULIN SER CALC-MCNC: 3.6 G/DL (ref 2–4)
GLUCOSE SERPL-MCNC: 80 MG/DL (ref 74–99)
GLUCOSE UR STRIP.AUTO-MCNC: NEGATIVE MG/DL
HCT VFR BLD AUTO: 40.9 % (ref 35–45)
HDLC SERPL-MCNC: 51 MG/DL (ref 40–60)
HDLC SERPL: 3.9 {RATIO} (ref 0–5)
HGB BLD-MCNC: 12.8 G/DL (ref 12–16)
HGB UR QL STRIP: NEGATIVE
KETONES UR QL STRIP.AUTO: NEGATIVE MG/DL
LDLC SERPL CALC-MCNC: 129.2 MG/DL (ref 0–100)
LEUKOCYTE ESTERASE UR QL STRIP.AUTO: NEGATIVE
LIPID PROFILE,FLP: ABNORMAL
LYMPHOCYTES # BLD: 2.2 K/UL (ref 0.9–3.6)
LYMPHOCYTES NFR BLD: 29 % (ref 21–52)
MCH RBC QN AUTO: 26 PG (ref 24–34)
MCHC RBC AUTO-ENTMCNC: 31.3 G/DL (ref 31–37)
MCV RBC AUTO: 83 FL (ref 74–97)
MONOCYTES # BLD: 0.9 K/UL (ref 0.05–1.2)
MONOCYTES NFR BLD: 12 % (ref 3–10)
NEUTS SEG # BLD: 4.1 K/UL (ref 1.8–8)
NEUTS SEG NFR BLD: 54 % (ref 40–73)
NITRITE UR QL STRIP.AUTO: NEGATIVE
PH UR STRIP: 7.5 [PH] (ref 5–8)
PLATELET # BLD AUTO: 156 K/UL (ref 135–420)
PMV BLD AUTO: 12.1 FL (ref 9.2–11.8)
POTASSIUM SERPL-SCNC: 4.2 MMOL/L (ref 3.5–5.5)
PROT SERPL-MCNC: 7.1 G/DL (ref 6.4–8.2)
PROT UR STRIP-MCNC: NEGATIVE MG/DL
RBC # BLD AUTO: 4.93 M/UL (ref 4.2–5.3)
SODIUM SERPL-SCNC: 142 MMOL/L (ref 136–145)
SP GR UR REFRACTOMETRY: 1.02 (ref 1–1.03)
TRIGL SERPL-MCNC: 94 MG/DL (ref ?–150)
UROBILINOGEN UR QL STRIP.AUTO: 1 EU/DL (ref 0.2–1)
VLDLC SERPL CALC-MCNC: 18.8 MG/DL
WBC # BLD AUTO: 7.5 K/UL (ref 4.6–13.2)

## 2020-01-29 PROCEDURE — 80053 COMPREHEN METABOLIC PANEL: CPT

## 2020-01-29 PROCEDURE — 85025 COMPLETE CBC W/AUTO DIFF WBC: CPT

## 2020-01-29 PROCEDURE — 36415 COLL VENOUS BLD VENIPUNCTURE: CPT

## 2020-01-29 PROCEDURE — 81003 URINALYSIS AUTO W/O SCOPE: CPT

## 2020-01-29 PROCEDURE — 80061 LIPID PANEL: CPT

## 2020-01-29 RX ORDER — ALBUTEROL SULFATE 90 UG/1
2 AEROSOL, METERED RESPIRATORY (INHALATION)
Qty: 1 INHALER | Refills: 0 | Status: SHIPPED | OUTPATIENT
Start: 2020-01-29 | End: 2021-06-16 | Stop reason: SDUPTHER

## 2020-01-29 RX ORDER — RANITIDINE 150 MG/1
150 TABLET, FILM COATED ORAL 2 TIMES DAILY
Qty: 60 TAB | Refills: 5 | Status: SHIPPED | OUTPATIENT
Start: 2020-01-29 | End: 2020-06-15 | Stop reason: SDDI

## 2020-01-29 RX ORDER — FLUOCINONIDE 0.5 MG/G
OINTMENT TOPICAL 2 TIMES DAILY
Qty: 60 G | Refills: 4 | Status: SHIPPED | OUTPATIENT
Start: 2020-01-29 | End: 2021-06-16 | Stop reason: SDUPTHER

## 2020-01-29 NOTE — PROGRESS NOTES
1. Have you been to the ER, urgent care clinic since your last visit? Hospitalized since your last visit? No    2. Have you seen or consulted any other health care providers outside of the [address] [place] [address] since your last visit? Include any pap smears or colon screening.  No

## 2020-01-29 NOTE — PROGRESS NOTES
Subjective     Patient ID:  Bull Main is a 82 y.o. ( 1935) female who presents for the following: Follow-up and Cough      HPI  She presents for follow-up. Feeling well overall. Hypertension follow up:  Taking medications as prescribed: Yes  Checking BP at home: No   Symptoms: none  Low sodium diet: No   Exercise: yes walking     Lower extremity edema:  Lasix as needed about twice a week. Keeping legs elevated helps. DVT hx 2018 left lower leg. One time only. No longer on anticoagulation. Cough:  2 to 3-day history of cough productive of white sputum, wheezing. Using albuterol twice a day since being sick. Nyquil and delsym, elderberry, tea with honey - helps. Reports that symptoms are starting to improve. Asthma:  When not sick she uses the inhaler about 1x/week. She reports shortness of breath when walking longer distances outside of her home, but no symptoms with her normal activities and ADLs. Subclinical hyperthyroidism:  Last TSH was less than 0.01. Last free T4 was 1.2 on 10/1/2019. Denies symptoms. GERD:  Switch from omeprazole to ranitidine. Reports that her heartburn has been well controlled unless she eats certain foods such as tomato sauce or junk food. Asymptomatic bacteriuria:  Last UA was positive for bacteria and yeast. Denies symptoms. Review of Systems   Constitutional: Negative for appetite change, chills, diaphoresis, fatigue, fever and unexpected weight change. HENT: Positive for rhinorrhea. Negative for congestion, ear discharge, ear pain, hearing loss, postnasal drip, sinus pressure, sinus pain, sneezing, sore throat and trouble swallowing. Eyes: Negative for discharge, redness and visual disturbance. Respiratory: Positive for cough (per HPI), shortness of breath and wheezing. Negative for chest tightness. Cardiovascular: Positive for leg swelling. Negative for chest pain and palpitations.    Gastrointestinal: Negative for abdominal distention, abdominal pain, blood in stool, constipation, diarrhea, nausea, rectal pain and vomiting. Endocrine: Negative for polydipsia, polyphagia and polyuria. Genitourinary: Negative for decreased urine volume, dysuria and frequency. Musculoskeletal: Negative for joint swelling, myalgias, neck pain and neck stiffness. Skin: Negative for rash and wound. Allergic/Immunologic: Negative for environmental allergies. Neurological: Negative for dizziness, weakness, light-headedness, numbness and headaches. Psychiatric/Behavioral: Negative for dysphoric mood and sleep disturbance. The patient is not nervous/anxious. Past Medical History, Past Surgery History, Allergies, Social History, and Family History were reviewed and updated.       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    Unspecified hypothyroidism 3/22/2010     Past Surgical History:   Procedure Laterality Date    HX GYN  1983    hysterectomy    HX HYSTERECTOMY  1984    fibroids    HX KNEE REPLACEMENT Right      Family History   Problem Relation Age of Onset    Hypertension Mother     Breast Cancer Mother 70    Breast Cancer Child 36    Breast Cancer Sister 39     Social History     Socioeconomic History    Marital status:      Spouse name: Not on file    Number of children: Not on file    Years of education: Not on file    Highest education level: Not on file   Occupational History    Not on file   Social Needs    Financial resource strain: Not on file    Food insecurity:     Worry: Not on file     Inability: Not on file   Nicholas Patterson Transportation needs:     Medical: Not on file     Non-medical: Not on file   Tobacco Use    Smoking status: Never Smoker    Smokeless tobacco: Never Used   Substance and Sexual Activity    Alcohol use: Yes     Alcohol/week: 2.5 standard drinks     Types: 3 Standard drinks or equivalent per week    Drug use: No    Sexual activity: Yes     Partners: Male   Lifestyle    Physical activity:     Days per week: Not on file     Minutes per session: Not on file    Stress: Not on file   Relationships    Social connections:     Talks on phone: Not on file     Gets together: Not on file     Attends Latter-day service: Not on file     Active member of club or organization: Not on file     Attends meetings of clubs or organizations: Not on file     Relationship status: Not on file    Intimate partner violence:     Fear of current or ex partner: Not on file     Emotionally abused: Not on file     Physically abused: Not on file     Forced sexual activity: Not on file   Other Topics Concern     Service Not Asked    Blood Transfusions Not Asked    Caffeine Concern Not Asked    Occupational Exposure Not Asked   Iris [place] Draft Hazards Not Asked    Sleep Concern Not Asked    Stress Concern Not Asked    Weight Concern Not Asked    Special Diet Yes     Comment: somewhat healthy, low sodium sometimes    Back Care Not Asked    Exercise Yes     Comment: walking, limited by ankle pain    Bike Helmet Not Asked    Seat Belt Not Asked    Self-Exams Not Asked   Social History Narrative    Not on file     No Known Allergies  Current Outpatient Medications on File Prior to Visit   Medication Sig Dispense Refill    furosemide (LASIX) 40 mg tablet Take 1 Tab by mouth daily. Only if needed for edema 30 Tab 3    allopurinol (ZYLOPRIM) 300 mg tablet Take 1 Tab by mouth daily. 90 Tab 4    hydroCHLOROthiazide (HYDRODIURIL) 25 mg tablet Take 1 Tab by mouth daily.  90 Tab 4    lisinopril (PRINIVIL, ZESTRIL) 20 mg tablet Take 1 Tab by mouth daily. 90 Tab 4    [DISCONTINUED] albuterol (PROVENTIL HFA, VENTOLIN HFA, PROAIR HFA) 90 mcg/actuation inhaler Take 2 Puffs by inhalation every six (6) hours as needed for Wheezing. 1 Inhaler 0    [DISCONTINUED] raNITIdine (ZANTAC) 150 mg tablet Take 1 Tab by mouth two (2) times a day. 60 Tab 5    [DISCONTINUED] fluocinoNIDE (LIDEX) 0.05 % ointment Apply  to affected area two (2) times a day. 60 g 4     No current facility-administered medications on file prior to visit. Objective     Visit Vitals  /53   Pulse 60   Temp 98.7 °F (37.1 °C) (Oral)   Resp 20   Ht 5' 8\" (1.727 m)   Wt 219 lb (99.3 kg)   SpO2 98%   BMI 33.30 kg/m²     No LMP recorded. Patient has had a hysterectomy. Physical Exam  Constitutional:       General: She is not in acute distress. Appearance: Normal appearance. She is well-developed. She is not toxic-appearing or diaphoretic. HENT:      Right Ear: Hearing, tympanic membrane and ear canal normal.      Left Ear: Hearing, tympanic membrane and ear canal normal.      Nose: Congestion present. No nasal tenderness, mucosal edema or rhinorrhea. Mouth/Throat:      Pharynx: Posterior oropharyngeal erythema (mild) present. No oropharyngeal exudate or uvula swelling. Tonsils: No tonsillar abscesses. Eyes:      General:         Right eye: No discharge. Left eye: No discharge. Conjunctiva/sclera: Conjunctivae normal.      Pupils: Pupils are equal, round, and reactive to light. Neck:      Musculoskeletal: Neck supple. Vascular: No carotid bruit. Cardiovascular:      Rate and Rhythm: Normal rate and regular rhythm. Pulses: Normal pulses. Heart sounds: Normal heart sounds. No murmur. Pulmonary:      Effort: Pulmonary effort is normal. No respiratory distress. Breath sounds: Normal breath sounds. No decreased breath sounds, wheezing, rhonchi or rales. Abdominal:      General: Bowel sounds are normal. There is no distension. Palpations: Abdomen is soft. There is no mass. Tenderness: There is no abdominal tenderness. Musculoskeletal:         General: Swelling (1+ edema of bilateral lower legs) present. Lymphadenopathy:      Cervical: No cervical adenopathy. Skin:     General: Skin is warm and dry. Findings: No rash. Neurological:      Mental Status: She is alert and oriented to person, place, and time. LABS   Component      Latest Ref Rng & Units 10/1/2019 10/1/2019 10/1/2019 10/1/2019          10:05 AM 10:05 AM 10:05 AM 10:05 AM   WBC      4.6 - 13.2 K/uL    7.2   RBC      4.20 - 5.30 M/uL    4.65   HGB      12.0 - 16.0 g/dL    12.0   HCT      35.0 - 45.0 %    39.9   MCV      74.0 - 97.0 FL    85.8   MCH      24.0 - 34.0 PG    25.8   MCHC      31.0 - 37.0 g/dL    30.1 (L)   RDW      11.6 - 14.5 %    14.3   PLATELET      889 - 865 K/uL    172   MPV      9.2 - 11.8 FL    12.3 (H)   NEUTROPHILS      40 - 73 %    58   LYMPHOCYTES      21 - 52 %    31   MONOCYTES      3 - 10 %    6   EOSINOPHILS      0 - 5 %    5   BASOPHILS      0 - 2 %    0   ABS. NEUTROPHILS      1.8 - 8.0 K/UL    4.1   ABS. LYMPHOCYTES      0.9 - 3.6 K/UL    2.2   ABS. MONOCYTES      0.05 - 1.2 K/UL    0.4   ABS. EOSINOPHILS      0.0 - 0.4 K/UL    0.4   ABS. BASOPHILS      0.0 - 0.1 K/UL    0.0   DF          AUTOMATED   Sodium      136 - 145 mmol/L       Potassium      3.5 - 5.5 mmol/L       Chloride      100 - 111 mmol/L       CO2      21 - 32 mmol/L       Anion gap      3.0 - 18 mmol/L       Glucose      74 - 99 mg/dL       BUN      7.0 - 18 MG/DL       Creatinine      0.6 - 1.3 MG/DL       BUN/Creatinine ratio      12 - 20         GFR est AA      >60 ml/min/1.73m2       GFR est non-AA      >60 ml/min/1.73m2       Calcium      8.5 - 10.1 MG/DL       Bilirubin, total      0.2 - 1.0 MG/DL       ALT (SGPT)      13 - 56 U/L       AST      10 - 38 U/L       Alk.  phosphatase      45 - 117 U/L       Protein, total      6.4 - 8.2 g/dL Albumin      3.4 - 5.0 g/dL       Globulin      2.0 - 4.0 g/dL       A-G Ratio      0.8 - 1.7         Color         YELLOW    Appearance         CLEAR    Specific gravity      1.005 - 1.030     1.018    pH (UA)      5.0 - 8.0     5.5    Protein      NEG mg/dL   NEGATIVE    Glucose      NEG mg/dL   NEGATIVE    Ketone      NEG mg/dL   NEGATIVE    Bilirubin      NEG     NEGATIVE    Blood      NEG     NEGATIVE    Urobilinogen      0.2 - 1.0 EU/dL   0.2    Nitrites      NEG     NEGATIVE    Leukocyte Esterase      NEG     SMALL (A)    WBC      0 - 4 /hpf  6 to 8     RBC      0 - 5 /hpf  0     Epithelial cells      0 - 5 /lpf  FEW     Bacteria      NEG /hpf  1+ (A)     Yeast      NEG  1+ (A)     TSH      0.36 - 3.74 uIU/mL       T4, Free      0.7 - 1.5 NG/DL 1.2      NT pro-BNP      0 - 1,800 PG/ML         Component      Latest Ref Rng & Units 10/1/2019 10/1/2019 10/1/2019          10:05 AM 10:05 AM 10:05 AM   WBC      4.6 - 13.2 K/uL      RBC      4.20 - 5.30 M/uL      HGB      12.0 - 16.0 g/dL      HCT      35.0 - 45.0 %      MCV      74.0 - 97.0 FL      MCH      24.0 - 34.0 PG      MCHC      31.0 - 37.0 g/dL      RDW      11.6 - 14.5 %      PLATELET      593 - 884 K/uL      MPV      9.2 - 11.8 FL      NEUTROPHILS      40 - 73 %      LYMPHOCYTES      21 - 52 %      MONOCYTES      3 - 10 %      EOSINOPHILS      0 - 5 %      BASOPHILS      0 - 2 %      ABS. NEUTROPHILS      1.8 - 8.0 K/UL      ABS. LYMPHOCYTES      0.9 - 3.6 K/UL      ABS. MONOCYTES      0.05 - 1.2 K/UL      ABS. EOSINOPHILS      0.0 - 0.4 K/UL      ABS.  BASOPHILS      0.0 - 0.1 K/UL      DF            Sodium      136 - 145 mmol/L   141   Potassium      3.5 - 5.5 mmol/L   4.3   Chloride      100 - 111 mmol/L   104   CO2      21 - 32 mmol/L   33 (H)   Anion gap      3.0 - 18 mmol/L   4   Glucose      74 - 99 mg/dL   96   BUN      7.0 - 18 MG/DL   24 (H)   Creatinine      0.6 - 1.3 MG/DL   1.39 (H)   BUN/Creatinine ratio      12 - 20     17   GFR est AA >60 ml/min/1.73m2   44 (L)   GFR est non-AA      >60 ml/min/1.73m2   36 (L)   Calcium      8.5 - 10.1 MG/DL   9.6   Bilirubin, total      0.2 - 1.0 MG/DL   0.4   ALT (SGPT)      13 - 56 U/L   25   AST      10 - 38 U/L   23   Alk. phosphatase      45 - 117 U/L   131 (H)   Protein, total      6.4 - 8.2 g/dL   7.1   Albumin      3.4 - 5.0 g/dL   3.6   Globulin      2.0 - 4.0 g/dL   3.5   A-G Ratio      0.8 - 1.7     1.0   Color            Appearance            Specific gravity      1.005 - 1.030        pH (UA)      5.0 - 8.0        Protein      NEG mg/dL      Glucose      NEG mg/dL      Ketone      NEG mg/dL      Bilirubin      NEG        Blood      NEG        Urobilinogen      0.2 - 1.0 EU/dL      Nitrites      NEG        Leukocyte Esterase      NEG        WBC      0 - 4 /hpf      RBC      0 - 5 /hpf      Epithelial cells      0 - 5 /lpf      Bacteria      NEG /hpf      Yeast      NEG      TSH      0.36 - 3.74 uIU/mL  <0.01 (L)    T4, Free      0.7 - 1.5 NG/DL      NT pro-BNP      0 - 1,800 PG/ML 77       TESTS      Assessment and Plan     1. Essential hypertension  Well controlled. Continue current meds. - CBC WITH AUTOMATED DIFF; Future  - METABOLIC PANEL, COMPREHENSIVE; Future  - URINALYSIS W/ RFLX MICROSCOPIC; Future    2. Lower extremity edema  Well controlled. Continue lasix PRN. Continue elevated PRN. 3. Pure hypercholesterolemia  Fasting labs today. - LIPID PANEL; Future    4. Reactive airway disease without complication, unspecified asthma severity, unspecified whether persistent  Well controlled. Continue current medications. - albuterol (PROVENTIL HFA, VENTOLIN HFA, PROAIR HFA) 90 mcg/actuation inhaler; Take 2 Puffs by inhalation every six (6) hours as needed for Wheezing. Dispense: 1 Inhaler; Refill: 0    5. Subclinical hyperthyroidism  Stable. 6. Gastroesophageal reflux disease, esophagitis presence not specified  Well controlled. Continue. - raNITIdine (ZANTAC) 150 mg tablet;  Take 1 Tab by mouth two (2) times a day. Dispense: 60 Tab; Refill: 5    7. Chronic eczema  Continue to affected areas as needed. - fluocinoNIDE (LIDEX) 0.05 % ointment; Apply  to affected area two (2) times a day. Dispense: 60 g; Refill: 4    8. Viral URI with cough   May use over-the-counter decongestants, nasal sprays, analgesics, and cough suppressants as needed. Follow up if symptoms worsen or fail to resolve over the next 1-2 weeks. 9. Asymptomatic bacteriuria  Report any symptoms. No treatment if continues to be asymptomatic. Follow-up and Dispositions    · Return in about 4 months (around 5/29/2020) for High blood pressure follow up. Risks, benefits, and alternatives of the medications and treatment plan prescribed today were discussed, and patient expressed understanding. Printed after visit summary was given to patient and reviewed. All patient questions and concerns were addressed. Plan follow-up as discussed or as needed if any worsening symptoms or change in condition.            Signed electronically by Jonna Alas DNP, FNP-BC

## 2020-06-15 ENCOUNTER — HOSPITAL ENCOUNTER (OUTPATIENT)
Dept: LAB | Age: 85
Discharge: HOME | End: 2020-06-15
Payer: MEDICARE

## 2020-06-15 ENCOUNTER — OFFICE VISIT (OUTPATIENT)
Dept: FAMILY MEDICINE CLINIC | Age: 85
End: 2020-06-15

## 2020-06-15 VITALS
SYSTOLIC BLOOD PRESSURE: 116 MMHG | OXYGEN SATURATION: 99 % | HEIGHT: 68 IN | BODY MASS INDEX: 33.65 KG/M2 | HEART RATE: 70 BPM | WEIGHT: 222 LBS | DIASTOLIC BLOOD PRESSURE: 70 MMHG | TEMPERATURE: 98.6 F | RESPIRATION RATE: 15 BRPM

## 2020-06-15 DIAGNOSIS — E05.90 SUBCLINICAL HYPERTHYROIDISM: ICD-10-CM

## 2020-06-15 DIAGNOSIS — R12 HEARTBURN: ICD-10-CM

## 2020-06-15 DIAGNOSIS — I10 ESSENTIAL HYPERTENSION: ICD-10-CM

## 2020-06-15 DIAGNOSIS — N18.30 STAGE 3 CHRONIC KIDNEY DISEASE (HCC): ICD-10-CM

## 2020-06-15 DIAGNOSIS — I10 ESSENTIAL HYPERTENSION: Primary | ICD-10-CM

## 2020-06-15 DIAGNOSIS — E01.0 THYROMEGALY: ICD-10-CM

## 2020-06-15 DIAGNOSIS — Z00.00 MEDICARE ANNUAL WELLNESS VISIT, SUBSEQUENT: ICD-10-CM

## 2020-06-15 DIAGNOSIS — M1A.09X0 CHRONIC GOUT OF MULTIPLE SITES, UNSPECIFIED CAUSE: ICD-10-CM

## 2020-06-15 DIAGNOSIS — J45.20 MILD INTERMITTENT ASTHMA WITHOUT COMPLICATION: ICD-10-CM

## 2020-06-15 DIAGNOSIS — J30.9 ALLERGIC RHINITIS, UNSPECIFIED SEASONALITY, UNSPECIFIED TRIGGER: ICD-10-CM

## 2020-06-15 DIAGNOSIS — M19.90 ARTHRITIS: ICD-10-CM

## 2020-06-15 LAB
ALBUMIN SERPL-MCNC: 3.7 G/DL (ref 3.4–5)
ALBUMIN/GLOB SERPL: 1 {RATIO} (ref 0.8–1.7)
ALP SERPL-CCNC: 199 U/L (ref 45–117)
ALT SERPL-CCNC: 33 U/L (ref 13–56)
ANION GAP SERPL CALC-SCNC: 3 MMOL/L (ref 3–18)
APPEARANCE UR: CLEAR
AST SERPL-CCNC: 25 U/L (ref 10–38)
BASOPHILS # BLD: 0 K/UL (ref 0–0.1)
BASOPHILS NFR BLD: 0 % (ref 0–2)
BILIRUB SERPL-MCNC: 0.7 MG/DL (ref 0.2–1)
BILIRUB UR QL: NEGATIVE
BUN SERPL-MCNC: 18 MG/DL (ref 7–18)
BUN/CREAT SERPL: 12 (ref 12–20)
CALCIUM SERPL-MCNC: 9.6 MG/DL (ref 8.5–10.1)
CHLORIDE SERPL-SCNC: 104 MMOL/L (ref 100–111)
CO2 SERPL-SCNC: 36 MMOL/L (ref 21–32)
COLOR UR: YELLOW
CREAT SERPL-MCNC: 1.49 MG/DL (ref 0.6–1.3)
DIFFERENTIAL METHOD BLD: NORMAL
EOSINOPHIL # BLD: 0.3 K/UL (ref 0–0.4)
EOSINOPHIL NFR BLD: 4 % (ref 0–5)
ERYTHROCYTE [DISTWIDTH] IN BLOOD BY AUTOMATED COUNT: 13.8 % (ref 11.6–14.5)
GLOBULIN SER CALC-MCNC: 3.6 G/DL (ref 2–4)
GLUCOSE SERPL-MCNC: 72 MG/DL (ref 74–99)
GLUCOSE UR STRIP.AUTO-MCNC: NEGATIVE MG/DL
HCT VFR BLD AUTO: 39.9 % (ref 35–45)
HGB BLD-MCNC: 12.4 G/DL (ref 12–16)
HGB UR QL STRIP: NEGATIVE
KETONES UR QL STRIP.AUTO: NEGATIVE MG/DL
LEUKOCYTE ESTERASE UR QL STRIP.AUTO: NEGATIVE
LYMPHOCYTES # BLD: 2.5 K/UL (ref 0.9–3.6)
LYMPHOCYTES NFR BLD: 32 % (ref 21–52)
MCH RBC QN AUTO: 25.6 PG (ref 24–34)
MCHC RBC AUTO-ENTMCNC: 31.1 G/DL (ref 31–37)
MCV RBC AUTO: 82.4 FL (ref 74–97)
MONOCYTES # BLD: 0.5 K/UL (ref 0.05–1.2)
MONOCYTES NFR BLD: 7 % (ref 3–10)
NEUTS SEG # BLD: 4.4 K/UL (ref 1.8–8)
NEUTS SEG NFR BLD: 57 % (ref 40–73)
NITRITE UR QL STRIP.AUTO: NEGATIVE
PH UR STRIP: 6.5 [PH] (ref 5–8)
PLATELET # BLD AUTO: 149 K/UL (ref 135–420)
PMV BLD AUTO: 11.5 FL (ref 9.2–11.8)
POTASSIUM SERPL-SCNC: 4.9 MMOL/L (ref 3.5–5.5)
PROT SERPL-MCNC: 7.3 G/DL (ref 6.4–8.2)
PROT UR STRIP-MCNC: NEGATIVE MG/DL
RBC # BLD AUTO: 4.84 M/UL (ref 4.2–5.3)
SODIUM SERPL-SCNC: 143 MMOL/L (ref 136–145)
SP GR UR REFRACTOMETRY: 1.02 (ref 1–1.03)
UROBILINOGEN UR QL STRIP.AUTO: 1 EU/DL (ref 0.2–1)
WBC # BLD AUTO: 7.7 K/UL (ref 4.6–13.2)

## 2020-06-15 PROCEDURE — 36415 COLL VENOUS BLD VENIPUNCTURE: CPT

## 2020-06-15 PROCEDURE — 80053 COMPREHEN METABOLIC PANEL: CPT

## 2020-06-15 PROCEDURE — 81003 URINALYSIS AUTO W/O SCOPE: CPT

## 2020-06-15 PROCEDURE — 85025 COMPLETE CBC W/AUTO DIFF WBC: CPT

## 2020-06-15 RX ORDER — FAMOTIDINE 20 MG/1
20 TABLET, FILM COATED ORAL
Qty: 60 TAB | Refills: 3 | Status: SHIPPED | OUTPATIENT
Start: 2020-06-15 | End: 2020-11-11 | Stop reason: ALTCHOICE

## 2020-06-15 RX ORDER — ACETAMINOPHEN 500 MG
1000 TABLET ORAL
Qty: 100 TAB | Refills: 3 | Status: SHIPPED | OUTPATIENT
Start: 2020-06-15 | End: 2021-03-23 | Stop reason: SDUPTHER

## 2020-06-15 RX ORDER — AMLODIPINE BESYLATE 5 MG/1
5 TABLET ORAL DAILY
Qty: 90 TAB | Refills: 3 | Status: SHIPPED | OUTPATIENT
Start: 2020-06-15 | End: 2021-06-16 | Stop reason: SDUPTHER

## 2020-06-15 RX ORDER — HYDROCHLOROTHIAZIDE 25 MG/1
25 TABLET ORAL DAILY
Qty: 90 TAB | Refills: 4 | Status: SHIPPED | OUTPATIENT
Start: 2020-06-15 | End: 2021-06-16 | Stop reason: SDUPTHER

## 2020-06-15 RX ORDER — CETIRIZINE HCL 10 MG
10 TABLET ORAL DAILY
Qty: 90 TAB | Refills: 3 | Status: SHIPPED | OUTPATIENT
Start: 2020-06-15 | End: 2021-12-13 | Stop reason: SDUPTHER

## 2020-06-15 RX ORDER — FLUTICASONE PROPIONATE 50 MCG
2 SPRAY, SUSPENSION (ML) NASAL DAILY
Qty: 1 BOTTLE | Refills: 5 | Status: SHIPPED | OUTPATIENT
Start: 2020-06-15

## 2020-06-15 NOTE — PROGRESS NOTES
Jana Irvin presents today for thumb pain, arthrtis  - Is someone accompanying this pt? no  - Is the patient using any durable medical equipment during office visit? Cane    Coordination of Care:  1. Have you been to the ER, urgent care clinic since your last visit? Hospitalized since your last visit? No    2. Have you seen or consulted any other health care providers outside of the [address] [place] [address] since your last visit? Include any pap smears or colon screening.  opthalmology

## 2020-06-15 NOTE — PATIENT INSTRUCTIONS

## 2020-06-15 NOTE — PROGRESS NOTES
This is the Subsequent Medicare Annual Wellness Exam, performed 12 months or more after the Initial AWV or the last Subsequent AWV    I have reviewed the patient's medical history in detail and updated the computerized patient record. History     Patient Active Problem List   Diagnosis Code    Pure hypercholesterolemia E78.00    Edema R60.9    Chronic eczema L30.9    Arthritis M19.90    Subclinical hyperthyroidism E05.90    Severe obesity (BMI 35.0-39. 9) with comorbidity ([facility] [address] E66.01    Chronic gout of multiple sites M1A. 09X0    Essential hypertension I10    Chronic kidney disease N18.9     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    Unspecified hypothyroidism 3/22/2010      Past Surgical History:   Procedure Laterality Date    HX GYN  1983    hysterectomy    HX HYSTERECTOMY  1984    fibroids    HX KNEE REPLACEMENT Right      Current Outpatient Medications   Medication Sig Dispense Refill    hydroCHLOROthiazide (HYDRODIURIL) 25 mg tablet Take 1 Tab by mouth daily. 90 Tab 4    famotidine (PEPCID) 20 mg tablet Take 1 Tab by mouth two (2) times daily as needed for Heartburn. 60 Tab 3    acetaminophen (TYLENOL) 500 mg tablet Take 2 Tabs by mouth every eight (8) hours as needed for Pain. 100 Tab 3    cetirizine (ZYRTEC) 10 mg tablet Take 1 Tab by mouth daily. 90 Tab 3    amLODIPine (NORVASC) 5 mg tablet Take 1 Tab by mouth daily. 90 Tab 3    fluticasone propionate (FLONASE) 50 mcg/actuation nasal spray 2 Sprays by Both Nostrils route daily.  1 Bottle 5    fluocinoNIDE (LIDEX) 0.05 % ointment Apply  to affected area two (2) times a day. 60 g 4    albuterol (PROVENTIL HFA, VENTOLIN HFA, PROAIR HFA) 90 mcg/actuation inhaler Take 2 Puffs by inhalation every six (6) hours as needed for Wheezing. 1 Inhaler 0    furosemide (LASIX) 40 mg tablet Take 1 Tab by mouth daily. Only if needed for edema 30 Tab 3    allopurinol (ZYLOPRIM) 300 mg tablet Take 1 Tab by mouth daily. 80 Tab 4     No Known Allergies    Family History   Problem Relation Age of Onset    Hypertension Mother     Breast Cancer Mother 70    Breast Cancer Child 36    Breast Cancer Sister 39     Social History     Tobacco Use    Smoking status: Never Smoker    Smokeless tobacco: Never Used   Substance Use Topics    Alcohol use: Yes     Alcohol/week: 2.5 standard drinks     Types: 3 Standard drinks or equivalent per week       Depression Risk Factor Screening:     3 most recent PHQ Screens 6/15/2020   PHQ Not Done -   Little interest or pleasure in doing things Not at all   Feeling down, depressed, irritable, or hopeless Not at all   Total Score PHQ 2 0       Alcohol Risk Factor Screening:   Do you average 1 drink per night or more than 7 drinks a week:  No    On any one occasion in the past three months have you have had more than 3 drinks containing alcohol:  No      Functional Ability and Level of Safety:   Hearing: Hearing is good. Activities of Daily Living: The home contains: no safety equipment. Patient needs help with:  transportation and shopping    Ambulation: with mild difficulty    Fall Risk:  Fall Risk Assessment, last 12 mths 6/15/2020   Able to walk? Yes   Fall in past 12 months?  No   Fall with injury? -   Number of falls in past 12 months -   Fall Risk Score -       Abuse Screen:  Patient is not abused    Cognitive Screening   Has your family/caregiver stated any concerns about your memory: no       Patient Care Team   Patient Care Team:  Danielle Nunez NP as PCP - General (Nurse Practitioner)  Danielle Nunez NP as PCP - [facility] Provider  Barrie Rousseau MD (Orthopedic Surgery)  Barrie Rousseau MD (Orthopedic Surgery)  Beatriz Rinaldi MD (Gastroenterology)  Cornelio Richardson MD (Surgery)    Assessment/Plan   Education and counseling provided:  Are appropriate based on today's review and evaluation    Diagnoses and all orders for this visit:    1. Essential hypertension  -     hydroCHLOROthiazide (HYDRODIURIL) 25 mg tablet; Take 1 Tab by mouth daily.  -     CBC WITH AUTOMATED DIFF; Future  -     METABOLIC PANEL, COMPREHENSIVE; Future  -     URINALYSIS W/ RFLX MICROSCOPIC; Future  -     amLODIPine (NORVASC) 5 mg tablet; Take 1 Tab by mouth daily. 2. Chronic gout of multiple sites, unspecified cause    3. Arthritis  -     acetaminophen (TYLENOL) 500 mg tablet; Take 2 Tabs by mouth every eight (8) hours as needed for Pain. 4. Heartburn  -     famotidine (PEPCID) 20 mg tablet; Take 1 Tab by mouth two (2) times daily as needed for Heartburn. 5. Allergic rhinitis, unspecified seasonality, unspecified trigger  -     cetirizine (ZYRTEC) 10 mg tablet; Take 1 Tab by mouth daily. -     fluticasone propionate (FLONASE) 50 mcg/actuation nasal spray; 2 Sprays by Both Nostrils route daily. 6. Thyromegaly  -     US THYROID/PARATHYROID/SOFT TISS; Future    7. Medicare annual wellness visit, subsequent    See separate note for problems discussed during this visit. Patient advised to get needed vaccines from her pharmacy. Patient advised that her eye exam is due.     Health Maintenance Due   Topic Date Due    DTaP/Tdap/Td series (1 - Tdap) 01/18/1958    Shingrix Vaccine Age 50> (1 of 2) 01/18/1987    GLAUCOMA SCREENING Q2Y  11/03/2018    Medicare Yearly Exam  05/13/2020

## 2020-06-15 NOTE — PROGRESS NOTES
Subjective     Patient ID:  Michael Martinez is a 82 y.o. ( 1935) female who presents for the following:   Hoarse and Thumb Pain      HPI  Presents for follow-up. Hoarse voice:  Reports a 3-week history of hoarse voice, but denies any sore throat or difficulty swallowing. She also reports clear rhinorrhea, postnasal drip, and dry cough. Reports intermittent left ear pain. She reports a history of environmental allergies. Taking fexofenadine. Subclinical hyperthyroidism:  TSH and free T4 levels have been stable over the last several years. She has not seen a specialist.  She did have a FNA biopsy about 10 years ago. Thumb pain:  Reports a several week history of intermittent pain in left thumb and stiffness and clicking with flexion of the thumb. Heartburn:  Reports occasional symptoms. Not taking anything. I previously stopped her PPI due to  declining GFR. Edema:   Chronic lower leg and foot edema. Takes furosemide about twice a week which helps. Elevating legs helps. Asthma:   Reports shortness of breath with activity at times. Needs inhaler about twice a week. Helps when she uses it. Gout:   Reports intermittent pain in the  toes. Takes allopurinol as needed which helps when she takes it. CKD:  eGFR has been 40-45 over the last year. Stopped PPI. Still taking naproxen occasionally. Review of Systems   Constitutional: Negative for appetite change, chills, diaphoresis, fatigue, fever and unexpected weight change. HENT: Positive for ear pain, postnasal drip, rhinorrhea and voice change. Negative for congestion, ear discharge, hearing loss, sinus pressure, sinus pain, sneezing, sore throat, tinnitus and trouble swallowing. Eyes: Negative for discharge, redness and visual disturbance. Respiratory: Negative for cough, chest tightness, shortness of breath and wheezing. Cardiovascular: Positive for leg swelling. Negative for chest pain and palpitations. Gastrointestinal: Negative for abdominal distention, abdominal pain, blood in stool, constipation, diarrhea, nausea, rectal pain and vomiting. Endocrine: Negative for polydipsia, polyphagia and polyuria. Genitourinary: Negative for decreased urine volume, dysuria and frequency. Musculoskeletal: Positive for arthralgias. Negative for joint swelling, myalgias, neck pain and neck stiffness. Skin: Negative for rash and wound. Allergic/Immunologic: Positive for environmental allergies. Neurological: Negative for dizziness, weakness, light-headedness, numbness and headaches. Psychiatric/Behavioral: Negative for dysphoric mood and sleep disturbance. The patient is not nervous/anxious. Past Medical History, Past Surgery History, Allergies, Social History, and Family History were reviewed and updated. Patient Active Problem List   Diagnosis Code    Pure hypercholesterolemia E78.00    Edema R60.9    Chronic eczema L30.9    Arthritis M19.90    Subclinical hyperthyroidism E05.90    Severe obesity (BMI 35.0-39. 9) with comorbidity ([facility] [address] E66.01    Chronic gout of multiple sites M1A. 09X0    Essential hypertension I10    Chronic kidney disease N18.9    Asthma J45.909     Past Medical History:   Diagnosis Date    Arthritis 9/20/2010    Asthma     Chronic eczema 3/22/2010    Chronic kidney disease     Clot 10/2018    lower left exremity, shin     Edema 3/22/2010    Environmental allergies     year round    Essential hypertension, malignant 3/22/2010    Gout     Menopause     Pure hypercholesterolemia 3/22/2010    Subclinical hyperthyroidism 5/30/2017    Unspecified arthropathy, shoulder region 3/22/2010    Unspecified hypothyroidism 3/22/2010     Patient Care Team:  Opal Balderrama NP as PCP - General (Nurse Practitioner)  Opal Balderrama NP as PCP - [facility] Empaneled Provider  Ashlee Lenz MD (Orthopedic Surgery)  Ashlee Lenz MD (Orthopedic Surgery)  Asuncion Love MD (Gastroenterology)  Summer Kelly MD (Surgery)    Past Surgical History:   Procedure Laterality Date    HX GYN  1983    hysterectomy    HX HYSTERECTOMY  1984    fibroids    HX KNEE REPLACEMENT Right      Family History   Problem Relation Age of Onset    Hypertension Mother     Breast Cancer Mother 70    Breast Cancer Child 36    Breast Cancer Sister 39     Social History     Tobacco Use    Smoking status: Never Smoker    Smokeless tobacco: Never Used   Substance Use Topics    Alcohol use: Yes     Alcohol/week: 2.5 standard drinks     Types: 3 Standard drinks or equivalent per week    Drug use: No     No Known Allergies  Current Outpatient Medications on File Prior to Visit   Medication Sig Dispense Refill    fluocinoNIDE (LIDEX) 0.05 % ointment Apply  to affected area two (2) times a day. 60 g 4    albuterol (PROVENTIL HFA, VENTOLIN HFA, PROAIR HFA) 90 mcg/actuation inhaler Take 2 Puffs by inhalation every six (6) hours as needed for Wheezing. 1 Inhaler 0    furosemide (LASIX) 40 mg tablet Take 1 Tab by mouth daily. Only if needed for edema 30 Tab 3    allopurinol (ZYLOPRIM) 300 mg tablet Take 1 Tab by mouth daily. 90 Tab 4     No current facility-administered medications on file prior to visit. Health Maintenance Due   Topic Date Due    DTaP/Tdap/Td series (1 - Tdap) 01/18/1958    Shingrix Vaccine Age 50> (1 of 2) 01/18/1987    GLAUCOMA SCREENING Q2Y  11/03/2018         Objective     Visit Vitals  /70 (BP 1 Location: Right arm, BP Patient Position: Sitting)   Pulse 70   Temp 98.6 °F (37 °C) (Oral)   Resp 15   Ht 5' 8\" (1.727 m)   Wt 222 lb (100.7 kg)   SpO2 99%   BMI 33.75 kg/m²     No LMP recorded. Patient has had a hysterectomy. Physical Exam  Constitutional:       General: She is not in acute distress. Appearance: Normal appearance. She is well-developed. She is not diaphoretic.    Eyes:      Conjunctiva/sclera: Conjunctivae normal.      Pupils: Pupils are equal, round, and reactive to light. Neck:      Thyroid: Thyromegaly (Left lobe enlarged and firm) present. Vascular: No carotid bruit. Cardiovascular:      Rate and Rhythm: Normal rate and regular rhythm. Pulses: Normal pulses. Heart sounds: Normal heart sounds. No murmur. Pulmonary:      Effort: Pulmonary effort is normal. No respiratory distress. Breath sounds: Normal breath sounds. Abdominal:      General: Bowel sounds are normal. There is no distension. Palpations: Abdomen is soft. There is no mass. Tenderness: There is no abdominal tenderness. Musculoskeletal:      Right lower leg: Edema (2+) present. Left lower leg: Edema (2+) present. Comments: Left thumb: tenderness, stiffness, and pain with flexion noted. Skin:     General: Skin is warm and dry. Findings: No rash. Neurological:      Mental Status: She is alert and oriented to person, place, and time. LABS     TESTS      Assessment and Plan     1. Essential hypertension  Labs today. Stop lisinopril. Start amlodipine. Continue hydrochlorothiazide. Recheck blood pressure in 1 to 2 weeks. - hydroCHLOROthiazide (HYDRODIURIL) 25 mg tablet; Take 1 Tab by mouth daily. Dispense: 90 Tab; Refill: 4  - CBC WITH AUTOMATED DIFF; Future  - METABOLIC PANEL, COMPREHENSIVE; Future  - URINALYSIS W/ RFLX MICROSCOPIC; Future  - amLODIPine (NORVASC) 5 mg tablet; Take 1 Tab by mouth daily. Dispense: 90 Tab; Refill: 3    2. Chronic gout of multiple sites, unspecified cause  Fairly controlled. Continue current regimen. 3. Arthritis  May use OTC topical agents as well as Tylenol as needed. - acetaminophen (TYLENOL) 500 mg tablet; Take 2 Tabs by mouth every eight (8) hours as needed for Pain. Dispense: 100 Tab; Refill: 3    4. Heartburn  Start famotidine as needed. - famotidine (PEPCID) 20 mg tablet; Take 1 Tab by mouth two (2) times daily as needed for Heartburn. Dispense: 60 Tab; Refill: 3    5.  Allergic rhinitis, unspecified seasonality, unspecified trigger  Stop  Allegra and start Zyrtec and Flonase. Follow-up if symptoms continue. - cetirizine (ZYRTEC) 10 mg tablet; Take 1 Tab by mouth daily. Dispense: 90 Tab; Refill: 3  - fluticasone propionate (FLONASE) 50 mcg/actuation nasal spray; 2 Sprays by Both Nostrils route daily. Dispense: 1 Bottle; Refill: 5    6. Thyromegaly  Further plan after results  - US THYROID/PARATHYROID/SOFT TISS; Future    7. Medicare annual wellness visit, subsequent  See my separate note. 8. Subclinical hyperthyroidism  Stable. 9. Stage 3 chronic kidney disease (HCC)  Stable. Stop lisinopril. Stop naproxen. Will check again in 3 months. 10. Mild intermittent asthma without complication  Well-controlled. Continue albuterol as needed. Follow-up and Dispositions    · Return for blood pressure check (nurse visit) in 1-2 week, follow up with Danyel Swanson in 3 months. Risks, benefits, and alternatives of the medications and treatment plan prescribed today were discussed, and patient expressed understanding. Printed after visit summary was given to patient and reviewed. All patient questions and concerns were addressed. Plan follow-up as discussed or as needed if any worsening symptoms or change in condition.            Signed electronically by Shay Juan DNP, FARAZ

## 2020-06-22 ENCOUNTER — HOSPITAL ENCOUNTER (OUTPATIENT)
Dept: ULTRASOUND IMAGING | Age: 85
Discharge: HOME | End: 2020-06-22
Attending: NURSE PRACTITIONER
Payer: MEDICARE

## 2020-06-22 DIAGNOSIS — R74.8 ELEVATED ALKALINE PHOSPHATASE LEVEL: Primary | ICD-10-CM

## 2020-06-22 DIAGNOSIS — E04.2 MULTIPLE THYROID NODULES: Primary | ICD-10-CM

## 2020-06-22 DIAGNOSIS — E01.0 THYROMEGALY: ICD-10-CM

## 2020-06-22 PROCEDURE — 76536 US EXAM OF HEAD AND NECK: CPT

## 2020-06-23 ENCOUNTER — TELEPHONE (OUTPATIENT)
Dept: FAMILY MEDICINE CLINIC | Age: 85
End: 2020-06-23

## 2020-06-23 NOTE — TELEPHONE ENCOUNTER
Called and spoke with patient and informed her about the Us thyroid, she will need to see ENT for further workup. She verbalized understanding.

## 2020-06-23 NOTE — TELEPHONE ENCOUNTER
----- Message from Supa Enriquez NP sent at 6/22/2020  1:10 PM EDT -----  Please call the patient regarding her lab result. Your thyroid US did show multiple cysts and nodules. I would like you to see a specialist to possibly do another biopsy.  I have entered the referral.

## 2020-06-29 ENCOUNTER — CLINICAL SUPPORT (OUTPATIENT)
Dept: FAMILY MEDICINE CLINIC | Age: 85
End: 2020-06-29

## 2020-06-29 VITALS
OXYGEN SATURATION: 98 % | RESPIRATION RATE: 18 BRPM | BODY MASS INDEX: 33.04 KG/M2 | HEART RATE: 92 BPM | SYSTOLIC BLOOD PRESSURE: 123 MMHG | TEMPERATURE: 98.6 F | HEIGHT: 68 IN | DIASTOLIC BLOOD PRESSURE: 84 MMHG | WEIGHT: 218 LBS

## 2020-06-29 DIAGNOSIS — Z01.30 BP CHECK: Primary | ICD-10-CM

## 2020-06-29 NOTE — PROGRESS NOTES
Apple December presents today for BP check  - Is someone accompanying this pt? no  - Is the patient using any durable medical equipment during office visit? Cane    Coordination of Care:  1. Have you been to the ER, urgent care clinic since your last visit? Hospitalized since your last visit? no    2. Have you seen or consulted any other health care providers outside of the [address] [place] [address] since your last visit? Include any pap smears or colon screening.  no

## 2020-08-06 ENCOUNTER — HOSPITAL ENCOUNTER (OUTPATIENT)
Dept: MAMMOGRAPHY | Age: 85
Discharge: HOME | End: 2020-08-06
Payer: MEDICARE

## 2020-08-06 DIAGNOSIS — Z12.31 ENCOUNTER FOR SCREENING MAMMOGRAM FOR BREAST CANCER: ICD-10-CM

## 2020-08-06 PROCEDURE — 77063 BREAST TOMOSYNTHESIS BI: CPT

## 2020-09-15 ENCOUNTER — HOSPITAL ENCOUNTER (OUTPATIENT)
Dept: LAB | Age: 85
Discharge: HOME | End: 2020-09-15
Payer: MEDICARE

## 2020-09-15 ENCOUNTER — OFFICE VISIT (OUTPATIENT)
Dept: FAMILY MEDICINE CLINIC | Age: 85
End: 2020-09-15

## 2020-09-15 VITALS
DIASTOLIC BLOOD PRESSURE: 68 MMHG | HEIGHT: 68 IN | RESPIRATION RATE: 15 BRPM | SYSTOLIC BLOOD PRESSURE: 132 MMHG | HEART RATE: 65 BPM | BODY MASS INDEX: 33.04 KG/M2 | TEMPERATURE: 97.1 F | OXYGEN SATURATION: 98 % | WEIGHT: 218 LBS

## 2020-09-15 DIAGNOSIS — Z23 ENCOUNTER FOR IMMUNIZATION: ICD-10-CM

## 2020-09-15 DIAGNOSIS — M19.90 ARTHRITIS: ICD-10-CM

## 2020-09-15 DIAGNOSIS — N18.30 STAGE 3 CHRONIC KIDNEY DISEASE (HCC): ICD-10-CM

## 2020-09-15 DIAGNOSIS — Z23 NEEDS FLU SHOT: ICD-10-CM

## 2020-09-15 DIAGNOSIS — R74.8 ELEVATED ALKALINE PHOSPHATASE LEVEL: ICD-10-CM

## 2020-09-15 DIAGNOSIS — E05.90 SUBCLINICAL HYPERTHYROIDISM: ICD-10-CM

## 2020-09-15 DIAGNOSIS — M1A.09X0 CHRONIC GOUT OF MULTIPLE SITES, UNSPECIFIED CAUSE: ICD-10-CM

## 2020-09-15 DIAGNOSIS — E66.01 SEVERE OBESITY (BMI 35.0-39.9) WITH COMORBIDITY (HCC): ICD-10-CM

## 2020-09-15 DIAGNOSIS — J45.20 MILD INTERMITTENT ASTHMA WITHOUT COMPLICATION: ICD-10-CM

## 2020-09-15 DIAGNOSIS — I10 ESSENTIAL HYPERTENSION: Primary | ICD-10-CM

## 2020-09-15 LAB
25(OH)D3 SERPL-MCNC: 11.8 NG/ML (ref 30–100)
ALBUMIN SERPL-MCNC: 3.3 G/DL (ref 3.4–5)
ALBUMIN/GLOB SERPL: 0.8 {RATIO} (ref 0.8–1.7)
ALP SERPL-CCNC: 127 U/L (ref 45–117)
ALT SERPL-CCNC: 30 U/L (ref 13–56)
ANION GAP SERPL CALC-SCNC: 1 MMOL/L (ref 3–18)
APPEARANCE UR: ABNORMAL
AST SERPL-CCNC: 30 U/L (ref 10–38)
BACTERIA URNS QL MICRO: ABNORMAL /HPF
BASOPHILS # BLD: 0 K/UL (ref 0–0.1)
BASOPHILS NFR BLD: 0 % (ref 0–2)
BILIRUB SERPL-MCNC: 1.1 MG/DL (ref 0.2–1)
BILIRUB UR QL: NEGATIVE
BUN SERPL-MCNC: 14 MG/DL (ref 7–18)
BUN/CREAT SERPL: 11 (ref 12–20)
CALCIUM SERPL-MCNC: 9.6 MG/DL (ref 8.5–10.1)
CALCIUM SERPL-MCNC: 9.7 MG/DL (ref 8.5–10.1)
CHLORIDE SERPL-SCNC: 103 MMOL/L (ref 100–111)
CO2 SERPL-SCNC: 38 MMOL/L (ref 21–32)
COLOR UR: YELLOW
CREAT SERPL-MCNC: 1.32 MG/DL (ref 0.6–1.3)
DIFFERENTIAL METHOD BLD: ABNORMAL
EOSINOPHIL # BLD: 0.3 K/UL (ref 0–0.4)
EOSINOPHIL NFR BLD: 3 % (ref 0–5)
EPITH CASTS URNS QL MICRO: ABNORMAL /LPF (ref 0–5)
ERYTHROCYTE [DISTWIDTH] IN BLOOD BY AUTOMATED COUNT: 13.4 % (ref 11.6–14.5)
GLOBULIN SER CALC-MCNC: 3.9 G/DL (ref 2–4)
GLUCOSE SERPL-MCNC: 93 MG/DL (ref 74–99)
GLUCOSE UR STRIP.AUTO-MCNC: NEGATIVE MG/DL
HCT VFR BLD AUTO: 41.4 % (ref 35–45)
HGB BLD-MCNC: 13 G/DL (ref 12–16)
HGB UR QL STRIP: NEGATIVE
KETONES UR QL STRIP.AUTO: NEGATIVE MG/DL
LEUKOCYTE ESTERASE UR QL STRIP.AUTO: ABNORMAL
LYMPHOCYTES # BLD: 2.4 K/UL (ref 0.9–3.6)
LYMPHOCYTES NFR BLD: 29 % (ref 21–52)
MCH RBC QN AUTO: 26.1 PG (ref 24–34)
MCHC RBC AUTO-ENTMCNC: 31.4 G/DL (ref 31–37)
MCV RBC AUTO: 83 FL (ref 74–97)
MONOCYTES # BLD: 0.7 K/UL (ref 0.05–1.2)
MONOCYTES NFR BLD: 8 % (ref 3–10)
NEUTS SEG # BLD: 4.9 K/UL (ref 1.8–8)
NEUTS SEG NFR BLD: 60 % (ref 40–73)
NITRITE UR QL STRIP.AUTO: NEGATIVE
PH UR STRIP: 7 [PH] (ref 5–8)
PHOSPHATE SERPL-MCNC: 2.2 MG/DL (ref 2.5–4.9)
PLATELET # BLD AUTO: 207 K/UL (ref 135–420)
PMV BLD AUTO: 11.9 FL (ref 9.2–11.8)
POTASSIUM SERPL-SCNC: 3.5 MMOL/L (ref 3.5–5.5)
PROT SERPL-MCNC: 7.2 G/DL (ref 6.4–8.2)
PROT UR STRIP-MCNC: NEGATIVE MG/DL
PTH-INTACT SERPL-MCNC: 159.8 PG/ML (ref 18.4–88)
RBC # BLD AUTO: 4.99 M/UL (ref 4.2–5.3)
RBC #/AREA URNS HPF: NEGATIVE /HPF (ref 0–5)
SODIUM SERPL-SCNC: 142 MMOL/L (ref 136–145)
SP GR UR REFRACTOMETRY: 1.01 (ref 1–1.03)
T4 FREE SERPL-MCNC: 1.3 NG/DL (ref 0.7–1.5)
TSH SERPL DL<=0.05 MIU/L-ACNC: <0.01 UIU/ML (ref 0.36–3.74)
UROBILINOGEN UR QL STRIP.AUTO: 1 EU/DL (ref 0.2–1)
WBC # BLD AUTO: 8.3 K/UL (ref 4.6–13.2)
WBC URNS QL MICRO: ABNORMAL /HPF (ref 0–5)
YEAST URNS QL MICRO: ABNORMAL

## 2020-09-15 PROCEDURE — 81001 URINALYSIS AUTO W/SCOPE: CPT

## 2020-09-15 PROCEDURE — 84100 ASSAY OF PHOSPHORUS: CPT

## 2020-09-15 PROCEDURE — 83970 ASSAY OF PARATHORMONE: CPT

## 2020-09-15 PROCEDURE — 82306 VITAMIN D 25 HYDROXY: CPT

## 2020-09-15 PROCEDURE — 84443 ASSAY THYROID STIM HORMONE: CPT

## 2020-09-15 PROCEDURE — 36415 COLL VENOUS BLD VENIPUNCTURE: CPT

## 2020-09-15 PROCEDURE — 84439 ASSAY OF FREE THYROXINE: CPT

## 2020-09-15 PROCEDURE — 80053 COMPREHEN METABOLIC PANEL: CPT

## 2020-09-15 PROCEDURE — 84080 ASSAY ALKALINE PHOSPHATASES: CPT

## 2020-09-15 PROCEDURE — 85025 COMPLETE CBC W/AUTO DIFF WBC: CPT

## 2020-09-15 NOTE — PROGRESS NOTES
Subjective     Patient ID:  Geni Dale is a 82 y.o. ( 1935) female who presents for the following:   Knee Pain (Left knee ) and Hypertension      HPI     Thyroid nodules:   Referred to ENT for biopsy, she was given mylanta for acid reflux. Dysphonia resolved. CKD:   Stopped lisinopril and naproxen. Taking ES tylenol for knee pain. Edema:   Chronic lower leg and foot edema. Takes furosemide about twice a week which helps. Elevating legs helps.      Asthma:   Reports shortness of breath with activity at times. Needs inhaler about twice a week. Helps when she uses it.      Gout:   Reports intermittent pain in the  toes. Takes allopurinol as needed which helps when she takes it. Hypertension follow up:  Taking medications as prescribed: Yes  Checking BP at home: No   Symptoms: none      Review of Systems   Constitutional: Negative for appetite change, diaphoresis, fatigue and unexpected weight change. Eyes: Negative for visual disturbance. Respiratory: Negative for cough, chest tightness and shortness of breath. Cardiovascular: Positive for leg swelling. Negative for chest pain and palpitations. Gastrointestinal: Negative for abdominal distention, abdominal pain, blood in stool, constipation, diarrhea, nausea, rectal pain and vomiting. Endocrine: Negative for polydipsia, polyphagia and polyuria. Genitourinary: Negative for decreased urine volume, dysuria and frequency. Musculoskeletal: Positive for arthralgias. Negative for joint swelling and myalgias. Skin: Negative for rash and wound. Neurological: Negative for dizziness, weakness, light-headedness, numbness and headaches. Psychiatric/Behavioral: Negative for dysphoric mood and sleep disturbance. The patient is not nervous/anxious. Past Medical History, Past Surgery History, Allergies, Social History, and Family History were reviewed and updated.       Patient Active Problem List   Diagnosis Code    Pure hypercholesterolemia E78.00    Edema R60.9    Chronic eczema L30.9    Arthritis M19.90    Subclinical hyperthyroidism E05.90    Severe obesity (BMI 35.0-39. 9) with comorbidity ([address] E66.01    Chronic gout of multiple sites M1A. 09X0    Essential hypertension I10    Chronic kidney disease N18.9    Asthma J45.909    Multinodular goiter E04.2     Past Medical History:   Diagnosis Date    Arthritis 9/20/2010    Asthma     Chronic eczema 3/22/2010    Chronic kidney disease     Clot 10/2018    lower left exremity, shin     Edema 3/22/2010    Environmental allergies     year round    Essential hypertension, malignant 3/22/2010    Gout     Menopause     Multinodular goiter     Followed by Dr. Jair Tuttle, ENT. FNA 2020    Pure hypercholesterolemia 3/22/2010    Subclinical hyperthyroidism 5/30/2017    Unspecified arthropathy, shoulder region 3/22/2010    Unspecified hypothyroidism 3/22/2010     Patient Care Team:  Yash Foote NP as PCP - General (Nurse Practitioner)  Yash Foote NP as PCP - [facility] Empaneled Provider  Keenan Bejarano MD (Orthopedic Surgery)  Keenan Bejarano MD (Orthopedic Surgery)  Janee Carrington MD (Gastroenterology)  Indu Hutton MD (Surgery)  Kael Damon MD (Otolaryngology)    Past Surgical History:   Procedure Laterality Date    HX GYN  1983    hysterectomy    HX HYSTERECTOMY  1984    fibroids    HX KNEE REPLACEMENT Right     IR FINE NEEDLE ASPIRATION THYROID  07/2020    benign     Family History   Problem Relation Age of Onset    Hypertension Mother     Breast Cancer Mother 70    Breast Cancer Child 36    Breast Cancer Sister 39     Social History     Tobacco Use    Smoking status: Never Smoker    Smokeless tobacco: Never Used   Substance Use Topics    Alcohol use:  Yes     Alcohol/week: 2.5 standard drinks     Types: 3 Standard drinks or equivalent per week    Drug use: No     No Known Allergies  Current Outpatient Medications on File Prior to Visit Medication Sig Dispense Refill    hydroCHLOROthiazide (HYDRODIURIL) 25 mg tablet Take 1 Tab by mouth daily. 90 Tab 4    famotidine (PEPCID) 20 mg tablet Take 1 Tab by mouth two (2) times daily as needed for Heartburn. 60 Tab 3    acetaminophen (TYLENOL) 500 mg tablet Take 2 Tabs by mouth every eight (8) hours as needed for Pain. 100 Tab 3    cetirizine (ZYRTEC) 10 mg tablet Take 1 Tab by mouth daily. 90 Tab 3    amLODIPine (NORVASC) 5 mg tablet Take 1 Tab by mouth daily. 90 Tab 3    fluticasone propionate (FLONASE) 50 mcg/actuation nasal spray 2 Sprays by Both Nostrils route daily. 1 Bottle 5    fluocinoNIDE (LIDEX) 0.05 % ointment Apply  to affected area two (2) times a day. 60 g 4    albuterol (PROVENTIL HFA, VENTOLIN HFA, PROAIR HFA) 90 mcg/actuation inhaler Take 2 Puffs by inhalation every six (6) hours as needed for Wheezing. 1 Inhaler 0    furosemide (LASIX) 40 mg tablet Take 1 Tab by mouth daily. Only if needed for edema 30 Tab 3    allopurinol (ZYLOPRIM) 300 mg tablet Take 1 Tab by mouth daily. 90 Tab 4     No current facility-administered medications on file prior to visit. Health Maintenance Due   Topic Date Due    DTaP/Tdap/Td series (1 - Tdap) 01/18/1958    Shingrix Vaccine Age 50> (1 of 2) 01/18/1987    GLAUCOMA SCREENING Q2Y  11/03/2018    Flu Vaccine (1) 09/01/2020         Objective     Visit Vitals  /68   Pulse 65   Temp 97.1 °F (36.2 °C) (Skin)   Resp 15   Ht 5' 8\" (1.727 m)   Wt 218 lb (98.9 kg)   SpO2 98%   BMI 33.15 kg/m²     No LMP recorded. Patient has had a hysterectomy. Physical Exam  Constitutional:       General: She is not in acute distress. Appearance: Normal appearance. She is well-developed. She is not diaphoretic. Eyes:      Conjunctiva/sclera: Conjunctivae normal.      Pupils: Pupils are equal, round, and reactive to light. Neck:      Vascular: No carotid bruit. Cardiovascular:      Rate and Rhythm: Normal rate and regular rhythm.       Pulses: Normal pulses. Heart sounds: Normal heart sounds. No murmur. Pulmonary:      Effort: Pulmonary effort is normal. No respiratory distress. Breath sounds: Normal breath sounds. Abdominal:      General: Bowel sounds are normal. There is no distension. Palpations: Abdomen is soft. There is no mass. Tenderness: There is no abdominal tenderness. Skin:     General: Skin is warm and dry. Findings: No rash. Neurological:      Mental Status: She is alert and oriented to person, place, and time. LABS     TESTS      Assessment and Plan     1. Essential hypertension  Well controlled. Continue current meds. 2. Stage 3 chronic kidney disease (HCC)  Recheck levels. Continue avoidance of renal toxins and hydrate well. - CBC WITH AUTOMATED DIFF; Future  - METABOLIC PANEL, COMPREHENSIVE; Future  - PHOSPHORUS; Future  - VITAMIN D, 25 HYDROXY; Future  - URINALYSIS W/ RFLX MICROSCOPIC; Future    3. Elevated alkaline phosphatase level  - METABOLIC PANEL, COMPREHENSIVE; Future  - ALK PHOS ISOENZYMES; Future    4. Subclinical hyperthyroidism  recheck  - TSH 3RD GENERATION; Future  - T4, FREE; Future    5. Severe obesity (BMI 35.0-39. 9) with comorbidity ([address]  Encouraged weight loss. 6. Encounter for immunization    7. Needs flu shot  - FLU (FLUAD QUAD INFLUENZA VACCINE,QUAD,ADJUVANTED)    8. Chronic gout of multiple sites, unspecified cause  Well controlled. Continue allopurinol prn    9. Arthritis  Continue tylenol prn    10. Mild intermittent asthma without complication  Continue albuterol prn      Follow-up and Dispositions    · Return in about 3 months (around 12/15/2020) for High blood pressure follow up, in person. Risks, benefits, and alternatives of the medications and treatment plan prescribed today were discussed, and patient expressed understanding. Printed after visit summary was given to patient and reviewed. All patient questions and concerns were addressed.  Plan follow-up as discussed or as needed if any worsening symptoms or change in condition.            Signed electronically by Se Mckeon DNP, FNP-BC

## 2020-09-16 NOTE — PROGRESS NOTES
Obtained signed consent form from patient. Per verbal order from Cristina Christy- injection of Fl administered. Verified by this nurse and TULIO Kenyon that this is the correct immunization/injection. Patient instructed to remain in clinic for 15 minutes and to report any adverse reactions immediately. Most recent VIS given. No adverse reactions noted after 15 minutes of observation.     9/15/20

## 2020-09-17 LAB
ALP BONE CFR SERPL: 17 % (ref 14–68)
ALP INTEST CFR SERPL: 44 % (ref 0–18)
ALP LIVER CFR SERPL: 39 % (ref 18–85)
ALP SERPL-CCNC: 120 IU/L (ref 39–117)

## 2020-10-01 DIAGNOSIS — N18.9 CHRONIC KIDNEY DISEASE, UNSPECIFIED CKD STAGE: Primary | ICD-10-CM

## 2020-10-01 DIAGNOSIS — E55.9 VITAMIN D DEFICIENCY: ICD-10-CM

## 2020-10-01 RX ORDER — ERGOCALCIFEROL 1.25 MG/1
50000 CAPSULE ORAL
Qty: 12 CAP | Refills: 1 | Status: SHIPPED | OUTPATIENT
Start: 2020-10-01 | End: 2020-12-08 | Stop reason: SDUPTHER

## 2020-10-05 ENCOUNTER — TELEPHONE (OUTPATIENT)
Dept: FAMILY MEDICINE CLINIC | Age: 85
End: 2020-10-05

## 2020-11-11 ENCOUNTER — VIRTUAL VISIT (OUTPATIENT)
Dept: FAMILY MEDICINE CLINIC | Age: 85
End: 2020-11-11
Payer: MEDICARE

## 2020-11-11 DIAGNOSIS — N18.30 STAGE 3 CHRONIC KIDNEY DISEASE, UNSPECIFIED WHETHER STAGE 3A OR 3B CKD (HCC): ICD-10-CM

## 2020-11-11 DIAGNOSIS — M19.90 ARTHRITIS: ICD-10-CM

## 2020-11-11 DIAGNOSIS — I10 ESSENTIAL HYPERTENSION: Primary | ICD-10-CM

## 2020-11-11 PROCEDURE — 99213 OFFICE O/P EST LOW 20 MIN: CPT | Performed by: NURSE PRACTITIONER

## 2020-11-11 PROCEDURE — G0463 HOSPITAL OUTPT CLINIC VISIT: HCPCS | Performed by: NURSE PRACTITIONER

## 2020-11-11 RX ORDER — LISINOPRIL 20 MG/1
20 TABLET ORAL DAILY
Qty: 30 TAB | Refills: 0 | Status: SHIPPED | OUTPATIENT
Start: 2020-11-11 | End: 2020-12-08 | Stop reason: SDUPTHER

## 2020-11-16 ENCOUNTER — HOSPITAL ENCOUNTER (OUTPATIENT)
Dept: LAB | Age: 85
Discharge: HOME | End: 2020-11-16
Payer: MEDICARE

## 2020-11-16 ENCOUNTER — APPOINTMENT (OUTPATIENT)
Dept: FAMILY MEDICINE CLINIC | Age: 85
End: 2020-11-16

## 2020-11-16 DIAGNOSIS — N18.9 CHRONIC KIDNEY DISEASE, UNSPECIFIED CKD STAGE: ICD-10-CM

## 2020-11-16 DIAGNOSIS — N18.30 STAGE 3 CHRONIC KIDNEY DISEASE, UNSPECIFIED WHETHER STAGE 3A OR 3B CKD (HCC): ICD-10-CM

## 2020-11-16 DIAGNOSIS — E55.9 VITAMIN D DEFICIENCY: ICD-10-CM

## 2020-11-16 LAB
25(OH)D3 SERPL-MCNC: 21.9 NG/ML (ref 30–100)
ANION GAP SERPL CALC-SCNC: 4 MMOL/L (ref 3–18)
BUN SERPL-MCNC: 13 MG/DL (ref 7–18)
BUN/CREAT SERPL: 11 (ref 12–20)
CALCIUM SERPL-MCNC: 9.3 MG/DL (ref 8.5–10.1)
CALCIUM SERPL-MCNC: 9.5 MG/DL (ref 8.5–10.1)
CHLORIDE SERPL-SCNC: 108 MMOL/L (ref 100–111)
CO2 SERPL-SCNC: 32 MMOL/L (ref 21–32)
CREAT SERPL-MCNC: 1.19 MG/DL (ref 0.6–1.3)
GLUCOSE SERPL-MCNC: 94 MG/DL (ref 74–99)
POTASSIUM SERPL-SCNC: 3.8 MMOL/L (ref 3.5–5.5)
PTH-INTACT SERPL-MCNC: 201.7 PG/ML (ref 18.4–88)
SODIUM SERPL-SCNC: 144 MMOL/L (ref 136–145)

## 2020-11-16 PROCEDURE — 36415 COLL VENOUS BLD VENIPUNCTURE: CPT

## 2020-11-16 PROCEDURE — 83970 ASSAY OF PARATHORMONE: CPT

## 2020-11-16 PROCEDURE — 82306 VITAMIN D 25 HYDROXY: CPT

## 2020-11-16 PROCEDURE — 84075 ASSAY ALKALINE PHOSPHATASE: CPT

## 2020-11-16 PROCEDURE — 80048 BASIC METABOLIC PNL TOTAL CA: CPT

## 2020-11-18 LAB
ALP BONE CFR SERPL: 23 % (ref 14–68)
ALP INTEST CFR SERPL: 28 % (ref 0–18)
ALP LIVER CFR SERPL: 49 % (ref 18–85)
ALP SERPL-CCNC: 102 IU/L (ref 39–117)

## 2020-12-08 ENCOUNTER — OFFICE VISIT (OUTPATIENT)
Dept: FAMILY MEDICINE CLINIC | Age: 85
End: 2020-12-08
Payer: MEDICARE

## 2020-12-08 VITALS
BODY MASS INDEX: 33.12 KG/M2 | DIASTOLIC BLOOD PRESSURE: 80 MMHG | TEMPERATURE: 100 F | WEIGHT: 217.8 LBS | HEART RATE: 67 BPM | RESPIRATION RATE: 20 BRPM | OXYGEN SATURATION: 100 % | SYSTOLIC BLOOD PRESSURE: 158 MMHG

## 2020-12-08 DIAGNOSIS — R60.0 LOCALIZED EDEMA: ICD-10-CM

## 2020-12-08 DIAGNOSIS — I10 ESSENTIAL HYPERTENSION: Primary | ICD-10-CM

## 2020-12-08 DIAGNOSIS — M1A.09X0 CHRONIC GOUT OF MULTIPLE SITES, UNSPECIFIED CAUSE: ICD-10-CM

## 2020-12-08 DIAGNOSIS — E55.9 VITAMIN D DEFICIENCY: ICD-10-CM

## 2020-12-08 PROCEDURE — 99214 OFFICE O/P EST MOD 30 MIN: CPT | Performed by: NURSE PRACTITIONER

## 2020-12-08 PROCEDURE — G0463 HOSPITAL OUTPT CLINIC VISIT: HCPCS | Performed by: NURSE PRACTITIONER

## 2020-12-08 RX ORDER — ERGOCALCIFEROL 1.25 MG/1
50000 CAPSULE ORAL
Qty: 12 CAP | Refills: 1 | Status: SHIPPED | OUTPATIENT
Start: 2020-12-08 | End: 2021-06-16 | Stop reason: SDUPTHER

## 2020-12-08 RX ORDER — LISINOPRIL 20 MG/1
20 TABLET ORAL DAILY
Qty: 30 TAB | Refills: 0 | Status: SHIPPED | OUTPATIENT
Start: 2020-12-08 | End: 2021-03-16 | Stop reason: SDUPTHER

## 2020-12-08 RX ORDER — ALLOPURINOL 300 MG/1
300 TABLET ORAL DAILY
Qty: 90 TAB | Refills: 4 | Status: SHIPPED | OUTPATIENT
Start: 2020-12-08

## 2020-12-08 RX ORDER — FUROSEMIDE 40 MG/1
40 TABLET ORAL DAILY
Qty: 30 TAB | Refills: 3 | Status: SHIPPED | OUTPATIENT
Start: 2020-12-08 | End: 2021-03-16 | Stop reason: SDUPTHER

## 2020-12-08 NOTE — PROGRESS NOTES
Charmaine Ornelas presents today for   Chief Complaint   Patient presents with    Follow-up       Is someone accompanying this pt? no    Is the patient using any DME equipment during OV? yes    Depression Screening:  3 most recent PHQ Screens 12/8/2020   PHQ Not Done -   Little interest or pleasure in doing things Not at all   Feeling down, depressed, irritable, or hopeless Not at all   Total Score PHQ 2 0       Learning Assessment:  Learning Assessment 6/15/2020   PRIMARY LEARNER Patient   HIGHEST LEVEL OF EDUCATION - PRIMARY LEARNER  GRADUATED HIGH SCHOOL OR GED   BARRIERS PRIMARY LEARNER NONE   CO-LEARNER CAREGIVER No   PRIMARY LANGUAGE ENGLISH   LEARNER PREFERENCE PRIMARY LISTENING   ANSWERED BY patient   RELATIONSHIP SELF       Abuse Screening:  Abuse Screening Questionnaire 7/11/2019   Do you ever feel afraid of your partner? N   Are you in a relationship with someone who physically or mentally threatens you? N   Is it safe for you to go home? Y       Fall Risk  Fall Risk Assessment, last 12 mths 12/8/2020   Able to walk? Yes   Fall in past 12 months? No   Fall with injury? -   Number of falls in past 12 months -   Fall Risk Score -       Health Maintenance reviewed and discussed and ordered per Provider. Health Maintenance Due   Topic Date Due    DTaP/Tdap/Td series (1 - Tdap) 01/18/1958    Shingrix Vaccine Age 50> (1 of 2) 01/18/1987    GLAUCOMA SCREENING Q2Y  11/03/2018   . Coordination of Care:  1. Have you been to the ER, urgent care clinic since your last visit? Hospitalized since your last visit? no    2. Have you seen or consulted any other health care providers outside of the [address] [place] [address] since your last visit? Include any pap smears or colon screening.  no      Last  Checked na  Last UDS Checked na  Last Pain contract signed: beverly

## 2020-12-08 NOTE — PROGRESS NOTES
Subjective     Patient ID:  Monita Cabot is a 82 y.o. ( 1935) female who presents for the following: Follow-up      HPI     Hypertension follow up:  Taking medications as prescribed: Yes, but did not take anything this morning. Checking BP at home: No   Symptoms:lower leg edema  Low sodium diet: somewhat, did eat ham over thanksgiving  Exercise: No      Lab review:   Creatinine was actually improved. Vitamin D def:   Taking otc tablet, not the prescription. Level is now 21 up from 11. PTH was elevated. Denies any abdominal pain, diarrhea or other GI symptoms. Review of Systems   Constitutional: Negative for appetite change, diaphoresis, fatigue and unexpected weight change. Eyes: Negative for visual disturbance. Respiratory: Negative for cough, chest tightness and shortness of breath. Cardiovascular: Positive for leg swelling. Negative for chest pain and palpitations. Gastrointestinal: Negative for abdominal distention, abdominal pain, blood in stool, constipation, diarrhea, nausea, rectal pain and vomiting. Endocrine: Negative for polydipsia, polyphagia and polyuria. Genitourinary: Negative for decreased urine volume, dysuria and frequency. Musculoskeletal: Negative for joint swelling and myalgias. Skin: Negative for rash and wound. Neurological: Negative for dizziness, weakness, light-headedness, numbness and headaches. Psychiatric/Behavioral: Negative for dysphoric mood and sleep disturbance. The patient is not nervous/anxious. Past Medical History, Past Surgery History, Allergies, Social History, and Family History were reviewed and updated. Patient Active Problem List   Diagnosis Code    Pure hypercholesterolemia E78.00    Edema R60.9    Chronic eczema L30.9    Arthritis M19.90    Subclinical hyperthyroidism E05.90    Severe obesity (BMI 35.0-39. 9) with comorbidity ([facility] [address] E66.01    Chronic gout of multiple sites M1A. 19M5    Essential hypertension I10    Chronic kidney disease N18.9    Asthma J45.909    Multinodular goiter E04.2    Vitamin D deficiency E55.9     Past Medical History:   Diagnosis Date    Arthritis 9/20/2010    Asthma     Chronic eczema 3/22/2010    Chronic kidney disease     Clot 10/2018    lower left exremity, shin     Edema 3/22/2010    Environmental allergies     year round    Essential hypertension, malignant 3/22/2010    Gout     Menopause     Multinodular goiter     Followed by Dr. Sang Corona, ENT. FNA 2020    Pure hypercholesterolemia 3/22/2010    Subclinical hyperthyroidism 5/30/2017    Unspecified arthropathy, shoulder region 3/22/2010    Unspecified hypothyroidism 3/22/2010    Vitamin D deficiency      Patient Care Team:  Jose Kendall NP as PCP - General (Nurse Practitioner)  Jose Kendall NP as PCP - [address] [place] [address] Provider  Steve Perez MD (Orthopedic Surgery)  Steve Perez MD (Orthopedic Surgery)  Scott Quintanilla MD (Gastroenterology)  Cristina Kerr MD (Surgery)  Aditi Dimas MD (Otolaryngology)    Past Surgical History:   Procedure Laterality Date    HX GYN  1983    hysterectomy    HX HYSTERECTOMY  1984    fibroids    HX KNEE REPLACEMENT Right     IR FINE NEEDLE ASPIRATION THYROID  07/2020    benign     Family History   Problem Relation Age of Onset    Hypertension Mother     Breast Cancer Mother 70    Breast Cancer Child 36    Breast Cancer Sister 39     Social History     Tobacco Use    Smoking status: Never Smoker    Smokeless tobacco: Never Used   Substance Use Topics    Alcohol use: Yes     Alcohol/week: 2.5 standard drinks     Types: 3 Standard drinks or equivalent per week    Drug use: No     No Known Allergies  Current Outpatient Medications on File Prior to Visit   Medication Sig Dispense Refill    hydroCHLOROthiazide (HYDRODIURIL) 25 mg tablet Take 1 Tab by mouth daily.  90 Tab 4    acetaminophen (TYLENOL) 500 mg tablet Take 2 Tabs by mouth every eight (8) hours as needed for Pain. 100 Tab 3    cetirizine (ZYRTEC) 10 mg tablet Take 1 Tab by mouth daily. 90 Tab 3    amLODIPine (NORVASC) 5 mg tablet Take 1 Tab by mouth daily. 90 Tab 3    fluticasone propionate (FLONASE) 50 mcg/actuation nasal spray 2 Sprays by Both Nostrils route daily. 1 Bottle 5    fluocinoNIDE (LIDEX) 0.05 % ointment Apply  to affected area two (2) times a day. 60 g 4    albuterol (PROVENTIL HFA, VENTOLIN HFA, PROAIR HFA) 90 mcg/actuation inhaler Take 2 Puffs by inhalation every six (6) hours as needed for Wheezing. 1 Inhaler 0    [DISCONTINUED] lisinopriL (PRINIVIL, ZESTRIL) 20 mg tablet Take 1 Tab by mouth daily. 30 Tab 0    [DISCONTINUED] ergocalciferol (ERGOCALCIFEROL) 1,250 mcg (50,000 unit) capsule Take 1 Cap by mouth every seven (7) days. 12 Cap 1    [DISCONTINUED] furosemide (LASIX) 40 mg tablet Take 1 Tab by mouth daily. Only if needed for edema 30 Tab 3    [DISCONTINUED] allopurinol (ZYLOPRIM) 300 mg tablet Take 1 Tab by mouth daily. 90 Tab 4     No current facility-administered medications on file prior to visit. Health Maintenance Due   Topic Date Due    DTaP/Tdap/Td series (1 - Tdap) 01/18/1958    Shingrix Vaccine Age 50> (1 of 2) 01/18/1987    GLAUCOMA SCREENING Q2Y  11/03/2018         Objective     Visit Vitals  BP (!) 158/80 Comment: manually left arm   Pulse 67   Temp 100 °F (37.8 °C)   Resp 20   Wt 217 lb 12.8 oz (98.8 kg)   SpO2 100%   BMI 33.12 kg/m²     No LMP recorded. Patient has had a hysterectomy. Physical Exam  Constitutional:       General: She is not in acute distress. Appearance: Normal appearance. She is well-developed. She is not diaphoretic. Eyes:      Conjunctiva/sclera: Conjunctivae normal.      Pupils: Pupils are equal, round, and reactive to light. Neck:      Vascular: No carotid bruit. Cardiovascular:      Rate and Rhythm: Normal rate and regular rhythm. Pulses: Normal pulses. Heart sounds: Normal heart sounds.  No murmur. Pulmonary:      Effort: Pulmonary effort is normal. No respiratory distress. Breath sounds: Normal breath sounds. Abdominal:      General: Bowel sounds are normal. There is no distension. Palpations: Abdomen is soft. There is no mass. Tenderness: There is no abdominal tenderness. Musculoskeletal:      Right lower leg: Edema (2+ pitting ) present. Left lower leg: Edema (2+ pitting) present. Skin:     General: Skin is warm and dry. Findings: No rash. Neurological:      Mental Status: She is alert and oriented to person, place, and time. LABS     TESTS      Assessment and Plan     1. Essential hypertension  Elevated today but has not taken her meds. Will have her continue the same medications. - lisinopriL (PRINIVIL, ZESTRIL) 20 mg tablet; Take 1 Tab by mouth daily. Dispense: 30 Tab; Refill: 0    2. Vitamin D deficiency  Start the ergocalciferol and will recheck labs in about 6 weeks. - ergocalciferol (ERGOCALCIFEROL) 1,250 mcg (50,000 unit) capsule; Take 1 Cap by mouth every seven (7) days. Dispense: 12 Cap; Refill: 1  - PTH INTACT; Future  - VITAMIN D, 25 HYDROXY; Future  - CALCIUM; Future    3. Localized edema  Has not been taking lasix. Restart it as needed for leg swelling. Also restart compression stockings and elevate legs. - furosemide (LASIX) 40 mg tablet; Take 1 Tab by mouth daily. Only if needed for edema  Dispense: 30 Tab; Refill: 3    4. Chronic gout of multiple sites, unspecified cause  Continue   - allopurinoL (ZYLOPRIM) 300 mg tablet; Take 1 Tab by mouth daily. Dispense: 90 Tab; Refill: 4      Follow-up and Dispositions    · Return for labs in 6 weeks, BP follow up in 3 months. Risks, benefits, and alternatives of the medications and treatment plan prescribed today were discussed, and patient expressed understanding. Printed after visit summary was given to patient and reviewed. All patient questions and concerns were addressed.  Plan follow-up as discussed or as needed if any worsening symptoms or change in condition.            Signed electronically by Susie Almaraz DNP, FNP-BC

## 2021-01-19 ENCOUNTER — HOSPITAL ENCOUNTER (OUTPATIENT)
Dept: LAB | Age: 86
Discharge: HOME | End: 2021-01-19
Payer: MEDICARE

## 2021-01-19 ENCOUNTER — APPOINTMENT (OUTPATIENT)
Dept: FAMILY MEDICINE CLINIC | Age: 86
End: 2021-01-19

## 2021-01-19 DIAGNOSIS — E55.9 VITAMIN D DEFICIENCY: ICD-10-CM

## 2021-01-19 LAB
25(OH)D3 SERPL-MCNC: 27.6 NG/ML (ref 30–100)
CALCIUM SERPL-MCNC: 9.7 MG/DL (ref 8.5–10.1)
CALCIUM SERPL-MCNC: 9.8 MG/DL (ref 8.5–10.1)
PTH-INTACT SERPL-MCNC: 135.2 PG/ML (ref 18.4–88)

## 2021-01-19 PROCEDURE — 36415 COLL VENOUS BLD VENIPUNCTURE: CPT

## 2021-01-19 PROCEDURE — 82310 ASSAY OF CALCIUM: CPT

## 2021-01-19 PROCEDURE — 82306 VITAMIN D 25 HYDROXY: CPT

## 2021-01-19 PROCEDURE — 83970 ASSAY OF PARATHORMONE: CPT

## 2021-02-17 ENCOUNTER — DOCUMENTATION ONLY (OUTPATIENT)
Dept: FAMILY MEDICINE CLINIC | Age: 86
End: 2021-02-17

## 2021-03-16 ENCOUNTER — HOSPITAL ENCOUNTER (OUTPATIENT)
Dept: LAB | Age: 86
Discharge: HOME | End: 2021-03-16
Payer: MEDICARE

## 2021-03-16 ENCOUNTER — OFFICE VISIT (OUTPATIENT)
Dept: FAMILY MEDICINE CLINIC | Age: 86
End: 2021-03-16
Payer: MEDICARE

## 2021-03-16 VITALS
DIASTOLIC BLOOD PRESSURE: 70 MMHG | OXYGEN SATURATION: 99 % | BODY MASS INDEX: 33.3 KG/M2 | TEMPERATURE: 97.3 F | SYSTOLIC BLOOD PRESSURE: 164 MMHG | HEART RATE: 66 BPM | RESPIRATION RATE: 20 BRPM | WEIGHT: 219 LBS

## 2021-03-16 DIAGNOSIS — I10 ESSENTIAL HYPERTENSION: ICD-10-CM

## 2021-03-16 DIAGNOSIS — E55.9 VITAMIN D DEFICIENCY: ICD-10-CM

## 2021-03-16 DIAGNOSIS — R60.0 LOCALIZED EDEMA: ICD-10-CM

## 2021-03-16 DIAGNOSIS — E78.00 PURE HYPERCHOLESTEROLEMIA: Primary | ICD-10-CM

## 2021-03-16 DIAGNOSIS — E78.00 PURE HYPERCHOLESTEROLEMIA: ICD-10-CM

## 2021-03-16 DIAGNOSIS — E04.2 MULTINODULAR GOITER: ICD-10-CM

## 2021-03-16 DIAGNOSIS — N18.30 STAGE 3 CHRONIC KIDNEY DISEASE, UNSPECIFIED WHETHER STAGE 3A OR 3B CKD (HCC): ICD-10-CM

## 2021-03-16 LAB
25(OH)D3 SERPL-MCNC: 33.7 NG/ML (ref 30–100)
ALBUMIN SERPL-MCNC: 3.4 G/DL (ref 3.4–5)
ALBUMIN/GLOB SERPL: 0.9 {RATIO} (ref 0.8–1.7)
ALP SERPL-CCNC: 198 U/L (ref 45–117)
ALT SERPL-CCNC: 34 U/L (ref 13–56)
ANION GAP SERPL CALC-SCNC: 6 MMOL/L (ref 3–18)
AST SERPL-CCNC: 27 U/L (ref 10–38)
BASOPHILS # BLD: 0 K/UL (ref 0–0.1)
BASOPHILS NFR BLD: 0 % (ref 0–2)
BILIRUB SERPL-MCNC: 0.4 MG/DL (ref 0.2–1)
BUN SERPL-MCNC: 15 MG/DL (ref 7–18)
BUN/CREAT SERPL: 12 (ref 12–20)
CALCIUM SERPL-MCNC: 9.4 MG/DL (ref 8.5–10.1)
CALCIUM SERPL-MCNC: 9.5 MG/DL (ref 8.5–10.1)
CHLORIDE SERPL-SCNC: 101 MMOL/L (ref 100–111)
CHOLEST SERPL-MCNC: 194 MG/DL
CO2 SERPL-SCNC: 35 MMOL/L (ref 21–32)
CREAT SERPL-MCNC: 1.29 MG/DL (ref 0.6–1.3)
DIFFERENTIAL METHOD BLD: ABNORMAL
EOSINOPHIL # BLD: 0.5 K/UL (ref 0–0.4)
EOSINOPHIL NFR BLD: 6 % (ref 0–5)
ERYTHROCYTE [DISTWIDTH] IN BLOOD BY AUTOMATED COUNT: 14.3 % (ref 11.6–14.5)
GLOBULIN SER CALC-MCNC: 3.9 G/DL (ref 2–4)
GLUCOSE SERPL-MCNC: 102 MG/DL (ref 74–99)
HCT VFR BLD AUTO: 40.8 % (ref 35–45)
HDLC SERPL-MCNC: 56 MG/DL (ref 40–60)
HDLC SERPL: 3.5 {RATIO} (ref 0–5)
HGB BLD-MCNC: 12.8 G/DL (ref 12–16)
LDLC SERPL CALC-MCNC: 116.8 MG/DL (ref 0–100)
LIPID PROFILE,FLP: ABNORMAL
LYMPHOCYTES # BLD: 2.4 K/UL (ref 0.9–3.6)
LYMPHOCYTES NFR BLD: 31 % (ref 21–52)
MCH RBC QN AUTO: 25.8 PG (ref 24–34)
MCHC RBC AUTO-ENTMCNC: 31.4 G/DL (ref 31–37)
MCV RBC AUTO: 82.3 FL (ref 74–97)
MONOCYTES # BLD: 0.5 K/UL (ref 0.05–1.2)
MONOCYTES NFR BLD: 7 % (ref 3–10)
NEUTS SEG # BLD: 4.3 K/UL (ref 1.8–8)
NEUTS SEG NFR BLD: 56 % (ref 40–73)
PLATELET # BLD AUTO: 191 K/UL (ref 135–420)
PMV BLD AUTO: 12.5 FL (ref 9.2–11.8)
POTASSIUM SERPL-SCNC: 3.5 MMOL/L (ref 3.5–5.5)
PROT SERPL-MCNC: 7.3 G/DL (ref 6.4–8.2)
PTH-INTACT SERPL-MCNC: 201.9 PG/ML (ref 18.4–88)
RBC # BLD AUTO: 4.96 M/UL (ref 4.2–5.3)
SODIUM SERPL-SCNC: 142 MMOL/L (ref 136–145)
T4 FREE SERPL-MCNC: 1.1 NG/DL (ref 0.7–1.5)
TRIGL SERPL-MCNC: 106 MG/DL (ref ?–150)
TSH SERPL DL<=0.05 MIU/L-ACNC: <0.01 UIU/ML (ref 0.36–3.74)
VLDLC SERPL CALC-MCNC: 21.2 MG/DL
WBC # BLD AUTO: 7.7 K/UL (ref 4.6–13.2)

## 2021-03-16 PROCEDURE — G8399 PT W/DXA RESULTS DOCUMENT: HCPCS | Performed by: NURSE PRACTITIONER

## 2021-03-16 PROCEDURE — 99214 OFFICE O/P EST MOD 30 MIN: CPT | Performed by: NURSE PRACTITIONER

## 2021-03-16 PROCEDURE — G0463 HOSPITAL OUTPT CLINIC VISIT: HCPCS | Performed by: NURSE PRACTITIONER

## 2021-03-16 PROCEDURE — 80061 LIPID PANEL: CPT

## 2021-03-16 PROCEDURE — 84443 ASSAY THYROID STIM HORMONE: CPT

## 2021-03-16 PROCEDURE — 80053 COMPREHEN METABOLIC PANEL: CPT

## 2021-03-16 PROCEDURE — G8432 DEP SCR NOT DOC, RNG: HCPCS | Performed by: NURSE PRACTITIONER

## 2021-03-16 PROCEDURE — 84439 ASSAY OF FREE THYROXINE: CPT

## 2021-03-16 PROCEDURE — G8754 DIAS BP LESS 90: HCPCS | Performed by: NURSE PRACTITIONER

## 2021-03-16 PROCEDURE — 85025 COMPLETE CBC W/AUTO DIFF WBC: CPT

## 2021-03-16 PROCEDURE — 1101F PT FALLS ASSESS-DOCD LE1/YR: CPT | Performed by: NURSE PRACTITIONER

## 2021-03-16 PROCEDURE — 1090F PRES/ABSN URINE INCON ASSESS: CPT | Performed by: NURSE PRACTITIONER

## 2021-03-16 PROCEDURE — G8417 CALC BMI ABV UP PARAM F/U: HCPCS | Performed by: NURSE PRACTITIONER

## 2021-03-16 PROCEDURE — G8428 CUR MEDS NOT DOCUMENT: HCPCS | Performed by: NURSE PRACTITIONER

## 2021-03-16 PROCEDURE — 83970 ASSAY OF PARATHORMONE: CPT

## 2021-03-16 PROCEDURE — G8753 SYS BP > OR = 140: HCPCS | Performed by: NURSE PRACTITIONER

## 2021-03-16 PROCEDURE — 36415 COLL VENOUS BLD VENIPUNCTURE: CPT

## 2021-03-16 PROCEDURE — 82306 VITAMIN D 25 HYDROXY: CPT

## 2021-03-16 PROCEDURE — G8536 NO DOC ELDER MAL SCRN: HCPCS | Performed by: NURSE PRACTITIONER

## 2021-03-16 RX ORDER — FUROSEMIDE 40 MG/1
40 TABLET ORAL DAILY
Qty: 30 TAB | Refills: 3 | Status: SHIPPED | OUTPATIENT
Start: 2021-03-16 | End: 2022-03-14 | Stop reason: SDUPTHER

## 2021-03-16 RX ORDER — LISINOPRIL 20 MG/1
20 TABLET ORAL DAILY
Qty: 90 TAB | Refills: 1 | Status: SHIPPED | OUTPATIENT
Start: 2021-03-16 | End: 2021-06-16

## 2021-03-16 NOTE — PROGRESS NOTES
Kulwant Arias presents today for   Chief Complaint   Patient presents with    Follow-up       Is someone accompanying this pt? no    Is the patient using any DME equipment during OV? no    Depression Screening:  3 most recent PHQ Screens 3/16/2021   PHQ Not Done -   Little interest or pleasure in doing things Not at all   Feeling down, depressed, irritable, or hopeless Not at all   Total Score PHQ 2 0       Learning Assessment:  Learning Assessment 6/15/2020   PRIMARY LEARNER Patient   HIGHEST LEVEL OF EDUCATION - PRIMARY LEARNER  GRADUATED HIGH SCHOOL OR GED   BARRIERS PRIMARY LEARNER NONE   CO-LEARNER CAREGIVER No   PRIMARY LANGUAGE ENGLISH   LEARNER PREFERENCE PRIMARY LISTENING   ANSWERED BY patient   RELATIONSHIP SELF       Abuse Screening:  Abuse Screening Questionnaire 7/11/2019   Do you ever feel afraid of your partner? N   Are you in a relationship with someone who physically or mentally threatens you? N   Is it safe for you to go home? Y       Fall Risk  Fall Risk Assessment, last 12 mths 3/16/2021   Able to walk? No   Fall in past 12 months? -   Number of falls in past 12 months -   Fall with injury? -       Health Maintenance reviewed and discussed and ordered per Provider. Health Maintenance Due   Topic Date Due    COVID-19 Vaccine (1) Never done    DTaP/Tdap/Td series (1 - Tdap) 01/18/1958    Shingrix Vaccine Age 50> (1 of 2) Never done    GLAUCOMA SCREENING Q2Y  11/03/2018   . Coordination of Care:  1. Have you been to the ER, urgent care clinic since your last visit? Hospitalized since your last visit? no    2. Have you seen or consulted any other health care providers outside of the [address] [place] [address] since your last visit? Include any pap smears or colon screening.  no      Last  Checked na  Last UDS Checked na  Last Pain contract signed: na

## 2021-03-16 NOTE — PROGRESS NOTES
Subjective     Patient ID:  Mendoza Henry is a 82 y.o. ( 1935) female who presents for the following: Follow-up      HPI     Her best friend and her friend's daughter were recently murdered. She reports very high stress and anxiety related to that. Hypertension follow up:  Taking medications as prescribed: Has been out of lisinopril for about 1 month. Checking BP at home: No   Symptoms: none  Low sodium diet: No   Exercise: No    Wears the compression stockings rarely. Taking lasix about 3 times a week. Vitamin D deficiency:   Taking weekly supp 06504 units. PTH was elevated. Fasting today. Review of Systems   Constitutional: Negative for appetite change, diaphoresis, fatigue and unexpected weight change. Eyes: Negative for visual disturbance. Respiratory: Negative for cough, chest tightness, shortness of breath and wheezing. Cardiovascular: Positive for leg swelling. Negative for chest pain and palpitations. Gastrointestinal: Negative for abdominal distention, abdominal pain, blood in stool, constipation, diarrhea, nausea, rectal pain and vomiting. Endocrine: Negative for polydipsia, polyphagia and polyuria. Genitourinary: Negative for decreased urine volume, dysuria and frequency. Musculoskeletal: Negative for joint swelling and myalgias. Skin: Negative for rash and wound. Neurological: Negative for dizziness, weakness, light-headedness, numbness and headaches. Psychiatric/Behavioral: Negative for dysphoric mood, sleep disturbance and suicidal ideas. The patient is nervous/anxious. Past Medical History, Past Surgery History, Allergies, Social History, and Family History were reviewed and updated. Patient Active Problem List   Diagnosis Code    Pure hypercholesterolemia E78.00    Edema R60.9    Chronic eczema L30.9    Arthritis M19.90    Subclinical hyperthyroidism E05.90    Severe obesity (BMI 35.0-39. 9) with comorbidity ([facility] [address] E66.01    Chronic gout of multiple sites M1A. 09X0    Essential hypertension I10    Chronic kidney disease N18.9    Asthma J45.909    Multinodular goiter E04.2    Vitamin D deficiency E55.9     Past Medical History:   Diagnosis Date    Arthritis 9/20/2010    Asthma     Chronic eczema 3/22/2010    Chronic kidney disease     Clot 10/2018    lower left exremity, shin     Edema 3/22/2010    Environmental allergies     year round    Essential hypertension, malignant 3/22/2010    Gout     Menopause     Multinodular goiter     Followed by Dr. Jaylin Wilkinson, ENT. FNA 2020    Pure hypercholesterolemia 3/22/2010    Subclinical hyperthyroidism 5/30/2017    Unspecified arthropathy, shoulder region 3/22/2010    Unspecified hypothyroidism 3/22/2010    Vitamin D deficiency      Patient Care Team:  Lg Busby NP as PCP - General (Nurse Practitioner)  Lg Busby NP as PCP - [facility] Empaneled Provider  Horacio Gonzalez MD (Orthopedic Surgery)  Horacio Gonzalez MD (Orthopedic Surgery)  Kelsey Jane MD (Gastroenterology)  Mary Beth Alexis MD (Surgery)  Yazan Bailey MD (Otolaryngology)    Past Surgical History:   Procedure Laterality Date    HX GYN  1983    hysterectomy    HX HYSTERECTOMY  1984    fibroids    HX KNEE REPLACEMENT Right     IR FINE NEEDLE ASPIRATION THYROID  07/2020    benign     Family History   Problem Relation Age of Onset    Hypertension Mother     Breast Cancer Mother 70    Breast Cancer Child 36    Breast Cancer Sister 39     Social History     Tobacco Use    Smoking status: Never Smoker    Smokeless tobacco: Never Used   Substance Use Topics    Alcohol use:  Yes     Alcohol/week: 2.5 standard drinks     Types: 3 Standard drinks or equivalent per week    Drug use: No     No Known Allergies  Current Outpatient Medications on File Prior to Visit   Medication Sig Dispense Refill    ergocalciferol (ERGOCALCIFEROL) 1,250 mcg (50,000 unit) capsule Take 1 Cap by mouth every seven (7) days. 12 Cap 1    allopurinoL (ZYLOPRIM) 300 mg tablet Take 1 Tab by mouth daily. 90 Tab 4    hydroCHLOROthiazide (HYDRODIURIL) 25 mg tablet Take 1 Tab by mouth daily. 90 Tab 4    cetirizine (ZYRTEC) 10 mg tablet Take 1 Tab by mouth daily. 90 Tab 3    amLODIPine (NORVASC) 5 mg tablet Take 1 Tab by mouth daily. 90 Tab 3    fluticasone propionate (FLONASE) 50 mcg/actuation nasal spray 2 Sprays by Both Nostrils route daily. 1 Bottle 5    fluocinoNIDE (LIDEX) 0.05 % ointment Apply  to affected area two (2) times a day. 60 g 4    albuterol (PROVENTIL HFA, VENTOLIN HFA, PROAIR HFA) 90 mcg/actuation inhaler Take 2 Puffs by inhalation every six (6) hours as needed for Wheezing. 1 Inhaler 0     No current facility-administered medications on file prior to visit. Health Maintenance Due   Topic Date Due    COVID-19 Vaccine (1) Never done    DTaP/Tdap/Td series (1 - Tdap) 01/18/1958    Shingrix Vaccine Age 50> (1 of 2) Never done         Objective     Visit Vitals  BP (!) 164/70   Pulse 66   Temp 97.3 °F (36.3 °C)   Resp 20   Wt 219 lb (99.3 kg)   SpO2 99%   BMI 33.30 kg/m²     No LMP recorded. Patient has had a hysterectomy. Physical Exam  Constitutional:       General: She is not in acute distress. Appearance: Normal appearance. She is well-developed. She is not diaphoretic. Eyes:      Conjunctiva/sclera: Conjunctivae normal.      Pupils: Pupils are equal, round, and reactive to light. Neck:      Vascular: No carotid bruit. Cardiovascular:      Rate and Rhythm: Normal rate and regular rhythm. Pulses: Normal pulses. Heart sounds: Normal heart sounds. No murmur. Pulmonary:      Effort: Pulmonary effort is normal. No respiratory distress. Breath sounds: Normal breath sounds. Abdominal:      General: Bowel sounds are normal. There is no distension. Palpations: Abdomen is soft. There is no mass. Tenderness: There is no abdominal tenderness.    Musculoskeletal:      Right lower leg: Edema (2+) present. Left lower leg: Edema (2+) present. Skin:     General: Skin is warm and dry. Findings: No rash. Neurological:      Mental Status: She is alert and oriented to person, place, and time. LABS     TESTS      Assessment and Plan     1. Essential hypertension  Recheck labs today. Restart lisinopril. ECHO to rule out CHF. - lisinopriL (PRINIVIL, ZESTRIL) 20 mg tablet; Take 1 Tab by mouth daily. Dispense: 90 Tab; Refill: 1  - ECHO ADULT COMPLETE; Future  - CBC WITH AUTOMATED DIFF; Future  - METABOLIC PANEL, COMPREHENSIVE; Future    2. Localized edema  Increase lasix to every day. Elevated legs when at rest. Wear compression stockings when up during the day. - furosemide (LASIX) 40 mg tablet; Take 1 Tab by mouth daily. Dispense: 30 Tab; Refill: 3  - ECHO ADULT COMPLETE; Future    3. Pure hypercholesterolemia  - LIPID PANEL; Future    4. Stage 3 chronic kidney disease, unspecified whether stage 3a or 3b CKD  Recheck labs    5. Multinodular goiter  - TSH 3RD GENERATION; Future  - T4, FREE; Future    6. Vitamin D deficiency  - VITAMIN D, 25 HYDROXY; Future  - PTH INTACT; Future      Follow-up and Dispositions    · Return in about 3 months (around 6/16/2021) for Medication follow up with new PCP. Risks, benefits, and alternatives of the medications and treatment plan prescribed today were discussed, and patient expressed understanding. Printed after visit summary was given to patient and reviewed. All patient questions and concerns were addressed. Plan follow-up as discussed or as needed if any worsening symptoms or change in condition.            Signed electronically by Don Larson DNP, FNP-BC

## 2021-03-16 NOTE — PATIENT INSTRUCTIONS
We will call or send you a [employer] message or letter regarding your lab/imaging results. If you do not hear anything in 2 weeks, then call our office back for your results.

## 2021-03-23 ENCOUNTER — CLINICAL SUPPORT (OUTPATIENT)
Dept: FAMILY MEDICINE CLINIC | Age: 86
End: 2021-03-23

## 2021-03-23 VITALS — DIASTOLIC BLOOD PRESSURE: 62 MMHG | SYSTOLIC BLOOD PRESSURE: 129 MMHG

## 2021-03-23 DIAGNOSIS — Z01.30 BP CHECK: Primary | ICD-10-CM

## 2021-03-23 DIAGNOSIS — I10 ESSENTIAL HYPERTENSION: ICD-10-CM

## 2021-03-23 DIAGNOSIS — M19.90 ARTHRITIS: ICD-10-CM

## 2021-03-23 DIAGNOSIS — R60.0 LOCALIZED EDEMA: ICD-10-CM

## 2021-03-23 RX ORDER — ACETAMINOPHEN 500 MG
1000 TABLET ORAL
Qty: 100 TAB | Refills: 3 | Status: SHIPPED | OUTPATIENT
Start: 2021-03-23 | End: 2022-11-01

## 2021-03-24 ENCOUNTER — HOSPITAL ENCOUNTER (OUTPATIENT)
Dept: NON INVASIVE DIAGNOSTICS | Age: 86
Discharge: HOME | End: 2021-03-24
Attending: NURSE PRACTITIONER
Payer: MEDICARE

## 2021-03-24 VITALS
BODY MASS INDEX: 33.19 KG/M2 | SYSTOLIC BLOOD PRESSURE: 129 MMHG | DIASTOLIC BLOOD PRESSURE: 62 MMHG | WEIGHT: 219 LBS | HEIGHT: 68 IN

## 2021-03-24 DIAGNOSIS — R93.1 ABNORMAL ECHOCARDIOGRAM: ICD-10-CM

## 2021-03-24 DIAGNOSIS — J45.20 MILD INTERMITTENT ASTHMA WITHOUT COMPLICATION: Primary | ICD-10-CM

## 2021-03-24 DIAGNOSIS — R60.0 LOCALIZED EDEMA: ICD-10-CM

## 2021-03-24 LAB
ECHO AO ROOT DIAM: 2.74 CM
ECHO LA AREA 4C: 19.31 CM2
ECHO LA VOL 2C: 52.4 ML (ref 22–52)
ECHO LA VOL 4C: 50.72 ML (ref 22–52)
ECHO LA VOL BP: 59.07 ML (ref 22–52)
ECHO LA VOL/BSA BIPLANE: 27.81 ML/M2 (ref 16–28)
ECHO LA VOLUME INDEX A2C: 24.67 ML/M2 (ref 16–28)
ECHO LA VOLUME INDEX A4C: 23.88 ML/M2 (ref 16–28)
ECHO LV INTERNAL DIMENSION DIASTOLIC: 4.09 CM (ref 3.9–5.3)
ECHO LV INTERNAL DIMENSION SYSTOLIC: 2.78 CM
ECHO LV IVSD: 1.05 CM (ref 0.6–0.9)
ECHO LV MASS 2D: 131.6 G (ref 67–162)
ECHO LV MASS INDEX 2D: 61.9 G/M2 (ref 43–95)
ECHO LV POSTERIOR WALL DIASTOLIC: 0.95 CM (ref 0.6–0.9)
ECHO LVOT DIAM: 1.88 CM
ECHO LVOT PEAK GRADIENT: 5.76 MMHG
ECHO LVOT PEAK VELOCITY: 119.96 CM/S
ECHO LVOT SV: 80.4 ML
ECHO LVOT VTI: 29.08 CM
ECHO MV A VELOCITY: 88.02 CM/S
ECHO MV E DECELERATION TIME (DT): 210.08 MS
ECHO MV E VELOCITY: 66.88 CM/S
ECHO MV E/A RATIO: 0.76
ECHO TV REGURGITANT MAX VELOCITY: 358.78 CM/S
ECHO TV REGURGITANT PEAK GRADIENT: 51.49 MMHG
LVOT MG: 3.59 MMHG

## 2021-03-24 PROCEDURE — 93306 TTE W/DOPPLER COMPLETE: CPT

## 2021-03-25 ENCOUNTER — TELEPHONE (OUTPATIENT)
Dept: FAMILY MEDICINE CLINIC | Age: 86
End: 2021-03-25

## 2021-03-26 DIAGNOSIS — E21.3 HYPERPARATHYROIDISM (HCC): Primary | ICD-10-CM

## 2021-03-26 DIAGNOSIS — E05.90 SUBCLINICAL HYPERTHYROIDISM: ICD-10-CM

## 2021-03-29 ENCOUNTER — TELEPHONE (OUTPATIENT)
Dept: FAMILY MEDICINE CLINIC | Age: 86
End: 2021-03-29

## 2021-04-16 ENCOUNTER — OFFICE VISIT (OUTPATIENT)
Dept: CARDIOLOGY CLINIC | Age: 86
End: 2021-04-16
Payer: MEDICARE

## 2021-04-16 VITALS
TEMPERATURE: 98.2 F | RESPIRATION RATE: 18 BRPM | HEART RATE: 70 BPM | BODY MASS INDEX: 32.58 KG/M2 | WEIGHT: 215 LBS | HEIGHT: 68 IN | DIASTOLIC BLOOD PRESSURE: 70 MMHG | SYSTOLIC BLOOD PRESSURE: 147 MMHG | OXYGEN SATURATION: 98 %

## 2021-04-16 DIAGNOSIS — R07.89 OTHER CHEST PAIN: ICD-10-CM

## 2021-04-16 DIAGNOSIS — R60.9 EDEMA, UNSPECIFIED TYPE: ICD-10-CM

## 2021-04-16 DIAGNOSIS — Z92.89 HISTORY OF ECHOCARDIOGRAM: ICD-10-CM

## 2021-04-16 DIAGNOSIS — R07.9 CHEST PAIN, UNSPECIFIED TYPE: Primary | ICD-10-CM

## 2021-04-16 DIAGNOSIS — I10 ESSENTIAL HYPERTENSION: ICD-10-CM

## 2021-04-16 PROCEDURE — G8417 CALC BMI ABV UP PARAM F/U: HCPCS | Performed by: INTERNAL MEDICINE

## 2021-04-16 PROCEDURE — G8536 NO DOC ELDER MAL SCRN: HCPCS | Performed by: INTERNAL MEDICINE

## 2021-04-16 PROCEDURE — 1101F PT FALLS ASSESS-DOCD LE1/YR: CPT | Performed by: INTERNAL MEDICINE

## 2021-04-16 PROCEDURE — 99204 OFFICE O/P NEW MOD 45 MIN: CPT | Performed by: INTERNAL MEDICINE

## 2021-04-16 PROCEDURE — G8753 SYS BP > OR = 140: HCPCS | Performed by: INTERNAL MEDICINE

## 2021-04-16 PROCEDURE — G8399 PT W/DXA RESULTS DOCUMENT: HCPCS | Performed by: INTERNAL MEDICINE

## 2021-04-16 PROCEDURE — 1090F PRES/ABSN URINE INCON ASSESS: CPT | Performed by: INTERNAL MEDICINE

## 2021-04-16 PROCEDURE — G8754 DIAS BP LESS 90: HCPCS | Performed by: INTERNAL MEDICINE

## 2021-04-16 PROCEDURE — G8510 SCR DEP NEG, NO PLAN REQD: HCPCS | Performed by: INTERNAL MEDICINE

## 2021-04-16 PROCEDURE — G8427 DOCREV CUR MEDS BY ELIG CLIN: HCPCS | Performed by: INTERNAL MEDICINE

## 2021-04-16 NOTE — PATIENT INSTRUCTIONS
Testing    Nuclear Stress    Please call [facility] scheduling at [phone]  **call office 3-5 days after testing is completed for results**

## 2021-04-16 NOTE — PROGRESS NOTES
Warren Alan presents today for   Chief Complaint   Patient presents with    New Patient       Warren Alan preferred language for health care discussion is english/other. Personal Protective Equipment:   Personal Protective Equipment was used including: mask-surgical and hands-gloves. Patient was placed on no precaution(s). Patient was masked. Precautions:   Patient currently on None  Patient currently roomed with door closed    Is someone accompanying this pt? Yes daughter  Is the patient using any DME equipment during OV? Yes cane    Depression Screening:  3 most recent PHQ Screens 4/16/2021   PHQ Not Done -   Little interest or pleasure in doing things Not at all   Feeling down, depressed, irritable, or hopeless Not at all   Total Score PHQ 2 0       Learning Assessment:  Learning Assessment 6/15/2020   PRIMARY LEARNER Patient   HIGHEST LEVEL OF EDUCATION - PRIMARY LEARNER  GRADUATED HIGH SCHOOL OR GED   BARRIERS PRIMARY LEARNER NONE   CO-LEARNER CAREGIVER No   PRIMARY LANGUAGE ENGLISH   LEARNER PREFERENCE PRIMARY LISTENING   ANSWERED BY patient   RELATIONSHIP SELF       Abuse Screening:  Abuse Screening Questionnaire 7/11/2019   Do you ever feel afraid of your partner? N   Are you in a relationship with someone who physically or mentally threatens you? N   Is it safe for you to go home? Y       Fall Risk  Fall Risk Assessment, last 12 mths 4/16/2021   Able to walk? Yes   Fall in past 12 months? 0   Do you feel unsteady? 0   Are you worried about falling 0   Number of falls in past 12 months -   Fall with injury? -       Pt currently taking Anticoagulant therapy? no    Coordination of Care:  1. Have you been to the ER, urgent care clinic since your last visit? Hospitalized since your last visit? yes    2. Have you seen or consulted any other health care providers outside of the [address] [place] [address] [place] since your last visit? Include any pap smears or colon screening.  no

## 2021-04-20 PROBLEM — R07.89 OTHER CHEST PAIN: Status: ACTIVE | Noted: 2021-04-20

## 2021-04-20 PROBLEM — Z92.89 HISTORY OF ECHOCARDIOGRAM: Status: ACTIVE | Noted: 2021-04-20

## 2021-04-20 NOTE — PROGRESS NOTES
Subjective:      Shaquille Bowman is in the office today for cardiac evaluation. She is a 28-year-old woman that was referred for further evaluation of chest pain. She was seen in the ED at [facility] on 3/31/2021. At that time she was complaining of persistent pain in her chest for the prior 3 days. She has a known history of esophagitis/biliary disease. 2 troponins were obtained both which were not significantly elevated. She has been referred to gastroenterology in that regard. She had an echocardiogram done on 3/24/2021. Her left ventricle was normal in size with normal systolic function. She did have moderate pulmonary hypertension with an estimated systolic pressure of 54 mmHg. It did not appear to be a complete TR regurgitant jet. The patient reported that she was really sore in the lower sternal area which was similar  to the feeling that she had in the past.  She also reported having increasing swelling which is how she came about obtaining an echocardiogram.  She reports a \"little bit\" of shortness of breath. She does not do much walking. She has been using a cane for results of right knee replacement 2013. She had no PND. She sleeps on 2 pillows for comfort. Patient's cardiac risk factors are hypertension. Patient Active Problem List    Diagnosis Date Noted    Other chest pain 04/20/2021    History of echocardiogram 04/20/2021    Vitamin D deficiency     Multinodular goiter     Asthma     Chronic kidney disease     Essential hypertension 07/11/2019    Chronic gout of multiple sites 10/03/2018    Severe obesity (BMI 35.0-39. 9) with comorbidity ([facility] [address] 05/16/2018    Subclinical hyperthyroidism 05/30/2017    Arthritis 09/20/2010    Pure hypercholesterolemia 03/22/2010    Edema 03/22/2010    Chronic eczema 03/22/2010     Current Outpatient Medications   Medication Sig Dispense Refill    acetaminophen (TYLENOL) 500 mg tablet Take 2 Tabs by mouth every eight (8) hours as needed for Pain. 100 Tab 3    lisinopriL (PRINIVIL, ZESTRIL) 20 mg tablet Take 1 Tab by mouth daily. 90 Tab 1    furosemide (LASIX) 40 mg tablet Take 1 Tab by mouth daily. 30 Tab 3    ergocalciferol (ERGOCALCIFEROL) 1,250 mcg (50,000 unit) capsule Take 1 Cap by mouth every seven (7) days. 12 Cap 1    allopurinoL (ZYLOPRIM) 300 mg tablet Take 1 Tab by mouth daily. 90 Tab 4    hydroCHLOROthiazide (HYDRODIURIL) 25 mg tablet Take 1 Tab by mouth daily. 90 Tab 4    cetirizine (ZYRTEC) 10 mg tablet Take 1 Tab by mouth daily. 90 Tab 3    amLODIPine (NORVASC) 5 mg tablet Take 1 Tab by mouth daily. 90 Tab 3    fluticasone propionate (FLONASE) 50 mcg/actuation nasal spray 2 Sprays by Both Nostrils route daily. 1 Bottle 5    fluocinoNIDE (LIDEX) 0.05 % ointment Apply  to affected area two (2) times a day. 60 g 4    albuterol (PROVENTIL HFA, VENTOLIN HFA, PROAIR HFA) 90 mcg/actuation inhaler Take 2 Puffs by inhalation every six (6) hours as needed for Wheezing. 1 Inhaler 0     Allergies   Allergen Reactions    Tomato Other (comments)     Gi Distress     Past Medical History:   Diagnosis Date    Arthritis 9/20/2010    Asthma     Chronic eczema 3/22/2010    Chronic kidney disease     Clot 10/2018    lower left exremity, shin     Edema 3/22/2010    Environmental allergies     year round    Essential hypertension, malignant 3/22/2010    Gout     Menopause     Multinodular goiter     Followed by Dr. Kathleen Jarquin, ENT.  FNA 2020    Pure hypercholesterolemia 3/22/2010    Subclinical hyperthyroidism 5/30/2017    Unspecified arthropathy, shoulder region 3/22/2010    Unspecified hypothyroidism 3/22/2010    Vitamin D deficiency      Past Surgical History:   Procedure Laterality Date    HX GYN  1983    hysterectomy    HX HYSTERECTOMY  1984    fibroids    HX KNEE REPLACEMENT Right     IR FINE NEEDLE ASPIRATION THYROID  07/2020    benign     Family History   Problem Relation Age of Onset    Hypertension Mother    Ge Breast Cancer Mother 70    Breast Cancer Child 36    Breast Cancer Sister 39     Social History     Tobacco Use   Smoking Status Never Smoker   Smokeless Tobacco Never Used          Review of Systems, additional:  Constitutional: negative  Eyes: negative  Respiratory: positive for dyspnea on exertion  Cardiovascular: positive for chest pain, lower extremity edema  Gastrointestinal: negative  Musculoskeletal:negative  Neurological: negative  Behvioral/Psych: negative  Endocrine: negative  ENT: negative    Objective:     Visit Vitals  BP (!) 147/70 (BP 1 Location: Left upper arm, BP Patient Position: Sitting, BP Cuff Size: Large adult)   Pulse 70   Temp 98.2 °F (36.8 °C) (Temporal)   Resp 18   Ht 5' 8\" (1.727 m)   Wt 215 lb (97.5 kg)   SpO2 98%   BMI 32.69 kg/m²     General:  alert, cooperative, no distress   Chest Wall: inspection normal - no chest wall deformities or tenderness, respiratory effort normal   Lung: clear to auscultation bilaterally   Heart:  normal rate and regular rhythm, S1 and S2 normal, no murmurs noted, no gallops noted   Abdomen: soft, non-tender. Bowel sounds normal. No masses,  no organomegaly   Extremities: extremities normal, atraumatic, no cyanosis or edema Skin: no rashes   Neuro: alert, oriented, normal speech, no focal findings or movement disorder noted     EKG: 3/31/2021. Sinus rhythm. Diffuse nondiagnostic ST and T wave abnormalities. Assessment/Plan:       ICD-10-CM ICD-9-CM    1. Chest pain, unspecified type , will order Tomas Saravia. Keep GI evaluation appointment. Return in 3 to 4 weeks. R07.9 786.50 NUCLEAR CARDIAC STRESS TEST   2. Essential hypertension , mildly elevated systolic BP in the office today. I10 401.9    3. Edema, unspecified type  R60.9 782.3    4. History of echocardiogram , normal LV function with no significant valvular pathology. PA pressure was estimated 54 mmHg. We will have patient back to try to get a better TR jet measurement.  Z92.89 V15.89

## 2021-04-27 ENCOUNTER — DOCUMENTATION ONLY (OUTPATIENT)
Dept: FAMILY MEDICINE CLINIC | Age: 86
End: 2021-04-27

## 2021-04-27 NOTE — PROGRESS NOTES
Faxed labs to Dr. Ronald Carpenter at [facility] ENDO [phone]. Patients request.

## 2021-05-19 ENCOUNTER — OFFICE VISIT (OUTPATIENT)
Dept: CARDIOLOGY CLINIC | Age: 86
End: 2021-05-19
Payer: MEDICARE

## 2021-05-19 VITALS
BODY MASS INDEX: 35.92 KG/M2 | WEIGHT: 215.6 LBS | DIASTOLIC BLOOD PRESSURE: 78 MMHG | TEMPERATURE: 98.2 F | HEART RATE: 93 BPM | RESPIRATION RATE: 16 BRPM | HEIGHT: 65 IN | SYSTOLIC BLOOD PRESSURE: 132 MMHG

## 2021-05-19 DIAGNOSIS — R07.9 CHEST PAIN, UNSPECIFIED TYPE: Primary | ICD-10-CM

## 2021-05-19 DIAGNOSIS — E66.01 SEVERE OBESITY (BMI 35.0-39.9) WITH COMORBIDITY (HCC): ICD-10-CM

## 2021-05-19 PROCEDURE — G8417 CALC BMI ABV UP PARAM F/U: HCPCS | Performed by: INTERNAL MEDICINE

## 2021-05-19 PROCEDURE — G8399 PT W/DXA RESULTS DOCUMENT: HCPCS | Performed by: INTERNAL MEDICINE

## 2021-05-19 PROCEDURE — G8536 NO DOC ELDER MAL SCRN: HCPCS | Performed by: INTERNAL MEDICINE

## 2021-05-19 PROCEDURE — 99214 OFFICE O/P EST MOD 30 MIN: CPT | Performed by: INTERNAL MEDICINE

## 2021-05-19 PROCEDURE — G8752 SYS BP LESS 140: HCPCS | Performed by: INTERNAL MEDICINE

## 2021-05-19 PROCEDURE — G8754 DIAS BP LESS 90: HCPCS | Performed by: INTERNAL MEDICINE

## 2021-05-19 PROCEDURE — 1101F PT FALLS ASSESS-DOCD LE1/YR: CPT | Performed by: INTERNAL MEDICINE

## 2021-05-19 PROCEDURE — 1090F PRES/ABSN URINE INCON ASSESS: CPT | Performed by: INTERNAL MEDICINE

## 2021-05-19 PROCEDURE — G8427 DOCREV CUR MEDS BY ELIG CLIN: HCPCS | Performed by: INTERNAL MEDICINE

## 2021-05-19 PROCEDURE — G8510 SCR DEP NEG, NO PLAN REQD: HCPCS | Performed by: INTERNAL MEDICINE

## 2021-05-19 NOTE — PROGRESS NOTES
Lis Sol presents today for   Chief Complaint   Patient presents with    Follow-up     1 month       Drebeverly Liming preferred language for health care discussion is english/other. Personal Protective Equipment:   Personal Protective Equipment was used including: mask-surgical and hands-gloves. Patient was placed on no precaution(s). Patient was masked. Precautions:   Patient currently on None  Patient currently roomed with door closed    Is someone accompanying this pt? yes    Is the patient using any DME equipment during [address]? no    Depression Screening:  3 most recent PHQ Screens 5/19/2021   PHQ Not Done -   Little interest or pleasure in doing things Not at all   Feeling down, depressed, irritable, or hopeless Not at all   Total Score PHQ 2 0       Learning Assessment:  Learning Assessment 6/15/2020   PRIMARY LEARNER Patient   HIGHEST LEVEL OF EDUCATION - PRIMARY LEARNER  GRADUATED HIGH SCHOOL OR GED   BARRIERS PRIMARY LEARNER NONE   CO-LEARNER CAREGIVER No   PRIMARY LANGUAGE ENGLISH   LEARNER PREFERENCE PRIMARY LISTENING   ANSWERED BY patient   RELATIONSHIP SELF       Abuse Screening:  Abuse Screening Questionnaire 7/11/2019   Do you ever feel afraid of your partner? N   Are you in a relationship with someone who physically or mentally threatens you? N   Is it safe for you to go home? Y       Fall Risk  Fall Risk Assessment, last 12 mths 5/19/2021   Able to walk? Yes   Fall in past 12 months? 0   Do you feel unsteady? 0   Are you worried about falling 0   Number of falls in past 12 months -   Fall with injury? -       Pt currently taking Anticoagulant therapy? no    Coordination of Care:  1. Have you been to the ER, urgent care clinic since your last visit? Hospitalized since your last visit? no    2. Have you seen or consulted any other health care providers outside of the [address] [place] [address] Olaf since your last visit? Include any pap smears or colon screening.  no

## 2021-05-26 NOTE — PROGRESS NOTES
Subjective:      [facility] is in the office today for cardiac reevaluation. She is a 79-year-old woman that was referred for further evaluation of chest pain. She was seen in the ED at [facility] on 3/31/2021. At that time she was complaining of persistent pain in her chest for the prior 3 days. She has a known history of esophagitis/biliary disease. 2 troponins were obtained both which were not significantly elevated. She has been referred to gastroenterology in that regard. She had an echocardiogram done on 3/24/2021. Her left ventricle was normal in size with normal systolic function. She did have moderate pulmonary hypertension with an estimated systolic pressure of 54 mmHg. It did not appear to be a complete TR regurgitant jet. In the office on 4/16/2021, the patient reported that she was really sore in the lower sternal area which was similar  to the feeling that she had in the past.  She also reported having increasing swelling which is how she came about obtaining an echocardiogram.  She reported a \"little bit\" of shortness of breath. She did not do much walking. She had been using a cane as a result of right knee replacement 2013. She had no PND. She sleeps on 2 pillows for comfort. Patient's cardiac risk factors are hypertension. A nuclear stress test was ordered completed. Results are as listed below. She is seeing gastroenterology next week. In the office today she reports she feels \"fine\". She has had no chest pain. She continues to report a \"little shortness of breath \". Patient Active Problem List    Diagnosis Date Noted    Other chest pain 04/20/2021    History of echocardiogram 04/20/2021    Vitamin D deficiency     Multinodular goiter     Asthma     Chronic kidney disease     Essential hypertension 07/11/2019    Chronic gout of multiple sites 10/03/2018    Severe obesity (BMI 35.0-39. 9) with comorbidity ([address] 05/16/2018    Subclinical hyperthyroidism 05/30/2017    Arthritis 09/20/2010    Pure hypercholesterolemia 03/22/2010    Edema 03/22/2010    Chronic eczema 03/22/2010     Current Outpatient Medications   Medication Sig Dispense Refill    acetaminophen (TYLENOL) 500 mg tablet Take 2 Tabs by mouth every eight (8) hours as needed for Pain. 100 Tab 3    lisinopriL (PRINIVIL, ZESTRIL) 20 mg tablet Take 1 Tab by mouth daily. 90 Tab 1    furosemide (LASIX) 40 mg tablet Take 1 Tab by mouth daily. 30 Tab 3    ergocalciferol (ERGOCALCIFEROL) 1,250 mcg (50,000 unit) capsule Take 1 Cap by mouth every seven (7) days. 12 Cap 1    allopurinoL (ZYLOPRIM) 300 mg tablet Take 1 Tab by mouth daily. 90 Tab 4    hydroCHLOROthiazide (HYDRODIURIL) 25 mg tablet Take 1 Tab by mouth daily. 90 Tab 4    cetirizine (ZYRTEC) 10 mg tablet Take 1 Tab by mouth daily. 90 Tab 3    amLODIPine (NORVASC) 5 mg tablet Take 1 Tab by mouth daily. 90 Tab 3    fluticasone propionate (FLONASE) 50 mcg/actuation nasal spray 2 Sprays by Both Nostrils route daily. 1 Bottle 5    fluocinoNIDE (LIDEX) 0.05 % ointment Apply  to affected area two (2) times a day. 60 g 4    albuterol (PROVENTIL HFA, VENTOLIN HFA, PROAIR HFA) 90 mcg/actuation inhaler Take 2 Puffs by inhalation every six (6) hours as needed for Wheezing. 1 Inhaler 0     Allergies   Allergen Reactions    Tomato Other (comments)     Gi Distress     Past Medical History:   Diagnosis Date    Arthritis 9/20/2010    Asthma     Chronic eczema 3/22/2010    Chronic kidney disease     Clot 10/2018    lower left exremity, shin     Edema 3/22/2010    Environmental allergies     year round    Essential hypertension, malignant 3/22/2010    Gout     Menopause     Multinodular goiter     Followed by Dr. Daniele Hays, ENT.  FNA 2020    Pure hypercholesterolemia 3/22/2010    Subclinical hyperthyroidism 5/30/2017    Unspecified arthropathy, shoulder region 3/22/2010    Unspecified hypothyroidism 3/22/2010    Vitamin D deficiency      Past Surgical History:   Procedure Laterality Date    HX GYN  1983    hysterectomy    HX HYSTERECTOMY  1984    fibroids    HX KNEE REPLACEMENT Right     IR FINE NEEDLE ASPIRATION THYROID  07/2020    benign     Family History   Problem Relation Age of Onset    Hypertension Mother     Breast Cancer Mother 70    Breast Cancer Child 36    Breast Cancer Sister 39     Social History     Tobacco Use   Smoking Status Never Smoker   Smokeless Tobacco Never Used          Review of Systems, additional:  Constitutional: negative  Eyes: negative  Respiratory: positive for dyspnea on exertion  Cardiovascular: positive for chest pain, lower extremity edema  Gastrointestinal: negative  Musculoskeletal:negative  Neurological: negative  Behvioral/Psych: negative  Endocrine: negative  ENT: negative    Objective:     Visit Vitals  /78 (BP 1 Location: Left upper arm, BP Patient Position: Sitting, BP Cuff Size: Adult)   Pulse 93   Temp 98.2 °F (36.8 °C) (Temporal)   Resp 16   Ht 5' 5\" (1.651 m)   Wt 97.8 kg (215 lb 9.6 oz)   BMI 35.88 kg/m²     General:  alert, cooperative, no distress   Chest Wall: inspection normal - no chest wall deformities or tenderness, respiratory effort normal   Lung: clear to auscultation bilaterally   Heart:  normal rate and regular rhythm, S1 and S2 normal, no murmurs noted, no gallops noted   Abdomen: soft, non-tender. Bowel sounds normal. No masses,  no organomegaly   Extremities: extremities normal, atraumatic, no cyanosis or edema Skin: no rashes   Neuro: alert, oriented, normal speech, no focal findings or movement disorder noted     EKG: 3/31/2021. Sinus rhythm. Diffuse nondiagnostic ST and T wave abnormalities. Assessment/Plan:       ICD-10-CM ICD-9-CM    1. Chest pain, unspecified type ,  Lexiscan Myoview completed 5/11/2021. Myocardial perfusion imaging is normal.  Ejection fraction 68%. GI evaluation pending. Return PRN R07.9 786.50 NUCLEAR CARDIAC STRESS TEST   2.  Essential hypertension , controlled BP in the office today. I10 401.9    3. Edema, unspecified type  R60.9 782.3    4. History of echocardiogram , normal LV function with no significant valvular pathology. PA pressure estimate was elevated.  Z92.89 V15.89

## 2021-06-16 ENCOUNTER — OFFICE VISIT (OUTPATIENT)
Dept: FAMILY MEDICINE CLINIC | Age: 86
End: 2021-06-16
Payer: MEDICARE

## 2021-06-16 VITALS
TEMPERATURE: 98.7 F | SYSTOLIC BLOOD PRESSURE: 133 MMHG | WEIGHT: 215 LBS | OXYGEN SATURATION: 99 % | HEART RATE: 85 BPM | BODY MASS INDEX: 35.78 KG/M2 | RESPIRATION RATE: 20 BRPM | DIASTOLIC BLOOD PRESSURE: 73 MMHG

## 2021-06-16 DIAGNOSIS — I10 ESSENTIAL HYPERTENSION: ICD-10-CM

## 2021-06-16 DIAGNOSIS — M79.89 LEG SWELLING: Primary | ICD-10-CM

## 2021-06-16 DIAGNOSIS — E55.9 VITAMIN D DEFICIENCY: ICD-10-CM

## 2021-06-16 DIAGNOSIS — J45.909 REACTIVE AIRWAY DISEASE WITHOUT COMPLICATION, UNSPECIFIED ASTHMA SEVERITY, UNSPECIFIED WHETHER PERSISTENT: ICD-10-CM

## 2021-06-16 DIAGNOSIS — Z76.89 ENCOUNTER TO ESTABLISH CARE: ICD-10-CM

## 2021-06-16 DIAGNOSIS — Z00.00 MEDICARE ANNUAL WELLNESS VISIT, SUBSEQUENT: ICD-10-CM

## 2021-06-16 DIAGNOSIS — L30.9 CHRONIC ECZEMA: ICD-10-CM

## 2021-06-16 PROCEDURE — G8536 NO DOC ELDER MAL SCRN: HCPCS | Performed by: STUDENT IN AN ORGANIZED HEALTH CARE EDUCATION/TRAINING PROGRAM

## 2021-06-16 PROCEDURE — G8428 CUR MEDS NOT DOCUMENT: HCPCS | Performed by: STUDENT IN AN ORGANIZED HEALTH CARE EDUCATION/TRAINING PROGRAM

## 2021-06-16 PROCEDURE — G8752 SYS BP LESS 140: HCPCS | Performed by: STUDENT IN AN ORGANIZED HEALTH CARE EDUCATION/TRAINING PROGRAM

## 2021-06-16 PROCEDURE — 99214 OFFICE O/P EST MOD 30 MIN: CPT | Performed by: STUDENT IN AN ORGANIZED HEALTH CARE EDUCATION/TRAINING PROGRAM

## 2021-06-16 PROCEDURE — 1090F PRES/ABSN URINE INCON ASSESS: CPT | Performed by: STUDENT IN AN ORGANIZED HEALTH CARE EDUCATION/TRAINING PROGRAM

## 2021-06-16 PROCEDURE — G8399 PT W/DXA RESULTS DOCUMENT: HCPCS | Performed by: STUDENT IN AN ORGANIZED HEALTH CARE EDUCATION/TRAINING PROGRAM

## 2021-06-16 PROCEDURE — 1101F PT FALLS ASSESS-DOCD LE1/YR: CPT | Performed by: STUDENT IN AN ORGANIZED HEALTH CARE EDUCATION/TRAINING PROGRAM

## 2021-06-16 PROCEDURE — G8510 SCR DEP NEG, NO PLAN REQD: HCPCS | Performed by: STUDENT IN AN ORGANIZED HEALTH CARE EDUCATION/TRAINING PROGRAM

## 2021-06-16 PROCEDURE — G8417 CALC BMI ABV UP PARAM F/U: HCPCS | Performed by: STUDENT IN AN ORGANIZED HEALTH CARE EDUCATION/TRAINING PROGRAM

## 2021-06-16 PROCEDURE — G0439 PPPS, SUBSEQ VISIT: HCPCS | Performed by: STUDENT IN AN ORGANIZED HEALTH CARE EDUCATION/TRAINING PROGRAM

## 2021-06-16 PROCEDURE — G8754 DIAS BP LESS 90: HCPCS | Performed by: STUDENT IN AN ORGANIZED HEALTH CARE EDUCATION/TRAINING PROGRAM

## 2021-06-16 RX ORDER — AMLODIPINE BESYLATE 5 MG/1
5 TABLET ORAL DAILY
Qty: 90 TABLET | Refills: 3 | Status: SHIPPED | OUTPATIENT
Start: 2021-06-16 | End: 2022-06-03 | Stop reason: SDUPTHER

## 2021-06-16 RX ORDER — HYDROCHLOROTHIAZIDE 25 MG/1
25 TABLET ORAL DAILY
Qty: 90 TABLET | Refills: 4 | Status: SHIPPED
Start: 2021-06-16 | End: 2022-05-13 | Stop reason: ALTCHOICE

## 2021-06-16 RX ORDER — FLUOCINONIDE 0.5 MG/G
OINTMENT TOPICAL 2 TIMES DAILY
Qty: 60 G | Refills: 4 | Status: SHIPPED | OUTPATIENT
Start: 2021-06-16 | End: 2022-11-01 | Stop reason: SDUPTHER

## 2021-06-16 RX ORDER — ALBUTEROL SULFATE 90 UG/1
2 AEROSOL, METERED RESPIRATORY (INHALATION)
Qty: 1 INHALER | Refills: 0 | Status: SHIPPED | OUTPATIENT
Start: 2021-06-16 | End: 2021-09-16 | Stop reason: SDUPTHER

## 2021-06-16 RX ORDER — ERGOCALCIFEROL 1.25 MG/1
50000 CAPSULE ORAL
Qty: 12 CAPSULE | Refills: 1 | Status: SHIPPED | OUTPATIENT
Start: 2021-06-16 | End: 2022-03-14 | Stop reason: SDUPTHER

## 2021-06-16 NOTE — PATIENT INSTRUCTIONS

## 2021-06-16 NOTE — PROGRESS NOTES
Tonny Lowry is a 82 y.o.  female and presents with    Chief Complaint   Patient presents with    Annual Wellness Visit    Establish Care           Subjective:  Patient is here today for medication refill, establish care, and her annual wellness visit. Since last seen, patient had went to the ED due to epigastric pain. From them she was sent to cardiology, was seen by Dr. Tulio Sevilla. Per patient she was told there was nothing significant going on with her heart at this time. Patient continues to have swelling her legs bilateral.  States that she does eat high in salt. Does not wear compression socks at this time. Patient needs today refills on her medications for hypertension, hypovitaminosis D, her eczema, and her albuterol. Since she is still better almost daily, however she only does this during spring season due to allergies. Feels that his slight wheezing when she uses the albuterol. For her eczema, usually this is underneath her breast, and bilaterally on her feet. When she uses the steroid cream this makes her feel better. Hypertension, has been well maintained on the medication she has at this time. Denies any side effects from them. States that she is no longer taking lisinopril. Has appointment to follow-up with GI, Dr. Dayne Ferreira, on 08/2021   Also has appointment with endocrinology in August 2021 for her thyroid. Patient Active Problem List   Diagnosis Code    Pure hypercholesterolemia E78.00    Edema R60.9    Chronic eczema L30.9    Arthritis M19.90    Subclinical hyperthyroidism E05.90    Severe obesity (BMI 35.0-39. 9) with comorbidity ([facility] E66.01    Chronic gout of multiple sites M1A. 09X0    Essential hypertension I10    Chronic kidney disease N18.9    Asthma J45.909    Multinodular goiter E04.2    Vitamin D deficiency E55.9    Other chest pain R07.89    History of echocardiogram Z92.89      Past Medical History:   Diagnosis Date    Arthritis 9/20/2010    Asthma     Chronic eczema 3/22/2010    Chronic kidney disease     Clot 10/2018    lower left exremity, shin     Edema 3/22/2010    Environmental allergies     year round    Essential hypertension, malignant 3/22/2010    Gout     Menopause     Multinodular goiter     Followed by Dr. Leonard Coy, ENT. FNA 2020    Pure hypercholesterolemia 3/22/2010    Subclinical hyperthyroidism 5/30/2017    Unspecified arthropathy, shoulder region 3/22/2010    Unspecified hypothyroidism 3/22/2010    Vitamin D deficiency       Past Surgical History:   Procedure Laterality Date    HX GYN  1983    hysterectomy    HX HYSTERECTOMY  1984    fibroids    HX KNEE REPLACEMENT Right     IR FINE NEEDLE ASPIRATION THYROID  07/2020    benign      Family History   Problem Relation Age of Onset    Hypertension Mother     Breast Cancer Mother 70    Breast Cancer Child 36    Breast Cancer Sister 39     Social History     Socioeconomic History    Marital status:      Spouse name: Not on file    Number of children: Not on file    Years of education: Not on file    Highest education level: Not on file   Occupational History    Not on file   Tobacco Use    Smoking status: Never Smoker    Smokeless tobacco: Never Used   Substance and Sexual Activity    Alcohol use:  Yes     Alcohol/week: 2.5 standard drinks     Types: 3 Standard drinks or equivalent per week    Drug use: No    Sexual activity: Yes     Partners: Male   Other Topics Concern     Service Not Asked    Blood Transfusions Not Asked    Caffeine Concern Not Asked    Occupational Exposure Not Asked    Hobby Hazards Not Asked    Sleep Concern Not Asked    Stress Concern Not Asked    Weight Concern Not Asked    Special Diet Yes     Comment: somewhat healthy, low sodium sometimes    Back Care Not Asked    Exercise Yes     Comment: walking, limited by ankle pain    Bike Helmet Not Asked    Seat Belt Not Asked    Self-Exams Not Asked Social History Narrative    Not on file     Social Determinants of Health     Financial Resource Strain:     Difficulty of Paying Living Expenses:    Food Insecurity:     Worried About Running Out of Food in the Last Year:     [address] in the Last Year:    Transportation Needs:     Lack of Transportation (Medical):  Lack of Transportation (Non-Medical):    Physical Activity:     Days of Exercise per Week:     Minutes of Exercise per Session:    Stress:     Feeling of Stress :    Social Connections:     Frequency of Communication with Friends and Family:     Frequency of Social Gatherings with Friends and Family:     Attends Gnosticist Services:     Active Member of Clubs or Organizations:     Attends Club or Organization Meetings:     Marital Status:    Intimate Partner Violence:     Fear of Current or Ex-Partner:     Emotionally Abused:     Physically Abused:     Sexually Abused:         Current Outpatient Medications   Medication Sig Dispense Refill    acetaminophen (TYLENOL) 500 mg tablet Take 2 Tabs by mouth every eight (8) hours as needed for Pain. 100 Tab 3    lisinopriL (PRINIVIL, ZESTRIL) 20 mg tablet Take 1 Tab by mouth daily. 90 Tab 1    furosemide (LASIX) 40 mg tablet Take 1 Tab by mouth daily. 30 Tab 3    ergocalciferol (ERGOCALCIFEROL) 1,250 mcg (50,000 unit) capsule Take 1 Cap by mouth every seven (7) days. 12 Cap 1    allopurinoL (ZYLOPRIM) 300 mg tablet Take 1 Tab by mouth daily. 90 Tab 4    hydroCHLOROthiazide (HYDRODIURIL) 25 mg tablet Take 1 Tab by mouth daily. 90 Tab 4    cetirizine (ZYRTEC) 10 mg tablet Take 1 Tab by mouth daily. 90 Tab 3    amLODIPine (NORVASC) 5 mg tablet Take 1 Tab by mouth daily. 90 Tab 3    fluocinoNIDE (LIDEX) 0.05 % ointment Apply  to affected area two (2) times a day. 60 g 4    albuterol (PROVENTIL HFA, VENTOLIN HFA, PROAIR HFA) 90 mcg/actuation inhaler Take 2 Puffs by inhalation every six (6) hours as needed for Wheezing.  1 Inhaler 0    fluticasone propionate (FLONASE) 50 mcg/actuation nasal spray 2 Sprays by Both Nostrils route daily. (Patient not taking: Reported on 6/16/2021) 1 Bottle 5        ROS   Review of Systems   Constitutional: Negative for chills, fever and malaise/fatigue. HENT: Negative for congestion, ear discharge and ear pain. Eyes: Negative for blurred vision, pain and discharge. Respiratory: Negative for cough and shortness of breath. Cardiovascular: Positive for leg swelling. Negative for chest pain and palpitations. Gastrointestinal: Negative for abdominal pain, nausea and vomiting. Genitourinary: Negative for dysuria, frequency and urgency. Musculoskeletal: Positive for joint pain. Skin: Positive for itching and rash. Neurological: Negative for dizziness, seizures, loss of consciousness and headaches. Psychiatric/Behavioral: Negative for substance abuse. Objective:  Vitals:    06/16/21 1036   BP: 133/73   Pulse: 85   Resp: 20   Temp: 98.7 °F (37.1 °C)   SpO2: 99%   Weight: 215 lb (97.5 kg)   PainSc:   4   PainLoc: Knee       Physical Exam  Vitals reviewed. Constitutional:       Appearance: Normal appearance. Eyes:      General: No scleral icterus. Right eye: No discharge. Left eye: No discharge. Cardiovascular:      Rate and Rhythm: Normal rate and regular rhythm. Pulses: Normal pulses. Pulmonary:      Effort: Pulmonary effort is normal. No respiratory distress. Breath sounds: Normal breath sounds. Musculoskeletal:      Cervical back: Normal range of motion and neck supple. Right lower leg: Edema present. Left lower leg: Edema present. Skin:     Findings: Rash (plantar feet) present. Neurological:      Mental Status: She is alert and oriented to person, place, and time. Cranial Nerves: No cranial nerve deficit. Psychiatric:         Mood and Affect: Mood normal.         Behavior: Behavior normal.         Thought Content:  Thought content normal.         Judgment: Judgment normal.           LABS     TESTS      Assessment/Plan:    1. Essential hypertension  Stable, continue present management. - amLODIPine (NORVASC) 5 mg tablet; Take 1 Tablet by mouth daily. Dispense: 90 Tablet; Refill: 3  - hydroCHLOROthiazide (HYDRODIURIL) 25 mg tablet; Take 1 Tablet by mouth daily. Dispense: 90 Tablet; Refill: 4    2. Chronic eczema  - fluocinoNIDE (LIDEX) 0.05 % ointment; Apply  to affected area two (2) times a day. Dispense: 60 g; Refill: 4    3. Vitamin D deficiency  - ergocalciferol (ERGOCALCIFEROL) 1,250 mcg (50,000 unit) capsule; Take 1 Capsule by mouth every seven (7) days. Dispense: 12 Capsule; Refill: 1    4. Reactive airway disease without complication, unspecified asthma severity, unspecified whether persistent  - albuterol (PROVENTIL HFA, VENTOLIN HFA, PROAIR HFA) 90 mcg/actuation inhaler; Take 2 Puffs by inhalation every six (6) hours as needed for Wheezing. Dispense: 1 Inhaler; Refill: 0    5. Leg swelling  Advised patient on improving her salt intake. She is to decrease this. She is to also wear compression socks, and elevate feet when possible. 6. Encounter to establish care  Will be patient's new PCP. 7. Medicare annual wellness visit, subsequent  See below. Lab review: no lab studies available for review at time of visit      I have discussed the diagnosis with the patient and the intended plan as seen in the above orders. The patient has received an after-visit summary and questions were answered concerning future plans. I have discussed medication side effects and warnings with the patient as well. I have reviewed the plan of care with the patient, accepted their input and they are in agreement with the treatment goals.          Lior Castro MD     ----------------------------------------------------------------------------      This is the Subsequent Medicare Annual Wellness Exam, performed 12 months or more after the Initial AWV or the last Subsequent AWV    I have reviewed the patient's medical history in detail and updated the computerized patient record. Assessment/Plan   Education and counseling provided:  Are appropriate based on today's review and evaluation    1. Leg swelling  2. Essential hypertension  -     amLODIPine (NORVASC) 5 mg tablet; Take 1 Tablet by mouth daily. , Normal, Disp-90 Tablet, R-3  -     hydroCHLOROthiazide (HYDRODIURIL) 25 mg tablet; Take 1 Tablet by mouth daily. , Normal, Disp-90 Tablet, R-4  3. Chronic eczema  -     fluocinoNIDE (LIDEX) 0.05 % ointment; Apply  to affected area two (2) times a day., Normal, Disp-60 g, R-4  4. Vitamin D deficiency  -     ergocalciferol (ERGOCALCIFEROL) 1,250 mcg (50,000 unit) capsule; Take 1 Capsule by mouth every seven (7) days. , Normal, Disp-12 Capsule, R-1  5. Reactive airway disease without complication, unspecified asthma severity, unspecified whether persistent  -     albuterol (PROVENTIL HFA, VENTOLIN HFA, PROAIR HFA) 90 mcg/actuation inhaler; Take 2 Puffs by inhalation every six (6) hours as needed for Wheezing., Normal, Disp-1 Inhaler, R-0  6. Encounter to establish care  7. Medicare annual wellness visit, subsequent       Depression Risk Factor Screening     3 most recent PHQ Screens 6/16/2021   PHQ Not Done -   Little interest or pleasure in doing things Not at all   Feeling down, depressed, irritable, or hopeless Not at all   Total Score PHQ 2 0       Alcohol Risk Screen    Do you average more than 1 drink per night or more than 7 drinks a week:  No    On any one occasion in the past three months have you have had more than 3 drinks containing alcohol:  No        Functional Ability and Level of Safety    Hearing: Hearing is good. Activities of Daily Living: The home contains: no safety equipment.   Patient needs help with:  transportation      Ambulation: with difficulty, uses a cane     Fall Risk:  Fall Risk Assessment, last 12 mths 6/16/2021   Able to walk? Yes   Fall in past 12 months? 0   Do you feel unsteady? 0   Are you worried about falling 0   Number of falls in past 12 months -   Fall with injury? -      Abuse Screen:  Patient is not abused       Cognitive Screening    Has your family/caregiver stated any concerns about your memory: no      Health Maintenance Due     Health Maintenance Due   Topic Date Due    DTaP/Tdap/Td series (1 - Tdap) 01/18/1958    Shingrix Vaccine Age 50> (1 of 2) Never done       Patient Care Team   Patient Care Team:  Arjun Matos MD as PCP - General (Family Medicine)  Arjun Matos MD as PCP - [facility] Empaneled Provider  Angie Rascon MD (Orthopedic Surgery)  Angie Rascon MD (Orthopedic Surgery)  Mandi Win MD (Gastroenterology)  Taryn Eid MD (Surgery)  Matt Arguello MD (Otolaryngology)    History     Patient Active Problem List   Diagnosis Code    Pure hypercholesterolemia E78.00    Edema R60.9    Chronic eczema L30.9    Arthritis M19.90    Subclinical hyperthyroidism E05.90    Severe obesity (BMI 35.0-39. 9) with comorbidity ([address] E66.01    Chronic gout of multiple sites M1A. 09X0    Essential hypertension I10    Chronic kidney disease N18.9    Asthma J45.909    Multinodular goiter E04.2    Vitamin D deficiency E55.9    Other chest pain R07.89    History of echocardiogram Z92.89     Past Medical History:   Diagnosis Date    Arthritis 9/20/2010    Asthma     Chronic eczema 3/22/2010    Chronic kidney disease     Clot 10/2018    lower left exremity, shin     Edema 3/22/2010    Environmental allergies     year round    Essential hypertension, malignant 3/22/2010    Gout     Menopause     Multinodular goiter     Followed by Dr. Karissa Curry, ENT.  FNA 2020    Pure hypercholesterolemia 3/22/2010    Subclinical hyperthyroidism 5/30/2017    Unspecified arthropathy, shoulder region 3/22/2010    Unspecified hypothyroidism 3/22/2010    Vitamin D deficiency       Past Surgical History:   Procedure Laterality Date    HX GYN  1983    hysterectomy    HX HYSTERECTOMY  1984    fibroids    HX KNEE REPLACEMENT Right     IR FINE NEEDLE ASPIRATION THYROID  07/2020    benign     Current Outpatient Medications   Medication Sig Dispense Refill    amLODIPine (NORVASC) 5 mg tablet Take 1 Tablet by mouth daily. 90 Tablet 3    hydroCHLOROthiazide (HYDRODIURIL) 25 mg tablet Take 1 Tablet by mouth daily. 90 Tablet 4    fluocinoNIDE (LIDEX) 0.05 % ointment Apply  to affected area two (2) times a day. 60 g 4    ergocalciferol (ERGOCALCIFEROL) 1,250 mcg (50,000 unit) capsule Take 1 Capsule by mouth every seven (7) days. 12 Capsule 1    albuterol (PROVENTIL HFA, VENTOLIN HFA, PROAIR HFA) 90 mcg/actuation inhaler Take 2 Puffs by inhalation every six (6) hours as needed for Wheezing. 1 Inhaler 0    acetaminophen (TYLENOL) 500 mg tablet Take 2 Tabs by mouth every eight (8) hours as needed for Pain. 100 Tab 3    furosemide (LASIX) 40 mg tablet Take 1 Tab by mouth daily. 30 Tab 3    allopurinoL (ZYLOPRIM) 300 mg tablet Take 1 Tab by mouth daily. 90 Tab 4    cetirizine (ZYRTEC) 10 mg tablet Take 1 Tab by mouth daily. 90 Tab 3    fluticasone propionate (FLONASE) 50 mcg/actuation nasal spray 2 Sprays by Both Nostrils route daily. (Patient not taking: Reported on 6/16/2021) 1 Bottle 5     Allergies   Allergen Reactions    Tomato Other (comments)     Gi Distress       Family History   Problem Relation Age of Onset    Hypertension Mother     Breast Cancer Mother 70    Breast Cancer Child 36    Breast Cancer Sister 39     Social History     Tobacco Use    Smoking status: Never Smoker    Smokeless tobacco: Never Used   Substance Use Topics    Alcohol use:  Yes     Alcohol/week: 2.5 standard drinks     Types: 3 Standard drinks or equivalent per week         William Coronado MD

## 2021-06-16 NOTE — PROGRESS NOTES
Florentin Kim presents today for   Chief Complaint   Patient presents with    Annual Wellness Visit    Establish Care       Is someone accompanying this pt? yes    Is the patient using any DME equipment during OV? yes    Depression Screening:  3 most recent PHQ Screens 6/16/2021   PHQ Not Done -   Little interest or pleasure in doing things Not at all   Feeling down, depressed, irritable, or hopeless Not at all   Total Score PHQ 2 0       Learning Assessment:  Learning Assessment 6/15/2020   PRIMARY LEARNER Patient   HIGHEST LEVEL OF EDUCATION - PRIMARY LEARNER  GRADUATED HIGH SCHOOL OR GED   BARRIERS PRIMARY LEARNER NONE   CO-LEARNER CAREGIVER No   PRIMARY LANGUAGE ENGLISH   LEARNER PREFERENCE PRIMARY LISTENING   ANSWERED BY patient   RELATIONSHIP SELF       Abuse Screening:  Abuse Screening Questionnaire 7/11/2019   Do you ever feel afraid of your partner? N   Are you in a relationship with someone who physically or mentally threatens you? N   Is it safe for you to go home? Y       Fall Risk  Fall Risk Assessment, last 12 mths 6/16/2021   Able to walk? Yes   Fall in past 12 months? 0   Do you feel unsteady? 0   Are you worried about falling 0   Number of falls in past 12 months -   Fall with injury? -       Health Maintenance reviewed and discussed and ordered per Provider. Health Maintenance Due   Topic Date Due    DTaP/Tdap/Td series (1 - Tdap) 01/18/1958    Shingrix Vaccine Age 50> (1 of 2) Never done    Medicare Yearly Exam  06/16/2021   . Coordination of Care:  1. Have you been to the ER, urgent care clinic since your last visit? Hospitalized since your last visit? yes    2. Have you seen or consulted any other health care providers outside of the [address] [place] [address] since your last visit? Include any pap smears or colon screening.  no      Last  Checked na  Last UDS Checked na  Last Pain contract signed: na

## 2021-07-21 ENCOUNTER — TRANSCRIBE ORDER (OUTPATIENT)
Dept: SCHEDULING | Age: 86
End: 2021-07-21

## 2021-07-21 DIAGNOSIS — Z12.31 VISIT FOR SCREENING MAMMOGRAM: Primary | ICD-10-CM

## 2021-08-09 ENCOUNTER — HOSPITAL ENCOUNTER (OUTPATIENT)
Dept: WOMENS IMAGING | Age: 86
Discharge: HOME | End: 2021-08-09
Attending: STUDENT IN AN ORGANIZED HEALTH CARE EDUCATION/TRAINING PROGRAM
Payer: MEDICARE

## 2021-08-09 DIAGNOSIS — Z12.31 VISIT FOR SCREENING MAMMOGRAM: ICD-10-CM

## 2021-08-09 PROCEDURE — 77063 BREAST TOMOSYNTHESIS BI: CPT

## 2021-09-16 ENCOUNTER — OFFICE VISIT (OUTPATIENT)
Dept: FAMILY MEDICINE CLINIC | Age: 86
End: 2021-09-16
Payer: MEDICARE

## 2021-09-16 VITALS
HEART RATE: 108 BPM | RESPIRATION RATE: 20 BRPM | SYSTOLIC BLOOD PRESSURE: 131 MMHG | TEMPERATURE: 98 F | DIASTOLIC BLOOD PRESSURE: 79 MMHG | OXYGEN SATURATION: 98 % | WEIGHT: 218.2 LBS | BODY MASS INDEX: 36.31 KG/M2

## 2021-09-16 DIAGNOSIS — I49.9 IRREGULAR CARDIAC RHYTHM: ICD-10-CM

## 2021-09-16 DIAGNOSIS — Z23 ENCOUNTER FOR IMMUNIZATION: Primary | ICD-10-CM

## 2021-09-16 DIAGNOSIS — M79.89 LEG SWELLING: ICD-10-CM

## 2021-09-16 DIAGNOSIS — J45.909 REACTIVE AIRWAY DISEASE WITHOUT COMPLICATION, UNSPECIFIED ASTHMA SEVERITY, UNSPECIFIED WHETHER PERSISTENT: ICD-10-CM

## 2021-09-16 DIAGNOSIS — M25.562 CHRONIC PAIN OF LEFT KNEE: ICD-10-CM

## 2021-09-16 DIAGNOSIS — G89.29 CHRONIC PAIN OF LEFT KNEE: ICD-10-CM

## 2021-09-16 PROCEDURE — G8399 PT W/DXA RESULTS DOCUMENT: HCPCS | Performed by: STUDENT IN AN ORGANIZED HEALTH CARE EDUCATION/TRAINING PROGRAM

## 2021-09-16 PROCEDURE — 90694 VACC AIIV4 NO PRSRV 0.5ML IM: CPT | Performed by: STUDENT IN AN ORGANIZED HEALTH CARE EDUCATION/TRAINING PROGRAM

## 2021-09-16 PROCEDURE — 99214 OFFICE O/P EST MOD 30 MIN: CPT | Performed by: STUDENT IN AN ORGANIZED HEALTH CARE EDUCATION/TRAINING PROGRAM

## 2021-09-16 PROCEDURE — G8510 SCR DEP NEG, NO PLAN REQD: HCPCS | Performed by: STUDENT IN AN ORGANIZED HEALTH CARE EDUCATION/TRAINING PROGRAM

## 2021-09-16 PROCEDURE — G8428 CUR MEDS NOT DOCUMENT: HCPCS | Performed by: STUDENT IN AN ORGANIZED HEALTH CARE EDUCATION/TRAINING PROGRAM

## 2021-09-16 PROCEDURE — G8752 SYS BP LESS 140: HCPCS | Performed by: STUDENT IN AN ORGANIZED HEALTH CARE EDUCATION/TRAINING PROGRAM

## 2021-09-16 PROCEDURE — G0008 ADMIN INFLUENZA VIRUS VAC: HCPCS | Performed by: STUDENT IN AN ORGANIZED HEALTH CARE EDUCATION/TRAINING PROGRAM

## 2021-09-16 PROCEDURE — 1101F PT FALLS ASSESS-DOCD LE1/YR: CPT | Performed by: STUDENT IN AN ORGANIZED HEALTH CARE EDUCATION/TRAINING PROGRAM

## 2021-09-16 PROCEDURE — G8417 CALC BMI ABV UP PARAM F/U: HCPCS | Performed by: STUDENT IN AN ORGANIZED HEALTH CARE EDUCATION/TRAINING PROGRAM

## 2021-09-16 PROCEDURE — 1090F PRES/ABSN URINE INCON ASSESS: CPT | Performed by: STUDENT IN AN ORGANIZED HEALTH CARE EDUCATION/TRAINING PROGRAM

## 2021-09-16 PROCEDURE — G8536 NO DOC ELDER MAL SCRN: HCPCS | Performed by: STUDENT IN AN ORGANIZED HEALTH CARE EDUCATION/TRAINING PROGRAM

## 2021-09-16 PROCEDURE — G8754 DIAS BP LESS 90: HCPCS | Performed by: STUDENT IN AN ORGANIZED HEALTH CARE EDUCATION/TRAINING PROGRAM

## 2021-09-16 RX ORDER — DICLOFENAC SODIUM 10 MG/G
4 GEL TOPICAL 4 TIMES DAILY
Qty: 350 G | Refills: 1 | Status: SHIPPED | OUTPATIENT
Start: 2021-09-16

## 2021-09-16 RX ORDER — ALBUTEROL SULFATE 90 UG/1
2 AEROSOL, METERED RESPIRATORY (INHALATION)
Qty: 1 EACH | Refills: 4 | Status: SHIPPED | OUTPATIENT
Start: 2021-09-16 | End: 2022-03-14 | Stop reason: SDUPTHER

## 2021-09-16 NOTE — PROGRESS NOTES
Clearance Grad presents today for   Chief Complaint   Patient presents with    Follow-up       Is someone accompanying this pt? no    Is the patient using any DME equipment during OV? no    Depression Screening:  3 most recent PHQ Screens 9/16/2021   PHQ Not Done -   Little interest or pleasure in doing things Not at all   Feeling down, depressed, irritable, or hopeless Not at all   Total Score PHQ 2 0       Learning Assessment:  Learning Assessment 6/16/2021   PRIMARY LEARNER Patient   HIGHEST LEVEL OF EDUCATION - PRIMARY LEARNER  GRADUATED HIGH SCHOOL OR GED   BARRIERS PRIMARY LEARNER NONE   CO-LEARNER CAREGIVER No   PRIMARY LANGUAGE ENGLISH   LEARNER PREFERENCE PRIMARY DEMONSTRATION   ANSWERED BY patient   RELATIONSHIP SELF       Abuse Screening:  Abuse Screening Questionnaire 7/11/2019   Do you ever feel afraid of your partner? N   Are you in a relationship with someone who physically or mentally threatens you? N   Is it safe for you to go home? Y       Fall Risk  Fall Risk Assessment, last 12 mths 9/16/2021   Able to walk? Yes   Fall in past 12 months? 0   Do you feel unsteady? 0   Are you worried about falling 0   Number of falls in past 12 months -   Fall with injury? -       Health Maintenance reviewed and discussed and ordered per Provider. Health Maintenance Due   Topic Date Due    Shingrix Vaccine Age 49> (1 of 2) Never done    DTaP/Tdap/Td series (1 - Tdap) 08/22/2013    Flu Vaccine (1) 09/01/2021   . Coordination of Care:  1. Have you been to the ER, urgent care clinic since your last visit? Hospitalized since your last visit? no    2. Have you seen or consulted any other health care providers outside of the [address] [place] [address] since your last visit? Include any pap smears or colon screening.  no      Last  Checked na  Last UDS Checked na  Last Pain contract signed: na

## 2021-09-17 NOTE — PROGRESS NOTES
Luba Escobar is a 82 y.o.  female and presents with    Chief Complaint   Patient presents with    Follow-up           Subjective:  Patient continues to have swelling of legs bilaterally. States that she has been wearing compression socks, and decreasing her salt intake. States that she also has been raising her legs. Has been taking Lasix 40 mg about twice a week. States that when she does take it she has to void a lot. Also she continues to have chronic pain in her left knee. She states that previously she had Dr. Sandoval, from orthopedics, do her right knee replacement. At that time she was told that there was a possibility she might need a left knee replacement. Patient continues to be on a chronic pain of 6 out of 10, with exacerbations. Would like to see if she can go back to see Dr. Sandoval for further treatment of her left knee. Patient also would like a refill on her albuterol. Would also like to have her flu shot today    Patient Active Problem List   Diagnosis Code    Pure hypercholesterolemia E78.00    Edema R60.9    Chronic eczema L30.9    Arthritis M19.90    Subclinical hyperthyroidism E05.90    Severe obesity (BMI 35.0-39. 9) with comorbidity ([facility] [address] E66.01    Chronic gout of multiple sites M1A. 09X0    Essential hypertension I10    Chronic kidney disease N18.9    Asthma J45.909    Multinodular goiter E04.2    Vitamin D deficiency E55.9    Other chest pain R07.89    History of echocardiogram Z92.89      Past Medical History:   Diagnosis Date    Arthritis 9/20/2010    Asthma     Chronic eczema 3/22/2010    Chronic kidney disease     Clot 10/2018    lower left exremity, shin     Edema 3/22/2010    Environmental allergies     year round    Essential hypertension, malignant 3/22/2010    Gout     Menopause     Multinodular goiter     Followed by Dr. Raffi Lora, ENT.  FNA 2020    Pure hypercholesterolemia 3/22/2010    Subclinical hyperthyroidism 5/30/2017    Unspecified arthropathy, shoulder region 3/22/2010    Unspecified hypothyroidism 3/22/2010    Vitamin D deficiency       Past Surgical History:   Procedure Laterality Date    HX GYN  1983    hysterectomy    HX HYSTERECTOMY  1984    fibroids    HX KNEE REPLACEMENT Right     IR FINE NEEDLE ASPIRATION THYROID  07/2020    benign      Family History   Problem Relation Age of Onset    Hypertension Mother     Breast Cancer Mother 70    Breast Cancer Child 36    Breast Cancer Sister 39     Social History     Socioeconomic History    Marital status:      Spouse name: Not on file    Number of children: Not on file    Years of education: Not on file    Highest education level: Not on file   Occupational History    Not on file   Tobacco Use    Smoking status: Never Smoker    Smokeless tobacco: Never Used   Substance and Sexual Activity    Alcohol use: Yes     Alcohol/week: 2.5 standard drinks     Types: 3 Standard drinks or equivalent per week    Drug use: No    Sexual activity: Yes     Partners: Male   Other Topics Concern     Service Not Asked    Blood Transfusions Not Asked    Caffeine Concern Not Asked    Occupational Exposure Not Asked    Hobby Hazards Not Asked    Sleep Concern Not Asked    Stress Concern Not Asked    Weight Concern Not Asked    Special Diet Yes     Comment: somewhat healthy, low sodium sometimes    Back Care Not Asked    Exercise Yes     Comment: walking, limited by ankle pain    Bike Helmet Not Asked    Seat Belt Not Asked    Self-Exams Not Asked   Social History Narrative    Not on file     Social Determinants of Health     Financial Resource Strain:     Difficulty of Paying Living Expenses:    Food Insecurity:     Worried About Running Out of Food in the Last Year:     [address] in the Last Year:    Transportation Needs:     Lack of Transportation (Medical):      Lack of Transportation (Non-Medical):    Physical Activity:     Days of Exercise per Week:     Minutes of Exercise per Session:    Stress:     Feeling of Stress :    Social Connections:     Frequency of Communication with Friends and Family:     Frequency of Social Gatherings with Friends and Family:     Attends Zoroastrian Services:     Active Member of Clubs or Organizations:     Attends Club or Organization Meetings:     Marital Status:    Intimate Partner Violence:     Fear of Current or Ex-Partner:     Emotionally Abused:     Physically Abused:     Sexually Abused:         Current Outpatient Medications   Medication Sig Dispense Refill    diclofenac (VOLTAREN) 1 % gel Apply 4 g to affected area four (4) times daily. 350 g 1    albuterol (PROVENTIL HFA, VENTOLIN HFA, PROAIR HFA) 90 mcg/actuation inhaler Take 2 Puffs by inhalation every six (6) hours as needed for Wheezing. 1 Each 4    amLODIPine (NORVASC) 5 mg tablet Take 1 Tablet by mouth daily. 90 Tablet 3    hydroCHLOROthiazide (HYDRODIURIL) 25 mg tablet Take 1 Tablet by mouth daily. 90 Tablet 4    fluocinoNIDE (LIDEX) 0.05 % ointment Apply  to affected area two (2) times a day. 60 g 4    ergocalciferol (ERGOCALCIFEROL) 1,250 mcg (50,000 unit) capsule Take 1 Capsule by mouth every seven (7) days. 12 Capsule 1    acetaminophen (TYLENOL) 500 mg tablet Take 2 Tabs by mouth every eight (8) hours as needed for Pain. 100 Tab 3    furosemide (LASIX) 40 mg tablet Take 1 Tab by mouth daily. 30 Tab 3    allopurinoL (ZYLOPRIM) 300 mg tablet Take 1 Tab by mouth daily. 90 Tab 4    cetirizine (ZYRTEC) 10 mg tablet Take 1 Tab by mouth daily. 90 Tab 3    fluticasone propionate (FLONASE) 50 mcg/actuation nasal spray 2 Sprays by Both Nostrils route daily. (Patient not taking: Reported on 6/16/2021) 1 Bottle 5        ROS   Review of Systems   Constitutional: Negative for chills, fever and malaise/fatigue. HENT: Negative for congestion, ear discharge and ear pain. Eyes: Negative for blurred vision, pain and discharge. Respiratory: Negative for cough and shortness of breath. Cardiovascular: Positive for leg swelling. Negative for chest pain and palpitations. Gastrointestinal: Negative for abdominal pain, nausea and vomiting. Genitourinary: Negative for dysuria, frequency and urgency. Musculoskeletal: Positive for joint pain. Skin: Negative for itching and rash. Neurological: Negative for dizziness, seizures, loss of consciousness and headaches. Psychiatric/Behavioral: Negative for substance abuse. Objective:  Vitals:    09/16/21 1018   BP: 131/79   Pulse: (!) 108   Resp: 20   Temp: 98 °F (36.7 °C)   SpO2: 98%   Weight: 218 lb 3.2 oz (99 kg)   PainSc:   6   PainLoc: Knee       Physical Exam  Constitutional:       Appearance: Normal appearance. Eyes:      General: No scleral icterus. Right eye: No discharge. Left eye: No discharge. Cardiovascular:      Rate and Rhythm: Normal rate. Rhythm irregular. Pulses: Normal pulses. Pulmonary:      Effort: Pulmonary effort is normal. No respiratory distress. Breath sounds: Normal breath sounds. Musculoskeletal:      Cervical back: Normal range of motion and neck supple. Right lower leg: Edema present. Left lower leg: Edema present. Skin:     General: Skin is warm and dry. Neurological:      Mental Status: She is alert and oriented to person, place, and time. Cranial Nerves: No cranial nerve deficit. Psychiatric:         Mood and Affect: Mood normal.         Behavior: Behavior normal.         Thought Content: Thought content normal.         Judgment: Judgment normal.           LABS     TESTS      Assessment/Plan:    1. Encounter for immunization  - FLU (FLUAD QUAD INFLUENZA VACCINE,QUAD,ADJUVANTED)    2. Leg swelling  We will try to do 20 mg of Lasix every day to see if improves with patient's leg swelling. Discussed importance of continue to avoid salt, using compression socks, and elevate her legs.     3. Chronic pain of left knee  Patient request to go back to [facility].  - diclofenac (VOLTAREN) 1 % gel; Apply 4 g to affected area four (4) times daily. Dispense: 350 g; Refill: 1  - REFERRAL TO ORTHOPEDIC SURGERY    4. Reactive airway disease without complication, unspecified asthma severity, unspecified whether persistent  - albuterol (PROVENTIL HFA, VENTOLIN HFA, PROAIR HFA) 90 mcg/actuation inhaler; Take 2 Puffs by inhalation every six (6) hours as needed for Wheezing. Dispense: 1 Each; Refill: 4    5. Irregular cardiac rhythm  Noted on physical exam. EKG machine was not printing EKG. From what it can be seen seems to be a possible second-degree heart block. - AMB POC EKG ROUTINE W/ 12 LEADS, INTER & REP         Lab review: no lab studies available for review at time of visit      I have discussed the diagnosis with the patient and the intended plan as seen in the above orders. The patient has received an after-visit summary and questions were answered concerning future plans. I have discussed medication side effects and warnings with the patient as well. I have reviewed the plan of care with the patient, accepted their input and they are in agreement with the treatment goals.          Derrell Ventura MD

## 2021-12-13 ENCOUNTER — OFFICE VISIT (OUTPATIENT)
Dept: FAMILY MEDICINE CLINIC | Age: 86
End: 2021-12-13
Payer: MEDICARE

## 2021-12-13 VITALS
TEMPERATURE: 98 F | BODY MASS INDEX: 36.61 KG/M2 | WEIGHT: 220 LBS | RESPIRATION RATE: 20 BRPM | OXYGEN SATURATION: 97 % | SYSTOLIC BLOOD PRESSURE: 122 MMHG | DIASTOLIC BLOOD PRESSURE: 82 MMHG | HEART RATE: 98 BPM

## 2021-12-13 DIAGNOSIS — M25.562 CHRONIC PAIN OF LEFT KNEE: Primary | ICD-10-CM

## 2021-12-13 DIAGNOSIS — J30.9 ALLERGIC RHINITIS, UNSPECIFIED SEASONALITY, UNSPECIFIED TRIGGER: ICD-10-CM

## 2021-12-13 DIAGNOSIS — I10 ESSENTIAL HYPERTENSION: ICD-10-CM

## 2021-12-13 DIAGNOSIS — M79.89 LEG SWELLING: ICD-10-CM

## 2021-12-13 DIAGNOSIS — E05.90 HYPERTHYROIDISM: ICD-10-CM

## 2021-12-13 DIAGNOSIS — G89.29 CHRONIC PAIN OF LEFT KNEE: Primary | ICD-10-CM

## 2021-12-13 PROCEDURE — G8428 CUR MEDS NOT DOCUMENT: HCPCS | Performed by: STUDENT IN AN ORGANIZED HEALTH CARE EDUCATION/TRAINING PROGRAM

## 2021-12-13 PROCEDURE — G8536 NO DOC ELDER MAL SCRN: HCPCS | Performed by: STUDENT IN AN ORGANIZED HEALTH CARE EDUCATION/TRAINING PROGRAM

## 2021-12-13 PROCEDURE — 1101F PT FALLS ASSESS-DOCD LE1/YR: CPT | Performed by: STUDENT IN AN ORGANIZED HEALTH CARE EDUCATION/TRAINING PROGRAM

## 2021-12-13 PROCEDURE — G8754 DIAS BP LESS 90: HCPCS | Performed by: STUDENT IN AN ORGANIZED HEALTH CARE EDUCATION/TRAINING PROGRAM

## 2021-12-13 PROCEDURE — G8752 SYS BP LESS 140: HCPCS | Performed by: STUDENT IN AN ORGANIZED HEALTH CARE EDUCATION/TRAINING PROGRAM

## 2021-12-13 PROCEDURE — G8399 PT W/DXA RESULTS DOCUMENT: HCPCS | Performed by: STUDENT IN AN ORGANIZED HEALTH CARE EDUCATION/TRAINING PROGRAM

## 2021-12-13 PROCEDURE — 1090F PRES/ABSN URINE INCON ASSESS: CPT | Performed by: STUDENT IN AN ORGANIZED HEALTH CARE EDUCATION/TRAINING PROGRAM

## 2021-12-13 PROCEDURE — G8417 CALC BMI ABV UP PARAM F/U: HCPCS | Performed by: STUDENT IN AN ORGANIZED HEALTH CARE EDUCATION/TRAINING PROGRAM

## 2021-12-13 PROCEDURE — G8510 SCR DEP NEG, NO PLAN REQD: HCPCS | Performed by: STUDENT IN AN ORGANIZED HEALTH CARE EDUCATION/TRAINING PROGRAM

## 2021-12-13 PROCEDURE — 99214 OFFICE O/P EST MOD 30 MIN: CPT | Performed by: STUDENT IN AN ORGANIZED HEALTH CARE EDUCATION/TRAINING PROGRAM

## 2021-12-13 RX ORDER — METHIMAZOLE 5 MG/1
2.5 TABLET ORAL DAILY
COMMUNITY
Start: 2021-12-12

## 2021-12-13 RX ORDER — CETIRIZINE HCL 10 MG
10 TABLET ORAL DAILY
Qty: 90 TABLET | Refills: 3 | Status: SHIPPED
Start: 2021-12-13 | End: 2022-11-01

## 2021-12-13 NOTE — PROGRESS NOTES
Alexander Sol presents today for No chief complaint on file. Is someone accompanying this pt? yes    Is the patient using any DME equipment during [address]? no    Depression Screening:  3 most recent PHQ Screens 12/13/2021   PHQ Not Done -   Little interest or pleasure in doing things Not at all   Feeling down, depressed, irritable, or hopeless Not at all   Total Score PHQ 2 0       Learning Assessment:  Learning Assessment 6/16/2021   PRIMARY LEARNER Patient   HIGHEST LEVEL OF EDUCATION - PRIMARY LEARNER  GRADUATED HIGH SCHOOL OR GED   BARRIERS PRIMARY LEARNER NONE   CO-LEARNER CAREGIVER No   PRIMARY LANGUAGE ENGLISH   LEARNER PREFERENCE PRIMARY DEMONSTRATION   ANSWERED BY patient   RELATIONSHIP SELF       Abuse Screening:  Abuse Screening Questionnaire 9/16/2021   Do you ever feel afraid of your partner? N   Are you in a relationship with someone who physically or mentally threatens you? N   Is it safe for you to go home? Y       Fall Risk  Fall Risk Assessment, last 12 mths 12/13/2021   Able to walk? Yes   Fall in past 12 months? 0   Do you feel unsteady? 0   Are you worried about falling 0   Number of falls in past 12 months -   Fall with injury? -       Health Maintenance reviewed and discussed and ordered per Provider. Health Maintenance Due   Topic Date Due    Shingrix Vaccine Age 49> (1 of 2) Never done    DTaP/Tdap/Td series (1 - Tdap) 08/22/2013    COVID-19 Vaccine (3 - Booster for Pfizer series) 11/01/2021   . Coordination of Care:  1. Have you been to the ER, urgent care clinic since your last visit? Hospitalized since your last visit? no    2. Have you seen or consulted any other health care providers outside of the [address] [place] [address] since your last visit? Include any pap smears or colon screening.  no      Last  Checked na  Last UDS Checked na  Last Pain contract signed: na

## 2022-03-01 ENCOUNTER — NURSE TRIAGE (OUTPATIENT)
Dept: OTHER | Facility: CLINIC | Age: 87
End: 2022-03-01

## 2022-03-01 NOTE — TELEPHONE ENCOUNTER
Received call from Jorje at [facility] with Red Flag Complaint. Subjective: Caller, daughter, states \"Guillermina has swelling, warmth, and pain in right leg. We think it is a blood clot. She had one before. She has an appt on the 14th and we want to get in sooner. \"     Current Symptoms: lower right calf. Pt states from the knee down. +pitting. Cough since yesterday. Speaking in complete. No labored breathing. No chest pain. Onset: 2 weeks ago; worsening    Associated Symptoms: tingling in toe. Pain Severity: 7/10; sharp and tingling; intermittent    Temperature: denies     What has been tried: compression stockings. Elevation. Foot massages. LMP: NA Pregnant: NA    Recommended disposition: Go to ED/UCC Now (Or to Office with PCP Approval)    Care advice provided, patient verbalizes understanding; denies any other questions or concerns; instructed to call back for any new or worsening symptoms. Writer provided warm transfer to [facility] for 2nd level triage regarding RLE and calf pain    Attention Provider: Thank you for allowing me to participate in the care of your patient. The patient was connected to triage in response to information provided to the ECC. Please do not respond through this encounter as the response is not directed to a shared pool.       Reason for Disposition   Thigh or calf pain and only 1 side and present > 1 hour    Protocols used: LEG SWELLING AND EDEMA-ADULT-OH

## 2022-03-02 ENCOUNTER — DOCUMENTATION ONLY (OUTPATIENT)
Dept: FAMILY MEDICINE CLINIC | Age: 87
End: 2022-03-02

## 2022-03-02 ENCOUNTER — HOSPITAL ENCOUNTER (OUTPATIENT)
Dept: LAB | Age: 87
Discharge: HOME | End: 2022-03-02
Payer: MEDICARE

## 2022-03-02 ENCOUNTER — OFFICE VISIT (OUTPATIENT)
Dept: FAMILY MEDICINE CLINIC | Age: 87
End: 2022-03-02
Payer: MEDICARE

## 2022-03-02 VITALS
SYSTOLIC BLOOD PRESSURE: 113 MMHG | HEART RATE: 73 BPM | TEMPERATURE: 97.6 F | HEIGHT: 65 IN | OXYGEN SATURATION: 95 % | BODY MASS INDEX: 37.69 KG/M2 | RESPIRATION RATE: 17 BRPM | WEIGHT: 226.2 LBS | DIASTOLIC BLOOD PRESSURE: 81 MMHG

## 2022-03-02 DIAGNOSIS — M79.89 PAIN AND SWELLING OF RIGHT LOWER LEG: Primary | ICD-10-CM

## 2022-03-02 DIAGNOSIS — M79.89 PAIN AND SWELLING OF RIGHT LOWER LEG: ICD-10-CM

## 2022-03-02 DIAGNOSIS — M79.661 PAIN AND SWELLING OF RIGHT LOWER LEG: Primary | ICD-10-CM

## 2022-03-02 DIAGNOSIS — E66.01 SEVERE OBESITY (BMI 35.0-39.9) WITH COMORBIDITY (HCC): ICD-10-CM

## 2022-03-02 DIAGNOSIS — M79.661 PAIN AND SWELLING OF RIGHT LOWER LEG: ICD-10-CM

## 2022-03-02 DIAGNOSIS — N18.30 STAGE 3 CHRONIC KIDNEY DISEASE, UNSPECIFIED WHETHER STAGE 3A OR 3B CKD (HCC): ICD-10-CM

## 2022-03-02 LAB — D DIMER PPP FEU-MCNC: 1.17 UG/ML(FEU)

## 2022-03-02 PROCEDURE — G8536 NO DOC ELDER MAL SCRN: HCPCS | Performed by: NURSE PRACTITIONER

## 2022-03-02 PROCEDURE — 85379 FIBRIN DEGRADATION QUANT: CPT

## 2022-03-02 PROCEDURE — G8427 DOCREV CUR MEDS BY ELIG CLIN: HCPCS | Performed by: NURSE PRACTITIONER

## 2022-03-02 PROCEDURE — G8417 CALC BMI ABV UP PARAM F/U: HCPCS | Performed by: NURSE PRACTITIONER

## 2022-03-02 PROCEDURE — G8432 DEP SCR NOT DOC, RNG: HCPCS | Performed by: NURSE PRACTITIONER

## 2022-03-02 PROCEDURE — G8754 DIAS BP LESS 90: HCPCS | Performed by: NURSE PRACTITIONER

## 2022-03-02 PROCEDURE — 1101F PT FALLS ASSESS-DOCD LE1/YR: CPT | Performed by: NURSE PRACTITIONER

## 2022-03-02 PROCEDURE — G8399 PT W/DXA RESULTS DOCUMENT: HCPCS | Performed by: NURSE PRACTITIONER

## 2022-03-02 PROCEDURE — G8752 SYS BP LESS 140: HCPCS | Performed by: NURSE PRACTITIONER

## 2022-03-02 PROCEDURE — 1090F PRES/ABSN URINE INCON ASSESS: CPT | Performed by: NURSE PRACTITIONER

## 2022-03-02 PROCEDURE — 36415 COLL VENOUS BLD VENIPUNCTURE: CPT

## 2022-03-02 PROCEDURE — 99213 OFFICE O/P EST LOW 20 MIN: CPT | Performed by: NURSE PRACTITIONER

## 2022-03-02 NOTE — PROGRESS NOTES
Called pt to inform her of her appt tomorrow at 1:00 at [facility] for us of leg.  Pt verbalized understanding

## 2022-03-02 NOTE — PROGRESS NOTES
Lilly Angelucci is a 82 y.o. female (: 1935) presenting to address:    Chief Complaint   Patient presents with    Leg Swelling       There were no vitals filed for this visit. Hearing/Vision:   No exam data present    Learning Assessment:     Learning Assessment 2021   PRIMARY LEARNER Patient   HIGHEST LEVEL OF EDUCATION - PRIMARY LEARNER  GRADUATED HIGH SCHOOL OR GED   BARRIERS PRIMARY LEARNER NONE   CO-LEARNER CAREGIVER No   PRIMARY LANGUAGE ENGLISH   LEARNER PREFERENCE PRIMARY DEMONSTRATION   ANSWERED BY patient   RELATIONSHIP SELF     Depression Screening:     3 most recent PHQ Screens 3/2/2022   PHQ Not Done -   Little interest or pleasure in doing things Not at all   Feeling down, depressed, irritable, or hopeless Not at all   Total Score PHQ 2 0     Fall Risk Assessment:     Fall Risk Assessment, last 12 mths 3/2/2022   Able to walk? Yes   Fall in past 12 months? 0   Do you feel unsteady? 1   Are you worried about falling 1   Is TUG test greater than 12 seconds? 1   Is the gait abnormal? 1   Number of falls in past 12 months 0   Fall with injury? -     Abuse Screening:     Abuse Screening Questionnaire 2021   Do you ever feel afraid of your partner? N   Are you in a relationship with someone who physically or mentally threatens you? N   Is it safe for you to go home?  Y     ADL Assessment:     ADL Assessment 2021   Feeding yourself No Help Needed   Getting from bed to chair No Help Needed   Getting dressed No Help Needed   Bathing or showering No Help Needed   Walk across the room (includes cane/walker) No Help Needed   Using the telphone No Help Needed   Taking your medications No Help Needed   Preparing meals No Help Needed   Managing money (expenses/bills) No Help Needed   Moderately strenuous housework (laundry) No Help Needed   Shopping for personal items (toiletries/medicines) No Help Needed   Shopping for groceries No Help Needed   Driving No Help Needed   Climbing a flight of stairs Help Needed   Getting to places beyond walking distances No Help Needed        Coordination of Care Questionaire:   1. Have you been to the ER, urgent care clinic since your last visit? Hospitalized since your last visit? NO    2. Have you seen or consulted any other health care providers outside of the [address] [place] [address] since your last visit? Include any pap smears or colon screening.  NO

## 2022-03-02 NOTE — PROGRESS NOTES
OFFICE NOTE    Fran Jovel is a 82 y.o. female presenting today for office visit. Patient Active Problem List   Diagnosis Code    Pure hypercholesterolemia E78.00    Edema R60.9    Chronic eczema L30.9    Arthritis M19.90    Subclinical hyperthyroidism E05.90    Severe obesity (BMI 35.0-39. 9) with comorbidity ([facility] [address] E66.01    Chronic gout of multiple sites M1A. 09X0    Essential hypertension I10    Chronic kidney disease N18.9    Asthma J45.909    Multinodular goiter E04.2    Vitamin D deficiency E55.9    Other chest pain R07.89    History of echocardiogram Z92.89     3/2/2022  9:17 AM    Chief Complaint   Patient presents with    Leg Swelling     HPI:   In office visit. Dr. Cj Briggs patient. Patient complains of chronic right leg swelling but recently she has had more swelling. She takes Lasix 40 mg 3x weekly but is suppose to take it daily. But she says she \"pees\" on herself when she takes lasix daily. She has chronic pain in her right lower leg but the last 2 weeks she had a \"shooting pain\" in her lower right low. Right lower leg swelling and D dimer today to rule out DVT. I advised patient to take care Lasix as directed. Patient reports appetite is good, no urinary issues, sleeps well and regular bowel movements. Patient denies fever, chills, chest pain, shortness of breath, abdomen pain or dark tarry stool.     ROS:    · General: negative for - chills, fever, weight changes or malaise  · HEENT: no sore throat, nasal congestion, vision problems or ear problems  · Respiratory: no cough, shortness of breath, or wheezing  · Cardiovascular: no chest pain, palpitations, or dyspnea on exertion  · Gastrointestinal: no abdominal pain, N/V, change in bowel habits, or black or bloody stools  · Musculoskeletal: Positive for bilateral lower extremity swelling, no back pain, joint pain, joint stiffness, muscle pain or muscle weakness  · Neurological: no numbness, tingling, headache or dizziness  · Endo:  No polyuria or polydipsia. · : no hematuria, dysuria, frequency, hesitancy, or nocturia. · Skin: No itching or rash, no open skin, no unusual lesions   · Psychological: negative for - anxiety, depression, sleep disturbances, suicidal or homicidal ideations     PHQ Screening   3 most recent PHQ Screens 3/14/2022   PHQ Not Done -   Little interest or pleasure in doing things Not at all   Feeling down, depressed, irritable, or hopeless Not at all   Total Score PHQ 2 0       History  Past Medical History:   Diagnosis Date    Arthritis 9/20/2010    Asthma     Chronic eczema 3/22/2010    Chronic kidney disease     Clot 10/2018    lower left exremity, shin     Edema 3/22/2010    Environmental allergies     year round    Essential hypertension, malignant 3/22/2010    Gout     Menopause     Multinodular goiter     Followed by Dr. Chika Ponce, ENT. FNA 2020    Pure hypercholesterolemia 3/22/2010    Subclinical hyperthyroidism 5/30/2017    Unspecified arthropathy, shoulder region 3/22/2010    Unspecified hypothyroidism 3/22/2010    Vitamin D deficiency        Past Surgical History:   Procedure Laterality Date    HX GYN  1983    hysterectomy    HX HYSTERECTOMY  1984    fibroids    HX KNEE REPLACEMENT Right     IR FINE NEEDLE ASPIRATION THYROID  07/2020    benign       Social History     Socioeconomic History    Marital status:      Spouse name: Not on file    Number of children: Not on file    Years of education: Not on file    Highest education level: Not on file   Occupational History    Not on file   Tobacco Use    Smoking status: Never Smoker    Smokeless tobacco: Never Used   Substance and Sexual Activity    Alcohol use:  Yes     Alcohol/week: 2.5 standard drinks     Types: 3 Standard drinks or equivalent per week    Drug use: No    Sexual activity: Yes     Partners: Male   Other Topics Concern     Service Not Asked    Blood Transfusions Not Asked    Caffeine Concern Not Asked    Occupational Exposure Not Asked    Hobby Hazards Not Asked    Sleep Concern Not Asked    Stress Concern Not Asked    Weight Concern Not Asked    Special Diet Yes     Comment: somewhat healthy, low sodium sometimes    Back Care Not Asked    Exercise Yes     Comment: walking, limited by ankle pain    Bike Helmet Not Asked    Seat Belt Not Asked    Self-Exams Not Asked   Social History Narrative    Not on file     Social Determinants of Health     Financial Resource Strain:     Difficulty of Paying Living Expenses: Not on file   Food Insecurity:     Worried About Running Out of Food in the Last Year: Not on file    Willa of Food in the Last Year: Not on file   Transportation Needs:     Lack of Transportation (Medical): Not on file    Lack of Transportation (Non-Medical):  Not on file   Physical Activity:     Days of Exercise per Week: Not on file    Minutes of Exercise per Session: Not on file   Stress:     Feeling of Stress : Not on file   Social Connections:     Frequency of Communication with Friends and Family: Not on file    Frequency of Social Gatherings with Friends and Family: Not on file    Attends Voodoo Services: Not on file    Active Member of [address] [place] [address] or Organizations: Not on file    Attends Club or Organization Meetings: Not on file    Marital Status: Not on file   Intimate Partner Violence:     Fear of Current or Ex-Partner: Not on file    Emotionally Abused: Not on file    Physically Abused: Not on file    Sexually Abused: Not on file   Housing Stability:     Unable to Pay for Housing in the Last Year: Not on file    Number of [place] in the Last Year: Not on file    Unstable Housing in the Last Year: Not on file       Allergies   Allergen Reactions    Tomato Other (comments)     Gi Distress       Current Outpatient Medications   Medication Sig Dispense Refill    methIMAzole (TAPAZOLE) 5 mg tablet       cetirizine (ZYRTEC) 10 mg tablet Take 1 Tablet by mouth daily. 90 Tablet 3    diclofenac (VOLTAREN) 1 % gel Apply 4 g to affected area four (4) times daily. 350 g 1    amLODIPine (NORVASC) 5 mg tablet Take 1 Tablet by mouth daily. 90 Tablet 3    hydroCHLOROthiazide (HYDRODIURIL) 25 mg tablet Take 1 Tablet by mouth daily. 90 Tablet 4    fluocinoNIDE (LIDEX) 0.05 % ointment Apply  to affected area two (2) times a day. 60 g 4    acetaminophen (TYLENOL) 500 mg tablet Take 2 Tabs by mouth every eight (8) hours as needed for Pain. 100 Tab 3    allopurinoL (ZYLOPRIM) 300 mg tablet Take 1 Tab by mouth daily. 90 Tab 4    fluticasone propionate (FLONASE) 50 mcg/actuation nasal spray 2 Sprays by Both Nostrils route daily. 1 Bottle 5    ergocalciferol (ERGOCALCIFEROL) 1,250 mcg (50,000 unit) capsule Take 1 Capsule by mouth every seven (7) days. 12 Capsule 1    albuterol (PROVENTIL HFA, VENTOLIN HFA, PROAIR HFA) 90 mcg/actuation inhaler Take 2 Puffs by inhalation every six (6) hours as needed for Wheezing. 1 Each 4    furosemide (LASIX) 40 mg tablet Take 1 Tablet by mouth daily. 90 Tablet 3    potassium chloride (KLOR-CON M10) 10 mEq tablet Take 1 Tablet by mouth daily. 30 Tablet 3       Patient Care Team:  Patient Care Team:  Maximo Thibodeaux MD as PCP - General (Family Medicine)  Maximo Thibodeaux MD as PCP - [facility]  Eloisa Campbell MD (Orthopedic Surgery)  Eloisa Campbell MD (Orthopedic Surgery)  Kesha Garcia MD (Gastroenterology)  Brigido Conley MD (Surgery)  Frankie Rodas MD (Otolaryngology)    Physical Exam  Patient appears well, she is pleasant, alert, oriented x 3, in no distress. ENT normal.  Neck supple. No adenopathy or thyromegaly. JULIANO. Lungs are clear, good air entry, no wheezes, rhonchi or rales. Cardiovascular, S1 and S2 normal, no murmurs, regular rate and rhythm. No LAD.   Chest wall negative for tenderness  Abdomen is soft without tenderness   /Anorectal, deferred. Muscleskeletal, no swelling, no tenderness, no injury. Extremities positive for bilateral lower extremity edema, positive for peripheral pulses. Neurological is normal without focal findings. Skin: no concerning lesions. Psych: normal affect. Mood good. Oriented x 3. Judgement and insight intact. 131/  Vitals:    03/02/22 1147 03/02/22 1150 03/02/22 1231   BP: (!) 152/95 (!) 147/104 113/81   Pulse: (!) 102  73   Resp: 17     Temp: 97.6 °F (36.4 °C)     TempSrc: Temporal     SpO2: 95%     Weight: 226 lb 3.2 oz (102.6 kg)     Height: 5' 5\" (1.651 m)     PainSc:   7         Assessment and Plan    Diagnoses and all orders for this visit:    Pain and swelling of right lower leg  -     D DIMER; Future  -     DUPLEX LOWER EXT ARTERY RIGHT; Future  -     DUPLEX LOWER EXT VENOUS RIGHT; Future    Severe obesity (BMI 35.0-39. 9) with comorbidity ([address]    Stage 3 chronic kidney disease, unspecified whether stage 3a or 3b CKD ([facility] [address]       Patient agreed to right lower leg US today and a D dimer. She says she will take her Lasix as directed. Spoke with patient 3/6/2022, ultrasound of right leg was normal.  Patient indicated there is swelling gone down and she was taking her Lasix as directed. *Plan of care reviewed with patient. Patient in agreement with plan and expresses understanding. All questions answered and patient encouraged to call or RTO if further questions or concerns. 50% of 25 minutes spent on counseling and managing her care. Follow-up and Dispositions    · Return if symptoms worsen or fail to improve.        Particsergio Luu, MARYLUC

## 2022-03-03 ENCOUNTER — HOSPITAL ENCOUNTER (OUTPATIENT)
Dept: VASCULAR SURGERY | Age: 87
Discharge: HOME | End: 2022-03-03
Attending: NURSE PRACTITIONER
Payer: MEDICARE

## 2022-03-03 DIAGNOSIS — M79.89 PAIN AND SWELLING OF RIGHT LOWER LEG: ICD-10-CM

## 2022-03-03 DIAGNOSIS — M79.661 PAIN AND SWELLING OF RIGHT LOWER LEG: ICD-10-CM

## 2022-03-03 PROCEDURE — 93971 EXTREMITY STUDY: CPT

## 2022-03-08 ENCOUNTER — HOSPITAL ENCOUNTER (OUTPATIENT)
Dept: LAB | Age: 87
Discharge: HOME | End: 2022-03-08
Payer: MEDICARE

## 2022-03-08 DIAGNOSIS — I10 ESSENTIAL HYPERTENSION: ICD-10-CM

## 2022-03-08 LAB
ALBUMIN SERPL-MCNC: 3.5 G/DL (ref 3.4–5)
ALBUMIN/GLOB SERPL: 0.9 {RATIO} (ref 0.8–1.7)
ALP SERPL-CCNC: 122 U/L (ref 45–117)
ALT SERPL-CCNC: 19 U/L (ref 13–56)
ANION GAP SERPL CALC-SCNC: 2 MMOL/L (ref 3–18)
AST SERPL-CCNC: 18 U/L (ref 10–38)
BASOPHILS # BLD: 0.1 K/UL (ref 0–0.1)
BASOPHILS NFR BLD: 1 % (ref 0–2)
BILIRUB SERPL-MCNC: 0.5 MG/DL (ref 0.2–1)
BUN SERPL-MCNC: 14 MG/DL (ref 7–18)
BUN/CREAT SERPL: 10 (ref 12–20)
CALCIUM SERPL-MCNC: 10.1 MG/DL (ref 8.5–10.1)
CHLORIDE SERPL-SCNC: 94 MMOL/L (ref 100–111)
CHOLEST SERPL-MCNC: 216 MG/DL
CO2 SERPL-SCNC: 40 MMOL/L (ref 21–32)
CREAT SERPL-MCNC: 1.45 MG/DL (ref 0.6–1.3)
CREAT UR-MCNC: 243 MG/DL (ref 30–125)
DIFFERENTIAL METHOD BLD: ABNORMAL
EOSINOPHIL # BLD: 1.4 K/UL (ref 0–0.4)
EOSINOPHIL NFR BLD: 12 % (ref 0–5)
ERYTHROCYTE [DISTWIDTH] IN BLOOD BY AUTOMATED COUNT: 14.4 % (ref 11.6–14.5)
GLOBULIN SER CALC-MCNC: 4.1 G/DL (ref 2–4)
GLUCOSE SERPL-MCNC: 82 MG/DL (ref 74–99)
HCT VFR BLD AUTO: 48 % (ref 35–45)
HDLC SERPL-MCNC: 52 MG/DL (ref 40–60)
HDLC SERPL: 4.2 {RATIO} (ref 0–5)
HGB BLD-MCNC: 14.4 G/DL (ref 12–16)
IMM GRANULOCYTES # BLD AUTO: 0 K/UL (ref 0–0.04)
IMM GRANULOCYTES NFR BLD AUTO: 0 % (ref 0–0.5)
LDLC SERPL CALC-MCNC: 135.4 MG/DL (ref 0–100)
LIPID PROFILE,FLP: ABNORMAL
LYMPHOCYTES # BLD: 2.9 K/UL (ref 0.9–3.6)
LYMPHOCYTES NFR BLD: 26 % (ref 21–52)
MCH RBC QN AUTO: 25.5 PG (ref 24–34)
MCHC RBC AUTO-ENTMCNC: 30 G/DL (ref 31–37)
MCV RBC AUTO: 85.1 FL (ref 78–100)
MICROALBUMIN UR-MCNC: 1.86 MG/DL (ref 0–3)
MICROALBUMIN/CREAT UR-RTO: 8 MG/G (ref 0–30)
MONOCYTES # BLD: 0.8 K/UL (ref 0.05–1.2)
MONOCYTES NFR BLD: 7 % (ref 3–10)
NEUTS SEG # BLD: 5.9 K/UL (ref 1.8–8)
NEUTS SEG NFR BLD: 54 % (ref 40–73)
NRBC # BLD: 0 K/UL (ref 0–0.01)
NRBC BLD-RTO: 0 PER 100 WBC
PLATELET # BLD AUTO: 216 K/UL (ref 135–420)
PMV BLD AUTO: 11.6 FL (ref 9.2–11.8)
POTASSIUM SERPL-SCNC: 3.2 MMOL/L (ref 3.5–5.5)
PROT SERPL-MCNC: 7.6 G/DL (ref 6.4–8.2)
RBC # BLD AUTO: 5.64 M/UL (ref 4.2–5.3)
SODIUM SERPL-SCNC: 137 MMOL/L (ref 136–145)
TRIGL SERPL-MCNC: 143 MG/DL (ref ?–150)
VLDLC SERPL CALC-MCNC: 28.6 MG/DL
WBC # BLD AUTO: 11 K/UL (ref 4.6–13.2)

## 2022-03-08 PROCEDURE — 36415 COLL VENOUS BLD VENIPUNCTURE: CPT

## 2022-03-08 PROCEDURE — 80061 LIPID PANEL: CPT

## 2022-03-08 PROCEDURE — 82043 UR ALBUMIN QUANTITATIVE: CPT

## 2022-03-08 PROCEDURE — 80053 COMPREHEN METABOLIC PANEL: CPT

## 2022-03-08 PROCEDURE — 85025 COMPLETE CBC W/AUTO DIFF WBC: CPT

## 2022-03-14 ENCOUNTER — OFFICE VISIT (OUTPATIENT)
Dept: FAMILY MEDICINE CLINIC | Age: 87
End: 2022-03-14
Payer: MEDICARE

## 2022-03-14 ENCOUNTER — HOSPITAL ENCOUNTER (OUTPATIENT)
Dept: LAB | Age: 87
Discharge: HOME | End: 2022-03-14
Payer: MEDICARE

## 2022-03-14 VITALS
WEIGHT: 219 LBS | BODY MASS INDEX: 36.44 KG/M2 | TEMPERATURE: 98 F | OXYGEN SATURATION: 96 % | RESPIRATION RATE: 20 BRPM | DIASTOLIC BLOOD PRESSURE: 85 MMHG | HEART RATE: 100 BPM | SYSTOLIC BLOOD PRESSURE: 129 MMHG

## 2022-03-14 DIAGNOSIS — R06.09 DYSPNEA ON EXERTION: ICD-10-CM

## 2022-03-14 DIAGNOSIS — J45.909 REACTIVE AIRWAY DISEASE WITHOUT COMPLICATION, UNSPECIFIED ASTHMA SEVERITY, UNSPECIFIED WHETHER PERSISTENT: ICD-10-CM

## 2022-03-14 DIAGNOSIS — E55.9 VITAMIN D DEFICIENCY: ICD-10-CM

## 2022-03-14 DIAGNOSIS — M79.89 LEG SWELLING: ICD-10-CM

## 2022-03-14 DIAGNOSIS — R06.09 DYSPNEA ON EXERTION: Primary | ICD-10-CM

## 2022-03-14 DIAGNOSIS — E87.6 HYPOKALEMIA: ICD-10-CM

## 2022-03-14 LAB
25(OH)D3 SERPL-MCNC: 72.2 NG/ML (ref 30–100)
ALBUMIN SERPL-MCNC: 3.5 G/DL (ref 3.4–5)
ALBUMIN/GLOB SERPL: 0.9 {RATIO} (ref 0.8–1.7)
ALP SERPL-CCNC: 143 U/L (ref 45–117)
ALT SERPL-CCNC: 21 U/L (ref 13–56)
ANION GAP SERPL CALC-SCNC: 1 MMOL/L (ref 3–18)
AST SERPL-CCNC: 17 U/L (ref 10–38)
BILIRUB SERPL-MCNC: 0.6 MG/DL (ref 0.2–1)
BNP SERPL-MCNC: 728 PG/ML (ref 0–1800)
BUN SERPL-MCNC: 22 MG/DL (ref 7–18)
BUN/CREAT SERPL: 14 (ref 12–20)
CALCIUM SERPL-MCNC: 10.2 MG/DL (ref 8.5–10.1)
CHLORIDE SERPL-SCNC: 93 MMOL/L (ref 100–111)
CO2 SERPL-SCNC: 45 MMOL/L (ref 21–32)
CREAT SERPL-MCNC: 1.59 MG/DL (ref 0.6–1.3)
GLOBULIN SER CALC-MCNC: 4.1 G/DL (ref 2–4)
GLUCOSE SERPL-MCNC: 83 MG/DL (ref 74–99)
POTASSIUM SERPL-SCNC: 3.2 MMOL/L (ref 3.5–5.5)
PROT SERPL-MCNC: 7.6 G/DL (ref 6.4–8.2)
SODIUM SERPL-SCNC: 139 MMOL/L (ref 136–145)

## 2022-03-14 PROCEDURE — G8754 DIAS BP LESS 90: HCPCS | Performed by: STUDENT IN AN ORGANIZED HEALTH CARE EDUCATION/TRAINING PROGRAM

## 2022-03-14 PROCEDURE — G8428 CUR MEDS NOT DOCUMENT: HCPCS | Performed by: STUDENT IN AN ORGANIZED HEALTH CARE EDUCATION/TRAINING PROGRAM

## 2022-03-14 PROCEDURE — G8536 NO DOC ELDER MAL SCRN: HCPCS | Performed by: STUDENT IN AN ORGANIZED HEALTH CARE EDUCATION/TRAINING PROGRAM

## 2022-03-14 PROCEDURE — 83880 ASSAY OF NATRIURETIC PEPTIDE: CPT

## 2022-03-14 PROCEDURE — 80053 COMPREHEN METABOLIC PANEL: CPT

## 2022-03-14 PROCEDURE — 36415 COLL VENOUS BLD VENIPUNCTURE: CPT

## 2022-03-14 PROCEDURE — G8417 CALC BMI ABV UP PARAM F/U: HCPCS | Performed by: STUDENT IN AN ORGANIZED HEALTH CARE EDUCATION/TRAINING PROGRAM

## 2022-03-14 PROCEDURE — G8399 PT W/DXA RESULTS DOCUMENT: HCPCS | Performed by: STUDENT IN AN ORGANIZED HEALTH CARE EDUCATION/TRAINING PROGRAM

## 2022-03-14 PROCEDURE — G8752 SYS BP LESS 140: HCPCS | Performed by: STUDENT IN AN ORGANIZED HEALTH CARE EDUCATION/TRAINING PROGRAM

## 2022-03-14 PROCEDURE — 1090F PRES/ABSN URINE INCON ASSESS: CPT | Performed by: STUDENT IN AN ORGANIZED HEALTH CARE EDUCATION/TRAINING PROGRAM

## 2022-03-14 PROCEDURE — G8510 SCR DEP NEG, NO PLAN REQD: HCPCS | Performed by: STUDENT IN AN ORGANIZED HEALTH CARE EDUCATION/TRAINING PROGRAM

## 2022-03-14 PROCEDURE — 82306 VITAMIN D 25 HYDROXY: CPT

## 2022-03-14 PROCEDURE — 1101F PT FALLS ASSESS-DOCD LE1/YR: CPT | Performed by: STUDENT IN AN ORGANIZED HEALTH CARE EDUCATION/TRAINING PROGRAM

## 2022-03-14 PROCEDURE — 99214 OFFICE O/P EST MOD 30 MIN: CPT | Performed by: STUDENT IN AN ORGANIZED HEALTH CARE EDUCATION/TRAINING PROGRAM

## 2022-03-14 RX ORDER — ERGOCALCIFEROL 1.25 MG/1
50000 CAPSULE ORAL
Qty: 12 CAPSULE | Refills: 1 | Status: SHIPPED | OUTPATIENT
Start: 2022-03-14 | End: 2022-07-22 | Stop reason: SDUPTHER

## 2022-03-14 RX ORDER — POTASSIUM CHLORIDE 750 MG/1
10 TABLET, EXTENDED RELEASE ORAL DAILY
Qty: 30 TABLET | Refills: 3 | Status: SHIPPED
Start: 2022-03-14 | End: 2022-03-24 | Stop reason: DRUGHIGH

## 2022-03-14 RX ORDER — ALBUTEROL SULFATE 90 UG/1
2 AEROSOL, METERED RESPIRATORY (INHALATION)
Qty: 1 EACH | Refills: 4 | Status: SHIPPED | OUTPATIENT
Start: 2022-03-14

## 2022-03-14 RX ORDER — FUROSEMIDE 40 MG/1
40 TABLET ORAL DAILY
Qty: 90 TABLET | Refills: 3 | Status: SHIPPED | OUTPATIENT
Start: 2022-03-14 | End: 2022-06-03 | Stop reason: SDUPTHER

## 2022-03-14 NOTE — PROGRESS NOTES
Tessa Luna presents today for No chief complaint on file. Is someone accompanying this pt? yes    Is the patient using any DME equipment during [address]? no    Depression Screening:  3 most recent PHQ Screens 3/14/2022   PHQ Not Done -   Little interest or pleasure in doing things Not at all   Feeling down, depressed, irritable, or hopeless Not at all   Total Score PHQ 2 0       Learning Assessment:  Learning Assessment 6/16/2021   PRIMARY LEARNER Patient   HIGHEST LEVEL OF EDUCATION - PRIMARY LEARNER  GRADUATED HIGH SCHOOL OR GED   BARRIERS PRIMARY LEARNER NONE   CO-LEARNER CAREGIVER No   PRIMARY LANGUAGE ENGLISH   LEARNER PREFERENCE PRIMARY DEMONSTRATION   ANSWERED BY patient   RELATIONSHIP SELF       Abuse Screening:  Abuse Screening Questionnaire 12/13/2021   Do you ever feel afraid of your partner? N   Are you in a relationship with someone who physically or mentally threatens you? N   Is it safe for you to go home? Y       Fall Risk  Fall Risk Assessment, last 12 mths 3/2/2022   Able to walk? Yes   Fall in past 12 months? 0   Do you feel unsteady? 1   Are you worried about falling 1   Is TUG test greater than 12 seconds? 1   Is the gait abnormal? 1   Number of falls in past 12 months 0   Fall with injury? -       Health Maintenance reviewed and discussed and ordered per Provider. Health Maintenance Due   Topic Date Due    Shingrix Vaccine Age 49> (1 of 2) Never done    DTaP/Tdap/Td series (1 - Tdap) 08/22/2013    COVID-19 Vaccine (3 - Booster for Pfizer series) 10/01/2021   . Coordination of Care:  1. Have you been to the ER, urgent care clinic since your last visit? Hospitalized since your last visit? yes    2. Have you seen or consulted any other health care providers outside of the [address] [place] [address] since your last visit? Include any pap smears or colon screening.  no      Last  Checked na  Last UDS Checked na  Last Pain contract signed: na

## 2022-03-14 NOTE — PROGRESS NOTES
Verenice Oswald is a 82 y.o.  female and presents with    Chief Complaint   Patient presents with    Follow-up           Subjective:    Pt has been using Lasix on a daily basis for her leg swelling for the last 2 weeks. Recently saw NP Di [place]ne for this. Previously had only taken Lasix 3 times a week. Has noted that her legs have improved. Has lost weight due to water. She states she is wearing her compression socks and tries to have her legs elevated. Has had a dry cough since Saturday. Has been using coughing lozenges to improve this. Getting tired easier with moving, although pt admits to being more sedentary. Patient Active Problem List   Diagnosis Code    Pure hypercholesterolemia E78.00    Edema R60.9    Chronic eczema L30.9    Arthritis M19.90    Subclinical hyperthyroidism E05.90    Severe obesity (BMI 35.0-39. 9) with comorbidity ([facility] [address] E66.01    Chronic gout of multiple sites M1A. 09X0    Essential hypertension I10    Chronic kidney disease N18.9    Asthma J45.909    Multinodular goiter E04.2    Vitamin D deficiency E55.9    Other chest pain R07.89    History of echocardiogram Z92.89      Past Medical History:   Diagnosis Date    Arthritis 9/20/2010    Asthma     Chronic eczema 3/22/2010    Chronic kidney disease     Clot 10/2018    lower left exremity, shin     Edema 3/22/2010    Environmental allergies     year round    Essential hypertension, malignant 3/22/2010    Gout     Menopause     Multinodular goiter     Followed by Dr. Nydia Maynard, ENT.  FNA 2020    Pure hypercholesterolemia 3/22/2010    Subclinical hyperthyroidism 5/30/2017    Unspecified arthropathy, shoulder region 3/22/2010    Unspecified hypothyroidism 3/22/2010    Vitamin D deficiency       Past Surgical History:   Procedure Laterality Date    HX GYN  1983    hysterectomy    HX HYSTERECTOMY  1984    fibroids    HX KNEE REPLACEMENT Right     IR FINE NEEDLE ASPIRATION THYROID  07/2020    benign Family History   Problem Relation Age of Onset    Hypertension Mother     Breast Cancer Mother 70    Breast Cancer Child 36    Breast Cancer Sister 39     Social History     Socioeconomic History    Marital status:      Spouse name: Not on file    Number of children: Not on file    Years of education: Not on file    Highest education level: Not on file   Occupational History    Not on file   Tobacco Use    Smoking status: Never Smoker    Smokeless tobacco: Never Used   Substance and Sexual Activity    Alcohol use: Yes     Alcohol/week: 2.5 standard drinks     Types: 3 Standard drinks or equivalent per week    Drug use: No    Sexual activity: Yes     Partners: Male   Other Topics Concern     Service Not Asked    Blood Transfusions Not Asked    Caffeine Concern Not Asked    Occupational Exposure Not Asked    Hobby Hazards Not Asked    Sleep Concern Not Asked    Stress Concern Not Asked    Weight Concern Not Asked    Special Diet Yes     Comment: somewhat healthy, low sodium sometimes    Back Care Not Asked    Exercise Yes     Comment: walking, limited by ankle pain    Bike Helmet Not Asked    Seat Belt Not Asked    Self-Exams Not Asked   Social History Narrative    Not on file     Social Determinants of Health     Financial Resource Strain:     Difficulty of Paying Living Expenses: Not on file   Food Insecurity:     Worried About Running Out of Food in the Last Year: Not on file    Willa of Food in the Last Year: Not on file   Transportation Needs:     Lack of Transportation (Medical): Not on file    Lack of Transportation (Non-Medical):  Not on file   Physical Activity:     Days of Exercise per Week: Not on file    Minutes of Exercise per Session: Not on file   Stress:     Feeling of Stress : Not on file   Social Connections:     Frequency of Communication with Friends and Family: Not on file    Frequency of Social Gatherings with Friends and Family: Not on file    Attends Faith Services: Not on file    Active Member of Clubs or Organizations: Not on file    Attends Club or Organization Meetings: Not on file    Marital Status: Not on file   Intimate Partner Violence:     Fear of Current or Ex-Partner: Not on file    Emotionally Abused: Not on file    Physically Abused: Not on file    Sexually Abused: Not on file   Housing Stability:     Unable to Pay for Housing in the Last Year: Not on file    Number of [place] in the Last Year: Not on file    Unstable Housing in the Last Year: Not on file        Current Outpatient Medications   Medication Sig Dispense Refill    ergocalciferol (ERGOCALCIFEROL) 1,250 mcg (50,000 unit) capsule Take 1 Capsule by mouth every seven (7) days. 12 Capsule 1    albuterol (PROVENTIL HFA, VENTOLIN HFA, PROAIR HFA) 90 mcg/actuation inhaler Take 2 Puffs by inhalation every six (6) hours as needed for Wheezing. 1 Each 4    furosemide (LASIX) 40 mg tablet Take 1 Tablet by mouth daily. 90 Tablet 3    potassium chloride (KLOR-CON M10) 10 mEq tablet Take 1 Tablet by mouth daily. 30 Tablet 3    methIMAzole (TAPAZOLE) 5 mg tablet       cetirizine (ZYRTEC) 10 mg tablet Take 1 Tablet by mouth daily. 90 Tablet 3    diclofenac (VOLTAREN) 1 % gel Apply 4 g to affected area four (4) times daily. 350 g 1    amLODIPine (NORVASC) 5 mg tablet Take 1 Tablet by mouth daily. 90 Tablet 3    hydroCHLOROthiazide (HYDRODIURIL) 25 mg tablet Take 1 Tablet by mouth daily. 90 Tablet 4    fluocinoNIDE (LIDEX) 0.05 % ointment Apply  to affected area two (2) times a day. 60 g 4    acetaminophen (TYLENOL) 500 mg tablet Take 2 Tabs by mouth every eight (8) hours as needed for Pain. 100 Tab 3    allopurinoL (ZYLOPRIM) 300 mg tablet Take 1 Tab by mouth daily. 90 Tab 4    fluticasone propionate (FLONASE) 50 mcg/actuation nasal spray 2 Sprays by Both Nostrils route daily.  1 Bottle 5        ROS   Review of Systems   Constitutional: Negative for chills, fever and malaise/fatigue. HENT: Negative for congestion, ear discharge and ear pain. Eyes: Negative for blurred vision, pain and discharge. Respiratory: Positive for cough. Negative for shortness of breath. Cardiovascular: Negative for chest pain and palpitations. Gastrointestinal: Negative for abdominal pain, nausea and vomiting. Genitourinary: Negative for dysuria, frequency and urgency. Skin: Negative for itching and rash. Neurological: Negative for dizziness, seizures, loss of consciousness and headaches. Psychiatric/Behavioral: Negative for substance abuse. Objective:  Vitals:    03/14/22 1230   BP: 129/85   Pulse: 100   Resp: 20   Temp: 98 °F (36.7 °C)   SpO2: 96%   Weight: 219 lb (99.3 kg)   PainSc:   0 - No pain       Physical Exam  Vitals reviewed. Constitutional:       Appearance: Normal appearance. Eyes:      General: No scleral icterus. Right eye: No discharge. Left eye: No discharge. Cardiovascular:      Rate and Rhythm: Normal rate and regular rhythm. Pulmonary:      Effort: Pulmonary effort is normal. No respiratory distress. Breath sounds: Decreased air movement (lower lobes b/l) present. No wheezing, rhonchi or rales. Musculoskeletal:      Cervical back: Normal range of motion and neck supple. Neurological:      Mental Status: She is alert and oriented to person, place, and time. Cranial Nerves: No cranial nerve deficit. Psychiatric:         Mood and Affect: Mood normal.         Behavior: Behavior normal.         Thought Content: Thought content normal.         Judgment: Judgment normal.           LABS     TESTS      Assessment/Plan:    1. Dyspnea on exertion  - XR CHEST PA LAT; Future  - NT-PRO BNP; Future    2. Leg swelling  improving  - NT-PRO BNP; Future  - furosemide (LASIX) 40 mg tablet; Take 1 Tablet by mouth daily. Dispense: 90 Tablet; Refill: 3  - METABOLIC PANEL, COMPREHENSIVE; Future    3.  Vitamin D deficiency  - ergocalciferol (ERGOCALCIFEROL) 1,250 mcg (50,000 unit) capsule; Take 1 Capsule by mouth every seven (7) days. Dispense: 12 Capsule; Refill: 1  - VITAMIN D, 25 HYDROXY; Future    4. Reactive airway disease without complication, unspecified asthma severity, unspecified whether persistent  - albuterol (PROVENTIL HFA, VENTOLIN HFA, PROAIR HFA) 90 mcg/actuation inhaler; Take 2 Puffs by inhalation every six (6) hours as needed for Wheezing. Dispense: 1 Each; Refill: 4    5. Hypokalemia  - potassium chloride (KLOR-CON M10) 10 mEq tablet; Take 1 Tablet by mouth daily. Dispense: 30 Tablet; Refill: 3       Lab review: orders written for new lab studies as appropriate; see orders      I have discussed the diagnosis with the patient and the intended plan as seen in the above orders. The patient has received an after-visit summary and questions were answered concerning future plans. I have discussed medication side effects and warnings with the patient as well. I have reviewed the plan of care with the patient, accepted their input and they are in agreement with the treatment goals.          Pilar Castañeda MD

## 2022-03-15 ENCOUNTER — TELEPHONE (OUTPATIENT)
Dept: FAMILY MEDICINE CLINIC | Age: 87
End: 2022-03-15

## 2022-03-15 DIAGNOSIS — E87.6 HYPOKALEMIA: Primary | ICD-10-CM

## 2022-03-15 DIAGNOSIS — N17.9 AKI (ACUTE KIDNEY INJURY) (HCC): ICD-10-CM

## 2022-03-15 NOTE — TELEPHONE ENCOUNTER
Called pt. She is to skip Lasix tomorrow and take 30mEq of Potassium, then 20mEq on 3/17, and come get blood work done on 3/18. She verbalized understanding.

## 2022-03-18 ENCOUNTER — TELEPHONE (OUTPATIENT)
Dept: FAMILY MEDICINE CLINIC | Age: 87
End: 2022-03-18

## 2022-03-18 ENCOUNTER — HOSPITAL ENCOUNTER (OUTPATIENT)
Dept: LAB | Age: 87
Discharge: HOME | End: 2022-03-18
Payer: MEDICARE

## 2022-03-18 DIAGNOSIS — N17.9 AKI (ACUTE KIDNEY INJURY) (HCC): ICD-10-CM

## 2022-03-18 DIAGNOSIS — E87.6 HYPOKALEMIA: ICD-10-CM

## 2022-03-18 PROBLEM — M1A.09X0 CHRONIC GOUT OF MULTIPLE SITES: Status: ACTIVE | Noted: 2018-10-03

## 2022-03-18 LAB
ALBUMIN SERPL-MCNC: 3.3 G/DL (ref 3.4–5)
ALBUMIN/GLOB SERPL: 0.8 {RATIO} (ref 0.8–1.7)
ALP SERPL-CCNC: 162 U/L (ref 45–117)
ALT SERPL-CCNC: 22 U/L (ref 13–56)
ANION GAP SERPL CALC-SCNC: 3 MMOL/L (ref 3–18)
AST SERPL-CCNC: 22 U/L (ref 10–38)
BILIRUB SERPL-MCNC: 0.4 MG/DL (ref 0.2–1)
BUN SERPL-MCNC: 20 MG/DL (ref 7–18)
BUN/CREAT SERPL: 13 (ref 12–20)
CALCIUM SERPL-MCNC: 10.3 MG/DL (ref 8.5–10.1)
CHLORIDE SERPL-SCNC: 95 MMOL/L (ref 100–111)
CO2 SERPL-SCNC: 42 MMOL/L (ref 21–32)
CREAT SERPL-MCNC: 1.52 MG/DL (ref 0.6–1.3)
GLOBULIN SER CALC-MCNC: 3.9 G/DL (ref 2–4)
GLUCOSE SERPL-MCNC: 108 MG/DL (ref 74–99)
POTASSIUM SERPL-SCNC: 3.4 MMOL/L (ref 3.5–5.5)
PROT SERPL-MCNC: 7.2 G/DL (ref 6.4–8.2)
SODIUM SERPL-SCNC: 140 MMOL/L (ref 136–145)

## 2022-03-18 PROCEDURE — 80053 COMPREHEN METABOLIC PANEL: CPT

## 2022-03-18 PROCEDURE — 36415 COLL VENOUS BLD VENIPUNCTURE: CPT

## 2022-03-18 NOTE — TELEPHONE ENCOUNTER
Called patient told to continue her two tablets of potassium daily and to have her labs redone on Tuesday 3/22/22.

## 2022-03-19 PROBLEM — E66.01 SEVERE OBESITY (BMI 35.0-39.9) WITH COMORBIDITY (HCC): Status: ACTIVE | Noted: 2018-05-16

## 2022-03-19 PROBLEM — I10 ESSENTIAL HYPERTENSION: Status: ACTIVE | Noted: 2019-07-11

## 2022-03-19 PROBLEM — Z92.89 HISTORY OF ECHOCARDIOGRAM: Status: ACTIVE | Noted: 2021-04-20

## 2022-03-20 PROBLEM — E05.90 SUBCLINICAL HYPERTHYROIDISM: Status: ACTIVE | Noted: 2017-05-30

## 2022-03-20 PROBLEM — R07.89 OTHER CHEST PAIN: Status: ACTIVE | Noted: 2021-04-20

## 2022-03-23 ENCOUNTER — HOSPITAL ENCOUNTER (OUTPATIENT)
Dept: LAB | Age: 87
Discharge: HOME | End: 2022-03-23
Payer: MEDICARE

## 2022-03-23 DIAGNOSIS — E87.6 HYPOKALEMIA: Primary | ICD-10-CM

## 2022-03-23 DIAGNOSIS — E87.6 HYPOKALEMIA: ICD-10-CM

## 2022-03-23 LAB
ALBUMIN SERPL-MCNC: 3.5 G/DL (ref 3.4–5)
ALBUMIN/GLOB SERPL: 0.8 {RATIO} (ref 0.8–1.7)
ALP SERPL-CCNC: 157 U/L (ref 45–117)
ALT SERPL-CCNC: 19 U/L (ref 13–56)
ANION GAP SERPL CALC-SCNC: 3 MMOL/L (ref 3–18)
AST SERPL-CCNC: 16 U/L (ref 10–38)
BILIRUB SERPL-MCNC: 0.3 MG/DL (ref 0.2–1)
BUN SERPL-MCNC: 19 MG/DL (ref 7–18)
BUN/CREAT SERPL: 13 (ref 12–20)
CALCIUM SERPL-MCNC: 10.2 MG/DL (ref 8.5–10.1)
CHLORIDE SERPL-SCNC: 92 MMOL/L (ref 100–111)
CO2 SERPL-SCNC: 43 MMOL/L (ref 21–32)
CREAT SERPL-MCNC: 1.52 MG/DL (ref 0.6–1.3)
GLOBULIN SER CALC-MCNC: 4.4 G/DL (ref 2–4)
GLUCOSE SERPL-MCNC: 67 MG/DL (ref 74–99)
POTASSIUM SERPL-SCNC: 3 MMOL/L (ref 3.5–5.5)
PROT SERPL-MCNC: 7.9 G/DL (ref 6.4–8.2)
SODIUM SERPL-SCNC: 138 MMOL/L (ref 136–145)

## 2022-03-23 PROCEDURE — 80053 COMPREHEN METABOLIC PANEL: CPT

## 2022-03-23 PROCEDURE — 36415 COLL VENOUS BLD VENIPUNCTURE: CPT

## 2022-03-24 DIAGNOSIS — E87.6 HYPOKALEMIA: Primary | ICD-10-CM

## 2022-03-24 RX ORDER — POTASSIUM CHLORIDE 20 MEQ/1
20 TABLET, EXTENDED RELEASE ORAL 2 TIMES DAILY
Qty: 60 TABLET | Refills: 1 | Status: SHIPPED | OUTPATIENT
Start: 2022-03-24 | End: 2022-06-03 | Stop reason: SDUPTHER

## 2022-03-28 ENCOUNTER — HOSPITAL ENCOUNTER (OUTPATIENT)
Dept: LAB | Age: 87
Discharge: HOME | End: 2022-03-28
Payer: MEDICARE

## 2022-03-28 DIAGNOSIS — E87.6 HYPOKALEMIA: ICD-10-CM

## 2022-03-28 LAB
ANION GAP SERPL CALC-SCNC: 4 MMOL/L (ref 3–18)
BUN SERPL-MCNC: 16 MG/DL (ref 7–18)
BUN/CREAT SERPL: 11 (ref 12–20)
CALCIUM SERPL-MCNC: 10 MG/DL (ref 8.5–10.1)
CHLORIDE SERPL-SCNC: 101 MMOL/L (ref 100–111)
CO2 SERPL-SCNC: 37 MMOL/L (ref 21–32)
CREAT SERPL-MCNC: 1.52 MG/DL (ref 0.6–1.3)
GLUCOSE SERPL-MCNC: 111 MG/DL (ref 74–99)
MAGNESIUM SERPL-MCNC: 2.2 MG/DL (ref 1.6–2.6)
POTASSIUM SERPL-SCNC: 3.6 MMOL/L (ref 3.5–5.5)
SODIUM SERPL-SCNC: 142 MMOL/L (ref 136–145)

## 2022-03-28 PROCEDURE — 83735 ASSAY OF MAGNESIUM: CPT

## 2022-03-28 PROCEDURE — 36415 COLL VENOUS BLD VENIPUNCTURE: CPT

## 2022-03-28 PROCEDURE — 80048 BASIC METABOLIC PNL TOTAL CA: CPT

## 2022-04-12 ENCOUNTER — HOSPITAL ENCOUNTER (OUTPATIENT)
Dept: LAB | Age: 87
Discharge: HOME | End: 2022-04-12
Payer: MEDICARE

## 2022-04-12 DIAGNOSIS — E87.6 HYPOKALEMIA: Primary | ICD-10-CM

## 2022-04-12 DIAGNOSIS — E87.6 HYPOKALEMIA: ICD-10-CM

## 2022-04-12 LAB
ANION GAP SERPL CALC-SCNC: 3 MMOL/L (ref 3–18)
BUN SERPL-MCNC: 11 MG/DL (ref 7–18)
BUN/CREAT SERPL: 7 (ref 12–20)
CALCIUM SERPL-MCNC: 9.6 MG/DL (ref 8.5–10.1)
CHLORIDE SERPL-SCNC: 103 MMOL/L (ref 100–111)
CO2 SERPL-SCNC: 35 MMOL/L (ref 21–32)
CREAT SERPL-MCNC: 1.48 MG/DL (ref 0.6–1.3)
GLUCOSE SERPL-MCNC: 66 MG/DL (ref 74–99)
POTASSIUM SERPL-SCNC: 3.9 MMOL/L (ref 3.5–5.5)
SODIUM SERPL-SCNC: 141 MMOL/L (ref 136–145)

## 2022-04-12 PROCEDURE — 80048 BASIC METABOLIC PNL TOTAL CA: CPT

## 2022-04-12 PROCEDURE — 36415 COLL VENOUS BLD VENIPUNCTURE: CPT

## 2022-04-28 ENCOUNTER — TELEPHONE (OUTPATIENT)
Dept: FAMILY MEDICINE CLINIC | Age: 87
End: 2022-04-28

## 2022-04-28 NOTE — TELEPHONE ENCOUNTER
Triage called stating patient had SOB , when I spoke with patient she stated no she has a cough that causes her to be short of breath. Wanted an appointment with a provider because she is going out of town and wanted to make sure she was ok. Told patient there where no appointments available until next week, She stated she will go to urgent care.

## 2022-05-10 ENCOUNTER — TELEPHONE (OUTPATIENT)
Dept: CARDIOLOGY CLINIC | Age: 87
End: 2022-05-10

## 2022-05-10 NOTE — TELEPHONE ENCOUNTER
[facility] Two patient Identifiers confirmed. Pt scheduled for May 25, 2022. Pts daughter verbalized understanding.

## 2022-05-10 NOTE — TELEPHONE ENCOUNTER
Patient daughter called to ask for a call back. Mother was seen at the hospital for CHF and needs a FU within 2 weeks and Isrrael navarro has no appts for that time. Patient was last seen 5- and a NP cant see patients if they havnt been seen within a year. Patient is asking for advice on next steps.

## 2022-05-13 ENCOUNTER — OFFICE VISIT (OUTPATIENT)
Dept: FAMILY MEDICINE CLINIC | Age: 87
End: 2022-05-13
Payer: MEDICARE

## 2022-05-13 ENCOUNTER — HOSPITAL ENCOUNTER (OUTPATIENT)
Dept: LAB | Age: 87
Discharge: HOME | End: 2022-05-13
Payer: MEDICARE

## 2022-05-13 VITALS
SYSTOLIC BLOOD PRESSURE: 111 MMHG | OXYGEN SATURATION: 97 % | DIASTOLIC BLOOD PRESSURE: 64 MMHG | RESPIRATION RATE: 16 BRPM | BODY MASS INDEX: 36.9 KG/M2 | HEART RATE: 62 BPM | TEMPERATURE: 98 F | WEIGHT: 221.5 LBS | HEIGHT: 65 IN

## 2022-05-13 DIAGNOSIS — R06.09 DYSPNEA ON EXERTION: ICD-10-CM

## 2022-05-13 DIAGNOSIS — I48.91 NEW ONSET ATRIAL FIBRILLATION (HCC): Primary | ICD-10-CM

## 2022-05-13 DIAGNOSIS — E66.01 SEVERE OBESITY (BMI 35.0-39.9) WITH COMORBIDITY (HCC): ICD-10-CM

## 2022-05-13 DIAGNOSIS — E87.6 HYPOKALEMIA: ICD-10-CM

## 2022-05-13 DIAGNOSIS — G47.33 OBSTRUCTIVE SLEEP APNEA SYNDROME: ICD-10-CM
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
## 2022-05-13 DIAGNOSIS — J44.1 COPD EXACERBATION (HCC): ICD-10-CM

## 2022-05-13 DIAGNOSIS — M79.89 LEG SWELLING: ICD-10-CM

## 2022-05-13 DIAGNOSIS — N18.31 STAGE 3A CHRONIC KIDNEY DISEASE (HCC): ICD-10-CM

## 2022-05-13 PROBLEM — N18.30 CHRONIC RENAL DISEASE, STAGE III (HCC): Status: ACTIVE | Noted: 2022-05-13

## 2022-05-13 LAB
ALBUMIN SERPL-MCNC: 3 G/DL (ref 3.4–5)
ALBUMIN/GLOB SERPL: 0.9 {RATIO} (ref 0.8–1.7)
ALP SERPL-CCNC: 100 U/L (ref 45–117)
ALT SERPL-CCNC: 31 U/L (ref 13–56)
ANION GAP SERPL CALC-SCNC: 3 MMOL/L (ref 3–18)
AST SERPL-CCNC: 16 U/L (ref 10–38)
BILIRUB SERPL-MCNC: 0.3 MG/DL (ref 0.2–1)
BUN SERPL-MCNC: 24 MG/DL (ref 7–18)
BUN/CREAT SERPL: 17 (ref 12–20)
CALCIUM SERPL-MCNC: 9.6 MG/DL (ref 8.5–10.1)
CHLORIDE SERPL-SCNC: 97 MMOL/L (ref 100–111)
CO2 SERPL-SCNC: 39 MMOL/L (ref 21–32)
CREAT SERPL-MCNC: 1.42 MG/DL (ref 0.6–1.3)
GLOBULIN SER CALC-MCNC: 3.2 G/DL (ref 2–4)
GLUCOSE SERPL-MCNC: 96 MG/DL (ref 74–99)
POTASSIUM SERPL-SCNC: 4.9 MMOL/L (ref 3.5–5.5)
PROT SERPL-MCNC: 6.2 G/DL (ref 6.4–8.2)
SODIUM SERPL-SCNC: 139 MMOL/L (ref 136–145)

## 2022-05-13 PROCEDURE — G8754 DIAS BP LESS 90: HCPCS | Performed by: FAMILY MEDICINE

## 2022-05-13 PROCEDURE — 80053 COMPREHEN METABOLIC PANEL: CPT

## 2022-05-13 PROCEDURE — G8399 PT W/DXA RESULTS DOCUMENT: HCPCS | Performed by: FAMILY MEDICINE

## 2022-05-13 PROCEDURE — 99215 OFFICE O/P EST HI 40 MIN: CPT | Performed by: FAMILY MEDICINE

## 2022-05-13 PROCEDURE — G8417 CALC BMI ABV UP PARAM F/U: HCPCS | Performed by: FAMILY MEDICINE

## 2022-05-13 PROCEDURE — G8752 SYS BP LESS 140: HCPCS | Performed by: FAMILY MEDICINE

## 2022-05-13 PROCEDURE — G8510 SCR DEP NEG, NO PLAN REQD: HCPCS | Performed by: FAMILY MEDICINE

## 2022-05-13 PROCEDURE — 36415 COLL VENOUS BLD VENIPUNCTURE: CPT

## 2022-05-13 PROCEDURE — G8427 DOCREV CUR MEDS BY ELIG CLIN: HCPCS | Performed by: FAMILY MEDICINE

## 2022-05-13 PROCEDURE — G8536 NO DOC ELDER MAL SCRN: HCPCS | Performed by: FAMILY MEDICINE

## 2022-05-13 PROCEDURE — 1090F PRES/ABSN URINE INCON ASSESS: CPT | Performed by: FAMILY MEDICINE

## 2022-05-13 PROCEDURE — 1101F PT FALLS ASSESS-DOCD LE1/YR: CPT | Performed by: FAMILY MEDICINE

## 2022-05-13 RX ORDER — APIXABAN 5 MG/1
5 TABLET, FILM COATED ORAL 2 TIMES DAILY
COMMUNITY
Start: 2022-05-08 | End: 2022-06-03 | Stop reason: SDUPTHER

## 2022-05-13 RX ORDER — BUDESONIDE AND FORMOTEROL FUMARATE DIHYDRATE 160; 4.5 UG/1; UG/1
2 AEROSOL RESPIRATORY (INHALATION) 2 TIMES DAILY
Qty: 1 EACH | Refills: 5 | Status: SHIPPED | OUTPATIENT
Start: 2022-05-13

## 2022-05-13 NOTE — PROGRESS NOTES
Sharon Eason is a 82 y.o.  female and presents with    Chief Complaint   Patient presents with   [facility] Follow Up    Medication Refill     5/1/2022 admitted to [facility] for respiratory failure  5/8/2022 discharged to home    Subjective:  She is here for f/u hospitalization accompanied by her daughter; she had progressively worsening shortness of breath that has been ongoing for the past week or so prior to admission. Patient has been having trouble walking around her home which is new for her, had been short of breath in the past but never been this severe. Patient also has some centralized chest pain in the setting of gastritis which she has had in the past  She has been having cough which is dry in nature, she had leg swelling prior to admission and has had improvement after starting lasix 40 mg. Has not had any history of DVT or pulmonary embolism    Hospital Course    Acute hypoxemic hypercarnbic respiratory failure- sec to suspected CHF, asthma/COPD exacerbation and acute bronchitis-   improving  Off HFN-->NC-->ra  No bipap overnight from last 2 nights  Feeling well  Change iv lasix to 40 po   S/p solumedrol  To daily  . Change to po pred taper   Incentive spirometry, PEEP  stayaed off bipap for 48rs. 6mwt done before dc didn't qualify for home oxygen   needs sleep study for Asael eval  Started on breo for copd  Cont duoneb fot 1 week then use as needed   info provided for [facility] office         . Acute diastolic CHF  -CXR, CTA noted  -Lasix dose increased to 40 mg  -trop neg x 3 (note prior CP this week that she attributed to GERD)  Doing well w/o bipap  Pt need sleep study for asael eval on dc   Fu with cards      Hypokalemia:  Due to lasix  Replace po  Resolved      . Acute asthma exacerbation with acute bronchitis  -CTA:   No pulmonary embolism. Bronchitis and air trapping. Likely reactive lymph nodes. Dependent atelectasis LEFT base.    Small effusions, LEFT greater than RIGHT    -COVID neg x 2  -urine antigens for Strep and Legionella neg  -sputum cx- normal resp anuel  Off vanc  Completed zosyn  Wbc trending down moraima decreasing steroids      Newly diagnosed paroxysmal atrial fibrillation: with -h/o hyperthyroidism on methimazole- TSH 1.25  intermitent A fib on tele  -ECHO as above  Yumi vasc score 3  Cont  eliquis and coreg     . Hyperthyroidism   -followed EVMS-does take methimazole- cont here  -TSH 1.25, free T4 0.9, Free T3 2.4      HTN   Coreg and amlodipine  hold hctz     GERD severe - cont prilosec in am     ROS   Constitutional: Negative for appetite change, diaphoresis, fatigue and unexpected weight change. Eyes: Negative for visual disturbance. Respiratory: Positive for cough, chest tightness, shortness of breath and wheezing. Cardiovascular: Positive for leg swelling. Negative for chest pain and palpitations. Gastrointestinal: Negative for abdominal distention, abdominal pain, blood in stool, constipation, diarrhea, nausea, rectal pain and vomiting. Endocrine: Negative for polydipsia, polyphagia and polyuria. Genitourinary: Negative for decreased urine volume, dysuria and frequency. Musculoskeletal: Negative for joint swelling and myalgias. Skin: Negative for rash and wound. Neurological: Negative for dizziness, weakness, light-headedness, numbness and headaches. Psychiatric/Behavioral: Negative for dysphoric mood, sleep disturbance and suicidal ideas. The patient is nervous/anxious. All other systems reviewed and are negative.       Objective:  Vitals:    05/13/22 0906   BP: 111/64   Pulse: 62   Resp: 16   Temp: 98 °F (36.7 °C)   TempSrc: Temporal   SpO2: 97%   Weight: 221 lb 8 oz (100.5 kg)   Height: 5' 5\" (1.651 m)       alert, well appearing, and in no distress and oriented to person, place, and time and obese  Mental status - normal mood, behavior, speech, dress, motor activity, and thought processes  Chest - clear to auscultation, no wheezes, rales or rhonchi, symmetric air entry  Heart - no murmurs noted, irregularly irregular rhythm  Extremities - pedal edema 1 +  Neuro - CN II-XII intact; unsteady gait using walker    LABS   -BNP 1224-->686  TESTS  -ECHO Ef 61% with indeterm diastolic function    Assessment/Plan:    1. New onset atrial fibrillation (HCC)  Continue rate control and anticoagulation; follow-up with cardiology    2. COPD exacerbation ([facility]  Newly diagnosed; refer for sleep study  - budesonide-formoteroL (Symbicort) 160-4.5 mcg/actuation HFAA; Take 2 Puffs by inhalation two (2) times a day. Dispense: 1 Each; Refill: 5    3. Dyspnea on exertion  Improved with diuresis    4. Leg swelling  Improved with diuresis    5. Hypokalemia  Replace; cmp ordered    6. Severe obesity (BMI 35.0-39. 9) with comorbidity ([facility]  I have reviewed/discussed the above normal BMI with the patient. I have recommended the following interventions: dietary management education, guidance, and counseling and monitor weight . .        7. Stage 3a chronic kidney disease (HCC)  Avoid nephrotoxins    8. Obstructive sleep apnea syndrome  Refer to sleep medicine    9. Fall, initial encounter  Using walker; physical therapy pending      Lab review: orders written for new lab studies as appropriate; see orders      I have discussed the diagnosis with the patient and the intended plan as seen in the above orders. The patient has received an after-visit summary and questions were answered concerning future plans. I have discussed medication side effects and warnings with the patient as well. I have reviewed the plan of care with the patient, accepted their input and they are in agreement with the treatment goals.

## 2022-05-13 NOTE — PROGRESS NOTES
Lizette Capellan is a 82 y.o. female (: 1935) presenting to address:    Chief Complaint   Patient presents with   [facility] Follow Up    Medication Refill       There were no vitals filed for this visit. Hearing/Vision:   No exam data present    Learning Assessment:     Learning Assessment 2021   PRIMARY LEARNER Patient   HIGHEST LEVEL OF EDUCATION - PRIMARY LEARNER  GRADUATED HIGH SCHOOL OR GED   BARRIERS PRIMARY LEARNER NONE   CO-LEARNER CAREGIVER No   PRIMARY LANGUAGE ENGLISH   LEARNER PREFERENCE PRIMARY DEMONSTRATION   ANSWERED BY patient   RELATIONSHIP SELF     Depression Screening:     3 most recent PHQ Screens 2022   PHQ Not Done -   Little interest or pleasure in doing things Not at all   Feeling down, depressed, irritable, or hopeless Not at all   Total Score PHQ 2 0     Fall Risk Assessment:     Fall Risk Assessment, last 12 mths 2022   Able to walk? Yes   Fall in past 12 months? 1   Do you feel unsteady? 1   Are you worried about falling 0   Is TUG test greater than 12 seconds? 1   Is the gait abnormal? 0   Number of falls in past 12 months 1   Fall with injury? 0     Abuse Screening:     Abuse Screening Questionnaire 2021   Do you ever feel afraid of your partner? N   Are you in a relationship with someone who physically or mentally threatens you? N   Is it safe for you to go home?  Y     ADL Assessment:     ADL Assessment 2021   Feeding yourself No Help Needed   Getting from bed to chair No Help Needed   Getting dressed No Help Needed   Bathing or showering No Help Needed   Walk across the room (includes cane/walker) No Help Needed   Using the telphone No Help Needed   Taking your medications No Help Needed   Preparing meals No Help Needed   Managing money (expenses/bills) No Help Needed   Moderately strenuous housework (laundry) No Help Needed   Shopping for personal items (toiletries/medicines) No Help Needed   Shopping for groceries No Help Needed   Driving No Help Needed   Climbing a flight of stairs Help Needed   Getting to places beyond walking distances No Help Needed        Coordination of Care Questionaire:     1. \"Have you been to the ER, urgent care clinic since your last visit? Hospitalized since your last visit? \" Yes Where: [facility]    2. \"Have you seen or consulted any other health care providers outside of the [address] [place] [address] since your last visit? \" No     3. For patients aged 39-70: Has the patient had a colonoscopy / FIT/ Cologuard? Na- base on age    If the patient is female:    4. For patients aged 41-77: Has the patient had a mammogram within the past 2 years? NA - based on age or sex      11. For patients aged 21-65: Has the patient had a pap smear?  NA - based on age or sex

## 2022-06-03 ENCOUNTER — HOSPITAL ENCOUNTER (OUTPATIENT)
Dept: LAB | Age: 87
Discharge: HOME | End: 2022-06-03
Payer: MEDICARE

## 2022-06-03 ENCOUNTER — OFFICE VISIT (OUTPATIENT)
Dept: CARDIOLOGY CLINIC | Age: 87
End: 2022-06-03
Payer: MEDICARE

## 2022-06-03 VITALS
RESPIRATION RATE: 16 BRPM | BODY MASS INDEX: 37.28 KG/M2 | TEMPERATURE: 98 F | SYSTOLIC BLOOD PRESSURE: 118 MMHG | OXYGEN SATURATION: 97 % | WEIGHT: 224 LBS | DIASTOLIC BLOOD PRESSURE: 78 MMHG | HEART RATE: 77 BPM

## 2022-06-03 DIAGNOSIS — R60.9 EDEMA, UNSPECIFIED TYPE: ICD-10-CM

## 2022-06-03 DIAGNOSIS — R07.9 CHEST PAIN, UNSPECIFIED TYPE: ICD-10-CM

## 2022-06-03 DIAGNOSIS — M79.89 LEG SWELLING: ICD-10-CM

## 2022-06-03 DIAGNOSIS — R07.9 CHEST PAIN, UNSPECIFIED TYPE: Primary | ICD-10-CM

## 2022-06-03 DIAGNOSIS — E87.6 HYPOKALEMIA: ICD-10-CM

## 2022-06-03 DIAGNOSIS — I10 ESSENTIAL HYPERTENSION: ICD-10-CM

## 2022-06-03 LAB
ANION GAP SERPL CALC-SCNC: ABNORMAL MMOL/L (ref 3–18)
BUN SERPL-MCNC: 13 MG/DL (ref 7–18)
BUN/CREAT SERPL: 9 (ref 12–20)
CALCIUM SERPL-MCNC: 9.9 MG/DL (ref 8.5–10.1)
CHLORIDE SERPL-SCNC: 102 MMOL/L (ref 100–111)
CO2 SERPL-SCNC: 40 MMOL/L (ref 21–32)
CREAT SERPL-MCNC: 1.52 MG/DL (ref 0.6–1.3)
GLUCOSE SERPL-MCNC: 80 MG/DL (ref 74–99)
MAGNESIUM SERPL-MCNC: 2.7 MG/DL (ref 1.6–2.6)
POTASSIUM SERPL-SCNC: 4.7 MMOL/L (ref 3.5–5.5)
SODIUM SERPL-SCNC: 140 MMOL/L (ref 136–145)

## 2022-06-03 PROCEDURE — 83735 ASSAY OF MAGNESIUM: CPT

## 2022-06-03 PROCEDURE — 1090F PRES/ABSN URINE INCON ASSESS: CPT | Performed by: INTERNAL MEDICINE

## 2022-06-03 PROCEDURE — G8752 SYS BP LESS 140: HCPCS | Performed by: INTERNAL MEDICINE

## 2022-06-03 PROCEDURE — G8536 NO DOC ELDER MAL SCRN: HCPCS | Performed by: INTERNAL MEDICINE

## 2022-06-03 PROCEDURE — 36415 COLL VENOUS BLD VENIPUNCTURE: CPT

## 2022-06-03 PROCEDURE — G8399 PT W/DXA RESULTS DOCUMENT: HCPCS | Performed by: INTERNAL MEDICINE

## 2022-06-03 PROCEDURE — G8417 CALC BMI ABV UP PARAM F/U: HCPCS | Performed by: INTERNAL MEDICINE

## 2022-06-03 PROCEDURE — G8510 SCR DEP NEG, NO PLAN REQD: HCPCS | Performed by: INTERNAL MEDICINE

## 2022-06-03 PROCEDURE — 80048 BASIC METABOLIC PNL TOTAL CA: CPT

## 2022-06-03 PROCEDURE — G8427 DOCREV CUR MEDS BY ELIG CLIN: HCPCS | Performed by: INTERNAL MEDICINE

## 2022-06-03 PROCEDURE — 99214 OFFICE O/P EST MOD 30 MIN: CPT | Performed by: INTERNAL MEDICINE

## 2022-06-03 PROCEDURE — 1123F ACP DISCUSS/DSCN MKR DOCD: CPT | Performed by: INTERNAL MEDICINE

## 2022-06-03 PROCEDURE — G8754 DIAS BP LESS 90: HCPCS | Performed by: INTERNAL MEDICINE

## 2022-06-03 PROCEDURE — 1101F PT FALLS ASSESS-DOCD LE1/YR: CPT | Performed by: INTERNAL MEDICINE

## 2022-06-03 RX ORDER — POTASSIUM CHLORIDE 20 MEQ/1
20 TABLET, EXTENDED RELEASE ORAL 2 TIMES DAILY
Qty: 180 TABLET | Refills: 3 | Status: SHIPPED | OUTPATIENT
Start: 2022-06-03

## 2022-06-03 RX ORDER — MONTELUKAST SODIUM 10 MG/1
1 TABLET ORAL
COMMUNITY
Start: 2022-05-08 | End: 2022-06-07

## 2022-06-03 RX ORDER — CARVEDILOL 6.25 MG/1
6.25 TABLET ORAL 2 TIMES DAILY WITH MEALS
Qty: 180 TABLET | Refills: 3 | Status: SHIPPED | OUTPATIENT
Start: 2022-06-03 | End: 2022-08-12 | Stop reason: SDUPTHER

## 2022-06-03 RX ORDER — IPRATROPIUM BROMIDE 0.5 MG/2.5ML
SOLUTION RESPIRATORY (INHALATION)
COMMUNITY
Start: 2022-05-08

## 2022-06-03 RX ORDER — FUROSEMIDE 40 MG/1
40 TABLET ORAL DAILY
Qty: 90 TABLET | Refills: 3 | Status: SHIPPED
Start: 2022-06-03 | End: 2022-09-23 | Stop reason: ALTCHOICE

## 2022-06-03 RX ORDER — OMEPRAZOLE 20 MG/1
CAPSULE, DELAYED RELEASE ORAL
COMMUNITY
Start: 2022-03-14

## 2022-06-03 RX ORDER — CALCIUM CARBONATE/VITAMIN D3 500-10/5ML
LIQUID (ML) ORAL DAILY
COMMUNITY
Start: 2022-05-08 | End: 2022-07-01

## 2022-06-03 RX ORDER — AMLODIPINE BESYLATE 5 MG/1
5 TABLET ORAL DAILY
Qty: 90 TABLET | Refills: 3 | Status: SHIPPED | OUTPATIENT
Start: 2022-06-03

## 2022-06-03 RX ORDER — APIXABAN 5 MG/1
5 TABLET, FILM COATED ORAL 2 TIMES DAILY
Qty: 180 TABLET | Refills: 3 | Status: SHIPPED | OUTPATIENT
Start: 2022-06-03 | End: 2022-08-12 | Stop reason: SDUPTHER

## 2022-06-03 RX ORDER — CARVEDILOL 6.25 MG/1
6.25 TABLET ORAL 2 TIMES DAILY WITH MEALS
COMMUNITY
Start: 2022-05-08 | End: 2022-06-03 | Stop reason: SDUPTHER

## 2022-06-03 NOTE — PROGRESS NOTES
Identified pt with two pt identifiers(name and ). Reviewed record in preparation for visit and have obtained necessary documentation. Viki Harrell presents today for   Chief Complaint   Patient presents with    Follow-up     overdue f/u, post-hospital resp. failure       Pt c/o DIZZINESS, SOB, SWELLING, sternum tenderness upon palpation. Viki Harrell preferred language for health care discussion is english/other. Personal Protective Equipment:   Personal Protective Equipment was used including: mask-surgical and hands-gloves. Patient was placed on no precaution(s). Patient was masked. Precautions:   Patient currently on None  Patient currently roomed with door closed. Is someone accompanying this pt? Yes, daughter    Is the patient using any DME equipment during OV? Yes, walker    Depression Screening:  3 most recent PHQ Screens 6/3/2022   PHQ Not Done -   Little interest or pleasure in doing things Not at all   Feeling down, depressed, irritable, or hopeless Not at all   Total Score PHQ 2 0       Learning Assessment:  Learning Assessment 2021   PRIMARY LEARNER Patient   HIGHEST LEVEL OF EDUCATION - PRIMARY LEARNER  GRADUATED HIGH SCHOOL OR GED   BARRIERS PRIMARY LEARNER NONE   CO-LEARNER CAREGIVER No   PRIMARY LANGUAGE ENGLISH   LEARNER PREFERENCE PRIMARY DEMONSTRATION   ANSWERED BY patient   RELATIONSHIP SELF       Abuse Screening:  Abuse Screening Questionnaire 2021   Do you ever feel afraid of your partner? N   Are you in a relationship with someone who physically or mentally threatens you? N   Is it safe for you to go home? Y       Fall Risk  Fall Risk Assessment, last 12 mths 2022   Able to walk? Yes   Fall in past 12 months? 1   Do you feel unsteady? 1   Are you worried about falling 0   Is TUG test greater than 12 seconds? 1   Is the gait abnormal? 0   Number of falls in past 12 months 1   Fall with injury? 0       Pt currently taking Anticoagulant therapy? yes  Pt currently taking Antiplatelet therapy? no    Coordination of Care:  1. Have you been to the ER, urgent care clinic since your last visit? Hospitalized since your last visit? Yes[facility], discharged on May 8, 2022    2. Have you seen or consulted any other health care providers outside of the [address] [place] [address] since your last visit? Include any pap smears or colon screening. no      Please see Red banners under Allergies and Med Rec to remove outside inquires. All correct information has been verified with patient and added to chart.      Medication's patient's would liked removed has been marked not taking to be removed per Verbal order and read back per Jairo Gordillo MD

## 2022-06-03 NOTE — TELEPHONE ENCOUNTER
PCP: Arsalan Burton MD    Last appt: 6/3/2022  Future Appointments   Date Time Provider Shefali Ivey   7/11/2022 11:15 AM Arsalan Burton MD [facility]   7/15/2022  8:40 AM Vito Smith MD [facility]       Requested Prescriptions     Pending Prescriptions Disp Refills    amLODIPine (NORVASC) 5 mg tablet 90 Tablet 3     Sig: Take 1 Tablet by mouth daily.  carvediloL (COREG) 6.25 mg tablet 180 Tablet 3     Sig: Take 1 Tablet by mouth two (2) times daily (with meals).  Eliquis 5 mg tablet 180 Tablet 3     Sig: Take 1 Tablet by mouth two (2) times a day.  potassium chloride (K-DUR, KLOR-CON M20) 20 mEq tablet 180 Tablet 3     Sig: Take 1 Tablet by mouth two (2) times a day.  furosemide (LASIX) 40 mg tablet 90 Tablet 3     Sig: Take 1 Tablet by mouth daily.

## 2022-06-03 NOTE — PATIENT INSTRUCTIONS
BMP and Magnesium-Labs drawn in [facility] [address] Their hours of operation are Monday through Friday 7am-3:30 pm.  If you have and questions regarding directions please contact them at [phone].

## 2022-06-07 NOTE — PROGRESS NOTES
Subjective:      Zbigniew martin is in the office today for cardiac reevaluation. She is a 72-year-old woman that was initially referred for further evaluation of chest pain. She was seen in the ED at [facility] in March 2021. At that time she was complaining of persistent pain in her chest for the prior 3 days. She has a known history of esophagitis/biliary disease. 2 troponins were obtained both which were not significantly elevated. She was referred to gastroenterology in that regard. She had an echocardiogram done on 3/24/2021. Her left ventricle was normal in size with normal systolic function. She did have moderate pulmonary hypertension with an estimated systolic pressure of 54 mmHg. It did not appear to be a complete TR regurgitant jet. She had a normal nuclear stress test in May 2021. Ejection fraction was 68%. In the office today she reports a recent [facility] hospitalization.  (5/1/2022). She had acute hypoxemic hypercarbic respiratory failure secondary to suspected CHF, asthma/COPD exacerbation and acute bronchitis. An echocardiogram during her hospitalization demonstrated an ejection fraction of 61%. Left ventricular diastolic pressure was indeterminate. In the office today she reports having shortness of breath and a \"hot sensation\" in her chest up until approximately a week ago. She is having physical therapy and nurse visits on a twice weekly basis. She has been instructed to take an extra Lasix tablet 40 mg if she gains more than 3 pounds in a single day. She has not been having to take any Lasix. Patient's cardiac risk factors are hypertension. A nuclear stress test was ordered completed. Results are as listed below.                Patient Active Problem List    Diagnosis Date Noted    Chronic renal disease, stage III 05/13/2022    Other chest pain 04/20/2021    History of echocardiogram 04/20/2021    Vitamin D deficiency     Multinodular goiter     Asthma     Chronic kidney disease     Essential hypertension 07/11/2019    Chronic gout of multiple sites 10/03/2018    Severe obesity (BMI 35.0-39. 9) with comorbidity ([facility] 05/16/2018    Subclinical hyperthyroidism 05/30/2017    Arthritis 09/20/2010    Pure hypercholesterolemia 03/22/2010    Edema 03/22/2010    Chronic eczema 03/22/2010     Current Outpatient Medications   Medication Sig Dispense Refill    ipratropium (ATROVENT) 0.02 % soln       light mineral oil-min oil, PF, 0.5-0.5 % dpet 2 Drops.  magnesium oxide 400 mg magnesium cap Take  by mouth daily.  montelukast (SINGULAIR) 10 mg tablet Take 1 Tablet by mouth nightly.  ergocalciferol (ERGOCALCIFEROL) 1,250 mcg (50,000 unit) capsule Take 1 Capsule by mouth every seven (7) days. 12 Capsule 1    methIMAzole (TAPAZOLE) 5 mg tablet Take 2.5 mg by mouth daily.  cetirizine (ZYRTEC) 10 mg tablet Take 1 Tablet by mouth daily. 90 Tablet 3    diclofenac (VOLTAREN) 1 % gel Apply 4 g to affected area four (4) times daily. 350 g 1    fluocinoNIDE (LIDEX) 0.05 % ointment Apply  to affected area two (2) times a day. 60 g 4    allopurinoL (ZYLOPRIM) 300 mg tablet Take 1 Tab by mouth daily. 90 Tab 4    fluticasone propionate (FLONASE) 50 mcg/actuation nasal spray 2 Sprays by Both Nostrils route daily. 1 Bottle 5    omeprazole (PRILOSEC) 20 mg capsule       amLODIPine (NORVASC) 5 mg tablet Take 1 Tablet by mouth daily. 90 Tablet 3    carvediloL (COREG) 6.25 mg tablet Take 1 Tablet by mouth two (2) times daily (with meals). 180 Tablet 3    Eliquis 5 mg tablet Take 1 Tablet by mouth two (2) times a day. 180 Tablet 3    potassium chloride (K-DUR, KLOR-CON M20) 20 mEq tablet Take 1 Tablet by mouth two (2) times a day. 180 Tablet 3    furosemide (LASIX) 40 mg tablet Take 1 Tablet by mouth daily. 90 Tablet 3    budesonide-formoteroL (Symbicort) 160-4.5 mcg/actuation HFAA Take 2 Puffs by inhalation two (2) times a day.  1 Each 5    albuterol (PROVENTIL HFA, VENTOLIN HFA, PROAIR HFA) 90 mcg/actuation inhaler Take 2 Puffs by inhalation every six (6) hours as needed for Wheezing. (Patient not taking: Reported on 6/3/2022) 1 Each 4    acetaminophen (TYLENOL) 500 mg tablet Take 2 Tabs by mouth every eight (8) hours as needed for Pain. (Patient not taking: Reported on 6/3/2022) 100 Tab 3     Allergies   Allergen Reactions    Tomato Other (comments)     Gi Distress     Past Medical History:   Diagnosis Date    Arthritis 9/20/2010    Asthma     Chronic eczema 3/22/2010    Chronic kidney disease     Clot 10/2018    lower left exremity, shin     Edema 3/22/2010    Environmental allergies     year round    Essential hypertension, malignant 3/22/2010    Gout     Menopause     Multinodular goiter     Followed by Dr. Kaden Smith, ENT.  [facility] 2020    Pure hypercholesterolemia 3/22/2010    Subclinical hyperthyroidism 5/30/2017    Unspecified arthropathy, shoulder region 3/22/2010    Unspecified hypothyroidism 3/22/2010    Vitamin D deficiency      Past Surgical History:   Procedure Laterality Date    HX GYN  1983    hysterectomy    HX HYSTERECTOMY  1984    fibroids    HX KNEE REPLACEMENT Right     IR FINE NEEDLE ASPIRATION THYROID  07/2020    benign     Family History   Problem Relation Age of Onset    Hypertension Mother     Breast Cancer Mother 70    Breast Cancer Child 36    Breast Cancer Sister 39     Social History     Tobacco Use   Smoking Status Never Smoker   Smokeless Tobacco Never Used          Review of Systems, additional:  Constitutional: negative  Eyes: negative  Respiratory: positive for dyspnea on exertion  Cardiovascular: positive for chest pain, lower extremity edema  Gastrointestinal: negative  Musculoskeletal:negative  Neurological: negative  Behvioral/Psych: negative  Endocrine: negative  ENT: negative    Objective:     Visit Vitals  /78   Pulse 77   Temp 98 °F (36.7 °C)   Resp 16   Wt 101.6 kg (224 lb)   SpO2 97% BMI 37.28 kg/m²     General:  alert, cooperative, no distress   Chest Wall: inspection normal - no chest wall deformities or tenderness, respiratory effort normal   Lung: clear to auscultation bilaterally   Heart:  normal rate and regular rhythm, S1 and S2 normal, no murmurs noted, no gallops noted   Abdomen: soft, non-tender. Bowel sounds normal. No masses,  no organomegaly   Extremities: extremities normal, atraumatic, no cyanosis or edema Skin: no rashes   Neuro: alert, oriented, normal speech, no focal findings or movement disorder noted     EKG: 3/31/2021. Sinus rhythm. Diffuse nondiagnostic ST and T wave abnormalities. Assessment/Plan:       ICD-10-CM ICD-9-CM    1. Chest pain, unspecified type ,  Lexiscan Myoview completed 5/11/2021. Myocardial perfusion imaging was normal.  Ejection fraction 68%. GI evaluation pending. Return PRN R07.9 786.50 NUCLEAR CARDIAC STRESS TEST   2. Essential hypertension , controlled BP in the office today. I10 401.9    3. Edema, unspecified type  R60.9 782.3    4. History of echocardiogram , normal LV function with no significant valvular pathology. PA pressure estimate was elevated. Z92.89 V15.89    5  HFpEF, hospitalized with acute hypoxemic hypercarbic respiratory failure at [facility] (5/1/2022), other contributing factors asthma and acute bronchitis, COPD exacerbation. Continue present diuretic prescription. Will check BMP and magnesium. Return in 6 weeks. 6       PAF, [id number] score 5, patient on Eliquis 5 mg twice daily for stroke prevention.   7       Hyperthyroidism, patient taking methimazole 2.5 mg daily

## 2022-06-09 ENCOUNTER — PATIENT MESSAGE (OUTPATIENT)
Dept: FAMILY MEDICINE CLINIC | Age: 87
End: 2022-06-09

## 2022-06-10 ENCOUNTER — TELEPHONE (OUTPATIENT)
Dept: CARDIOLOGY CLINIC | Age: 87
End: 2022-06-10

## 2022-06-10 NOTE — TELEPHONE ENCOUNTER
Incoming call from PT daughter. Two patient Identifiers confirmed. Pt and PT daughter seen my chart results and wanted to discuss them. I advised that Dr. Serafin Costa will review when in clinic next week and we will call PT back with any recommendations or changes. PT and PT daughter verbalized understanding.

## 2022-06-17 ENCOUNTER — TELEPHONE (OUTPATIENT)
Dept: CARDIOLOGY CLINIC | Age: 87
End: 2022-06-17

## 2022-06-17 NOTE — TELEPHONE ENCOUNTER
Contacted pt at mobile number, pt's daughter answered. Two patient Identifiers confirmed. Informed patient that labs have been completed, will route to Dr. Lorenzo Mejia for review. Pt wondering whether or not to keep taking magnesium oxide 400mg daily, needs refill on magnesium. Patient verbalized understanding.

## 2022-06-20 NOTE — TELEPHONE ENCOUNTER
Verbal order and read back per Harish Matthew MD  Stop magnesium and repeat blood work in 2 weeks; may need to see kidney specialist dependant on results     Contacted pts daughter at [facility] number. Two patient Identifiers confirmed. Advised pt per Dr Kaye Polo. Pts daughter verbalized understanding.

## 2022-06-21 DIAGNOSIS — M79.89 LEG SWELLING: Primary | ICD-10-CM

## 2022-06-21 DIAGNOSIS — R07.9 CHEST PAIN, UNSPECIFIED TYPE: ICD-10-CM

## 2022-07-01 ENCOUNTER — OFFICE VISIT (OUTPATIENT)
Dept: CARDIOLOGY CLINIC | Age: 87
End: 2022-07-01
Payer: MEDICARE

## 2022-07-01 ENCOUNTER — HOSPITAL ENCOUNTER (OUTPATIENT)
Dept: LAB | Age: 87
Discharge: HOME | End: 2022-07-01
Payer: MEDICARE

## 2022-07-01 VITALS
DIASTOLIC BLOOD PRESSURE: 68 MMHG | TEMPERATURE: 98.2 F | SYSTOLIC BLOOD PRESSURE: 119 MMHG | BODY MASS INDEX: 37.77 KG/M2 | HEART RATE: 100 BPM | OXYGEN SATURATION: 94 % | WEIGHT: 227 LBS

## 2022-07-01 DIAGNOSIS — R13.10 DYSPHAGIA, UNSPECIFIED TYPE: ICD-10-CM

## 2022-07-01 DIAGNOSIS — R13.10 DYSPHAGIA, UNSPECIFIED TYPE: Primary | ICD-10-CM

## 2022-07-01 DIAGNOSIS — N18.9 CHRONIC KIDNEY DISEASE, UNSPECIFIED CKD STAGE: ICD-10-CM

## 2022-07-01 LAB
ANION GAP SERPL CALC-SCNC: 0 MMOL/L (ref 3–18)
BUN SERPL-MCNC: 12 MG/DL (ref 7–18)
BUN/CREAT SERPL: 9 (ref 12–20)
CALCIUM SERPL-MCNC: 9.8 MG/DL (ref 8.5–10.1)
CHLORIDE SERPL-SCNC: 99 MMOL/L (ref 100–111)
CO2 SERPL-SCNC: 40 MMOL/L (ref 21–32)
CREAT SERPL-MCNC: 1.3 MG/DL (ref 0.6–1.3)
GLUCOSE SERPL-MCNC: 78 MG/DL (ref 74–99)
MAGNESIUM SERPL-MCNC: 2.4 MG/DL (ref 1.6–2.6)
POTASSIUM SERPL-SCNC: 4.4 MMOL/L (ref 3.5–5.5)
SODIUM SERPL-SCNC: 139 MMOL/L (ref 136–145)

## 2022-07-01 PROCEDURE — 80048 BASIC METABOLIC PNL TOTAL CA: CPT

## 2022-07-01 PROCEDURE — G8399 PT W/DXA RESULTS DOCUMENT: HCPCS | Performed by: INTERNAL MEDICINE

## 2022-07-01 PROCEDURE — 1101F PT FALLS ASSESS-DOCD LE1/YR: CPT | Performed by: INTERNAL MEDICINE

## 2022-07-01 PROCEDURE — G8754 DIAS BP LESS 90: HCPCS | Performed by: INTERNAL MEDICINE

## 2022-07-01 PROCEDURE — G8752 SYS BP LESS 140: HCPCS | Performed by: INTERNAL MEDICINE

## 2022-07-01 PROCEDURE — G8417 CALC BMI ABV UP PARAM F/U: HCPCS | Performed by: INTERNAL MEDICINE

## 2022-07-01 PROCEDURE — G8427 DOCREV CUR MEDS BY ELIG CLIN: HCPCS | Performed by: INTERNAL MEDICINE

## 2022-07-01 PROCEDURE — G8432 DEP SCR NOT DOC, RNG: HCPCS | Performed by: INTERNAL MEDICINE

## 2022-07-01 PROCEDURE — 1090F PRES/ABSN URINE INCON ASSESS: CPT | Performed by: INTERNAL MEDICINE

## 2022-07-01 PROCEDURE — 83735 ASSAY OF MAGNESIUM: CPT

## 2022-07-01 PROCEDURE — 36415 COLL VENOUS BLD VENIPUNCTURE: CPT

## 2022-07-01 PROCEDURE — 99214 OFFICE O/P EST MOD 30 MIN: CPT | Performed by: INTERNAL MEDICINE

## 2022-07-01 PROCEDURE — 1123F ACP DISCUSS/DSCN MKR DOCD: CPT | Performed by: INTERNAL MEDICINE

## 2022-07-01 PROCEDURE — G8536 NO DOC ELDER MAL SCRN: HCPCS | Performed by: INTERNAL MEDICINE

## 2022-07-01 NOTE — PATIENT INSTRUCTIONS
Testing   Barrioum Swallow testing - order to be sent to [facility]  **call office 3-5 days after testing is completed for results**     Labs  BMP andf Magnesium today    No appointment required for lab work  [address] [facility] [address], [facility]  Hours are Mon-Fri 7:00 am-3:30 pm  **closed from 12:30 pm-1pm daily**      Other Testing  Referral to Nephrology - they will contact your for date and time of appt

## 2022-07-01 NOTE — PROGRESS NOTES
Identified pt with two pt identifiers(name and ). Reviewed record in preparation for visit and have obtained necessary documentation. Zackery Leslie presents today for   Chief Complaint   Patient presents with    Follow-up       Pt c/o  SOB, SWELLING. Zackery Leslie preferred language for health care discussion is english/other. Personal Protective Equipment:   Personal Protective Equipment was used including: mask-surgical and hands-gloves. Patient was placed on no precaution(s). Patient was masked. Precautions:   Patient currently on None  Patient currently roomed with door closed. Is someone accompanying this pt? daughter    Is the patient using any DME equipment during OV? walker    Depression Screening:  3 most recent PHQ Screens 6/3/2022   PHQ Not Done -   Little interest or pleasure in doing things Not at all   Feeling down, depressed, irritable, or hopeless Not at all   Total Score PHQ 2 0       Learning Assessment:  Learning Assessment 2021   PRIMARY LEARNER Patient   HIGHEST LEVEL OF EDUCATION - PRIMARY LEARNER  GRADUATED HIGH SCHOOL OR GED   BARRIERS PRIMARY LEARNER NONE   CO-LEARNER CAREGIVER No   PRIMARY LANGUAGE ENGLISH   LEARNER PREFERENCE PRIMARY DEMONSTRATION   ANSWERED BY patient   RELATIONSHIP SELF       Abuse Screening:  Abuse Screening Questionnaire 2021   Do you ever feel afraid of your partner? N   Are you in a relationship with someone who physically or mentally threatens you? N   Is it safe for you to go home? Y       Fall Risk  Fall Risk Assessment, last 12 mths 2022   Able to walk? Yes   Fall in past 12 months? 1   Do you feel unsteady? 1   Are you worried about falling 0   Is TUG test greater than 12 seconds? 1   Is the gait abnormal? 0   Number of falls in past 12 months 1   Fall with injury? 0       Pt currently taking Anticoagulant therapy? No  Pt currently taking Antiplatelet therapy? no    Coordination of Care:  1.  Have you been to the ER, urgent care clinic since your last visit? Hospitalized since your last visit? no    2. Have you seen or consulted any other health care providers outside of the [address] [place] [address] since your last visit? Include any pap smears or colon screening. no      Please see Red banners under Allergies and Med Rec to remove outside inquires. All correct information has been verified with patient and added to chart.      Medication's patient's would liked removed has been marked not taking to be removed per Verbal order and read back per Priyanka Contreras MD

## 2022-07-06 NOTE — PROGRESS NOTES
Subjective:      Maggy Ornelas is in the office today for cardiac reevaluation. She is a 65-year-old woman that was initially referred for further evaluation of chest pain. She was seen in the ED at [facility] in March 2021. At that time she was complaining of persistent pain in her chest for the prior 3 days. She has a known history of esophagitis/biliary disease. 2 troponins were obtained both which were not significantly elevated. She thought that she was referred to gastroenterology at that time. She had an echocardiogram done on 3/24/2021. Her left ventricle was normal in size with normal systolic function. She did have moderate pulmonary hypertension with an estimated systolic pressure of 54 mmHg. It did not appear to be a complete TR regurgitant jet. She had a normal nuclear stress test in May 2021. Ejection fraction was 68%. In the office on 6/3/2022 she reported a prior [facility] hospitalization.  (5/1/2022). She had acute hypoxemic hypercarbic respiratory failure secondary to suspected CHF, asthma/COPD exacerbation and acute bronchitis. An echocardiogram during her hospitalization demonstrated an ejection fraction of 61%. Left ventricular diastolic pressure was indeterminate. She was also she reporting shortness of breath and a \"hot sensation\" in her chest up until approximately a week prior. She was having physical therapy and nurse visits on a twice weekly basis. She was instructed to take an extra Lasix tablet 40 mg if she gained more than 3 pounds in a single day. She was not have having to take any Lasix. A nuclear stress test was ordered completed. Results are as listed below. At her last visit, she was sent for some laboratory data. Results are listed below. She has had no further chest pain. She does have shortness of breath at times and uses her inhaler which seems to help. She has a sleep study scheduled for July 7.               Patient Active Problem List    Diagnosis Date Noted    Chronic renal disease, stage III 05/13/2022    Other chest pain 04/20/2021    History of echocardiogram 04/20/2021    Vitamin D deficiency     Multinodular goiter     Asthma     Chronic kidney disease     Essential hypertension 07/11/2019    Chronic gout of multiple sites 10/03/2018    Severe obesity (BMI 35.0-39. 9) with comorbidity ([facility] [address] 05/16/2018    Subclinical hyperthyroidism 05/30/2017    Arthritis 09/20/2010    Pure hypercholesterolemia 03/22/2010    Edema 03/22/2010    Chronic eczema 03/22/2010     Current Outpatient Medications   Medication Sig Dispense Refill    ipratropium (ATROVENT) 0.02 % soln       light mineral oil-min oil, PF, 0.5-0.5 % dpet 2 Drops.  omeprazole (PRILOSEC) 20 mg capsule       amLODIPine (NORVASC) 5 mg tablet Take 1 Tablet by mouth daily. 90 Tablet 3    carvediloL (COREG) 6.25 mg tablet Take 1 Tablet by mouth two (2) times daily (with meals). 180 Tablet 3    Eliquis 5 mg tablet Take 1 Tablet by mouth two (2) times a day. 180 Tablet 3    potassium chloride (K-DUR, KLOR-CON M20) 20 mEq tablet Take 1 Tablet by mouth two (2) times a day. 180 Tablet 3    furosemide (LASIX) 40 mg tablet Take 1 Tablet by mouth daily. 90 Tablet 3    budesonide-formoteroL (Symbicort) 160-4.5 mcg/actuation HFAA Take 2 Puffs by inhalation two (2) times a day. 1 Each 5    ergocalciferol (ERGOCALCIFEROL) 1,250 mcg (50,000 unit) capsule Take 1 Capsule by mouth every seven (7) days. 12 Capsule 1    albuterol (PROVENTIL HFA, VENTOLIN HFA, PROAIR HFA) 90 mcg/actuation inhaler Take 2 Puffs by inhalation every six (6) hours as needed for Wheezing. 1 Each 4    methIMAzole (TAPAZOLE) 5 mg tablet Take 2.5 mg by mouth daily.  cetirizine (ZYRTEC) 10 mg tablet Take 1 Tablet by mouth daily. 90 Tablet 3    diclofenac (VOLTAREN) 1 % gel Apply 4 g to affected area four (4) times daily.  350 g 1    fluocinoNIDE (LIDEX) 0.05 % ointment Apply  to affected area two (2) times a day. 60 g 4    acetaminophen (TYLENOL) 500 mg tablet Take 2 Tabs by mouth every eight (8) hours as needed for Pain. 100 Tab 3    allopurinoL (ZYLOPRIM) 300 mg tablet Take 1 Tab by mouth daily. 90 Tab 4    fluticasone propionate (FLONASE) 50 mcg/actuation nasal spray 2 Sprays by Both Nostrils route daily. 1 Bottle 5     Allergies   Allergen Reactions    Tomato Other (comments)     Gi Distress     Past Medical History:   Diagnosis Date    Arthritis 9/20/2010    Asthma     Chronic eczema 3/22/2010    Chronic kidney disease     Clot 10/2018    lower left exremity, shin     Edema 3/22/2010    Environmental allergies     year round    Essential hypertension, malignant 3/22/2010    Gout     Menopause     Multinodular goiter     Followed by Dr. Mai Fontenot, ENT.  FNA 2020    Pure hypercholesterolemia 3/22/2010    Subclinical hyperthyroidism 5/30/2017    Unspecified arthropathy, shoulder region 3/22/2010    Unspecified hypothyroidism 3/22/2010    Vitamin D deficiency      Past Surgical History:   Procedure Laterality Date    HX GYN  1983    hysterectomy    HX HYSTERECTOMY  1984    fibroids    HX KNEE REPLACEMENT Right     IR FINE NEEDLE ASPIRATION THYROID  07/2020    benign     Family History   Problem Relation Age of Onset    Hypertension Mother     Breast Cancer Mother 70    Breast Cancer Child 36    Breast Cancer Sister 39     Social History     Tobacco Use   Smoking Status Never Smoker   Smokeless Tobacco Never Used          Review of Systems, additional:  Constitutional: negative  Eyes: negative  Respiratory: positive for dyspnea on exertion  Cardiovascular: positive for chest pain, lower extremity edema  Gastrointestinal: negative  Musculoskeletal:negative  Neurological: negative  Behvioral/Psych: negative  Endocrine: negative  ENT: negative    Objective:     Visit Vitals  /68   Pulse 100   Temp 98.2 °F (36.8 °C) (Temporal)   Wt 103 kg (227 lb)   SpO2 94%   BMI 37.77 kg/m² General:  alert, cooperative, no distress   Chest Wall: inspection normal - no chest wall deformities or tenderness, respiratory effort normal   Lung: clear to auscultation bilaterally   Heart:  normal rate and regular rhythm, S1 and S2 normal, no murmurs noted, no gallops noted   Abdomen: soft, non-tender. Bowel sounds normal. No masses,  no organomegaly   Extremities: extremities normal, atraumatic, no cyanosis or edema Skin: no rashes   Neuro: alert, oriented, normal speech, no focal findings or movement disorder noted     EKG: 3/31/2021. Sinus rhythm. Diffuse nondiagnostic ST and T wave abnormalities. Assessment/Plan:       ICD-10-CM ICD-9-CM    1. Chest pain, unspecified type ,  Lexiscan Myoview completed 5/11/2021. Myocardial perfusion imaging was normal.  Ejection fraction 68%. Order barium swallow and upper GI. Return in 5 weeks. R07.9 786.50 NUCLEAR CARDIAC STRESS TEST   2. Essential hypertension , controlled BP in the office today. I10 401.9    3. Edema, unspecified type  R60.9 782.3    4. History of echocardiogram , normal LV function with no significant valvular pathology. PA pressure estimate was elevated. Z92.89 V15.89    5  HFpEF, hospitalized with acute hypoxemic hypercarbic respiratory failure at [facility] (5/1/2022), other contributing factors asthma and acute bronchitis, COPD exacerbation. Continue present diuretic prescription. Ordered BMP and magnesium which was completed on 6/3/2022. 6 potassium was 4.7. CO2 was 40. BUN/creatinine were 13 and 1.52. Magnesium was 2.7. She was on magnesium oxide. She was told to discontinue it. Nephrology evaluation planned      6       PAF, [id number] score 5, patient on Eliquis 5 mg twice daily for stroke prevention.   7       Hyperthyroidism, patient taking methimazole 2.5 mg daily

## 2022-07-08 ENCOUNTER — HOSPITAL ENCOUNTER (OUTPATIENT)
Dept: GENERAL RADIOLOGY | Age: 87
Discharge: HOME | End: 2022-07-08
Attending: INTERNAL MEDICINE

## 2022-07-22 ENCOUNTER — OFFICE VISIT (OUTPATIENT)
Dept: FAMILY MEDICINE CLINIC | Age: 87
End: 2022-07-22
Payer: MEDICARE

## 2022-07-22 VITALS
OXYGEN SATURATION: 100 % | WEIGHT: 221.6 LBS | SYSTOLIC BLOOD PRESSURE: 99 MMHG | HEIGHT: 65 IN | DIASTOLIC BLOOD PRESSURE: 65 MMHG | RESPIRATION RATE: 16 BRPM | HEART RATE: 79 BPM | BODY MASS INDEX: 36.92 KG/M2 | TEMPERATURE: 97.9 F

## 2022-07-22 DIAGNOSIS — Z09 HOSPITAL DISCHARGE FOLLOW-UP: ICD-10-CM

## 2022-07-22 DIAGNOSIS — E55.9 VITAMIN D DEFICIENCY: ICD-10-CM

## 2022-07-22 DIAGNOSIS — E87.6 HYPOKALEMIA: Primary | ICD-10-CM

## 2022-07-22 DIAGNOSIS — N18.31 STAGE 3A CHRONIC KIDNEY DISEASE (HCC): ICD-10-CM

## 2022-07-22 PROCEDURE — G8399 PT W/DXA RESULTS DOCUMENT: HCPCS | Performed by: STUDENT IN AN ORGANIZED HEALTH CARE EDUCATION/TRAINING PROGRAM

## 2022-07-22 PROCEDURE — G8536 NO DOC ELDER MAL SCRN: HCPCS | Performed by: STUDENT IN AN ORGANIZED HEALTH CARE EDUCATION/TRAINING PROGRAM

## 2022-07-22 PROCEDURE — 99214 OFFICE O/P EST MOD 30 MIN: CPT | Performed by: STUDENT IN AN ORGANIZED HEALTH CARE EDUCATION/TRAINING PROGRAM

## 2022-07-22 PROCEDURE — G8752 SYS BP LESS 140: HCPCS | Performed by: STUDENT IN AN ORGANIZED HEALTH CARE EDUCATION/TRAINING PROGRAM

## 2022-07-22 PROCEDURE — 1101F PT FALLS ASSESS-DOCD LE1/YR: CPT | Performed by: STUDENT IN AN ORGANIZED HEALTH CARE EDUCATION/TRAINING PROGRAM

## 2022-07-22 PROCEDURE — G8428 CUR MEDS NOT DOCUMENT: HCPCS | Performed by: STUDENT IN AN ORGANIZED HEALTH CARE EDUCATION/TRAINING PROGRAM

## 2022-07-22 PROCEDURE — G8417 CALC BMI ABV UP PARAM F/U: HCPCS | Performed by: STUDENT IN AN ORGANIZED HEALTH CARE EDUCATION/TRAINING PROGRAM

## 2022-07-22 PROCEDURE — 1111F DSCHRG MED/CURRENT MED MERGE: CPT | Performed by: STUDENT IN AN ORGANIZED HEALTH CARE EDUCATION/TRAINING PROGRAM

## 2022-07-22 PROCEDURE — G8754 DIAS BP LESS 90: HCPCS | Performed by: STUDENT IN AN ORGANIZED HEALTH CARE EDUCATION/TRAINING PROGRAM

## 2022-07-22 PROCEDURE — 1123F ACP DISCUSS/DSCN MKR DOCD: CPT | Performed by: STUDENT IN AN ORGANIZED HEALTH CARE EDUCATION/TRAINING PROGRAM

## 2022-07-22 PROCEDURE — G8510 SCR DEP NEG, NO PLAN REQD: HCPCS | Performed by: STUDENT IN AN ORGANIZED HEALTH CARE EDUCATION/TRAINING PROGRAM

## 2022-07-22 PROCEDURE — 1090F PRES/ABSN URINE INCON ASSESS: CPT | Performed by: STUDENT IN AN ORGANIZED HEALTH CARE EDUCATION/TRAINING PROGRAM

## 2022-07-22 RX ORDER — HYDROCODONE BITARTRATE AND ACETAMINOPHEN 5; 325 MG/1; MG/1
TABLET ORAL
COMMUNITY
Start: 2022-07-13

## 2022-07-22 RX ORDER — METOPROLOL TARTRATE 50 MG/1
50 TABLET ORAL 2 TIMES DAILY
COMMUNITY
Start: 2022-07-15 | End: 2022-08-15 | Stop reason: SDUPTHER

## 2022-07-22 RX ORDER — MONTELUKAST SODIUM 10 MG/1
10 TABLET ORAL DAILY
COMMUNITY
End: 2022-07-22 | Stop reason: SDUPTHER

## 2022-07-22 RX ORDER — MONTELUKAST SODIUM 10 MG/1
10 TABLET ORAL DAILY
Qty: 90 TABLET | Refills: 1 | Status: SHIPPED | OUTPATIENT
Start: 2022-07-22 | End: 2022-10-20

## 2022-07-22 RX ORDER — ERGOCALCIFEROL 1.25 MG/1
50000 CAPSULE ORAL
Qty: 12 CAPSULE | Refills: 1 | Status: SHIPPED | OUTPATIENT
Start: 2022-07-22 | End: 2022-11-01 | Stop reason: SDUPTHER

## 2022-07-22 NOTE — PROGRESS NOTES
Colletta Lobos is a 82 y.o. presents today for   Chief Complaint   Patient presents with    Hospital Follow Up     Heart failure, pacemaker placed. Is someone accompanying this pt? Yes, Lucrecia Gant daughter     Is the patient using any DME equipment during OV? Yes, walker    Visit Vitals  BP 99/65 (BP 1 Location: Left upper arm, BP Patient Position: Sitting, BP Cuff Size: Adult long)   Pulse 79   Temp 97.9 °F (36.6 °C) (Temporal)   Resp 16   Ht 5' 5\" (1.651 m)   Wt 221 lb 9.6 oz (100.5 kg)   SpO2 100%   BMI 36.88 kg/m²       Depression Screening:   3 most recent PHQ Screens 7/22/2022   PHQ Not Done -   Little interest or pleasure in doing things Not at all   Feeling down, depressed, irritable, or hopeless Several days   Total Score PHQ 2 1       Health Maintenance: reviewed and discussed and ordered per Provider. Health Maintenance Due   Topic Date Due    DTaP/Tdap/Td series (1 - Tdap) 08/22/2013    COVID-19 Vaccine (3 - Booster for Pfizer series) 10/01/2021    Medicare Yearly Exam  06/17/2022         Coordination of Care:   1. \"Have you been to the ER, urgent care clinic since your last visit? Hospitalized since your last visit? \" Yes [facility] heart failure last week     2. \"Have you seen or consulted any other health care providers outside of the [address] [place] [address] since your last visit? \" Yes pulmonary       3. For patients aged 39-70: Has the patient had a colonoscopy / FIT/ Cologuard? Yes - no Care Gap present    If the patient is female:    4. For patients aged 41-77: Has the patient had a mammogram within the past 2 years? NA - based on age or sex    11. For patients aged 21-65: Has the patient had a pap smear? NA - based on age or sex     Advanced Directive:  1. Do you have an Advanced Directive? No     2. Would you like information on Advanced Directives?  No    Tommiea Colby CMA

## 2022-07-22 NOTE — PROGRESS NOTES
Colletta Lobos is a 82 y.o.  female and presents with    Chief Complaint   Patient presents with    Hospital Follow Up     Heart failure, pacemaker placed. Subjective:    Patient was admitted to the hospital on July 7, and was discharged on July 15. She presented to the emergency room after she was having physical therapy at home and it was noted that she had an oxygen saturation of 85%. Patient had recently had her Lasix decreased to once a day due to her GFR decreasing. She was treated for acute respiratory failure with hypoxia and hypercapnia likely secondary to acute on chronic diastolic heart failure with possible underlying SHENG. Patient states that she had a referral made to see nephrology. She is scheduled to follow-up with them. Upon discharge patient was also discharged on oxygen. She is currently on 2 L. She states she follows with pulmonary but does not have an appointment until further down the months.   -pulmonary - Dr. Karoline Carr    Patient Active Problem List   Diagnosis Code    Pure hypercholesterolemia E78.00    Edema R60.9    Chronic eczema L30.9    Arthritis M19.90    Subclinical hyperthyroidism E05.90    Severe obesity (BMI 35.0-39. 9) with comorbidity ([address] E66.01    Chronic gout of multiple sites M1A. 72B2    Essential hypertension I10    Chronic kidney disease N18.9    Asthma J45.909    Multinodular goiter E04.2    Vitamin D deficiency E55.9    Other chest pain R07.89    History of echocardiogram Z92.89    Chronic renal disease, stage III N18.30      Past Medical History:   Diagnosis Date    Arthritis 9/20/2010    Asthma     Chronic eczema 3/22/2010    Chronic kidney disease     Clot 10/2018    lower left exremity, shin     Edema 3/22/2010    Environmental allergies     year round    Essential hypertension, malignant 3/22/2010    Gout     Menopause     Multinodular goiter     Followed by Dr. Caio Buckner, ENT.  FNA 2020    Pure hypercholesterolemia 3/22/2010 Subclinical hyperthyroidism 5/30/2017    Unspecified arthropathy, shoulder region 3/22/2010    Unspecified hypothyroidism 3/22/2010    Vitamin D deficiency       Past Surgical History:   Procedure Laterality Date    HX GYN  1983    hysterectomy    HX HYSTERECTOMY  1984    fibroids    HX KNEE REPLACEMENT Right     IR FINE NEEDLE ASPIRATION THYROID  07/2020    benign      Family History   Problem Relation Age of Onset    Hypertension Mother     Breast Cancer Mother 70    Breast Cancer Child 36    Breast Cancer Sister 39     Social History     Socioeconomic History    Marital status:      Spouse name: Not on file    Number of children: Not on file    Years of education: Not on file    Highest education level: Not on file   Occupational History    Not on file   Tobacco Use    Smoking status: Never    Smokeless tobacco: Never   Substance and Sexual Activity    Alcohol use:  Yes     Alcohol/week: 2.5 standard drinks     Types: 3 Standard drinks or equivalent per week    Drug use: No    Sexual activity: Yes     Partners: Male   Other Topics Concern     Service Not Asked    Blood Transfusions Not Asked    Caffeine Concern Not Asked    Occupational Exposure Not Asked    Hobby Hazards Not Asked    Sleep Concern Not Asked    Stress Concern Not Asked    Weight Concern Not Asked    Special Diet Yes     Comment: somewhat healthy, low sodium sometimes    Back Care Not Asked    Exercise Yes     Comment: walking, limited by ankle pain    Bike Helmet Not Asked    Seat Belt Not Asked    Self-Exams Not Asked   Social History Narrative    Not on file     Social Determinants of Health     Financial Resource Strain: Not on file   Food Insecurity: Not on file   Transportation Needs: Not on file   Physical Activity: Not on file   Stress: Not on file   Social Connections: Not on file   Intimate Partner Violence: Not on file   Housing Stability: Not on file        Current Outpatient Medications   Medication Sig Dispense Refill montelukast (Singulair) 10 mg tablet Take 10 mg by mouth in the morning. HYDROcodone-acetaminophen (NORCO) 5-325 mg per tablet take 1 tablet by mouth every 6 hours if needed      metoprolol tartrate (LOPRESSOR) 50 mg tablet Take 50 mg by mouth two (2) times a day. ipratropium (ATROVENT) 0.02 % soln       omeprazole (PRILOSEC) 20 mg capsule       Eliquis 5 mg tablet Take 1 Tablet by mouth two (2) times a day. 180 Tablet 3    furosemide (LASIX) 40 mg tablet Take 1 Tablet by mouth daily. 90 Tablet 3    budesonide-formoteroL (Symbicort) 160-4.5 mcg/actuation HFAA Take 2 Puffs by inhalation two (2) times a day. 1 Each 5    ergocalciferol (ERGOCALCIFEROL) 1,250 mcg (50,000 unit) capsule Take 1 Capsule by mouth every seven (7) days. 12 Capsule 1    albuterol (PROVENTIL HFA, VENTOLIN HFA, PROAIR HFA) 90 mcg/actuation inhaler Take 2 Puffs by inhalation every six (6) hours as needed for Wheezing. 1 Each 4    methIMAzole (TAPAZOLE) 5 mg tablet Take 2.5 mg by mouth daily. diclofenac (VOLTAREN) 1 % gel Apply 4 g to affected area four (4) times daily. 350 g 1    fluocinoNIDE (LIDEX) 0.05 % ointment Apply  to affected area two (2) times a day. 60 g 4    allopurinoL (ZYLOPRIM) 300 mg tablet Take 1 Tab by mouth daily. 90 Tab 4    fluticasone propionate (FLONASE) 50 mcg/actuation nasal spray 2 Sprays by Both Nostrils route daily. 1 Bottle 5    light mineral oil-min oil, PF, 0.5-0.5 % dpet 2 Drops. (Patient not taking: Reported on 7/22/2022)      amLODIPine (NORVASC) 5 mg tablet Take 1 Tablet by mouth daily. (Patient not taking: Reported on 7/22/2022) 90 Tablet 3    carvediloL (COREG) 6.25 mg tablet Take 1 Tablet by mouth two (2) times daily (with meals). (Patient not taking: Reported on 7/22/2022) 180 Tablet 3    potassium chloride (K-DUR, KLOR-CON M20) 20 mEq tablet Take 1 Tablet by mouth two (2) times a day.  (Patient not taking: Reported on 7/22/2022) 180 Tablet 3    cetirizine (ZYRTEC) 10 mg tablet Take 1 Tablet by mouth daily. (Patient not taking: Reported on 7/22/2022) 90 Tablet 3    acetaminophen (TYLENOL) 500 mg tablet Take 2 Tabs by mouth every eight (8) hours as needed for Pain. (Patient not taking: Reported on 7/22/2022) 100 Tab 3        ROS   ROS        Objective:  Vitals:    07/22/22 1533   BP: 99/65   Pulse: 79   Resp: 16   Temp: 97.9 °F (36.6 °C)   TempSrc: Temporal   SpO2: 100%   Weight: 221 lb 9.6 oz (100.5 kg)   Height: 5' 5\" (1.651 m)   PainSc:   0 - No pain       Physical Exam      LABS     TESTS      Assessment/Plan:    1. Vitamin D deficiency  - ergocalciferol (ERGOCALCIFEROL) 1,250 mcg (50,000 unit) capsule; Take 1 Capsule by mouth every seven (7) days. Dispense: 12 Capsule; Refill: 1    2. Hypokalemia  - RENAL FUNCTION PANEL; Future    3. Stage 3a chronic kidney disease (HCC)  F/up w/ nephro  - RENAL FUNCTION PANEL; Future    4. Hospital discharge follow-up  Discussed with patient to make a sooner appointment with pulmonary. If she is unable to get in with her pulmonary to let me know so a referral for pulmonary for her can be done. - [place] DISCHARGE MEDS RECONCILED W/ CURRENT OUTPATIENT MED LIST         Lab review: orders written for new lab studies as appropriate; see orders    Over 35 minutes were spent with patient during medical management. I have discussed the diagnosis with the patient and the intended plan as seen in the above orders. The patient has received an after-visit summary and questions were answered concerning future plans. I have discussed medication side effects and warnings with the patient as well. I have reviewed the plan of care with the patient, accepted their input and they are in agreement with the treatment goals.          Yandy Nguyen MD

## 2022-07-25 DIAGNOSIS — M79.89 LEG SWELLING: ICD-10-CM

## 2022-07-25 DIAGNOSIS — I50.31 ACUTE DIASTOLIC CHF (CONGESTIVE HEART FAILURE) (HCC): ICD-10-CM

## 2022-07-25 DIAGNOSIS — I48.91 NEW ONSET ATRIAL FIBRILLATION (HCC): Primary | ICD-10-CM

## 2022-07-26 ENCOUNTER — HOSPITAL ENCOUNTER (OUTPATIENT)
Dept: LAB | Age: 87
Discharge: HOME | End: 2022-07-26

## 2022-07-26 DIAGNOSIS — E87.6 HYPOKALEMIA: ICD-10-CM

## 2022-07-26 DIAGNOSIS — N18.31 STAGE 3A CHRONIC KIDNEY DISEASE (HCC): ICD-10-CM

## 2022-07-26 LAB — XX-LABCORP SPECIMEN COL,LCBCF: NORMAL

## 2022-07-26 PROCEDURE — 99001 SPECIMEN HANDLING PT-LAB: CPT

## 2022-07-27 DIAGNOSIS — J45.20 MILD INTERMITTENT ASTHMA WITHOUT COMPLICATION: Primary | ICD-10-CM

## 2022-07-27 DIAGNOSIS — Z99.81 OXYGEN DEPENDENT: ICD-10-CM

## 2022-07-27 LAB
ALBUMIN SERPL-MCNC: 3.6 G/DL (ref 3.6–4.6)
BUN SERPL-MCNC: 16 MG/DL (ref 8–27)
BUN/CREAT SERPL: 12 (ref 12–28)
CALCIUM SERPL-MCNC: 9.9 MG/DL (ref 8.7–10.3)
CHLORIDE SERPL-SCNC: 94 MMOL/L (ref 96–106)
CO2 SERPL-SCNC: 40 MMOL/L (ref 20–29)
CREAT SERPL-MCNC: 1.35 MG/DL (ref 0.57–1)
EGFR: 39 ML/MIN/1.73
GLUCOSE SERPL-MCNC: 72 MG/DL (ref 65–99)
INTERPRETATION: NORMAL
PHOSPHATE SERPL-MCNC: 2 MG/DL (ref 3–4.3)
POTASSIUM SERPL-SCNC: 4.8 MMOL/L (ref 3.5–5.2)
SODIUM SERPL-SCNC: 142 MMOL/L (ref 134–144)

## 2022-08-04 ENCOUNTER — OFFICE VISIT (OUTPATIENT)
Dept: FAMILY MEDICINE CLINIC | Age: 87
End: 2022-08-04
Payer: MEDICARE

## 2022-08-04 VITALS
TEMPERATURE: 97.9 F | DIASTOLIC BLOOD PRESSURE: 79 MMHG | HEIGHT: 65 IN | WEIGHT: 230.8 LBS | OXYGEN SATURATION: 100 % | SYSTOLIC BLOOD PRESSURE: 127 MMHG | RESPIRATION RATE: 16 BRPM | HEART RATE: 83 BPM | BODY MASS INDEX: 38.45 KG/M2

## 2022-08-04 DIAGNOSIS — Z91.81 AT HIGH RISK FOR INJURY RELATED TO FALL: ICD-10-CM

## 2022-08-04 DIAGNOSIS — Z99.81 OXYGEN DEPENDENT: ICD-10-CM

## 2022-08-04 DIAGNOSIS — M79.89 RIGHT LEG SWELLING: Primary | ICD-10-CM

## 2022-08-04 DIAGNOSIS — M19.90 ARTHRITIS: ICD-10-CM

## 2022-08-04 DIAGNOSIS — R06.09 DYSPNEA ON EXERTION: ICD-10-CM

## 2022-08-04 PROCEDURE — G8417 CALC BMI ABV UP PARAM F/U: HCPCS | Performed by: STUDENT IN AN ORGANIZED HEALTH CARE EDUCATION/TRAINING PROGRAM

## 2022-08-04 PROCEDURE — G8510 SCR DEP NEG, NO PLAN REQD: HCPCS | Performed by: STUDENT IN AN ORGANIZED HEALTH CARE EDUCATION/TRAINING PROGRAM

## 2022-08-04 PROCEDURE — G8536 NO DOC ELDER MAL SCRN: HCPCS | Performed by: STUDENT IN AN ORGANIZED HEALTH CARE EDUCATION/TRAINING PROGRAM

## 2022-08-04 PROCEDURE — G8754 DIAS BP LESS 90: HCPCS | Performed by: STUDENT IN AN ORGANIZED HEALTH CARE EDUCATION/TRAINING PROGRAM

## 2022-08-04 PROCEDURE — 99214 OFFICE O/P EST MOD 30 MIN: CPT | Performed by: STUDENT IN AN ORGANIZED HEALTH CARE EDUCATION/TRAINING PROGRAM

## 2022-08-04 PROCEDURE — 1090F PRES/ABSN URINE INCON ASSESS: CPT | Performed by: STUDENT IN AN ORGANIZED HEALTH CARE EDUCATION/TRAINING PROGRAM

## 2022-08-04 PROCEDURE — G8399 PT W/DXA RESULTS DOCUMENT: HCPCS | Performed by: STUDENT IN AN ORGANIZED HEALTH CARE EDUCATION/TRAINING PROGRAM

## 2022-08-04 PROCEDURE — G8752 SYS BP LESS 140: HCPCS | Performed by: STUDENT IN AN ORGANIZED HEALTH CARE EDUCATION/TRAINING PROGRAM

## 2022-08-04 PROCEDURE — 1101F PT FALLS ASSESS-DOCD LE1/YR: CPT | Performed by: STUDENT IN AN ORGANIZED HEALTH CARE EDUCATION/TRAINING PROGRAM

## 2022-08-04 PROCEDURE — 1123F ACP DISCUSS/DSCN MKR DOCD: CPT | Performed by: STUDENT IN AN ORGANIZED HEALTH CARE EDUCATION/TRAINING PROGRAM

## 2022-08-04 PROCEDURE — G8428 CUR MEDS NOT DOCUMENT: HCPCS | Performed by: STUDENT IN AN ORGANIZED HEALTH CARE EDUCATION/TRAINING PROGRAM

## 2022-08-04 NOTE — PROGRESS NOTES
Dwain Juarez is a 82 y.o. presents today for   Chief Complaint   Patient presents with    Leg Swelling     Right thigh noticed today         Is someone accompanying this pt? Yes daughter    Is the patient using any DME equipment during OV? walker    Visit Vitals  /79 (BP 1 Location: Left upper arm, BP Patient Position: Sitting, BP Cuff Size: Adult long)   Pulse 83   Temp 97.9 °F (36.6 °C) (Temporal)   Resp 16   Ht 5' 5\" (1.651 m)   Wt 230 lb 12.8 oz (104.7 kg)   SpO2 100%   BMI 38.41 kg/m²       Depression Screening:   3 most recent PHQ Screens 8/4/2022   PHQ Not Done -   Little interest or pleasure in doing things Not at all   Feeling down, depressed, irritable, or hopeless Several days   Total Score PHQ 2 1       Health Maintenance: reviewed and discussed and ordered per Provider. Health Maintenance Due   Topic Date Due    DTaP/Tdap/Td series (1 - Tdap) 08/22/2013    COVID-19 Vaccine (3 - Booster for Pfizer series) 10/01/2021    Medicare Yearly Exam  06/17/2022         Coordination of Care:   1. \"Have you been to the ER, urgent care clinic since your last visit? Hospitalized since your last visit? \" No    2. \"Have you seen or consulted any other health care providers outside of the [address] [place] [address] since your last visit? \" No     3. For patients aged 39-70: Has the patient had a colonoscopy / FIT/ Cologuard? NA - based on age    If the patient is female:    4. For patients aged 41-77: Has the patient had a mammogram within the past 2 years? NA - based on age or sex    11. For patients aged 21-65: Has the patient had a pap smear? NA - based on age or sex     Advanced Directive:  1. Do you have an Advanced Directive? No     2. Would you like information on Advanced Directives?  No    Fletcher Koyanagi CMA

## 2022-08-05 ENCOUNTER — DOCUMENTATION ONLY (OUTPATIENT)
Dept: FAMILY MEDICINE CLINIC | Age: 87
End: 2022-08-05

## 2022-08-05 NOTE — PROGRESS NOTES
Farida Sexton is a 82 y.o.  female and presents with    Chief Complaint   Patient presents with    Leg Swelling     Right thigh noticed today               Subjective:    Patient woke up this morning and noticed on the exterior aspect of her right thigh that there was a new swelling she had not appreciated before. It is slightly tender to palpation. Patient is currently taking Eliquis for her atrial fibrillation. She denies fevers. Denies any trauma to the area. States that she has been doing better now that she has been doing physical therapy. Has yet to have an appointment with pulmonary. Patient Active Problem List   Diagnosis Code    Pure hypercholesterolemia E78.00    Edema R60.9    Chronic eczema L30.9    Arthritis M19.90    Subclinical hyperthyroidism E05.90    Severe obesity (BMI 35.0-39. 9) with comorbidity ([address] E66.01    Chronic gout of multiple sites M1A. 58I2    Essential hypertension I10    Chronic kidney disease N18.9    Asthma J45.909    Multinodular goiter E04.2    Vitamin D deficiency E55.9    Other chest pain R07.89    History of echocardiogram Z92.89    Chronic renal disease, stage III N18.30      Past Medical History:   Diagnosis Date    Arthritis 9/20/2010    Asthma     Chronic eczema 3/22/2010    Chronic kidney disease     Clot 10/2018    lower left exremity, shin     Edema 3/22/2010    Environmental allergies     year round    Essential hypertension, malignant 3/22/2010    Gout     Menopause     Multinodular goiter     Followed by Dr. Pam Live, ENT.  FNA 2020    Pure hypercholesterolemia 3/22/2010    Subclinical hyperthyroidism 5/30/2017    Unspecified arthropathy, shoulder region 3/22/2010    Unspecified hypothyroidism 3/22/2010    Vitamin D deficiency       Past Surgical History:   Procedure Laterality Date    HX GYN  1983    hysterectomy    HX HYSTERECTOMY  1984    fibroids    HX KNEE REPLACEMENT Right     IR FINE NEEDLE ASPIRATION THYROID  07/2020    benign      Family History   Problem Relation Age of Onset    Hypertension Mother     Breast Cancer Mother 70    Breast Cancer Child 36    Breast Cancer Sister 39     Social History     Socioeconomic History    Marital status:      Spouse name: Not on file    Number of children: Not on file    Years of education: Not on file    Highest education level: Not on file   Occupational History    Not on file   Tobacco Use    Smoking status: Never    Smokeless tobacco: Never   Substance and Sexual Activity    Alcohol use: Yes     Alcohol/week: 2.5 standard drinks     Types: 3 Standard drinks or equivalent per week    Drug use: No    Sexual activity: Yes     Partners: Male   Other Topics Concern     Service Not Asked    Blood Transfusions Not Asked    Caffeine Concern Not Asked    Occupational Exposure Not Asked    Hobby Hazards Not Asked    Sleep Concern Not Asked    Stress Concern Not Asked    Weight Concern Not Asked    Special Diet Yes     Comment: somewhat healthy, low sodium sometimes    Back Care Not Asked    Exercise Yes     Comment: walking, limited by ankle pain    Bike Helmet Not Asked    Seat Belt Not Asked    Self-Exams Not Asked   Social History Narrative    Not on file     Social Determinants of Health     Financial Resource Strain: Not on file   Food Insecurity: Not on file   Transportation Needs: Not on file   Physical Activity: Not on file   Stress: Not on file   Social Connections: Not on file   Intimate Partner Violence: Not on file   Housing Stability: Not on file        Current Outpatient Medications   Medication Sig Dispense Refill    HYDROcodone-acetaminophen (NORCO) 5-325 mg per tablet take 1 tablet by mouth every 6 hours if needed      metoprolol tartrate (LOPRESSOR) 50 mg tablet Take 50 mg by mouth two (2) times a day. montelukast (Singulair) 10 mg tablet Take 1 Tablet by mouth in the morning for 90 days.  90 Tablet 1    ergocalciferol (ERGOCALCIFEROL) 1,250 mcg (50,000 unit) capsule Take 1 Capsule by mouth every seven (7) days. 12 Capsule 1    ipratropium (ATROVENT) 0.02 % soln       omeprazole (PRILOSEC) 20 mg capsule       Eliquis 5 mg tablet Take 1 Tablet by mouth two (2) times a day. 180 Tablet 3    furosemide (LASIX) 40 mg tablet Take 1 Tablet by mouth daily. 90 Tablet 3    budesonide-formoteroL (Symbicort) 160-4.5 mcg/actuation HFAA Take 2 Puffs by inhalation two (2) times a day. 1 Each 5    albuterol (PROVENTIL HFA, VENTOLIN HFA, PROAIR HFA) 90 mcg/actuation inhaler Take 2 Puffs by inhalation every six (6) hours as needed for Wheezing. 1 Each 4    methIMAzole (TAPAZOLE) 5 mg tablet Take 2.5 mg by mouth daily. diclofenac (VOLTAREN) 1 % gel Apply 4 g to affected area four (4) times daily. 350 g 1    fluocinoNIDE (LIDEX) 0.05 % ointment Apply  to affected area two (2) times a day. 60 g 4    allopurinoL (ZYLOPRIM) 300 mg tablet Take 1 Tab by mouth daily. 90 Tab 4    fluticasone propionate (FLONASE) 50 mcg/actuation nasal spray 2 Sprays by Both Nostrils route daily. 1 Bottle 5    light mineral oil-min oil, PF, 0.5-0.5 % dpet 2 Drops. (Patient not taking: No sig reported)      amLODIPine (NORVASC) 5 mg tablet Take 1 Tablet by mouth daily. (Patient not taking: No sig reported) 90 Tablet 3    carvediloL (COREG) 6.25 mg tablet Take 1 Tablet by mouth two (2) times daily (with meals). (Patient not taking: No sig reported) 180 Tablet 3    potassium chloride (K-DUR, KLOR-CON M20) 20 mEq tablet Take 1 Tablet by mouth two (2) times a day. (Patient not taking: No sig reported) 180 Tablet 3    cetirizine (ZYRTEC) 10 mg tablet Take 1 Tablet by mouth daily. (Patient not taking: No sig reported) 90 Tablet 3    acetaminophen (TYLENOL) 500 mg tablet Take 2 Tabs by mouth every eight (8) hours as needed for Pain. (Patient not taking: No sig reported) 100 Tab 3        ROS   Review of Systems   Constitutional:  Negative for chills, fever and malaise/fatigue. HENT:  Negative for congestion, ear discharge and ear pain. Eyes:  Negative for blurred vision, pain and discharge. Respiratory:  Negative for cough and shortness of breath. Cardiovascular:  Positive for leg swelling. Negative for chest pain and palpitations. Gastrointestinal:  Negative for abdominal pain, nausea and vomiting. Genitourinary:  Negative for dysuria, frequency and urgency. Musculoskeletal:  Positive for myalgias. Skin:  Negative for itching and rash. Neurological:  Negative for dizziness, seizures, loss of consciousness and headaches. Psychiatric/Behavioral:  Negative for substance abuse. Objective:  Vitals:    08/04/22 1353   BP: 127/79   Pulse: 83   Resp: 16   Temp: 97.9 °F (36.6 °C)   TempSrc: Temporal   SpO2: 100%   Weight: 230 lb 12.8 oz (104.7 kg)   Height: 5' 5\" (1.651 m)   PainSc:   0 - No pain       Physical Exam  Vitals reviewed. Constitutional:       Appearance: Normal appearance. Eyes:      General: No scleral icterus. Right eye: No discharge. Left eye: No discharge. Cardiovascular:      Rate and Rhythm: Normal rate. Rhythm irregular. Pulmonary:      Effort: Pulmonary effort is normal. No respiratory distress. Musculoskeletal:         General: Swelling and tenderness present. Cervical back: Normal range of motion and neck supple. Right lower leg: Edema present. Left lower leg: Edema present. Neurological:      Mental Status: She is alert and oriented to person, place, and time. Cranial Nerves: No cranial nerve deficit. Psychiatric:         Mood and Affect: Mood normal.         Behavior: Behavior normal.         Thought Content: Thought content normal.         Judgment: Judgment normal.         LABS     TESTS      Assessment/Plan:    1. Right leg swelling  Concern for DVT. Patient already on Eliquis. - DUPLEX LOWER EXT VENOUS RIGHT; Future    2. Dyspnea on exertion  - AMB SUPPLY ORDER    3. At high risk for injury related to fall  - AMB SUPPLY ORDER    4.  Arthritis  - AMB SUPPLY ORDER    5. Oxygen dependent  - AMB SUPPLY ORDER     Over 30 minutes were spent with patient and on medical management. Lab review: no lab studies available for review at time of visit      I have discussed the diagnosis with the patient and the intended plan as seen in the above orders. The patient has received an after-visit summary and questions were answered concerning future plans. I have discussed medication side effects and warnings with the patient as well. I have reviewed the plan of care with the patient, accepted their input and they are in agreement with the treatment goals.          Carlos A Estrada MD

## 2022-08-12 ENCOUNTER — OFFICE VISIT (OUTPATIENT)
Dept: CARDIOLOGY CLINIC | Age: 87
End: 2022-08-12
Payer: MEDICARE

## 2022-08-12 VITALS
DIASTOLIC BLOOD PRESSURE: 72 MMHG | WEIGHT: 230 LBS | OXYGEN SATURATION: 99 % | TEMPERATURE: 97.9 F | BODY MASS INDEX: 38.27 KG/M2 | SYSTOLIC BLOOD PRESSURE: 116 MMHG | HEART RATE: 76 BPM

## 2022-08-12 DIAGNOSIS — R60.9 EDEMA, UNSPECIFIED TYPE: Primary | ICD-10-CM

## 2022-08-12 PROCEDURE — 1123F ACP DISCUSS/DSCN MKR DOCD: CPT | Performed by: INTERNAL MEDICINE

## 2022-08-12 PROCEDURE — 1090F PRES/ABSN URINE INCON ASSESS: CPT | Performed by: INTERNAL MEDICINE

## 2022-08-12 PROCEDURE — 1101F PT FALLS ASSESS-DOCD LE1/YR: CPT | Performed by: INTERNAL MEDICINE

## 2022-08-12 PROCEDURE — G8752 SYS BP LESS 140: HCPCS | Performed by: INTERNAL MEDICINE

## 2022-08-12 PROCEDURE — G8399 PT W/DXA RESULTS DOCUMENT: HCPCS | Performed by: INTERNAL MEDICINE

## 2022-08-12 PROCEDURE — G8536 NO DOC ELDER MAL SCRN: HCPCS | Performed by: INTERNAL MEDICINE

## 2022-08-12 PROCEDURE — 99214 OFFICE O/P EST MOD 30 MIN: CPT | Performed by: INTERNAL MEDICINE

## 2022-08-12 PROCEDURE — G8427 DOCREV CUR MEDS BY ELIG CLIN: HCPCS | Performed by: INTERNAL MEDICINE

## 2022-08-12 PROCEDURE — G8510 SCR DEP NEG, NO PLAN REQD: HCPCS | Performed by: INTERNAL MEDICINE

## 2022-08-12 PROCEDURE — G8754 DIAS BP LESS 90: HCPCS | Performed by: INTERNAL MEDICINE

## 2022-08-12 PROCEDURE — G8417 CALC BMI ABV UP PARAM F/U: HCPCS | Performed by: INTERNAL MEDICINE

## 2022-08-12 RX ORDER — CARVEDILOL 6.25 MG/1
6.25 TABLET ORAL 2 TIMES DAILY WITH MEALS
Qty: 180 TABLET | Refills: 3 | Status: SHIPPED | OUTPATIENT
Start: 2022-08-12 | End: 2022-08-15 | Stop reason: ALTCHOICE

## 2022-08-12 RX ORDER — APIXABAN 5 MG/1
5 TABLET, FILM COATED ORAL 2 TIMES DAILY
Qty: 180 TABLET | Refills: 3 | Status: SHIPPED | OUTPATIENT
Start: 2022-08-12 | End: 2022-09-23 | Stop reason: DRUGHIGH

## 2022-08-12 NOTE — TELEPHONE ENCOUNTER
PCP: Aayush Guevara MD    Last appt: 8/12/2022  Future Appointments   Date Time Provider Shefali Ivey   9/23/2022  9:20 AM Lester Ramos MD [facility] BS AMB       Requested Prescriptions     Pending Prescriptions Disp Refills    carvediloL (COREG) 6.25 mg tablet 180 Tablet 3     Sig: Take 1 Tablet by mouth two (2) times daily (with meals). Eliquis 5 mg tablet 180 Tablet 3     Sig: Take 1 Tablet by mouth two (2) times a day.

## 2022-08-12 NOTE — PROGRESS NOTES
Identified pt with two pt identifiers(name and ). Reviewed record in preparation for visit and have obtained necessary documentation. Maame Chilel presents today for   Chief Complaint   Patient presents with    Follow-up       Pt c/o SOB            Maame Chilel preferred language for health care discussion is english/other. Personal Protective Equipment:   Personal Protective Equipment was used including: mask-surgical and hands-gloves. Patient was placed on no precaution(s). Patient was masked. Precautions:   Patient currently on None  Patient currently roomed with door closed. Is someone accompanying this pt? yes    Is the patient using any DME equipment during OV? Yes. wheelchair    Depression Screening:  3 most recent PHQ Screens 2022   PHQ Not Done -   Little interest or pleasure in doing things Not at all   Feeling down, depressed, irritable, or hopeless Not at all   Total Score PHQ 2 0       Learning Assessment:  Learning Assessment 2021   PRIMARY LEARNER Patient   HIGHEST LEVEL OF EDUCATION - PRIMARY LEARNER  GRADUATED HIGH SCHOOL OR GED   BARRIERS PRIMARY LEARNER NONE   CO-LEARNER CAREGIVER No   PRIMARY LANGUAGE ENGLISH   LEARNER PREFERENCE PRIMARY DEMONSTRATION   ANSWERED BY patient   RELATIONSHIP SELF       Abuse Screening:  Abuse Screening Questionnaire 2021   Do you ever feel afraid of your partner? N   Are you in a relationship with someone who physically or mentally threatens you? N   Is it safe for you to go home? Y       Fall Risk  Fall Risk Assessment, last 12 mths 2022   Able to walk? Yes   Fall in past 12 months? 0   Do you feel unsteady? 0   Are you worried about falling 0   Is TUG test greater than 12 seconds? -   Is the gait abnormal? -   Number of falls in past 12 months -   Fall with injury? -       Pt currently taking Anticoagulant therapy? yes  Pt currently taking Antiplatelet therapy? no    Coordination of Care:  1.  Have you been to the ER, urgent care clinic since your last visit? Hospitalized since your last visit? Yes. 7/7/22. [facility] Dx CHF/ 8/10/22 leg swelling madi     2. Have you seen or consulted any other health care providers outside of the [address] [place] [address] since your last visit? Include any pap smears or colon screening. yes      Please see Red banners under Allergies and Med Rec to remove outside inquires. All correct information has been verified with patient and added to chart.      Medication's patient's would liked removed has been marked not taking to be removed per Verbal order and read back per Pratibha Mcfarlane MD

## 2022-08-15 ENCOUNTER — TELEPHONE (OUTPATIENT)
Dept: CARDIOLOGY CLINIC | Age: 87
End: 2022-08-15

## 2022-08-15 RX ORDER — METOPROLOL TARTRATE 50 MG/1
50 TABLET ORAL 2 TIMES DAILY
Qty: 180 TABLET | Refills: 3 | Status: SHIPPED | OUTPATIENT
Start: 2022-08-15 | End: 2022-08-17 | Stop reason: SDUPTHER

## 2022-08-15 NOTE — PROGRESS NOTES
Subjective:      Zbigniew martin is in the office today for cardiac reevaluation. She is a 42-year-old woman that was initially referred for further evaluation of chest pain. She was seen in the ED at [facility] in March 2021. At that time she was complaining of persistent pain in her chest for the prior 3 days. She has a known history of esophagitis/biliary disease. 2 troponins were obtained both which were not significantly elevated. She thought that she was referred to gastroenterology at that time. She had an echocardiogram done on 3/24/2021. Her left ventricle was normal in size with normal systolic function. She did have moderate pulmonary hypertension with an estimated systolic pressure of 54 mmHg. It did not appear to be a complete TR regurgitant jet. She had a normal nuclear stress test in May 2021. Ejection fraction was 68%. In the office on 6/3/2022 she reported a prior [facility] hospitalization.  (5/1/2022). She had acute hypoxemic hypercarbic respiratory failure secondary to suspected CHF, asthma/COPD exacerbation and acute bronchitis. An echocardiogram during her hospitalization demonstrated an ejection fraction of 61%. Left ventricular diastolic pressure was indeterminate. She was also  reporting shortness of breath and a \"hot sensation\" in her chest up until approximately a week prior. She was having physical therapy and nurse visits on a twice weekly basis. She was instructed to take an extra Lasix tablet 40 mg if she gained more than 3 pounds in a single day. She was not have having to take any Lasix. A nuclear stress test was ordered completed. Results are as listed below. In her visit today, she reports a recent [facility] hospitalization. She was complaining of increased swelling in her lower extremities. She also has some shortness of breath and wheezing. She was in atrial fibrillation. .  During her hospitalization, she developed an asymptomatic 12-second pause. Currently underwent implantation of a permanent pacemaker. She reports that she is still having shortness of breath. Home health has been set visiting her. She reports that recently her oxygen saturations were low and she is trying to wean off the supplemental oxygen. She is taking furosemide twice daily. She has been evaluated by nephrology. Patient Active Problem List    Diagnosis Date Noted    Chronic renal disease, stage III 05/13/2022    Other chest pain 04/20/2021    History of echocardiogram 04/20/2021    Vitamin D deficiency     Multinodular goiter     Asthma     Chronic kidney disease     Essential hypertension 07/11/2019    Chronic gout of multiple sites 10/03/2018    Severe obesity (BMI 35.0-39. 9) with comorbidity ([facility] [address] 05/16/2018    Subclinical hyperthyroidism 05/30/2017    Arthritis 09/20/2010    Pure hypercholesterolemia 03/22/2010    Edema 03/22/2010    Chronic eczema 03/22/2010     Current Outpatient Medications   Medication Sig Dispense Refill    Eliquis 5 mg tablet Take 1 Tablet by mouth two (2) times a day. 180 Tablet 3    HYDROcodone-acetaminophen (NORCO) 5-325 mg per tablet take 1 tablet by mouth every 6 hours if needed      metoprolol tartrate (LOPRESSOR) 50 mg tablet Take 50 mg by mouth two (2) times a day. montelukast (Singulair) 10 mg tablet Take 1 Tablet by mouth in the morning for 90 days. 90 Tablet 1    ergocalciferol (ERGOCALCIFEROL) 1,250 mcg (50,000 unit) capsule Take 1 Capsule by mouth every seven (7) days. 12 Capsule 1    ipratropium (ATROVENT) 0.02 % soln       light mineral oil-min oil, PF, 0.5-0.5 % dpet 2 Drops. (Patient not taking: No sig reported)      omeprazole (PRILOSEC) 20 mg capsule       amLODIPine (NORVASC) 5 mg tablet Take 1 Tablet by mouth daily. (Patient not taking: No sig reported) 90 Tablet 3    potassium chloride (K-DUR, KLOR-CON M20) 20 mEq tablet Take 1 Tablet by mouth two (2) times a day.  (Patient not taking: No sig reported) 180 Tablet 3    furosemide (LASIX) 40 mg tablet Take 1 Tablet by mouth daily. 90 Tablet 3    budesonide-formoteroL (Symbicort) 160-4.5 mcg/actuation HFAA Take 2 Puffs by inhalation two (2) times a day. 1 Each 5    albuterol (PROVENTIL HFA, VENTOLIN HFA, PROAIR HFA) 90 mcg/actuation inhaler Take 2 Puffs by inhalation every six (6) hours as needed for Wheezing. 1 Each 4    methIMAzole (TAPAZOLE) 5 mg tablet Take 2.5 mg by mouth daily. cetirizine (ZYRTEC) 10 mg tablet Take 1 Tablet by mouth daily. (Patient not taking: No sig reported) 90 Tablet 3    diclofenac (VOLTAREN) 1 % gel Apply 4 g to affected area four (4) times daily. 350 g 1    fluocinoNIDE (LIDEX) 0.05 % ointment Apply  to affected area two (2) times a day. 60 g 4    acetaminophen (TYLENOL) 500 mg tablet Take 2 Tabs by mouth every eight (8) hours as needed for Pain. (Patient not taking: No sig reported) 100 Tab 3    allopurinoL (ZYLOPRIM) 300 mg tablet Take 1 Tab by mouth daily. 90 Tab 4    fluticasone propionate (FLONASE) 50 mcg/actuation nasal spray 2 Sprays by Both Nostrils route daily. 1 Bottle 5     Allergies   Allergen Reactions    Tomato Other (comments)     Gi Distress     Past Medical History:   Diagnosis Date    Arthritis 9/20/2010    Asthma     Chronic eczema 3/22/2010    Chronic kidney disease     Clot 10/2018    lower left exremity, shin     Edema 3/22/2010    Environmental allergies     year round    Essential hypertension, malignant 3/22/2010    Gout     Menopause     Multinodular goiter     Followed by Dr. Jack Cruz, ENT.  FNA 2020    Pure hypercholesterolemia 3/22/2010    Subclinical hyperthyroidism 5/30/2017    Unspecified arthropathy, shoulder region 3/22/2010    Unspecified hypothyroidism 3/22/2010    Vitamin D deficiency      Past Surgical History:   Procedure Laterality Date    HX GYN  1983    hysterectomy    HX HYSTERECTOMY  1984    fibroids    HX KNEE REPLACEMENT Right     IR FINE NEEDLE ASPIRATION THYROID  07/2020    benign Family History   Problem Relation Age of Onset    Hypertension Mother     Breast Cancer Mother 70    Breast Cancer Child 36    Breast Cancer Sister 39     Social History     Tobacco Use   Smoking Status Never   Smokeless Tobacco Never          Review of Systems, additional:  Constitutional: negative  Eyes: negative  Respiratory: positive for dyspnea on exertion  Cardiovascular: positive for chest pain, lower extremity edema  Gastrointestinal: negative  Musculoskeletal:negative  Neurological: negative  Behvioral/Psych: negative  Endocrine: negative  ENT: negative    Objective:     Visit Vitals  /72   Pulse 76   Temp 97.9 °F (36.6 °C) (Temporal)   Wt 104.3 kg (230 lb) Comment: approx. SpO2 99%   BMI 38.27 kg/m²     General:  alert, cooperative, no distress   Chest Wall: inspection normal - no chest wall deformities or tenderness, respiratory effort normal   Lung: clear to auscultation bilaterally   Heart:  normal rate and regular rhythm, S1 and S2 normal, no murmurs noted, no gallops noted   Abdomen: soft, non-tender. Bowel sounds normal. No masses,  no organomegaly   Extremities: extremities normal, atraumatic, no cyanosis or edema Skin: no rashes   Neuro: alert, oriented, normal speech, no focal findings or movement disorder noted     EKG: 3/31/2021. Sinus rhythm. Diffuse nondiagnostic ST and T wave abnormalities. Assessment/Plan:       ICD-10-CM ICD-9-CM    1. Chest pain, unspecified type ,  Lexiscan Myoview completed 5/11/2021. Myocardial perfusion imaging was normal.  Ejection fraction 68%. Order barium swallow and upper GI. Return in 5 weeks. R07.9 786.50 NUCLEAR CARDIAC STRESS TEST   2. Essential hypertension , controlled BP in the office today. I10 401.9    3. Edema, unspecified type  R60.9 782.3    4. History of echocardiogram , normal LV function with no significant valvular pathology. PA pressure estimate was elevated.  Z92.89 V15.89    5  HFpEF, hospitalized with acute hypoxemic hypercarbic respiratory failure at [facility] (5/1/2022), other contributing factors asthma and acute bronchitis, COPD exacerbation. Continue present diuretic prescription. Ordered BMP and magnesium which was completed on 6/3/2022. 6 potassium was 4.7. CO2 was 40. BUN/creatinine were 13 and 1.52. Magnesium was 2.7. She was on magnesium oxide. She was told to discontinue it. Nephrology evaluation planned      6       PAF, [id number] score 5, patient on Eliquis 5 mg twice daily for stroke prevention. 7       Hyperthyroidism, patient taking methimazole 2.5 mg daily  8. CKD, stage IIIa  9. Permanent pacemaker in situ. Implant date 7/12/2022. [address] MRI [id number]. For asymptomatic 12-second pause on telemetry, atrial fibrillation with RVR. Return in 6 weeks.

## 2022-08-15 NOTE — TELEPHONE ENCOUNTER
New Rx for lopressor 50 mg BID sent to Dr. Mayda Nielsen. Updated in chart.  Medication changed when in hospital last.

## 2022-08-15 NOTE — TELEPHONE ENCOUNTER
Patient daughter called saying that the prescription put in on on Friday for the carvedilol was wrong. Patient instead needs the metoprolol.

## 2022-08-15 NOTE — TELEPHONE ENCOUNTER
PCP: Fanta Frederick MD    Last appt: 8/12/2022  Future Appointments   Date Time Provider Shefali Ivey   9/23/2022  9:20 AM Adalberto Moya MD [facility] BS AMB       Requested Prescriptions     Pending Prescriptions Disp Refills    metoprolol tartrate (LOPRESSOR) 50 mg tablet 180 Tablet 3     Sig: Take 1 Tablet by mouth two (2) times a day.            Other Comments: PT Coreg changed to lopressor 50 mg BID while in hospital

## 2022-08-16 NOTE — TELEPHONE ENCOUNTER
Rx not able to be escribed to specific pharmacy. PT needs printed Rx . Rx sent to Dr. Mayda Nielsen. Will advise again tomorrow.

## 2022-08-16 NOTE — TELEPHONE ENCOUNTER
Patient went to the [facility] pharmacy to  metoprolol prescription and was told that they had not received anything form the office

## 2022-08-17 RX ORDER — METOPROLOL TARTRATE 50 MG/1
50 TABLET ORAL 2 TIMES DAILY
Qty: 180 TABLET | Refills: 3 | Status: SHIPPED | OUTPATIENT
Start: 2022-08-17

## 2022-08-17 NOTE — TELEPHONE ENCOUNTER
PCP: Mabeline Closs, MD    Last appt: 8/12/2022  Future Appointments   Date Time Provider Shefali Ivey   9/23/2022  9:20 AM Ravindra Munoz MD [facility] BS AMB       Requested Prescriptions     Pending Prescriptions Disp Refills    metoprolol tartrate (LOPRESSOR) 50 mg tablet 180 Tablet 3     Sig: Take 1 Tablet by mouth two (2) times a day.

## 2022-08-17 NOTE — TELEPHONE ENCOUNTER
Pt's sister came to office to  paper scripts. Two pt identifiers confirmed. Scripts signed by Dr. Hill Patterson and handed to pt's sister.

## 2022-08-24 ENCOUNTER — DOCUMENTATION ONLY (OUTPATIENT)
Dept: PULMONOLOGY | Age: 87
End: 2022-08-24

## 2022-09-12 DIAGNOSIS — M79.89 RIGHT LEG SWELLING: ICD-10-CM

## 2022-09-13 ENCOUNTER — TELEPHONE (OUTPATIENT)
Dept: FAMILY MEDICINE CLINIC | Age: 87
End: 2022-09-13

## 2022-09-13 NOTE — TELEPHONE ENCOUNTER
Pt's home health nurse called regarding the plan of care for pt. Please call back. Call back number is [phone]. Thank you.

## 2022-09-16 ENCOUNTER — OFFICE VISIT (OUTPATIENT)
Dept: FAMILY MEDICINE CLINIC | Age: 87
End: 2022-09-16
Payer: MEDICARE

## 2022-09-16 VITALS
SYSTOLIC BLOOD PRESSURE: 96 MMHG | HEART RATE: 94 BPM | OXYGEN SATURATION: 100 % | DIASTOLIC BLOOD PRESSURE: 65 MMHG | TEMPERATURE: 98 F | BODY MASS INDEX: 33.28 KG/M2 | WEIGHT: 200 LBS | RESPIRATION RATE: 20 BRPM

## 2022-09-16 DIAGNOSIS — J01.90 ACUTE NON-RECURRENT SINUSITIS, UNSPECIFIED LOCATION: ICD-10-CM

## 2022-09-16 DIAGNOSIS — I50.33 ACUTE ON CHRONIC DIASTOLIC HEART FAILURE (HCC): ICD-10-CM

## 2022-09-16 DIAGNOSIS — Z09 HOSPITAL DISCHARGE FOLLOW-UP: Primary | ICD-10-CM

## 2022-09-16 DIAGNOSIS — Z87.01 HISTORY OF PNEUMONIA: ICD-10-CM

## 2022-09-16 PROCEDURE — 1101F PT FALLS ASSESS-DOCD LE1/YR: CPT | Performed by: NURSE PRACTITIONER

## 2022-09-16 PROCEDURE — G8432 DEP SCR NOT DOC, RNG: HCPCS | Performed by: NURSE PRACTITIONER

## 2022-09-16 PROCEDURE — 99214 OFFICE O/P EST MOD 30 MIN: CPT | Performed by: NURSE PRACTITIONER

## 2022-09-16 PROCEDURE — G8417 CALC BMI ABV UP PARAM F/U: HCPCS | Performed by: NURSE PRACTITIONER

## 2022-09-16 PROCEDURE — 1090F PRES/ABSN URINE INCON ASSESS: CPT | Performed by: NURSE PRACTITIONER

## 2022-09-16 PROCEDURE — G8752 SYS BP LESS 140: HCPCS | Performed by: NURSE PRACTITIONER

## 2022-09-16 PROCEDURE — G8754 DIAS BP LESS 90: HCPCS | Performed by: NURSE PRACTITIONER

## 2022-09-16 PROCEDURE — G8427 DOCREV CUR MEDS BY ELIG CLIN: HCPCS | Performed by: NURSE PRACTITIONER

## 2022-09-16 PROCEDURE — G8399 PT W/DXA RESULTS DOCUMENT: HCPCS | Performed by: NURSE PRACTITIONER

## 2022-09-16 PROCEDURE — 1123F ACP DISCUSS/DSCN MKR DOCD: CPT | Performed by: NURSE PRACTITIONER

## 2022-09-16 PROCEDURE — G8536 NO DOC ELDER MAL SCRN: HCPCS | Performed by: NURSE PRACTITIONER

## 2022-09-16 NOTE — PROGRESS NOTES
Zackery Leslie is a 82 y.o. female  established patient, here for evaluation of the following chief complaint(s):  Chief Complaint   Patient presents with    Hospital Follow Up        Assessment and Plan  1. Hospital discharge follow-up  2. Acute on chronic diastolic heart failure ([address]  3. History of pneumonia  4. Acute non-recurrent sinusitis, unspecified location     Follow-up and Dispositions    Return in about 1 month (around 10/16/2022) for Follow-up with your PCP. HPI:   Reviewed hospital notes. In office visit related to hospital follow-up Admit Date: 8/30/2022 D/C Date: 9/15/2022 related to persistent atrial fibrillation and acute on chronic diastolic heart failure. Patient started on Augmentin at discharge related to pneumonia and sinusitis and Bumex 2 mg twice daily. She is finishing antibiotic. Patient looks well and she says he feels a lot better. She is on regular diet and family is watching patient's fluid intake. Patient says she had a reaction to an IV catheter in of left forearm and this is better. She has a bandage over it. No apparent infection and denies any pain. Patient has orders for home mal and daughter expects the first appt to be next week. Daughter has been given a devise to weigh patient, check pulse ox and BP. Patient on home O2, 2 liters. Patient appears to see Dr. Michelle Gilliland with cardiology and she him again. She also has an appt with [facility] cardiology. Patient has a pulmonologist and has an 9/28/202.  .    ROS:    General: negative for - chills, fever, weight changes or malaise  HEENT: no sore throat, nasal congestion, vision problems or ear problems  Respiratory: no cough, shortness of breath, or wheezing  Cardiovascular: no chest pain, palpitations, or dyspnea on exertion  Gastrointestinal: no abdominal pain, N/V, change in bowel habits  Musculoskeletal: no back pain or joint pain  Neurological: no headache or dizziness  Endo:  No polyuria or polydipsia  : no urinary  Skin: blister left forearm   Psychological: negative for - anxiety, depression, sleeps issues    Prior to Admission medications    Medication Sig Start Date End Date Taking? Authorizing Provider   metoprolol tartrate (LOPRESSOR) 50 mg tablet Take 1 Tablet by mouth two (2) times a day. 8/17/22  Yes Quincy Fernandes MD   Eliquis 5 mg tablet Take 1 Tablet by mouth two (2) times a day. 8/12/22  Yes Quincy Fernandes MD   HYDROcodone-acetaminophen [facility]) 5-325 mg per tablet take 1 tablet by mouth every 6 hours if needed 7/13/22  Yes Provider, Historical   montelukast (Singulair) 10 mg tablet Take 1 Tablet by mouth in the morning for 90 days. 7/22/22 10/20/22 Yes Paige Mansfield MD   ergocalciferol (ERGOCALCIFEROL) 1,250 mcg (50,000 unit) capsule Take 1 Capsule by mouth every seven (7) days. 7/22/22  Yes Merlin Severino MD   ipratropium (ATROVENT) 0.02 % soln  5/8/22  Yes Provider, Historical   omeprazole (PRILOSEC) 20 mg capsule  3/14/22  Yes Provider, Historical   budesonide-formoteroL (Symbicort) 160-4.5 mcg/actuation HFAA Take 2 Puffs by inhalation two (2) times a day. 5/13/22  Yes Delmy Foote MD   albuterol (PROVENTIL HFA, VENTOLIN HFA, PROAIR HFA) 90 mcg/actuation inhaler Take 2 Puffs by inhalation every six (6) hours as needed for Wheezing. 3/14/22  Yes Paige Mansfield MD   methIMAzole (TAPAZOLE) 5 mg tablet Take 2.5 mg by mouth daily. 12/12/21  Yes Provider, Historical   diclofenac (VOLTAREN) 1 % gel Apply 4 g to affected area four (4) times daily. 9/16/21  Yes Paige Mansfield MD   fluocinoNIDE (LIDEX) 0.05 % ointment Apply  to affected area two (2) times a day. 6/16/21  Yes Paige Mansfield MD   allopurinoL (ZYLOPRIM) 300 mg tablet Take 1 Tab by mouth daily. 12/8/20  Yes Nicole Belle NP   fluticasone propionate (FLONASE) 50 mcg/actuation nasal spray 2 Sprays by Both Nostrils route daily.  6/15/20  Yes Nicole Belle NP   light mineral oil-min oil, PF, 0.5-0.5 % dpet 2 Drops. Patient not taking: No sig reported 5/11/22   Provider, Historical   amLODIPine (NORVASC) 5 mg tablet Take 1 Tablet by mouth daily. Patient not taking: No sig reported 6/3/22   Pavithra Wynne MD   potassium chloride (K-DUR, KLOR-CON M20) 20 mEq tablet Take 1 Tablet by mouth two (2) times a day. Patient not taking: No sig reported 6/3/22   Pavithra Wynne MD   furosemide (LASIX) 40 mg tablet Take 1 Tablet by mouth daily. Patient not taking: Reported on 9/16/2022 6/3/22   Pavithra Wynne MD   cetirizine (ZYRTEC) 10 mg tablet Take 1 Tablet by mouth daily. Patient not taking: No sig reported 12/13/21   Jamie Ge MD   acetaminophen (TYLENOL) 500 mg tablet Take 2 Tabs by mouth every eight (8) hours as needed for Pain. Patient not taking: No sig reported 3/23/21   Winnie Vazquez NP        Results for orders placed or performed during the hospital encounter of 07/26/22   [facility] SPECIMEN COL   Result Value Ref Range    [facility] SPECIM[facility] COLLN. Specimens collected/sent to [facility]. Please direct inquiries to ([phone]). Physical Exam  Patient appears well, she is pleasant, alert, oriented x 3, in no distress. Sitting in wheelchair and on portable O2, 2 liters by nasal canula. ENT normal.  Neck supple. No adenopathy or thyromegaly. JULIANO. Lungs are clear, good air entry, no wheezes  Cardiovascular, S1 and S2 normal, no murmurs, regular rate and rhythm. Chest wall negative for tenderness  Abdomen is soft without tenderness, guarding  /Anorectal, deferred. Muscleskeletal, slight bilateral lower extremity swelling  Extremities show no edema  Neurological is normal without focal findings. Skin: healing blister of left forearm   Psych: normal affect. Mood good. Oriented x 3.       Vitals:    09/16/22 0927 09/16/22 0948   BP: 96/65    Pulse: (!) 108 94   Resp: 20    Temp: 98 °F (36.7 °C)    SpO2: 100%    Weight: 200 lb (90.7 kg)    PainSc:   0 - No pain        *Plan of care reviewed with patient. Patient in agreement with plan and expresses understanding. All questions answered and patient encouraged to call or RTO if further questions or concerns. On this date 09/16/2022 I have spent 39 minutes reviewing previous notes, test results and face to face with the patient discussing the diagnosis and importance of compliance with the treatment plan as well as documenting on the day of the visit.       MARYLU GilmoreC

## 2022-09-16 NOTE — PROGRESS NOTES
Harrison Lam presents today for   Chief Complaint   Patient presents with    Hospital Follow Up       Is someone accompanying this pt? no    Is the patient using any DME equipment during OV? no    Depression Screening:  3 most recent PHQ Screens 9/16/2022   PHQ Not Done -   Little interest or pleasure in doing things Not at all   Feeling down, depressed, irritable, or hopeless Not at all   Total Score PHQ 2 0       Learning Assessment:  Learning Assessment 6/16/2021   PRIMARY LEARNER Patient   HIGHEST LEVEL OF EDUCATION - PRIMARY LEARNER  GRADUATED HIGH SCHOOL OR GED   BARRIERS PRIMARY LEARNER NONE   CO-LEARNER CAREGIVER No   PRIMARY LANGUAGE ENGLISH   LEARNER PREFERENCE PRIMARY DEMONSTRATION   ANSWERED BY patient   RELATIONSHIP SELF       Abuse Screening:  Abuse Screening Questionnaire 12/13/2021   Do you ever feel afraid of your partner? N   Are you in a relationship with someone who physically or mentally threatens you? N   Is it safe for you to go home? Y       Fall Risk  Fall Risk Assessment, last 12 mths 8/12/2022   Able to walk? Yes   Fall in past 12 months? 0   Do you feel unsteady? 0   Are you worried about falling 0   Is TUG test greater than 12 seconds? -   Is the gait abnormal? -   Number of falls in past 12 months -   Fall with injury? -       Health Maintenance reviewed and discussed and ordered per Provider. Health Maintenance Due   Topic Date Due    DTaP/Tdap/Td series (1 - Tdap) 08/22/2013    COVID-19 Vaccine (3 - Booster for Pfizer series) 10/01/2021    Medicare Yearly Exam  06/17/2022    Flu Vaccine (1) 09/01/2022   .        1. \"Have you been to the ER, urgent care clinic since your last visit? Hospitalized since your last visit? \" Yes When: Andrey Gramajo, 8/30    2. \"Have you seen or consulted any other health care providers outside of the [address] [place] [address] since your last visit? \" No     3. For patients over 45: Has the patient had a colonoscopy? No     If the patient is female:    4. For patients over 40: Has the patient had a mammogram? No    5. For patients over 21: Has the patient had a pap smear?  No

## 2022-09-23 ENCOUNTER — OFFICE VISIT (OUTPATIENT)
Dept: CARDIOLOGY CLINIC | Age: 87
End: 2022-09-23
Payer: MEDICARE

## 2022-09-23 ENCOUNTER — HOSPITAL ENCOUNTER (OUTPATIENT)
Dept: LAB | Age: 87
Discharge: HOME | End: 2022-09-23
Payer: MEDICARE

## 2022-09-23 VITALS
OXYGEN SATURATION: 98 % | DIASTOLIC BLOOD PRESSURE: 62 MMHG | TEMPERATURE: 97.9 F | SYSTOLIC BLOOD PRESSURE: 102 MMHG | BODY MASS INDEX: 35.45 KG/M2 | WEIGHT: 213 LBS | HEART RATE: 75 BPM

## 2022-09-23 DIAGNOSIS — N18.9 CHRONIC KIDNEY DISEASE, UNSPECIFIED CKD STAGE: Primary | ICD-10-CM

## 2022-09-23 DIAGNOSIS — N18.9 CHRONIC KIDNEY DISEASE, UNSPECIFIED CKD STAGE: ICD-10-CM

## 2022-09-23 LAB
ANION GAP SERPL CALC-SCNC: 4 MMOL/L (ref 3–18)
BUN SERPL-MCNC: 11 MG/DL (ref 7–18)
BUN/CREAT SERPL: 9 (ref 12–20)
CALCIUM SERPL-MCNC: 9.7 MG/DL (ref 8.5–10.1)
CHLORIDE SERPL-SCNC: 97 MMOL/L (ref 100–111)
CO2 SERPL-SCNC: 37 MMOL/L (ref 21–32)
CREAT SERPL-MCNC: 1.29 MG/DL (ref 0.6–1.3)
GLUCOSE SERPL-MCNC: 93 MG/DL (ref 74–99)
MAGNESIUM SERPL-MCNC: 1.9 MG/DL (ref 1.6–2.6)
POTASSIUM SERPL-SCNC: 3.9 MMOL/L (ref 3.5–5.5)
SODIUM SERPL-SCNC: 138 MMOL/L (ref 136–145)

## 2022-09-23 PROCEDURE — G8536 NO DOC ELDER MAL SCRN: HCPCS | Performed by: INTERNAL MEDICINE

## 2022-09-23 PROCEDURE — G8754 DIAS BP LESS 90: HCPCS | Performed by: INTERNAL MEDICINE

## 2022-09-23 PROCEDURE — 36415 COLL VENOUS BLD VENIPUNCTURE: CPT

## 2022-09-23 PROCEDURE — G8417 CALC BMI ABV UP PARAM F/U: HCPCS | Performed by: INTERNAL MEDICINE

## 2022-09-23 PROCEDURE — 1123F ACP DISCUSS/DSCN MKR DOCD: CPT | Performed by: INTERNAL MEDICINE

## 2022-09-23 PROCEDURE — 83735 ASSAY OF MAGNESIUM: CPT

## 2022-09-23 PROCEDURE — 1101F PT FALLS ASSESS-DOCD LE1/YR: CPT | Performed by: INTERNAL MEDICINE

## 2022-09-23 PROCEDURE — 80048 BASIC METABOLIC PNL TOTAL CA: CPT

## 2022-09-23 PROCEDURE — 1090F PRES/ABSN URINE INCON ASSESS: CPT | Performed by: INTERNAL MEDICINE

## 2022-09-23 PROCEDURE — G8752 SYS BP LESS 140: HCPCS | Performed by: INTERNAL MEDICINE

## 2022-09-23 PROCEDURE — G8427 DOCREV CUR MEDS BY ELIG CLIN: HCPCS | Performed by: INTERNAL MEDICINE

## 2022-09-23 PROCEDURE — G8510 SCR DEP NEG, NO PLAN REQD: HCPCS | Performed by: INTERNAL MEDICINE

## 2022-09-23 PROCEDURE — 99214 OFFICE O/P EST MOD 30 MIN: CPT | Performed by: INTERNAL MEDICINE

## 2022-09-23 PROCEDURE — G8399 PT W/DXA RESULTS DOCUMENT: HCPCS | Performed by: INTERNAL MEDICINE

## 2022-09-23 NOTE — PROGRESS NOTES
Identified pt with two pt identifiers(name and ). Reviewed record in preparation for visit and have obtained necessary documentation. Bib Fonseca presents today for   Chief Complaint   Patient presents with    Follow-up       Pt denies  DIZZINESS, SOB, CHEST PAIN/ PRESSURE, FATIGUE/WEAKNESS, HEADACHES, SWELLING. Bib Fonseca preferred language for health care discussion is english/other. Personal Protective Equipment:   Personal Protective Equipment was used including: mask-surgical and hands-gloves. Patient was placed on no precaution(s). Patient was masked. Precautions:   Patient currently on None  Patient currently roomed with door closed. Is someone accompanying this pt? yes    Is the patient using any DME equipment during OV? Yes. Oxygen and wheelchair     Depression Screening:  3 most recent PHQ Screens 2022   PHQ Not Done -   Little interest or pleasure in doing things Not at all   Feeling down, depressed, irritable, or hopeless Not at all   Total Score PHQ 2 0       Learning Assessment:  Learning Assessment 2021   PRIMARY LEARNER Patient   HIGHEST LEVEL OF EDUCATION - PRIMARY LEARNER  GRADUATED HIGH SCHOOL OR GED   BARRIERS PRIMARY LEARNER NONE   CO-LEARNER CAREGIVER No   PRIMARY LANGUAGE ENGLISH   LEARNER PREFERENCE PRIMARY DEMONSTRATION   ANSWERED BY patient   RELATIONSHIP SELF       Abuse Screening:  Abuse Screening Questionnaire 2021   Do you ever feel afraid of your partner? N   Are you in a relationship with someone who physically or mentally threatens you? N   Is it safe for you to go home? Y       Fall Risk  Fall Risk Assessment, last 12 mths 2022   Able to walk? Yes   Fall in past 12 months? 0   Do you feel unsteady? 0   Are you worried about falling 0   Is TUG test greater than 12 seconds? -   Is the gait abnormal? -   Number of falls in past 12 months -   Fall with injury? -       Pt currently taking Anticoagulant therapy?  yes  Pt currently taking Antiplatelet therapy? no    Coordination of Care:  1. Have you been to the ER, urgent care clinic since your last visit? [facility] since your last visit? [facility]    2. Have you seen or consulted any other health care providers outside of the [address] [place] [address] since your last visit? Include any pap smears or colon screening. no      Please see Red banners under Allergies and Med Rec to remove outside inquires. All correct information has been verified with patient and added to chart.      Medication's patient's would liked removed has been marked not taking to be removed per Verbal order and read back per Rosmery Nielsen MD

## 2022-09-23 NOTE — PATIENT INSTRUCTIONS
New Medication/Medication Changes    Reduce lopressor to 25 mg (1/2 tablet) twice a day     **please allow 24-48 hrs for medication to be escribed to pharmacy** If you need any refills on medications please contact your pharmacy so that the request can be escribed to the provider for review.     Labs    BMP and magnesium    No appointment required for lab work  [address] [facility] [address] [facility]  Hours are Mon-Fri 7:00 am-3:30 pm  **closed from 12:30 pm-1pm daily**

## 2022-10-07 NOTE — PROGRESS NOTES
Subjective:      Demetra Keita is in the office today for cardiac reevaluation. She is a 82-year-old woman that was initially referred for further evaluation of chest pain. She was seen in the ED at [facility] in March 2021. At that time she was complaining of persistent pain in her chest for the prior 3 days. She has a known history of esophagitis/biliary disease. 2 troponins were obtained both which were not significantly elevated. She thought that she was referred to gastroenterology at that time. She had an echocardiogram done on 3/24/2021. Her left ventricle was normal in size with normal systolic function. She did have moderate pulmonary hypertension with an estimated systolic pressure of 54 mmHg. It did not appear to be a complete TR regurgitant jet. She had a normal nuclear stress test in May 2021. Ejection fraction was 68%. In the office on 6/3/2022 she reported a prior [facility] hospitalization.  (5/1/2022). She had acute hypoxemic hypercarbic respiratory failure secondary to suspected CHF, asthma/COPD exacerbation and acute bronchitis. An echocardiogram during her hospitalization demonstrated an ejection fraction of 61%. Left ventricular diastolic pressure was indeterminate. In her visit today, she reports a more recent [facility] hospitalization (8/30/2022). She was felt to have had acute on chronic diastolic heart failure. She also was felt to have Klebsiella PNA and an underlying obstructive sleep apnea. She was intubated from 8/31-9/4. She was continued on the metoprolol and Eliquis for her paroxysmal atrial fibrillation with her indwelling pacemaker. Now using a BiPAP machine at home. She believes her shortness of breath is Shira Hives lot better \". Her Eliquis was reduced from 5 to 2.5 mg during her hospitalization.           Patient Active Problem List    Diagnosis Date Noted    Acute on chronic diastolic heart failure ([facility] 07/07/2022    Chronic renal disease, stage III 05/13/2022    Other chest pain 04/20/2021    History of echocardiogram 04/20/2021    Vitamin D deficiency     Multinodular goiter     Asthma     Chronic kidney disease     Essential hypertension 07/11/2019    Chronic gout of multiple sites 10/03/2018    Severe obesity (BMI 35.0-39. 9) with comorbidity ([facility] 05/16/2018    Subclinical hyperthyroidism 05/30/2017    Arthritis 09/20/2010    Pure hypercholesterolemia 03/22/2010    Edema 03/22/2010    Chronic eczema 03/22/2010     Current Outpatient Medications   Medication Sig Dispense Refill    apixaban (Eliquis) 2.5 mg tablet Take 2.5 mg by mouth two (2) times a day. metoprolol tartrate (LOPRESSOR) 50 mg tablet Take 1 Tablet by mouth two (2) times a day. 180 Tablet 3    HYDROcodone-acetaminophen (NORCO) 5-325 mg per tablet take 1 tablet by mouth every 6 hours if needed      montelukast (Singulair) 10 mg tablet Take 1 Tablet by mouth in the morning for 90 days. 90 Tablet 1    ergocalciferol (ERGOCALCIFEROL) 1,250 mcg (50,000 unit) capsule Take 1 Capsule by mouth every seven (7) days. 12 Capsule 1    ipratropium (ATROVENT) 0.02 % soln       light mineral oil-min oil, PF, 0.5-0.5 % dpet 2 Drops. omeprazole (PRILOSEC) 20 mg capsule       amLODIPine (NORVASC) 5 mg tablet Take 1 Tablet by mouth daily. 90 Tablet 3    potassium chloride (K-DUR, KLOR-CON M20) 20 mEq tablet Take 1 Tablet by mouth two (2) times a day. 180 Tablet 3    budesonide-formoteroL (Symbicort) 160-4.5 mcg/actuation HFAA Take 2 Puffs by inhalation two (2) times a day. 1 Each 5    albuterol (PROVENTIL HFA, VENTOLIN HFA, PROAIR HFA) 90 mcg/actuation inhaler Take 2 Puffs by inhalation every six (6) hours as needed for Wheezing. 1 Each 4    methIMAzole (TAPAZOLE) 5 mg tablet Take 2.5 mg by mouth daily. cetirizine (ZYRTEC) 10 mg tablet Take 1 Tablet by mouth daily. 90 Tablet 3    diclofenac (VOLTAREN) 1 % gel Apply 4 g to affected area four (4) times daily.  350 g 1    fluocinoNIDE (LIDEX) 0.05 % ointment Apply  to affected area two (2) times a day. 60 g 4    acetaminophen (TYLENOL) 500 mg tablet Take 2 Tabs by mouth every eight (8) hours as needed for Pain. 100 Tab 3    allopurinoL (ZYLOPRIM) 300 mg tablet Take 1 Tab by mouth daily. 90 Tab 4    fluticasone propionate (FLONASE) 50 mcg/actuation nasal spray 2 Sprays by Both Nostrils route daily. 1 Bottle 5     Allergies   Allergen Reactions    Tomato Other (comments)     Gi Distress     Past Medical History:   Diagnosis Date    Arthritis 9/20/2010    Asthma     Chronic eczema 3/22/2010    Chronic kidney disease     Clot 10/2018    lower left exremity, shin     Edema 3/22/2010    Environmental allergies     year round    Essential hypertension, malignant 3/22/2010    Gout     Menopause     Multinodular goiter     Followed by Dr. Melisa Leone, ENT.  FNA 2020    Pure hypercholesterolemia 3/22/2010    Subclinical hyperthyroidism 5/30/2017    Unspecified arthropathy, shoulder region 3/22/2010    Unspecified hypothyroidism 3/22/2010    Vitamin D deficiency      Past Surgical History:   Procedure Laterality Date    HX GYN  1983    hysterectomy    HX HYSTERECTOMY  1984    fibroids    HX KNEE REPLACEMENT Right     IR FINE NEEDLE ASPIRATION THYROID  07/2020    benign     Family History   Problem Relation Age of Onset    Hypertension Mother     Breast Cancer Mother 70    Breast Cancer Child 36    Breast Cancer Sister 39     Social History     Tobacco Use   Smoking Status Never   Smokeless Tobacco Never          Review of Systems, additional:  Constitutional: negative  Eyes: negative  Respiratory: positive for dyspnea on exertion  Cardiovascular: positive for chest pain, lower extremity edema  Gastrointestinal: negative  Musculoskeletal:negative  Neurological: negative  Behvioral/Psych: negative  Endocrine: negative  ENT: negative    Objective:     Visit Vitals  /62   Pulse 75   Temp 97.9 °F (36.6 °C) (Temporal)   Wt 96.6 kg (213 lb)   SpO2 98%   BMI 35.45 kg/m² General:  alert, cooperative, no distress   Chest Wall: inspection normal - no chest wall deformities or tenderness, respiratory effort normal   Lung: clear to auscultation bilaterally   Heart:  Irregularly irregular rhythm, S1 and S2 normal, no murmurs noted, no gallops noted   Abdomen: soft, non-tender. Bowel sounds normal. No masses,  no organomegaly   Extremities: extremities normal, atraumatic, no cyanosis or edema Skin: no rashes   Neuro: alert, oriented, normal speech, no focal findings or movement disorder noted     EK2022. Atrial fibrillation diffuse nondiagnostic ST and T wave abnormalities. Low voltage    Assessment/Plan:       ICD-10-CM ICD-9-CM    1. Chest pain, unspecified type ,  Lexiscan Myoview completed 2021. Myocardial perfusion imaging was normal.  Ejection fraction 68%. R07.9 786.50 NUCLEAR CARDIAC STRESS TEST   2. Essential hypertension , controlled BP in the office today. Metoprolol 25 mg twice daily. I10 401.9    3. Edema, unspecified type  R60.9 782.3    4. History of echocardiogram , normal LV function with no significant valvular pathology. PA pressure estimate was elevated. Z92.89 V15.89    5  HFpEF, hospitalized with acute hypoxemic hypercarbic respiratory failure at [facility] (2022), other contributing factors asthma and acute bronchitis, COPD exacerbation. Continue present diuretic prescription. Ordered BMP and magnesium which was completed on 6/3/2022. 6 potassium was 4.7. CO2 was 40. BUN/creatinine were 13 and 1.52. Magnesium was 2.7. She was on magnesium oxide. She was told to discontinue it. Nephrology evaluation planned. We will order BMP and magnesium      6       PAF, [id number] score 5, patient on Eliquis 2.5 mg twice daily for stroke prevention. 7       Hyperthyroidism, patient taking methimazole 2.5 mg daily  8. CKD, stage IIIa  9. Permanent pacemaker in situ. Implant date 2022.   [address] MRI L311/6295.30. For asymptomatic 12-second pause on telemetry, atrial fibrillation with RVR. Return in 6 weeks.

## 2022-10-18 ENCOUNTER — CLINICAL SUPPORT (OUTPATIENT)
Dept: CARDIOLOGY CLINIC | Age: 87
End: 2022-10-18
Payer: MEDICARE

## 2022-10-18 DIAGNOSIS — Z95.0 CARDIAC PACEMAKER IN SITU: Primary | ICD-10-CM

## 2022-10-19 ENCOUNTER — TRANSCRIBE ORDER (OUTPATIENT)
Dept: SCHEDULING | Age: 87
End: 2022-10-19

## 2022-10-19 DIAGNOSIS — Z12.31 VISIT FOR SCREENING MAMMOGRAM: Primary | ICD-10-CM

## 2022-11-01 ENCOUNTER — OFFICE VISIT (OUTPATIENT)
Dept: FAMILY MEDICINE CLINIC | Age: 87
End: 2022-11-01
Payer: MEDICARE

## 2022-11-01 VITALS
OXYGEN SATURATION: 90 % | BODY MASS INDEX: 36.65 KG/M2 | HEART RATE: 87 BPM | RESPIRATION RATE: 18 BRPM | TEMPERATURE: 97.7 F | SYSTOLIC BLOOD PRESSURE: 110 MMHG | HEIGHT: 65 IN | DIASTOLIC BLOOD PRESSURE: 62 MMHG | WEIGHT: 220 LBS

## 2022-11-01 DIAGNOSIS — L30.9 CHRONIC ECZEMA: ICD-10-CM

## 2022-11-01 DIAGNOSIS — Z99.81 OXYGEN DEPENDENT: ICD-10-CM

## 2022-11-01 DIAGNOSIS — E55.9 VITAMIN D DEFICIENCY: ICD-10-CM

## 2022-11-01 DIAGNOSIS — L85.3 DRY SKIN: ICD-10-CM

## 2022-11-01 DIAGNOSIS — Z23 NEEDS FLU SHOT: ICD-10-CM

## 2022-11-01 DIAGNOSIS — R49.0 HOARSE VOICE QUALITY: Primary | ICD-10-CM

## 2022-11-01 DIAGNOSIS — I48.0 PAROXYSMAL ATRIAL FIBRILLATION (HCC): ICD-10-CM

## 2022-11-01 PROCEDURE — G8510 SCR DEP NEG, NO PLAN REQD: HCPCS | Performed by: STUDENT IN AN ORGANIZED HEALTH CARE EDUCATION/TRAINING PROGRAM

## 2022-11-01 PROCEDURE — G8417 CALC BMI ABV UP PARAM F/U: HCPCS | Performed by: STUDENT IN AN ORGANIZED HEALTH CARE EDUCATION/TRAINING PROGRAM

## 2022-11-01 PROCEDURE — 1090F PRES/ABSN URINE INCON ASSESS: CPT | Performed by: STUDENT IN AN ORGANIZED HEALTH CARE EDUCATION/TRAINING PROGRAM

## 2022-11-01 PROCEDURE — 99214 OFFICE O/P EST MOD 30 MIN: CPT | Performed by: STUDENT IN AN ORGANIZED HEALTH CARE EDUCATION/TRAINING PROGRAM

## 2022-11-01 PROCEDURE — G8428 CUR MEDS NOT DOCUMENT: HCPCS | Performed by: STUDENT IN AN ORGANIZED HEALTH CARE EDUCATION/TRAINING PROGRAM

## 2022-11-01 PROCEDURE — G8754 DIAS BP LESS 90: HCPCS | Performed by: STUDENT IN AN ORGANIZED HEALTH CARE EDUCATION/TRAINING PROGRAM

## 2022-11-01 PROCEDURE — 1123F ACP DISCUSS/DSCN MKR DOCD: CPT | Performed by: STUDENT IN AN ORGANIZED HEALTH CARE EDUCATION/TRAINING PROGRAM

## 2022-11-01 PROCEDURE — 3078F DIAST BP <80 MM HG: CPT | Performed by: STUDENT IN AN ORGANIZED HEALTH CARE EDUCATION/TRAINING PROGRAM

## 2022-11-01 PROCEDURE — 90694 VACC AIIV4 NO PRSRV 0.5ML IM: CPT | Performed by: STUDENT IN AN ORGANIZED HEALTH CARE EDUCATION/TRAINING PROGRAM

## 2022-11-01 PROCEDURE — G8752 SYS BP LESS 140: HCPCS | Performed by: STUDENT IN AN ORGANIZED HEALTH CARE EDUCATION/TRAINING PROGRAM

## 2022-11-01 PROCEDURE — 1101F PT FALLS ASSESS-DOCD LE1/YR: CPT | Performed by: STUDENT IN AN ORGANIZED HEALTH CARE EDUCATION/TRAINING PROGRAM

## 2022-11-01 PROCEDURE — G8399 PT W/DXA RESULTS DOCUMENT: HCPCS | Performed by: STUDENT IN AN ORGANIZED HEALTH CARE EDUCATION/TRAINING PROGRAM

## 2022-11-01 PROCEDURE — G8536 NO DOC ELDER MAL SCRN: HCPCS | Performed by: STUDENT IN AN ORGANIZED HEALTH CARE EDUCATION/TRAINING PROGRAM

## 2022-11-01 PROCEDURE — 3074F SYST BP LT 130 MM HG: CPT | Performed by: STUDENT IN AN ORGANIZED HEALTH CARE EDUCATION/TRAINING PROGRAM

## 2022-11-01 PROCEDURE — G0008 ADMIN INFLUENZA VIRUS VAC: HCPCS | Performed by: STUDENT IN AN ORGANIZED HEALTH CARE EDUCATION/TRAINING PROGRAM

## 2022-11-01 RX ORDER — AMMONIUM LACTATE 12 G/100G
CREAM TOPICAL 2 TIMES DAILY
Qty: 280 G | Refills: 3 | Status: SHIPPED | OUTPATIENT
Start: 2022-11-01

## 2022-11-01 RX ORDER — IBUPROFEN 800 MG/1
TABLET ORAL
COMMUNITY

## 2022-11-01 RX ORDER — ERGOCALCIFEROL 1.25 MG/1
50000 CAPSULE ORAL
Qty: 12 CAPSULE | Refills: 1 | Status: SHIPPED | OUTPATIENT
Start: 2022-11-01

## 2022-11-01 RX ORDER — FLUOCINONIDE 0.5 MG/G
OINTMENT TOPICAL 2 TIMES DAILY
Qty: 60 G | Refills: 4 | Status: SHIPPED | OUTPATIENT
Start: 2022-11-01

## 2022-11-01 NOTE — PROGRESS NOTES
Jayce Jaquez is a 82 y.o. presents today for No chief complaint on file. Is someone accompanying this pt? Yes, Daughter     Is the patient using any DME equipment during [address]? Yes, Wheel chair    There were no vitals taken for this visit. Depression Screening:   3 most recent PHQ Screens 11/1/2022   PHQ Not Done -   Little interest or pleasure in doing things Not at all   Feeling down, depressed, irritable, or hopeless Not at all   Total Score PHQ 2 0       Health Maintenance: reviewed and discussed and ordered per Provider. Health Maintenance Due   Topic Date Due    DTaP/Tdap/Td series (1 - Tdap) 08/22/2013    COVID-19 Vaccine (3 - Booster for Pfizer series) 06/26/2021    Medicare Yearly Exam  06/17/2022    Flu Vaccine (1) 08/01/2022         Coordination of Care:   1. \"Have you been to the ER, urgent care clinic since your last visit? Hospitalized since your last visit? \" No    2. \"Have you seen or consulted any other health care providers outside of the [address] [place] [address] since your last visit? \" Yes, Cardiology and Lung Dr     3. For patients aged 39-70: Has the patient had a colonoscopy / FIT/ Cologuard? NA - based on age    If the patient is female:    4. For patients aged 41-77: Has the patient had a mammogram within the past 2 years? NA - based on age or sex    11. For patients aged 21-65: Has the patient had a pap smear? NA - based on age or sex     Advanced Directive:  1. Do you have an Advanced Directive? Yes     2. Would you like information on Advanced Directives?  No    Lucrecia Mejía CMA

## 2022-11-01 NOTE — PROGRESS NOTES
Thompson Travis is a 82 y.o.  female and presents with    Chief Complaint   Patient presents with    Follow-up     1 month            Subjective:    Patient was hospitalized about a month and a half ago. That was due to atrial fibrillation and acute on chronic CHF. At that time per daughter patient was intubated. Pts daughter states since than intubation her voice has been different. Recently had her pacemaker checked, and she states she was told it was good. She id doing her BIPAP - avg b/w 5-7  hours. Saw pulmonary doctor 4 days ago. Was told her lungs were ok. She feels lightly SOB today. States she has intermittent episodes where she feels good, and then gets SOB. She is currently on 2L of O2. Patient Active Problem List   Diagnosis Code    Pure hypercholesterolemia E78.00    Edema R60.9    Chronic eczema L30.9    Arthritis M19.90    Subclinical hyperthyroidism E05.90    Severe obesity (BMI 35.0-39. 9) with comorbidity ([address] E66.01    Chronic gout of multiple sites M1A. 86N3    Essential hypertension I10    Chronic kidney disease N18.9    Asthma J45.909    Multinodular goiter E04.2    Vitamin D deficiency E55.9    Other chest pain R07.89    History of echocardiogram Z92.89    Chronic renal disease, stage III N18.30    Acute on chronic diastolic heart failure (HCC) I50.33      Past Medical History:   Diagnosis Date    Arthritis 9/20/2010    Asthma     Chronic eczema 3/22/2010    Chronic kidney disease     Clot 10/2018    lower left exremity, shin     Edema 3/22/2010    Environmental allergies     year round    Essential hypertension, malignant 3/22/2010    Gout     Menopause     Multinodular goiter     Followed by Dr. Mulu Ortiz, ENT.  FNA 2020    Pure hypercholesterolemia 3/22/2010    Subclinical hyperthyroidism 5/30/2017    Unspecified arthropathy, shoulder region 3/22/2010    Unspecified hypothyroidism 3/22/2010    Vitamin D deficiency       Past Surgical History:   Procedure Laterality Date    HX GYN  1983    hysterectomy    HX HYSTERECTOMY  1984    fibroids    HX KNEE REPLACEMENT Right     IR FINE NEEDLE ASPIRATION THYROID  07/2020    benign      Family History   Problem Relation Age of Onset    Hypertension Mother     Breast Cancer Mother 70    Breast Cancer Child 36    Breast Cancer Sister 39     Social History     Socioeconomic History    Marital status:      Spouse name: Not on file    Number of children: Not on file    Years of education: Not on file    Highest education level: Not on file   Occupational History    Not on file   Tobacco Use    Smoking status: Never    Smokeless tobacco: Never   Substance and Sexual Activity    Alcohol use: Not Currently     Alcohol/week: 1.0 standard drink     Types: 1 Glasses of wine per week    Drug use: No    Sexual activity: Yes     Partners: Male   Other Topics Concern     Service Not Asked    Blood Transfusions Not Asked    Caffeine Concern Not Asked    Occupational Exposure Not Asked    Hobby Hazards Not Asked    Sleep Concern Not Asked    Stress Concern Not Asked    Weight Concern Not Asked    Special Diet Yes     Comment: somewhat healthy, low sodium sometimes    Back Care Not Asked    Exercise Yes     Comment: walking, limited by ankle pain    Bike Helmet Not Asked    Seat Belt Not Asked    Self-Exams Not Asked   Social History Narrative    Not on file     Social Determinants of Health     Financial Resource Strain: Not on file   Food Insecurity: Not on file   Transportation Needs: Not on file   Physical Activity: Not on file   Stress: Not on file   Social Connections: Not on file   Intimate Partner Violence: Not on file   Housing Stability: Not on file        Current Outpatient Medications   Medication Sig Dispense Refill    ibuprofen (MOTRIN) 800 mg tablet Take  by mouth. fluocinoNIDE (LIDEX) 0.05 % ointment Apply  to affected area two (2) times a day.  60 g 4    apixaban (ELIQUIS) 2.5 mg tablet Take 2.5 mg by mouth two (2) times a day. metoprolol tartrate (LOPRESSOR) 50 mg tablet Take 1 Tablet by mouth two (2) times a day. 180 Tablet 3    HYDROcodone-acetaminophen (NORCO) 5-325 mg per tablet take 1 tablet by mouth every 6 hours if needed      ergocalciferol (ERGOCALCIFEROL) 1,250 mcg (50,000 unit) capsule Take 1 Capsule by mouth every seven (7) days. 12 Capsule 1    ipratropium (ATROVENT) 0.02 % soln       light mineral oil-min oil, PF, 0.5-0.5 % dpet 2 Drops. omeprazole (PRILOSEC) 20 mg capsule       amLODIPine (NORVASC) 5 mg tablet Take 1 Tablet by mouth daily. 90 Tablet 3    potassium chloride (K-DUR, KLOR-CON M20) 20 mEq tablet Take 1 Tablet by mouth two (2) times a day. (Patient taking differently: Take 20 mEq by mouth two (2) times a day. Half a pill in the am and half a pill pm) 180 Tablet 3    budesonide-formoteroL (Symbicort) 160-4.5 mcg/actuation HFAA Take 2 Puffs by inhalation two (2) times a day. 1 Each 5    albuterol (PROVENTIL HFA, VENTOLIN HFA, PROAIR HFA) 90 mcg/actuation inhaler Take 2 Puffs by inhalation every six (6) hours as needed for Wheezing. 1 Each 4    methIMAzole (TAPAZOLE) 5 mg tablet Take 2.5 mg by mouth daily. diclofenac (VOLTAREN) 1 % gel Apply 4 g to affected area four (4) times daily. 350 g 1    fluticasone propionate (FLONASE) 50 mcg/actuation nasal spray 2 Sprays by Both Nostrils route daily. 1 Bottle 5    cetirizine (ZYRTEC) 10 mg tablet Take 1 Tablet by mouth daily. (Patient not taking: Reported on 11/1/2022) 90 Tablet 3    acetaminophen (TYLENOL) 500 mg tablet Take 2 Tabs by mouth every eight (8) hours as needed for Pain. (Patient not taking: Reported on 11/1/2022) 100 Tab 3    allopurinoL (ZYLOPRIM) 300 mg tablet Take 1 Tab by mouth daily. 90 Tab 4        ROS   Review of Systems   Constitutional:  Negative for chills, fever and malaise/fatigue. HENT:  Negative for congestion, ear discharge and ear pain. Eyes:  Negative for blurred vision, pain and discharge.    Respiratory: Negative for cough and shortness of breath. Cardiovascular:  Negative for chest pain and palpitations. Gastrointestinal:  Negative for abdominal pain, nausea and vomiting. Genitourinary:  Negative for dysuria, frequency and urgency. Skin:  Negative for itching and rash. Neurological:  Negative for dizziness, seizures, loss of consciousness and headaches. Psychiatric/Behavioral:  Negative for substance abuse. Objective:  Vitals:    11/01/22 1352   BP: 110/62   Pulse: 87   Resp: 18   Temp: 97.7 °F (36.5 °C)   SpO2: 90%   Weight: 220 lb (99.8 kg)   Height: 5' 5\" (1.651 m)   PainSc:   0 - No pain       Physical Exam  Vitals reviewed. Constitutional:       Appearance: Normal appearance. Eyes:      General: No scleral icterus. Right eye: No discharge. Left eye: No discharge. Cardiovascular:      Comments: While in the office I listened to the patients heart twice. At first she was in afib, then she turned back to sinus rhythm  Pulmonary:      Effort: Pulmonary effort is normal. No respiratory distress. Breath sounds: Normal breath sounds. Musculoskeletal:      Cervical back: Normal range of motion and neck supple. Skin:     General: Skin is warm. Neurological:      Mental Status: She is alert and oriented to person, place, and time. Cranial Nerves: No cranial nerve deficit. Psychiatric:         Mood and Affect: Mood normal.         Behavior: Behavior normal.         Thought Content: Thought content normal.         Judgment: Judgment normal.         LABS     TESTS      Assessment/Plan:    1. Chronic eczema  - fluocinoNIDE (LIDEX) 0.05 % ointment; Apply  to affected area two (2) times a day. Dispense: 60 g; Refill: 4    2. Hoarse voice quality  - REFERRAL TO ENT-OTOLARYNGOLOGY    3. Dry skin  - ammonium lactate (AMLACTIN) 12 % topical cream; Apply  to affected area two (2) times a day. rub in to affected area well  Dispense: 280 g; Refill: 3    4.  Vitamin D deficiency  - ergocalciferol (ERGOCALCIFEROL) 1,250 mcg (50,000 unit) capsule; Take 1 Capsule by mouth every seven (7) days. Dispense: 12 Capsule; Refill: 1    5. Needs flu shot  - INFLUENZA, FLUAD, (AGE 65 Y+), IM, PF, 0.5 ML    6. Oxygen dependent  Increased her O2 to 2.5L, this raised her O2 from 90% to 97%. Discussed w/ daughter to keep an eye on this and once she is feeling better she can go back down to 2L    7. Paroxysmal atrial fibrillation (HCC)  F/up w/ cardiology      Lab review: no lab studies available for review at time of visit      I have discussed the diagnosis with the patient and the intended plan as seen in the above orders. The patient has received an after-visit summary and questions were answered concerning future plans. I have discussed medication side effects and warnings with the patient as well. I have reviewed the plan of care with the patient, accepted their input and they are in agreement with the treatment goals.          Daphne Forde MD

## 2022-11-16 ENCOUNTER — OFFICE VISIT (OUTPATIENT)
Dept: CARDIOLOGY CLINIC | Age: 87
End: 2022-11-16
Payer: MEDICARE

## 2022-11-16 VITALS
WEIGHT: 221 LBS | RESPIRATION RATE: 18 BRPM | OXYGEN SATURATION: 98 % | SYSTOLIC BLOOD PRESSURE: 114 MMHG | TEMPERATURE: 97.6 F | DIASTOLIC BLOOD PRESSURE: 63 MMHG | BODY MASS INDEX: 36.78 KG/M2 | HEART RATE: 84 BPM

## 2022-11-16 DIAGNOSIS — Z95.0 CARDIAC PACEMAKER IN SITU: Primary | ICD-10-CM

## 2022-11-16 PROCEDURE — 99214 OFFICE O/P EST MOD 30 MIN: CPT | Performed by: INTERNAL MEDICINE

## 2022-11-16 PROCEDURE — G8752 SYS BP LESS 140: HCPCS | Performed by: INTERNAL MEDICINE

## 2022-11-16 PROCEDURE — G8754 DIAS BP LESS 90: HCPCS | Performed by: INTERNAL MEDICINE

## 2022-11-16 PROCEDURE — 3074F SYST BP LT 130 MM HG: CPT | Performed by: INTERNAL MEDICINE

## 2022-11-16 PROCEDURE — 1101F PT FALLS ASSESS-DOCD LE1/YR: CPT | Performed by: INTERNAL MEDICINE

## 2022-11-16 PROCEDURE — G8427 DOCREV CUR MEDS BY ELIG CLIN: HCPCS | Performed by: INTERNAL MEDICINE

## 2022-11-16 PROCEDURE — G8417 CALC BMI ABV UP PARAM F/U: HCPCS | Performed by: INTERNAL MEDICINE

## 2022-11-16 PROCEDURE — G8432 DEP SCR NOT DOC, RNG: HCPCS | Performed by: INTERNAL MEDICINE

## 2022-11-16 PROCEDURE — 3078F DIAST BP <80 MM HG: CPT | Performed by: INTERNAL MEDICINE

## 2022-11-16 PROCEDURE — 1090F PRES/ABSN URINE INCON ASSESS: CPT | Performed by: INTERNAL MEDICINE

## 2022-11-16 PROCEDURE — G8399 PT W/DXA RESULTS DOCUMENT: HCPCS | Performed by: INTERNAL MEDICINE

## 2022-11-16 PROCEDURE — 1123F ACP DISCUSS/DSCN MKR DOCD: CPT | Performed by: INTERNAL MEDICINE

## 2022-11-16 PROCEDURE — G8536 NO DOC ELDER MAL SCRN: HCPCS | Performed by: INTERNAL MEDICINE

## 2022-11-16 RX ORDER — BUMETANIDE 2 MG/1
2 TABLET ORAL 2 TIMES DAILY
Qty: 60 TABLET | Refills: 5 | Status: SHIPPED | OUTPATIENT
Start: 2022-11-16

## 2022-11-16 NOTE — PROGRESS NOTES
Identified pt with two pt identifiers(name and ). Reviewed record in preparation for visit and have obtained necessary documentation. Bj Raygoza presents today for   Chief Complaint   Patient presents with    Follow-up     labs       Pt c/o  SOB,  SWELLING. Bj Raygoza preferred language for health care discussion is english/other. Personal Protective Equipment:   Personal Protective Equipment was used including: mask-surgical and hands-gloves. Patient was placed on no precaution(s). Patient was masked. Precautions:   Patient currently on None  Patient currently roomed with door closed. Is someone accompanying this pt? yes    Is the patient using any DME equipment during OV? WC    Depression Screening:  3 most recent PHQ Screens 2022   PHQ Not Done Patient refuses   Little interest or pleasure in doing things -   Feeling down, depressed, irritable, or hopeless -   Total Score PHQ 2 -       Learning Assessment:  Learning Assessment 2021   PRIMARY LEARNER Patient   HIGHEST LEVEL OF EDUCATION - PRIMARY LEARNER  GRADUATED HIGH SCHOOL OR GED   BARRIERS PRIMARY LEARNER NONE   CO-LEARNER CAREGIVER No   PRIMARY LANGUAGE ENGLISH   LEARNER PREFERENCE PRIMARY DEMONSTRATION   ANSWERED BY patient   RELATIONSHIP SELF       Abuse Screening:  Abuse Screening Questionnaire 2021   Do you ever feel afraid of your partner? N   Are you in a relationship with someone who physically or mentally threatens you? N   Is it safe for you to go home? Y       Fall Risk  Fall Risk Assessment, last 12 mths 2022   Able to walk? Yes   Fall in past 12 months? -   Do you feel unsteady? -   Are you worried about falling -   Is TUG test greater than 12 seconds? -   Is the gait abnormal? -   Number of falls in past 12 months -   Fall with injury? -       Pt currently taking Anticoagulant therapy? yes  Pt currently taking Antiplatelet therapy? no    Coordination of Care:  1.  Have you been to the ER, urgent care clinic since your last visit? Hospitalized since your last visit? yes    2. Have you seen or consulted any other health care providers outside of the [address] [place] [address] since your last visit? Include any pap smears or colon screening. no      Please see Red banners under Allergies and Med Rec to remove outside inquires. All correct information has been verified with patient and added to chart.      Medication's patient's would liked removed has been marked not taking to be removed per Verbal order and read back per Lizbet Resendez MD

## 2022-11-16 NOTE — TELEPHONE ENCOUNTER
PCP: Roosevelt Clayton MD    Last appt: 11/16/2022  Future Appointments   Date Time Provider Shefali Ivey   12/13/2022  1:30 PM Roosevelt Clayton MD [facility] AMB       Requested Prescriptions     Pending Prescriptions Disp Refills    bumetanide (BUMEX) 2 mg tablet 60 Tablet 5     Sig: Take 1 Tablet by mouth two (2) times a day. apixaban (ELIQUIS) 2.5 mg tablet 60 Tablet 5     Sig: Take 1 Tablet by mouth two (2) times a day.

## 2022-11-27 NOTE — PROGRESS NOTES
Subjective:      [facility] is in the office today for cardiac reevaluation. She is a 53-year-old woman that was initially referred for further evaluation of chest pain. She was seen in the ED at [facility] in March 2021. At that time she was complaining of persistent pain in her chest for the prior 3 days. She has a known history of esophagitis/biliary disease. 2 troponins were obtained both which were not significantly elevated. She thought that she was referred to gastroenterology at that time. She had an echocardiogram done on 3/24/2021. Her left ventricle was normal in size with normal systolic function. She did have moderate pulmonary hypertension with an estimated systolic pressure of 54 mmHg. It did not appear to be a complete TR regurgitant jet. She had a normal nuclear stress test in May 2021. Ejection fraction was 68%. In the office on 6/3/2022 she reported a prior [facility] hospitalization.  (5/1/2022). She had acute hypoxemic hypercarbic respiratory failure secondary to suspected CHF, asthma/COPD exacerbation and acute bronchitis. An echocardiogram during her hospitalization demonstrated an ejection fraction of 61%. Left ventricular diastolic pressure was indeterminate. She had an additional [facility] hospitalization (8/30/2022). She was felt to have had acute on chronic diastolic heart failure. She also was felt to have Klebsiella PNA and an underlying obstructive sleep apnea. She was intubated from 8/31-9/4. She was continued on the metoprolol and Eliquis for her paroxysmal atrial fibrillation with her indwelling pacemaker. She is using a BiPAP machine at home. Her Eliquis was reduced from 5 to 2.5 mg during her hospitalization. At her most recent pacemaker interrogation, she was found to be in % of the time. She was switched to VVIR. Longevity was 5.5 years. Office today she believes she might be \"a little more short of breath \".   She continues to wear supplemental nasal oxygen at 3 L a minute. She is still taking  Bumex 2 mg twice daily. She has seen nephrology and is following with them now. Patient Active Problem List    Diagnosis Date Noted    Acute on chronic diastolic heart failure ([facility] [address] 07/07/2022    Chronic renal disease, stage III 05/13/2022    Other chest pain 04/20/2021    History of echocardiogram 04/20/2021    Vitamin D deficiency     Multinodular goiter     Asthma     Chronic kidney disease     Essential hypertension 07/11/2019    Chronic gout of multiple sites 10/03/2018    Severe obesity (BMI 35.0-39. 9) with comorbidity ([facility] [address] 05/16/2018    Subclinical hyperthyroidism 05/30/2017    Arthritis 09/20/2010    Pure hypercholesterolemia 03/22/2010    Edema 03/22/2010    Chronic eczema 03/22/2010     Current Outpatient Medications   Medication Sig Dispense Refill    ibuprofen (MOTRIN) 800 mg tablet Take  by mouth. fluocinoNIDE (LIDEX) 0.05 % ointment Apply  to affected area two (2) times a day. 60 g 4    ammonium lactate (AMLACTIN) 12 % topical cream Apply  to affected area two (2) times a day. rub in to affected area well 280 g 3    ergocalciferol (ERGOCALCIFEROL) 1,250 mcg (50,000 unit) capsule Take 1 Capsule by mouth every seven (7) days. 12 Capsule 1    metoprolol tartrate (LOPRESSOR) 50 mg tablet Take 1 Tablet by mouth two (2) times a day. 180 Tablet 3    HYDROcodone-acetaminophen (NORCO) 5-325 mg per tablet take 1 tablet by mouth every 6 hours if needed      ipratropium (ATROVENT) 0.02 % soln       light mineral oil-min oil, PF, 0.5-0.5 % dpet 2 Drops. omeprazole (PRILOSEC) 20 mg capsule       amLODIPine (NORVASC) 5 mg tablet Take 1 Tablet by mouth daily. 90 Tablet 3    potassium chloride (K-DUR, KLOR-CON M20) 20 mEq tablet Take 1 Tablet by mouth two (2) times a day. (Patient taking differently: Take 20 mEq by mouth two (2) times a day.  Half a pill in the am and half a pill pm) 180 Tablet 3    budesonide-formoteroL (Symbicort) 160-4.5 mcg/actuation HFAA Take 2 Puffs by inhalation two (2) times a day. 1 Each 5    albuterol (PROVENTIL HFA, VENTOLIN HFA, PROAIR HFA) 90 mcg/actuation inhaler Take 2 Puffs by inhalation every six (6) hours as needed for Wheezing. 1 Each 4    methIMAzole (TAPAZOLE) 5 mg tablet Take 2.5 mg by mouth daily. diclofenac (VOLTAREN) 1 % gel Apply 4 g to affected area four (4) times daily. 350 g 1    allopurinoL (ZYLOPRIM) 300 mg tablet Take 1 Tab by mouth daily. 90 Tab 4    fluticasone propionate (FLONASE) 50 mcg/actuation nasal spray 2 Sprays by Both Nostrils route daily. 1 Bottle 5    bumetanide (BUMEX) 2 mg tablet Take 1 Tablet by mouth two (2) times a day. 60 Tablet 5    apixaban (ELIQUIS) 2.5 mg tablet Take 1 Tablet by mouth two (2) times a day. 60 Tablet 5     Allergies   Allergen Reactions    Tomato Other (comments)     Gi Distress    PT said she no longer allergic      Past Medical History:   Diagnosis Date    Arthritis 9/20/2010    Asthma     Chronic eczema 3/22/2010    Chronic kidney disease     Clot 10/2018    lower left exremity, shin     Edema 3/22/2010    Environmental allergies     year round    Essential hypertension, malignant 3/22/2010    Gout     Menopause     Multinodular goiter     Followed by Dr. Lito Turner, ENT.  FNA 2020    Pure hypercholesterolemia 3/22/2010    Subclinical hyperthyroidism 5/30/2017    Unspecified arthropathy, shoulder region 3/22/2010    Unspecified hypothyroidism 3/22/2010    Vitamin D deficiency      Past Surgical History:   Procedure Laterality Date    HX GYN  1983    hysterectomy    HX HYSTERECTOMY  1984    fibroids    HX KNEE REPLACEMENT Right     IR FINE NEEDLE ASPIRATION THYROID  07/2020    benign     Family History   Problem Relation Age of Onset    Hypertension Mother     Breast Cancer Mother 70    Breast Cancer Child 36    Breast Cancer Sister 39     Social History     Tobacco Use   Smoking Status Never   Smokeless Tobacco Never          Review of Systems, additional:  Constitutional: negative  Eyes: negative  Respiratory: positive for dyspnea on exertion  Cardiovascular: positive for chest pain, lower extremity edema  Gastrointestinal: negative  Musculoskeletal:negative  Neurological: negative  Behvioral/Psych: negative  Endocrine: negative  ENT: negative    Objective:     Visit Vitals  /63   Pulse 84   Temp 97.6 °F (36.4 °C) (Temporal)   Resp 18   Wt 100.2 kg (221 lb)   SpO2 98%   BMI 36.78 kg/m²     General:  alert, cooperative, no distress   Chest Wall: inspection normal - no chest wall deformities or tenderness, respiratory effort normal   Lung: clear to auscultation bilaterally   Heart:  Irregularly irregular rhythm, S1 and S2 normal, no murmurs noted, no gallops noted   Abdomen: soft, non-tender. Bowel sounds normal. No masses,  no organomegaly   Extremities: extremities normal, atraumatic, no cyanosis or edema Skin: no rashes   Neuro: alert, oriented, normal speech, no focal findings or movement disorder noted     EK2022. Atrial fibrillation diffuse nondiagnostic ST and T wave abnormalities. Low voltage    Assessment/Plan:       ICD-10-CM ICD-9-CM    1. Chest pain, unspecified type ,  Lexiscan Myoview completed 2021. Myocardial perfusion imaging was normal.  Ejection fraction 68%. R07.9 786.50 NUCLEAR CARDIAC STRESS TEST   2. Essential hypertension , controlled BP in the office today. Metoprolol 25 mg twice daily. I10 401.9    3. Edema, unspecified type  R60.9 782.3    4. History of echocardiogram , normal LV function with no significant valvular pathology. PA pressure estimate was elevated. Z92.89 V15.89    5  HFpEF, hospitalized with acute hypoxemic hypercarbic respiratory failure at [facility] (2022), other contributing factors asthma and acute bronchitis, COPD exacerbation. Continue present diuretic prescription. Ordered BMP and magnesium which was completed on 6/3/2022. Potassium was 4.7. CO2 was 40.   BUN/creatinine were 13 and 1.52. Magnesium was 2.7. She was on magnesium oxide. She was told to discontinue it. She has been seeing nephrology. Return in 4 months      6       PAF, [id number] score 5, patient on Eliquis 2.5 mg twice daily for stroke prevention. 7       Hyperthyroidism, patient taking methimazole 2.5 mg daily  8. CKD, stage IIIa  9. Permanent pacemaker in situ. Implant date 7/12/2022. [address] MRI [id number]. For asymptomatic 12-second pause on telemetry, atrial fibrillation with RVR. Last interrogation 10/18/2022.  100% AF /AFl.   DDD switched to VVIR

## 2022-12-13 ENCOUNTER — OFFICE VISIT (OUTPATIENT)
Dept: FAMILY MEDICINE CLINIC | Age: 87
End: 2022-12-13
Payer: MEDICARE

## 2022-12-13 VITALS
DIASTOLIC BLOOD PRESSURE: 76 MMHG | BODY MASS INDEX: 36.82 KG/M2 | WEIGHT: 221 LBS | OXYGEN SATURATION: 100 % | HEIGHT: 65 IN | SYSTOLIC BLOOD PRESSURE: 124 MMHG | RESPIRATION RATE: 16 BRPM | TEMPERATURE: 98 F | HEART RATE: 76 BPM

## 2022-12-13 DIAGNOSIS — Z00.00 MEDICARE ANNUAL WELLNESS VISIT, SUBSEQUENT: Primary | ICD-10-CM

## 2022-12-13 DIAGNOSIS — R14.0 BLOATING: ICD-10-CM

## 2022-12-13 DIAGNOSIS — K21.9 GASTROESOPHAGEAL REFLUX DISEASE, UNSPECIFIED WHETHER ESOPHAGITIS PRESENT: ICD-10-CM

## 2022-12-13 PROCEDURE — 1090F PRES/ABSN URINE INCON ASSESS: CPT | Performed by: STUDENT IN AN ORGANIZED HEALTH CARE EDUCATION/TRAINING PROGRAM

## 2022-12-13 PROCEDURE — 3074F SYST BP LT 130 MM HG: CPT | Performed by: STUDENT IN AN ORGANIZED HEALTH CARE EDUCATION/TRAINING PROGRAM

## 2022-12-13 PROCEDURE — 3078F DIAST BP <80 MM HG: CPT | Performed by: STUDENT IN AN ORGANIZED HEALTH CARE EDUCATION/TRAINING PROGRAM

## 2022-12-13 PROCEDURE — G8399 PT W/DXA RESULTS DOCUMENT: HCPCS | Performed by: STUDENT IN AN ORGANIZED HEALTH CARE EDUCATION/TRAINING PROGRAM

## 2022-12-13 PROCEDURE — 1101F PT FALLS ASSESS-DOCD LE1/YR: CPT | Performed by: STUDENT IN AN ORGANIZED HEALTH CARE EDUCATION/TRAINING PROGRAM

## 2022-12-13 PROCEDURE — G8510 SCR DEP NEG, NO PLAN REQD: HCPCS | Performed by: STUDENT IN AN ORGANIZED HEALTH CARE EDUCATION/TRAINING PROGRAM

## 2022-12-13 PROCEDURE — G8536 NO DOC ELDER MAL SCRN: HCPCS | Performed by: STUDENT IN AN ORGANIZED HEALTH CARE EDUCATION/TRAINING PROGRAM

## 2022-12-13 PROCEDURE — 99213 OFFICE O/P EST LOW 20 MIN: CPT | Performed by: STUDENT IN AN ORGANIZED HEALTH CARE EDUCATION/TRAINING PROGRAM

## 2022-12-13 PROCEDURE — G0439 PPPS, SUBSEQ VISIT: HCPCS | Performed by: STUDENT IN AN ORGANIZED HEALTH CARE EDUCATION/TRAINING PROGRAM

## 2022-12-13 PROCEDURE — G8428 CUR MEDS NOT DOCUMENT: HCPCS | Performed by: STUDENT IN AN ORGANIZED HEALTH CARE EDUCATION/TRAINING PROGRAM

## 2022-12-13 PROCEDURE — 1123F ACP DISCUSS/DSCN MKR DOCD: CPT | Performed by: STUDENT IN AN ORGANIZED HEALTH CARE EDUCATION/TRAINING PROGRAM

## 2022-12-13 PROCEDURE — G8417 CALC BMI ABV UP PARAM F/U: HCPCS | Performed by: STUDENT IN AN ORGANIZED HEALTH CARE EDUCATION/TRAINING PROGRAM

## 2022-12-13 RX ORDER — SIMETHICONE 80 MG
80 TABLET,CHEWABLE ORAL
Qty: 30 TABLET | Refills: 1 | Status: SHIPPED | OUTPATIENT
Start: 2022-12-13

## 2022-12-13 RX ORDER — OMEPRAZOLE 40 MG/1
40 CAPSULE, DELAYED RELEASE ORAL DAILY
Qty: 30 CAPSULE | Refills: 2 | Status: SHIPPED | OUTPATIENT
Start: 2022-12-13

## 2022-12-13 NOTE — PROGRESS NOTES
Matt Tom is a 82 y.o. presents today for   Chief Complaint   Patient presents with    Follow-up     Is someone accompanying this pt? Yes, daughter    Is the patient using any DME equipment during OV? Yes, wheelchair     Visit Vitals  /76 (BP 1 Location: Left upper arm, BP Patient Position: Sitting, BP Cuff Size: Adult)   Pulse 76   Temp 98 °F (36.7 °C) (Temporal)   Resp 16   Ht 5' 5\" (1.651 m)   Wt 221 lb (100.2 kg)   SpO2 100%   BMI 36.78 kg/m²       Depression Screening:   3 most recent PHQ Screens 12/13/2022   PHQ Not Done -   Little interest or pleasure in doing things Not at all   Feeling down, depressed, irritable, or hopeless Several days   Total Score PHQ 2 1       Health Maintenance: reviewed and discussed and ordered per Provider. Health Maintenance Due   Topic Date Due    DTaP/Tdap/Td series (1 - Tdap) 08/22/2013    COVID-19 Vaccine (3 - Booster for Pfizer series) 06/26/2021    Medicare Yearly Exam  06/17/2022         Coordination of Care:   1. \"Have you been to the ER, urgent care clinic since your last visit? Hospitalized since your last visit? \" No    2. \"Have you seen or consulted any other health care providers outside of the [address] [place] [address] since your last visit? \" No     3. For patients aged 39-70: Has the patient had a colonoscopy / FIT/ Cologuard? Yes - no Care Gap present    If the patient is female:    4. For patients aged 41-77: Has the patient had a mammogram within the past 2 years? Yes - no Care Gap present    5. For patients aged 21-65: Has the patient had a pap smear?  NA - based on age or sex     Laqueta Clock

## 2022-12-13 NOTE — PATIENT INSTRUCTIONS
Medicare Wellness Visit, Female     The best way to live healthy is to have a lifestyle where you eat a well-balanced diet, exercise regularly, limit alcohol use, and quit all forms of tobacco/nicotine, if applicable. Regular preventive services are another way to keep healthy. Preventive services (vaccines, screening tests, monitoring & exams) can help personalize your care plan, which helps you manage your own care. Screening tests can find health problems at the earliest stages, when they are easiest to treat. [facility] follows the current, evidence-based guidelines published by the [facility] ([facility]) when recommending preventive services for our patients. Because we follow these guidelines, sometimes recommendations change over time as research supports it. (For example, mammograms used to be recommended annually. Even though Medicare will still pay for an annual mammogram, the newer guidelines recommend a mammogram every two years for women of average risk). Of course, you and your doctor may decide to screen more often for some diseases, based on your risk and your co-morbidities (chronic disease you are already diagnosed with). Preventive services for you include:  - Medicare offers their members a free annual wellness visit, which is time for you and your primary care provider to discuss and plan for your preventive service needs.  Take advantage of this benefit every year!    -Over the age of 72 should receive the recommended pneumonia vaccines.    -All adults should have a flu vaccine yearly.  -All adults should have a tetanus vaccine every 10 years.   -Over the age 48 should receive the shingles vaccines.        -All adults should be screened once for Hepatitis C.  -All adults age 38-68 who are overweight should have a diabetes screening test once every three years.   -Other screening tests and preventive services for persons with diabetes include: an eye exam to screen for diabetic retinopathy, a kidney function test, a foot exam, and stricter control over your cholesterol.   -Cardiovascular screening for adults with routine risk involves an electrocardiogram (ECG) at intervals determined by your doctor.     -Colorectal cancer screenings should be done for adults age 39-70 with no increased risk factors for colorectal cancer. There are a number of acceptable methods of screening for this type of cancer. Each test has its own benefits and drawbacks. Discuss with your doctor what is most appropriate for you during your annual wellness visit. The different tests include: colonoscopy (considered the best screening method), a fecal occult blood test, a fecal DNA test, and sigmoidoscopy.    -Lung cancer screening is recommended annually with a low dose CT scan for adults between age 54 and 68, who have smoked at least 30 pack years (equivalent of 1 pack per day for 30 days), and who is a current smoker or quit less than 15 years ago.    -A bone mass density test is recommended when a woman turns 65 to screen for osteoporosis. This test is only recommended one time, as a screening. Some providers will use this same test as a disease monitoring tool if you already have osteoporosis. -Breast cancer screenings are recommended every other year for women of normal risk, age 54-69.    -Cervical cancer screenings for women over age 72 are only recommended with certain risk factors.      Here is a list of your current Health Maintenance items (your personalized list of preventive services) with a due date:  Health Maintenance Due   Topic Date Due    DTaP/Tdap/Td  (1 - Tdap) 08/22/2013    COVID-19 Vaccine (3 - Booster for Pfizer series) 06/26/2021

## 2022-12-13 NOTE — PROGRESS NOTES
This is the Subsequent Medicare Annual Wellness Exam, performed 12 months or more after the Initial AWV or the last Subsequent AWV    I have reviewed the patient's medical history in detail and updated the computerized patient record. Assessment/Plan   Education and counseling provided:  Are appropriate based on today's review and evaluation    1. Anemia, unspecified type  2. Medicare annual wellness visit, subsequent       Depression Risk Factor Screening     3 most recent PHQ Screens 12/13/2022   PHQ Not Done -   Little interest or pleasure in doing things Not at all   Feeling down, depressed, irritable, or hopeless Several days   Total Score PHQ 2 1       Alcohol & Drug Abuse Risk Screen    Do you average more than 1 drink per night or more than 7 drinks a week:  No    On any one occasion in the past three months have you have had more than 3 drinks containing alcohol:  No       Opioid Risk: (Low risk score <55, High risk score ?55)  Opioid risk score: 13      Click here to complete the Controlled Substance Monitoring SmartForm    Last PDMP [place] as Reviewed:  Review User Review Instant Review Result              Functional Ability and Level of Safety    Hearing: Hearing is good. Activities of Daily Living: The home contains: grab bars  Patient needs help with:  transportation, shopping, preparing meals, and bathing      Ambulation: with difficulty, uses a wheelchair outside. Walks at home. Fall Risk:  Fall Risk Assessment, last 12 mths 12/13/2022   Able to walk? Yes   Fall in past 12 months? 0   Do you feel unsteady? 0   Are you worried about falling 0   Is TUG test greater than 12 seconds? -   Is the gait abnormal? -   Number of falls in past 12 months -   Fall with injury?  -      Abuse Screen:  Patient is not abused       Cognitive Screening    Has your family/caregiver stated any concerns about your memory: no       Health Maintenance Due     Health Maintenance Due   Topic Date Due DTaP/Tdap/Td series (1 - Tdap) 08/22/2013    COVID-19 Vaccine (3 - Booster for Pfizer series) 06/26/2021       Patient Care Team   Patient Care Team:  Stephanie Lora MD as PCP - General (Family Medicine)  Stephanie Lora MD as PCP - [address] [place] [address] [facility]  Gregory Charles MD (Orthopedic Surgery)  Gregory Charles MD (Orthopedic Surgery)  Jessica Valadez MD (Gastroenterology)  Parris Sutton MD (Surgery Physician)  Belksi Gorman MD (Otolaryngology)    History     Patient Active Problem List   Diagnosis Code    Pure hypercholesterolemia E78.00    Edema R60.9    Chronic eczema L30.9    Arthritis M19.90    Subclinical hyperthyroidism E05.90    Severe obesity (BMI 35.0-39. 9) with comorbidity ([facility] [address] E66.01    Chronic gout of multiple sites M1A. 40T3    Essential hypertension I10    Chronic kidney disease N18.9    Asthma J45.909    Multinodular goiter E04.2    Vitamin D deficiency E55.9    Other chest pain R07.89    History of echocardiogram Z92.89    Chronic renal disease, stage III N18.30    Acute on chronic diastolic heart failure (HCC) I50.33     Past Medical History:   Diagnosis Date    Arthritis 9/20/2010    Asthma     Chronic eczema 3/22/2010    Chronic kidney disease     Clot 10/2018    lower left exremity, shin     Edema 3/22/2010    Environmental allergies     year round    Essential hypertension, malignant 3/22/2010    Gout     Menopause     Multinodular goiter     Followed by Dr. Billie Waller, ENT.  FNA 2020    Pure hypercholesterolemia 3/22/2010    Subclinical hyperthyroidism 5/30/2017    Unspecified arthropathy, shoulder region 3/22/2010    Unspecified hypothyroidism 3/22/2010    Vitamin D deficiency       Past Surgical History:   Procedure Laterality Date    HX GYN  1983    hysterectomy    HX HYSTERECTOMY  1984    fibroids    HX KNEE REPLACEMENT Right     IR FINE NEEDLE ASPIRATION THYROID  07/2020    benign     Current Outpatient Medications   Medication Sig Dispense Refill    bumetanide (BUMEX) 2 mg tablet Take 1 Tablet by mouth two (2) times a day. 60 Tablet 5    apixaban (ELIQUIS) 2.5 mg tablet Take 1 Tablet by mouth two (2) times a day. 60 Tablet 5    ibuprofen (MOTRIN) 800 mg tablet Take  by mouth. fluocinoNIDE (LIDEX) 0.05 % ointment Apply  to affected area two (2) times a day. 60 g 4    ammonium lactate (AMLACTIN) 12 % topical cream Apply  to affected area two (2) times a day. rub in to affected area well 280 g 3    ergocalciferol (ERGOCALCIFEROL) 1,250 mcg (50,000 unit) capsule Take 1 Capsule by mouth every seven (7) days. 12 Capsule 1    metoprolol tartrate (LOPRESSOR) 50 mg tablet Take 1 Tablet by mouth two (2) times a day. 180 Tablet 3    HYDROcodone-acetaminophen (NORCO) 5-325 mg per tablet take 1 tablet by mouth every 6 hours if needed      ipratropium (ATROVENT) 0.02 % soln       light mineral oil-min oil, PF, 0.5-0.5 % dpet 2 Drops. omeprazole (PRILOSEC) 20 mg capsule       amLODIPine (NORVASC) 5 mg tablet Take 1 Tablet by mouth daily. 90 Tablet 3    potassium chloride (K-DUR, KLOR-CON M20) 20 mEq tablet Take 1 Tablet by mouth two (2) times a day. (Patient taking differently: Take 20 mEq by mouth two (2) times a day. Half a pill in the am and half a pill pm) 180 Tablet 3    budesonide-formoteroL (Symbicort) 160-4.5 mcg/actuation HFAA Take 2 Puffs by inhalation two (2) times a day. 1 Each 5    albuterol (PROVENTIL HFA, VENTOLIN HFA, PROAIR HFA) 90 mcg/actuation inhaler Take 2 Puffs by inhalation every six (6) hours as needed for Wheezing. 1 Each 4    methIMAzole (TAPAZOLE) 5 mg tablet Take 2.5 mg by mouth daily. diclofenac (VOLTAREN) 1 % gel Apply 4 g to affected area four (4) times daily. 350 g 1    allopurinoL (ZYLOPRIM) 300 mg tablet Take 1 Tab by mouth daily. 90 Tab 4    fluticasone propionate (FLONASE) 50 mcg/actuation nasal spray 2 Sprays by Both Nostrils route daily.  1 Bottle 5     Allergies   Allergen Reactions    Tomato Other (comments)     Gi Distress    PT said she no longer allergic        Family History   Problem Relation Age of Onset    Hypertension Mother     Breast Cancer Mother 70    Breast Cancer Child 36    Breast Cancer Sister 39     Social History     Tobacco Use    Smoking status: Never    Smokeless tobacco: Never   Substance Use Topics    Alcohol use: Not Currently     Alcohol/week: 1.0 standard drink     Types: 1 Glasses of wine per week         Gail Holloway MD     --------------------------------------------------------------------------------------------------------------------    Ford Perez is a 82 y.o.  female and presents with    Chief Complaint   Patient presents with    Follow-up           Subjective:    Has been feeling w/ abdominal pain. Feels like food is heavy in her stomach. Feels like she cannot eat after 3 oclcok bc she feels like the food get stuck in the top part of the stomach. Feels like she has gases and gets bloated. Patient Active Problem List   Diagnosis Code    Pure hypercholesterolemia E78.00    Edema R60.9    Chronic eczema L30.9    Arthritis M19.90    Subclinical hyperthyroidism E05.90    Severe obesity (BMI 35.0-39. 9) with comorbidity ([facility] [address] E66.01    Chronic gout of multiple sites M1A. 36A4    Essential hypertension I10    Chronic kidney disease N18.9    Asthma J45.909    Multinodular goiter E04.2    Vitamin D deficiency E55.9    Other chest pain R07.89    History of echocardiogram Z92.89    Chronic renal disease, stage III N18.30    Acute on chronic diastolic heart failure (HCC) I50.33      Past Medical History:   Diagnosis Date    Arthritis 9/20/2010    Asthma     Chronic eczema 3/22/2010    Chronic kidney disease     Clot 10/2018    lower left exremity, shin     Edema 3/22/2010    Environmental allergies     year round    Essential hypertension, malignant 3/22/2010    Gout     Menopause     Multinodular goiter     Followed by Dr. Eliz Austin, ENT.  FNA 2020    Pure hypercholesterolemia 3/22/2010 Subclinical hyperthyroidism 5/30/2017    Unspecified arthropathy, shoulder region 3/22/2010    Unspecified hypothyroidism 3/22/2010    Vitamin D deficiency       Past Surgical History:   Procedure Laterality Date    HX GYN  1983    hysterectomy    HX HYSTERECTOMY  1984    fibroids    HX KNEE REPLACEMENT Right     IR FINE NEEDLE ASPIRATION THYROID  07/2020    benign      Family History   Problem Relation Age of Onset    Hypertension Mother     Breast Cancer Mother 70    Breast Cancer Child 36    Breast Cancer Sister 39     Social History     Socioeconomic History    Marital status:      Spouse name: Not on file    Number of children: Not on file    Years of education: Not on file    Highest education level: Not on file   Occupational History    Not on file   Tobacco Use    Smoking status: Never    Smokeless tobacco: Never   Substance and Sexual Activity    Alcohol use: Not Currently     Alcohol/week: 1.0 standard drink     Types: 1 Glasses of wine per week    Drug use: No    Sexual activity: Yes     Partners: Male   Other Topics Concern     Service Not Asked    Blood Transfusions Not Asked    Caffeine Concern Not Asked    Occupational Exposure Not Asked    Hobby Hazards Not Asked    Sleep Concern Not Asked    Stress Concern Not Asked    Weight Concern Not Asked    Special Diet Yes     Comment: somewhat healthy, low sodium sometimes    Back Care Not Asked    Exercise Yes     Comment: walking, limited by ankle pain    Bike Helmet Not Asked    Seat Belt Not Asked    Self-Exams Not Asked   Social History Narrative    Not on file     Social Determinants of Health     Financial Resource Strain: Not on file   Food Insecurity: Not on file   Transportation Needs: Not on file   Physical Activity: Not on file   Stress: Not on file   Social Connections: Not on file   Intimate Partner Violence: Not on file   Housing Stability: Not on file        Current Outpatient Medications   Medication Sig Dispense Refill bumetanide (BUMEX) 2 mg tablet Take 1 Tablet by mouth two (2) times a day. 60 Tablet 5    apixaban (ELIQUIS) 2.5 mg tablet Take 1 Tablet by mouth two (2) times a day. 60 Tablet 5    ibuprofen (MOTRIN) 800 mg tablet Take  by mouth. fluocinoNIDE (LIDEX) 0.05 % ointment Apply  to affected area two (2) times a day. 60 g 4    ammonium lactate (AMLACTIN) 12 % topical cream Apply  to affected area two (2) times a day. rub in to affected area well 280 g 3    ergocalciferol (ERGOCALCIFEROL) 1,250 mcg (50,000 unit) capsule Take 1 Capsule by mouth every seven (7) days. 12 Capsule 1    metoprolol tartrate (LOPRESSOR) 50 mg tablet Take 1 Tablet by mouth two (2) times a day. 180 Tablet 3    HYDROcodone-acetaminophen (NORCO) 5-325 mg per tablet take 1 tablet by mouth every 6 hours if needed      ipratropium (ATROVENT) 0.02 % soln       light mineral oil-min oil, PF, 0.5-0.5 % dpet 2 Drops. omeprazole (PRILOSEC) 20 mg capsule       amLODIPine (NORVASC) 5 mg tablet Take 1 Tablet by mouth daily. 90 Tablet 3    potassium chloride (K-DUR, KLOR-CON M20) 20 mEq tablet Take 1 Tablet by mouth two (2) times a day. (Patient taking differently: Take 20 mEq by mouth two (2) times a day. Half a pill in the am and half a pill pm) 180 Tablet 3    budesonide-formoteroL (Symbicort) 160-4.5 mcg/actuation HFAA Take 2 Puffs by inhalation two (2) times a day. 1 Each 5    albuterol (PROVENTIL HFA, VENTOLIN HFA, PROAIR HFA) 90 mcg/actuation inhaler Take 2 Puffs by inhalation every six (6) hours as needed for Wheezing. 1 Each 4    methIMAzole (TAPAZOLE) 5 mg tablet Take 2.5 mg by mouth daily. diclofenac (VOLTAREN) 1 % gel Apply 4 g to affected area four (4) times daily. 350 g 1    allopurinoL (ZYLOPRIM) 300 mg tablet Take 1 Tab by mouth daily. 90 Tab 4    fluticasone propionate (FLONASE) 50 mcg/actuation nasal spray 2 Sprays by Both Nostrils route daily.  1 Bottle 5        ROS   Review of Systems   Constitutional:  Negative for chills, fever and malaise/fatigue. HENT:  Negative for congestion, ear discharge and ear pain. Eyes:  Negative for blurred vision, pain and discharge. Respiratory:  Negative for cough and shortness of breath. Cardiovascular:  Negative for chest pain and palpitations. Gastrointestinal:  Positive for abdominal pain. Negative for nausea and vomiting. Genitourinary:  Negative for dysuria, frequency and urgency. Skin:  Negative for itching and rash. Neurological:  Negative for dizziness, seizures, loss of consciousness and headaches. Psychiatric/Behavioral:  Negative for substance abuse. Objective:  Vitals:    12/13/22 1348   BP: 124/76   Pulse: 76   Resp: 16   Temp: 98 °F (36.7 °C)   TempSrc: Temporal   SpO2: 100%   Weight: 221 lb (100.2 kg)   Height: 5' 5\" (1.651 m)   PainSc:   0 - No pain       Physical Exam  Vitals reviewed. Constitutional:       Appearance: Normal appearance. She is obese. Eyes:      General: No scleral icterus. Right eye: No discharge. Left eye: No discharge. Cardiovascular:      Rate and Rhythm: Normal rate and regular rhythm. Heart sounds:     No gallop. Pulmonary:      Effort: Pulmonary effort is normal. No respiratory distress. Breath sounds: Normal breath sounds. Abdominal:      Palpations: Abdomen is soft. Musculoskeletal:      Cervical back: Normal range of motion and neck supple. Neurological:      Mental Status: She is alert and oriented to person, place, and time. Cranial Nerves: No cranial nerve deficit. Psychiatric:         Mood and Affect: Mood normal.         Behavior: Behavior normal.         Thought Content: Thought content normal.         Judgment: Judgment normal.         LABS     TESTS      Assessment/Plan:    1. Medicare annual wellness visit, subsequent  See above     2. Bloating  - simethicone (MYLICON) 80 mg chewable tablet; Take 1 Tablet by mouth every six (6) hours as needed for Flatulence.   Dispense: 30 Tablet; Refill: 1    3. Gastroesophageal reflux disease, unspecified whether esophagitis present  If not improved then will do a GI referral.   - simethicone (MYLICON) 80 mg chewable tablet; Take 1 Tablet by mouth every six (6) hours as needed for Flatulence. Dispense: 30 Tablet; Refill: 1  - omeprazole (PRILOSEC) 40 mg capsule; Take 1 Capsule by mouth daily. Dispense: 30 Capsule; Refill: 2           Lab review: no lab studies available for review at time of visit      I have discussed the diagnosis with the patient and the intended plan as seen in the above orders. The patient has received an after-visit summary and questions were answered concerning future plans. I have discussed medication side effects and warnings with the patient as well. I have reviewed the plan of care with the patient, accepted their input and they are in agreement with the treatment goals.          Jennifer Pineda MD

## 2023-01-24 ENCOUNTER — HOSPITAL ENCOUNTER (OUTPATIENT)
Dept: LAB | Age: 88
Discharge: HOME | End: 2023-01-24
Payer: MEDICARE

## 2023-01-24 ENCOUNTER — OFFICE VISIT (OUTPATIENT)
Dept: FAMILY MEDICINE CLINIC | Age: 88
End: 2023-01-24
Payer: MEDICARE

## 2023-01-24 VITALS
TEMPERATURE: 97.7 F | SYSTOLIC BLOOD PRESSURE: 118 MMHG | BODY MASS INDEX: 34.66 KG/M2 | WEIGHT: 208 LBS | RESPIRATION RATE: 16 BRPM | OXYGEN SATURATION: 100 % | HEIGHT: 65 IN | DIASTOLIC BLOOD PRESSURE: 69 MMHG | HEART RATE: 78 BPM

## 2023-01-24 DIAGNOSIS — J44.1 COPD EXACERBATION (HCC): Primary | ICD-10-CM

## 2023-01-24 DIAGNOSIS — N18.4 STAGE 4 CHRONIC KIDNEY DISEASE (HCC): ICD-10-CM

## 2023-01-24 DIAGNOSIS — L50.9 URTICARIA: ICD-10-CM

## 2023-01-24 DIAGNOSIS — I48.0 PAROXYSMAL ATRIAL FIBRILLATION (HCC): ICD-10-CM

## 2023-01-24 DIAGNOSIS — I50.33 ACUTE ON CHRONIC DIASTOLIC HEART FAILURE (HCC): ICD-10-CM

## 2023-01-24 PROBLEM — N18.30 CHRONIC RENAL DISEASE, STAGE III (HCC): Status: RESOLVED | Noted: 2022-05-13 | Resolved: 2023-01-24

## 2023-01-24 LAB
ALBUMIN SERPL-MCNC: 2.6 G/DL (ref 3.4–5)
ALP SERPL-CCNC: 118 U/L (ref 45–117)
ALT SERPL-CCNC: 12 U/L (ref 13–56)
ANION GAP SERPL CALC-SCNC: 5 MMOL/L (ref 3–18)
AST SERPL-CCNC: 12 U/L (ref 10–38)
BASOPHILS # BLD: 0.1 K/UL (ref 0–0.1)
BASOPHILS NFR BLD: 0 % (ref 0–2)
BILIRUB DIRECT SERPL-MCNC: 0.1 MG/DL (ref 0–0.2)
BILIRUB SERPL-MCNC: 0.5 MG/DL (ref 0.2–1)
BUN SERPL-MCNC: 19 MG/DL (ref 7–18)
BUN/CREAT SERPL: 11 (ref 12–20)
CALCIUM SERPL-MCNC: 9.7 MG/DL (ref 8.5–10.1)
CHLORIDE SERPL-SCNC: 99 MMOL/L (ref 100–111)
CO2 SERPL-SCNC: 35 MMOL/L (ref 21–32)
CREAT SERPL-MCNC: 1.77 MG/DL (ref 0.6–1.3)
DIFFERENTIAL METHOD BLD: ABNORMAL
EOSINOPHIL # BLD: 1.8 K/UL (ref 0–0.4)
EOSINOPHIL NFR BLD: 15 % (ref 0–5)
ERYTHROCYTE [DISTWIDTH] IN BLOOD BY AUTOMATED COUNT: 14.2 % (ref 11.6–14.5)
GLUCOSE SERPL-MCNC: 81 MG/DL (ref 74–99)
HCT VFR BLD AUTO: 35.6 % (ref 35–45)
HGB BLD-MCNC: 10.5 G/DL (ref 12–16)
IMM GRANULOCYTES # BLD AUTO: 0 K/UL (ref 0–0.04)
IMM GRANULOCYTES NFR BLD AUTO: 0 % (ref 0–0.5)
LYMPHOCYTES # BLD: 2.8 K/UL (ref 0.9–3.6)
LYMPHOCYTES NFR BLD: 24 % (ref 21–52)
MCH RBC QN AUTO: 25.1 PG (ref 24–34)
MCHC RBC AUTO-ENTMCNC: 29.5 G/DL (ref 31–37)
MCV RBC AUTO: 85 FL (ref 78–100)
MONOCYTES # BLD: 1.2 K/UL (ref 0.05–1.2)
MONOCYTES NFR BLD: 10 % (ref 3–10)
NEUTS SEG # BLD: 5.8 K/UL (ref 1.8–8)
NEUTS SEG NFR BLD: 50 % (ref 40–73)
NRBC # BLD: 0 K/UL (ref 0–0.01)
NRBC BLD-RTO: 0 PER 100 WBC
PHOSPHATE SERPL-MCNC: 2 MG/DL (ref 2.5–4.9)
PLATELET # BLD AUTO: 154 K/UL (ref 135–420)
PMV BLD AUTO: 13 FL (ref 9.2–11.8)
POTASSIUM SERPL-SCNC: 3.7 MMOL/L (ref 3.5–5.5)
PROT SERPL-MCNC: 7.7 G/DL (ref 6.4–8.2)
RBC # BLD AUTO: 4.19 M/UL (ref 4.2–5.3)
SODIUM SERPL-SCNC: 139 MMOL/L (ref 136–145)
TSH SERPL DL<=0.05 MIU/L-ACNC: 1.55 UIU/ML (ref 0.36–3.74)
WBC # BLD AUTO: 11.5 K/UL (ref 4.6–13.2)

## 2023-01-24 PROCEDURE — 84460 ALANINE AMINO (ALT) (SGPT): CPT

## 2023-01-24 PROCEDURE — 84450 TRANSFERASE (AST) (SGOT): CPT

## 2023-01-24 PROCEDURE — 85025 COMPLETE CBC W/AUTO DIFF WBC: CPT

## 2023-01-24 PROCEDURE — 36415 COLL VENOUS BLD VENIPUNCTURE: CPT

## 2023-01-24 PROCEDURE — 84075 ASSAY ALKALINE PHOSPHATASE: CPT

## 2023-01-24 PROCEDURE — 84443 ASSAY THYROID STIM HORMONE: CPT

## 2023-01-24 PROCEDURE — 84155 ASSAY OF PROTEIN SERUM: CPT

## 2023-01-24 PROCEDURE — 80069 RENAL FUNCTION PANEL: CPT

## 2023-01-24 PROCEDURE — 82248 BILIRUBIN DIRECT: CPT

## 2023-01-24 PROCEDURE — 82247 BILIRUBIN TOTAL: CPT

## 2023-01-24 NOTE — PROGRESS NOTES
Dwain Juarez is a 82 y.o. presents today for   Chief Complaint   Patient presents with    Itching     Over all body (back, arms, abdomen) since last week. Is someone accompanying this pt? Yes, daughter    Is the patient using any DME equipment during OV? walker    Visit Vitals  /69 (BP 1 Location: Left upper arm, BP Patient Position: Sitting, BP Cuff Size: Adult)   Pulse 78   Temp 97.7 °F (36.5 °C) (Temporal)   Resp 16   Ht 5' 5\" (1.651 m)   Wt 208 lb (94.3 kg)   SpO2 100%   BMI 34.61 kg/m²       Depression Screening:   3 most recent PHQ Screens 1/24/2023   PHQ Not Done -   Little interest or pleasure in doing things Not at all   Feeling down, depressed, irritable, or hopeless Not at all   Total Score PHQ 2 0       Health Maintenance: reviewed and discussed and ordered per Provider. Health Maintenance Due   Topic Date Due    DTaP/Tdap/Td series (1 - Tdap) 08/22/2013    COVID-19 Vaccine (3 - Booster for Pfizer series) 06/26/2021         Coordination of Care:   1. \"Have you been to the ER, urgent care clinic since your last visit? Hospitalized since your last visit? \" Yes [address] for SOB last month     2. \"Have you seen or consulted any other health care providers outside of the [address] [place] [address] since your last visit? \" No     3. For patients aged 39-70: Has the patient had a colonoscopy / FIT/ Cologuard? Yes - no Care Gap present    If the patient is female:    4. For patients aged 41-77: Has the patient had a mammogram within the past 2 years? NA - based on age or sex    11. For patients aged 21-65: Has the patient had a pap smear?  NA - based on age or sex     Oswald Larkin

## 2023-01-27 DIAGNOSIS — L50.9 URTICARIA: ICD-10-CM

## 2023-01-27 DIAGNOSIS — D72.19 OTHER EOSINOPHILIA: Primary | ICD-10-CM

## 2023-01-29 NOTE — PROGRESS NOTES
Paige Correia is a 82 y.o.  female and presents with    Chief Complaint   Patient presents with    Itching     Over all body (back, arms, abdomen) since last week. Subjective:    Pt states that for the last week she has been itching all over. Denies seeing any rash. Has been complaint with using lotion. Has done oatmeal baths which slightly helped. Has been following with nephrologist. - Dr. Delano Gunn. Does not think she has been eating anything new. Deny having changed her soap and/ or detergent or anything like so recently. Patient Active Problem List   Diagnosis Code    Pure hypercholesterolemia E78.00    Edema R60.9    Chronic eczema L30.9    Arthritis M19.90    Subclinical hyperthyroidism E05.90    Severe obesity (BMI 35.0-39. 9) with comorbidity ([facility] [address] E66.01    Chronic gout of multiple sites M1A. 27M1    Essential hypertension I10    Chronic kidney disease N18.9    Asthma J45.909    Multinodular goiter E04.2    Vitamin D deficiency E55.9    Other chest pain R07.89    History of echocardiogram Z92.89    Acute on chronic diastolic heart failure (HCC) I50.33    COPD exacerbation (HCC) J44.1    Paroxysmal atrial fibrillation (HCC) I48.0      Past Medical History:   Diagnosis Date    Arthritis 9/20/2010    Asthma     Chronic eczema 3/22/2010    Chronic kidney disease     Clot 10/2018    lower left exremity, shin     Edema 3/22/2010    Environmental allergies     year round    Essential hypertension, malignant 3/22/2010    Gout     Menopause     Multinodular goiter     Followed by Dr. Robert Muniz, ENT.  FNA 2020    Pure hypercholesterolemia 3/22/2010    Subclinical hyperthyroidism 5/30/2017    Unspecified arthropathy, shoulder region 3/22/2010    Unspecified hypothyroidism 3/22/2010    Vitamin D deficiency       Past Surgical History:   Procedure Laterality Date    HX GYN  1983    hysterectomy    HX HYSTERECTOMY  1984    fibroids    HX KNEE REPLACEMENT Right     IR FINE NEEDLE ASPIRATION THYROID  07/2020    benign      Family History   Problem Relation Age of Onset    Hypertension Mother     Breast Cancer Mother 70    Breast Cancer Child 36    Breast Cancer Sister 39     Social History     Socioeconomic History    Marital status:      Spouse name: Not on file    Number of children: Not on file    Years of education: Not on file    Highest education level: Not on file   Occupational History    Not on file   Tobacco Use    Smoking status: Never    Smokeless tobacco: Never   Substance and Sexual Activity    Alcohol use: Not Currently     Alcohol/week: 1.0 standard drink     Types: 1 Glasses of wine per week    Drug use: No    Sexual activity: Yes     Partners: Male   Other Topics Concern     Service Not Asked    Blood Transfusions Not Asked    Caffeine Concern Not Asked    Occupational Exposure Not Asked    Hobby Hazards Not Asked    Sleep Concern Not Asked    Stress Concern Not Asked    Weight Concern Not Asked    Special Diet Yes     Comment: somewhat healthy, low sodium sometimes    Back Care Not Asked    Exercise Yes     Comment: walking, limited by ankle pain    Bike Helmet Not Asked    Seat Belt Not Asked    Self-Exams Not Asked   Social History Narrative    Not on file     Social Determinants of Health     Financial Resource Strain: Not on file   Food Insecurity: Not on file   Transportation Needs: Not on file   Physical Activity: Not on file   Stress: Not on file   Social Connections: Not on file   Intimate Partner Violence: Not on file   Housing Stability: Not on file        Current Outpatient Medications   Medication Sig Dispense Refill    simethicone (MYLICON) 80 mg chewable tablet Take 1 Tablet by mouth every six (6) hours as needed for Flatulence. 30 Tablet 1    omeprazole (PRILOSEC) 40 mg capsule Take 1 Capsule by mouth daily. 30 Capsule 2    bumetanide (BUMEX) 2 mg tablet Take 1 Tablet by mouth two (2) times a day.  60 Tablet 5    apixaban (ELIQUIS) 2.5 mg tablet Take 1 Tablet by mouth two (2) times a day. 60 Tablet 5    ibuprofen (MOTRIN) 800 mg tablet Take  by mouth. fluocinoNIDE (LIDEX) 0.05 % ointment Apply  to affected area two (2) times a day. 60 g 4    ammonium lactate (AMLACTIN) 12 % topical cream Apply  to affected area two (2) times a day. rub in to affected area well 280 g 3    ergocalciferol (ERGOCALCIFEROL) 1,250 mcg (50,000 unit) capsule Take 1 Capsule by mouth every seven (7) days. 12 Capsule 1    metoprolol tartrate (LOPRESSOR) 50 mg tablet Take 1 Tablet by mouth two (2) times a day. 180 Tablet 3    HYDROcodone-acetaminophen (NORCO) 5-325 mg per tablet take 1 tablet by mouth every 6 hours if needed      ipratropium (ATROVENT) 0.02 % soln       light mineral oil-min oil, PF, 0.5-0.5 % dpet 2 Drops. amLODIPine (NORVASC) 5 mg tablet Take 1 Tablet by mouth daily. 90 Tablet 3    potassium chloride (K-DUR, KLOR-CON M20) 20 mEq tablet Take 1 Tablet by mouth two (2) times a day. (Patient taking differently: Take 20 mEq by mouth two (2) times a day. Half a pill in the am and half a pill pm) 180 Tablet 3    budesonide-formoteroL (Symbicort) 160-4.5 mcg/actuation HFAA Take 2 Puffs by inhalation two (2) times a day. 1 Each 5    albuterol (PROVENTIL HFA, VENTOLIN HFA, PROAIR HFA) 90 mcg/actuation inhaler Take 2 Puffs by inhalation every six (6) hours as needed for Wheezing. 1 Each 4    methIMAzole (TAPAZOLE) 5 mg tablet Take 2.5 mg by mouth daily. diclofenac (VOLTAREN) 1 % gel Apply 4 g to affected area four (4) times daily. 350 g 1    allopurinoL (ZYLOPRIM) 300 mg tablet Take 1 Tab by mouth daily. 90 Tab 4    fluticasone propionate (FLONASE) 50 mcg/actuation nasal spray 2 Sprays by Both Nostrils route daily. 1 Bottle 5        ROS   Review of Systems   Constitutional:  Negative for chills, fever and malaise/fatigue. HENT:  Negative for congestion, ear discharge and ear pain. Eyes:  Negative for blurred vision, pain and discharge.    Respiratory:  Negative for cough and shortness of breath. Cardiovascular:  Negative for chest pain and palpitations. Gastrointestinal:  Negative for abdominal pain, nausea and vomiting. Genitourinary:  Negative for dysuria, frequency and urgency. Skin:  Positive for itching. Negative for rash. Neurological:  Negative for dizziness, seizures, loss of consciousness and headaches. Psychiatric/Behavioral:  Negative for substance abuse. Objective:  Vitals:    01/24/23 1405   BP: 118/69   Pulse: 78   Resp: 16   Temp: 97.7 °F (36.5 °C)   TempSrc: Temporal   SpO2: 100%   Weight: 208 lb (94.3 kg)   Height: 5' 5\" (1.651 m)   PainSc:   0 - No pain       Physical Exam  Vitals reviewed. Constitutional:       Appearance: Normal appearance. She is obese. Eyes:      General: No scleral icterus. Right eye: No discharge. Left eye: No discharge. Cardiovascular:      Rate and Rhythm: Normal rate and regular rhythm. Pulmonary:      Effort: Pulmonary effort is normal. No respiratory distress. Breath sounds: Normal breath sounds. Musculoskeletal:      Cervical back: Normal range of motion and neck supple. Skin:     Coloration: Skin is not jaundiced. Findings: No bruising, erythema, lesion or rash. Neurological:      Mental Status: She is alert and oriented to person, place, and time. Cranial Nerves: No cranial nerve deficit. Psychiatric:         Mood and Affect: Mood normal.         Behavior: Behavior normal.         Thought Content: Thought content normal.         Judgment: Judgment normal.       LABS     TESTS      Assessment/Plan:    1. COPD exacerbation (HCC)  Using oxygen but not w/ exacerbation present    2. Paroxysmal atrial fibrillation (HCC)  - TSH 3RD GENERATION; Future    3. Acute on chronic diastolic heart failure (HCC)    4. Stage 4 chronic kidney disease ([facility] [address]  - RENAL FUNCTION PANEL; Future    5. Urticaria  Will see if there is kidney or liver exacerbation vs allergic rxn.  Advised to use Benadyl cream in the meantime.   - HEPATIC FUNCTION PANEL; Future  - TSH 3RD GENERATION; Future  - CBC WITH AUTOMATED DIFF; Future       Lab review: orders written for new lab studies as appropriate; see orders      I have discussed the diagnosis with the patient and the intended plan as seen in the above orders. The patient has received an after-visit summary and questions were answered concerning future plans. I have discussed medication side effects and warnings with the patient as well. I have reviewed the plan of care with the patient, accepted their input and they are in agreement with the treatment goals.          Art Paredes MD

## 2023-01-31 DIAGNOSIS — Z12.31 VISIT FOR SCREENING MAMMOGRAM: Primary | ICD-10-CM

## 2023-02-04 DIAGNOSIS — Z12.31 VISIT FOR SCREENING MAMMOGRAM: Primary | ICD-10-CM

## 2023-03-15 ENCOUNTER — OFFICE VISIT (OUTPATIENT)
Facility: CLINIC | Age: 88
End: 2023-03-15

## 2023-03-15 VITALS
HEART RATE: 66 BPM | TEMPERATURE: 97.8 F | SYSTOLIC BLOOD PRESSURE: 115 MMHG | OXYGEN SATURATION: 100 % | DIASTOLIC BLOOD PRESSURE: 68 MMHG | BODY MASS INDEX: 34.95 KG/M2 | RESPIRATION RATE: 20 BRPM | WEIGHT: 210 LBS

## 2023-03-15 DIAGNOSIS — J44.1 CHRONIC OBSTRUCTIVE PULMONARY DISEASE WITH (ACUTE) EXACERBATION (HCC): Primary | ICD-10-CM

## 2023-03-15 DIAGNOSIS — M25.562 PAIN IN LATERAL PORTION OF LEFT KNEE: ICD-10-CM

## 2023-03-15 DIAGNOSIS — J30.1 SEASONAL ALLERGIC RHINITIS DUE TO POLLEN: ICD-10-CM

## 2023-03-15 DIAGNOSIS — M25.511 ACUTE PAIN OF RIGHT SHOULDER: ICD-10-CM

## 2023-03-15 RX ORDER — CETIRIZINE HYDROCHLORIDE 10 MG/1
10 TABLET ORAL DAILY
Qty: 90 TABLET | Refills: 1 | Status: SHIPPED | OUTPATIENT
Start: 2023-03-15

## 2023-03-15 RX ORDER — ALBUTEROL SULFATE 90 UG/1
2 AEROSOL, METERED RESPIRATORY (INHALATION) EVERY 6 HOURS PRN
Qty: 18 G | Refills: 2 | Status: SHIPPED | OUTPATIENT
Start: 2023-03-15

## 2023-03-15 ASSESSMENT — PATIENT HEALTH QUESTIONNAIRE - PHQ9
SUM OF ALL RESPONSES TO PHQ QUESTIONS 1-9: 0
SUM OF ALL RESPONSES TO PHQ QUESTIONS 1-9: 0
2. FEELING DOWN, DEPRESSED OR HOPELESS: 0
1. LITTLE INTEREST OR PLEASURE IN DOING THINGS: 0
SUM OF ALL RESPONSES TO PHQ QUESTIONS 1-9: 0
SUM OF ALL RESPONSES TO PHQ9 QUESTIONS 1 & 2: 0
SUM OF ALL RESPONSES TO PHQ QUESTIONS 1-9: 0

## 2023-03-15 NOTE — PROGRESS NOTES
the plan of care with the patient, accepted their input and they are in agreement with the treatment goals.      Nan Rojas MD

## 2023-03-21 ASSESSMENT — ENCOUNTER SYMPTOMS
EYE PAIN: 0
EYE DISCHARGE: 0
ABDOMINAL PAIN: 0
CONSTIPATION: 0
VOMITING: 0
FACIAL SWELLING: 0
EYE REDNESS: 0
SHORTNESS OF BREATH: 0
BACK PAIN: 0
EYE ITCHING: 0
DIARRHEA: 0
COLOR CHANGE: 0

## 2023-03-24 ENCOUNTER — CARE COORDINATION (OUTPATIENT)
Facility: CLINIC | Age: 88
End: 2023-03-24

## 2023-03-24 NOTE — CARE COORDINATION
.Attempted to contact patient for Complex Care follow up. No answer, left message with contact information provided. Will attempt to contact at a later time.

## 2023-05-10 ENCOUNTER — OFFICE VISIT (OUTPATIENT)
Age: 88
End: 2023-05-10
Payer: MEDICARE

## 2023-05-10 VITALS
SYSTOLIC BLOOD PRESSURE: 123 MMHG | DIASTOLIC BLOOD PRESSURE: 63 MMHG | WEIGHT: 204 LBS | OXYGEN SATURATION: 97 % | BODY MASS INDEX: 33.95 KG/M2 | HEART RATE: 63 BPM

## 2023-05-10 DIAGNOSIS — Z01.818 PREOPERATIVE CLEARANCE: Primary | ICD-10-CM

## 2023-05-10 PROCEDURE — G8417 CALC BMI ABV UP PARAM F/U: HCPCS | Performed by: INTERNAL MEDICINE

## 2023-05-10 PROCEDURE — 1090F PRES/ABSN URINE INCON ASSESS: CPT | Performed by: INTERNAL MEDICINE

## 2023-05-10 PROCEDURE — 1036F TOBACCO NON-USER: CPT | Performed by: INTERNAL MEDICINE

## 2023-05-10 PROCEDURE — G8427 DOCREV CUR MEDS BY ELIG CLIN: HCPCS | Performed by: INTERNAL MEDICINE

## 2023-05-10 PROCEDURE — 99214 OFFICE O/P EST MOD 30 MIN: CPT | Performed by: INTERNAL MEDICINE

## 2023-05-10 PROCEDURE — 1123F ACP DISCUSS/DSCN MKR DOCD: CPT | Performed by: INTERNAL MEDICINE

## 2023-05-10 RX ORDER — FERROUS SULFATE 325(65) MG
325 TABLET ORAL
COMMUNITY

## 2023-05-10 NOTE — PROGRESS NOTES
Identified pt with two pt identifiers(name and ). Reviewed record in preparation for visit and have obtained necessary documentation. Heather Reina presents today for   Chief Complaint   Patient presents with    Follow-up     4m       Pt denied  DIZZINESS, SOB, CHEST PAIN/ PRESSURE, FATIGUE/WEAKNESS, HEADACHES, SWELLING. Heather Reina preferred language for health care discussion is english/other. Personal Protective Equipment:   Personal Protective Equipment was used including: mask-surgical and hands-gloves. Patient was placed on no precaution(s). Patient was masked. Precautions:   Patient currently on None  Patient currently roomed with door closed. Is someone accompanying this pt? daughter    Is the patient using any DME equipment during OV? Walker and 3L 02 NC    Depression Screening:  PHQ-9 Questionaire 3/15/2023 2023 2022 2022 2022 2022 2022   Little interest or pleasure in doing things 0 0 0 0 0 0 0   Feeling down, depressed, or hopeless 0 0 1 0 0 0 0   PHQ-9 Total Score 0 0 1 0 0 0 0        Learning Assessment:  No question data found. Abuse Screening: AMB Abuse Screening 5/10/2023   Do you ever feel afraid of your partner? N   Are you in a relationship with someone who physically or mentally threatens you? N   Is it safe for you to go home? Y          Fall Risk  Fall Risk 5/10/2023 3/15/2023   2 or more falls in past year? no no   Fall with injury in past year? no no         Pt currently taking Anticoagulant therapy? no  Pt currently taking Antiplatelet therapy? no    Coordination of Care:  1. Have you been to the ER, urgent care clinic since your last visit? Hospitalized since your last visit? no    2. Have you seen or consulted any other health care providers outside of the [address] [place] [address] since your last visit? Include any pap smears or colon screening.  Dr Lea Nieves      Please see Red banners under Allergies and Med Rec to remove
by mouth two (2) times a day. Half a pill in the am and half a pill pm) 180 Tablet 3    budesonide-formoteroL (Symbicort) 160-4.5 mcg/actuation HFAA Take 2 Puffs by inhalation two (2) times a day. 1 Each 5    albuterol (PROVENTIL HFA, VENTOLIN HFA, PROAIR HFA) 90 mcg/actuation inhaler Take 2 Puffs by inhalation every six (6) hours as needed for Wheezing. 1 Each 4    methIMAzole (TAPAZOLE) 5 mg tablet Take 2.5 mg by mouth daily. diclofenac (VOLTAREN) 1 % gel Apply 4 g to affected area four (4) times daily. 350 g 1    allopurinoL (ZYLOPRIM) 300 mg tablet Take 1 Tab by mouth daily. 90 Tab 4    fluticasone propionate (FLONASE) 50 mcg/actuation nasal spray 2 Sprays by Both Nostrils route daily. 1 Bottle 5    bumetanide (BUMEX) 2 mg tablet Take 1 Tablet by mouth two (2) times a day. 60 Tablet 5    apixaban (ELIQUIS) 2.5 mg tablet Take 1 Tablet by mouth two (2) times a day. 60 Tablet 5     Allergies   Allergen Reactions    Tomato Other (comments)     Gi Distress    PT said she no longer allergic      Past Medical History:   Diagnosis Date    Arthritis 9/20/2010    Asthma     Chronic eczema 3/22/2010    Chronic kidney disease     Clot 10/2018    lower left exremity, shin     Edema 3/22/2010    Environmental allergies     year round    Essential hypertension, malignant 3/22/2010    Gout     Menopause     Multinodular goiter     Followed by Dr. Hdz Rm, ENT.  FNA 2020    Pure hypercholesterolemia 3/22/2010    Subclinical hyperthyroidism 5/30/2017    Unspecified arthropathy, shoulder region 3/22/2010    Unspecified hypothyroidism 3/22/2010    Vitamin D deficiency      Past Surgical History:   Procedure Laterality Date    HX GYN  1983    hysterectomy    HX HYSTERECTOMY  1984    fibroids    HX KNEE REPLACEMENT Right     IR FINE NEEDLE ASPIRATION THYROID  07/2020    benign     Family History   Problem Relation Age of Onset    Hypertension Mother     Breast Cancer Mother 70    Breast Cancer Child 36    Breast Cancer Sister 39

## 2023-05-11 ENCOUNTER — TELEPHONE (OUTPATIENT)
Facility: CLINIC | Age: 88
End: 2023-05-11

## 2023-05-11 DIAGNOSIS — K04.7 DENTAL INFECTION: Primary | ICD-10-CM

## 2023-05-11 RX ORDER — AMOXICILLIN 875 MG/1
875 TABLET, COATED ORAL 2 TIMES DAILY
Qty: 14 TABLET | Refills: 0 | Status: SHIPPED | OUTPATIENT
Start: 2023-05-11 | End: 2023-05-18

## 2023-05-24 ENCOUNTER — TELEPHONE (OUTPATIENT)
Age: 88
End: 2023-05-24

## 2023-05-26 ENCOUNTER — CARE COORDINATION (OUTPATIENT)
Facility: CLINIC | Age: 88
End: 2023-05-26

## 2023-05-26 PROBLEM — R10.13 EPIGASTRIC PAIN: Status: ACTIVE | Noted: 2023-05-26

## 2023-05-26 PROBLEM — K80.20 CHOLELITHIASIS WITHOUT OBSTRUCTION: Status: ACTIVE | Noted: 2023-05-26

## 2023-05-26 PROBLEM — J45.909 UNCOMPLICATED ASTHMA: Status: ACTIVE | Noted: 2022-06-09

## 2023-05-26 PROBLEM — K29.90 GASTRODUODENITIS: Status: ACTIVE | Noted: 2023-05-26

## 2023-05-26 PROBLEM — D64.9 CHRONIC ANEMIA: Status: ACTIVE | Noted: 2022-08-31

## 2023-05-26 PROBLEM — K76.9 DISORDER OF LIVER: Status: ACTIVE | Noted: 2023-05-26

## 2023-05-26 PROBLEM — E66.01 MORBID OBESITY (HCC): Status: ACTIVE | Noted: 2022-08-31

## 2023-05-26 PROBLEM — Q39.4 TERMINAL ESOPHAGEAL WEB: Status: ACTIVE | Noted: 2023-05-26

## 2023-05-26 PROBLEM — I50.9 CHF (CONGESTIVE HEART FAILURE) (HCC): Status: ACTIVE | Noted: 2022-08-30

## 2023-05-26 PROBLEM — I48.19 PERSISTENT ATRIAL FIBRILLATION (HCC): Status: ACTIVE | Noted: 2022-07-09

## 2023-05-26 PROBLEM — K21.9 GASTRO-ESOPHAGEAL REFLUX DISEASE WITHOUT ESOPHAGITIS: Status: ACTIVE | Noted: 2023-05-26

## 2023-05-26 PROBLEM — K22.2 STRICTURE OF ESOPHAGUS: Status: ACTIVE | Noted: 2023-05-26

## 2023-05-26 PROBLEM — R13.10 DYSPHAGIA: Status: ACTIVE | Noted: 2023-05-26

## 2023-05-26 PROBLEM — K57.30 DIVERTICULAR DISEASE OF COLON: Status: ACTIVE | Noted: 2023-05-26

## 2023-05-26 PROBLEM — R68.81 EARLY SATIETY: Status: ACTIVE | Noted: 2023-05-26

## 2023-05-26 PROBLEM — D72.10 EOSINOPHILIA: Status: ACTIVE | Noted: 2022-06-09

## 2023-05-26 PROBLEM — R06.02 SOB (SHORTNESS OF BREATH): Status: ACTIVE | Noted: 2022-06-09

## 2023-05-26 PROBLEM — E05.90 HYPERTHYROIDISM: Status: ACTIVE | Noted: 2022-08-31

## 2023-05-26 PROBLEM — Z20.822 SUSPECTED 2019-NCOV INFECTION: Status: ACTIVE | Noted: 2022-05-01

## 2023-05-26 PROBLEM — M1A.9XX0 CHRONIC GOUT: Status: ACTIVE | Noted: 2022-08-31

## 2023-05-26 PROBLEM — R14.3 FLATULENCE: Status: ACTIVE | Noted: 2023-05-26

## 2023-05-26 PROBLEM — K44.9 DIAPHRAGMATIC HERNIA: Status: ACTIVE | Noted: 2023-05-26

## 2023-05-26 PROBLEM — K64.0 FIRST DEGREE HEMORRHOIDS: Status: ACTIVE | Noted: 2023-05-26

## 2023-05-26 ASSESSMENT — PATIENT HEALTH QUESTIONNAIRE - PHQ9
SUM OF ALL RESPONSES TO PHQ QUESTIONS 1-9: 0

## 2023-05-26 NOTE — CARE COORDINATION
language, deafness, aphasia, alcohol or drug problems, learning difficulties, concentration): Adequate communication, with or without minor barriers   Do other services need to be involved to help this patient?: Other care/services in place and adequate   Are current services involved with this patient well-coordinated? (Include coordination with other services you are now recommendation): Required care/services in place and adequately coordinated   Suggested Interventions and Community Resources  No Identified Needs              . Goals        Care Coordination Self Management      CC Self Management Goal  Patient Goal (What steps will patient take to achieve goal?): >>>>  Patient is able to discuss self-management of condition(s):  Pt demonstrates adherence to medications  Pt demonstrates understanding of self-monitoring  Patient is able to identify Red Flags:  Alert to potential adverse drug reactions(s) or side effects and actions to take should they arise  Discuss target symptoms and actions to take should they arise  Identify problems that require immediate PCP or specialist visit  Patient demonstrates understanding of access to PCP/Specialist:  Understands about scheduling routine Follow Up appointments   Understands about sick day appointment options for worsening of symptoms/progression (Same Day, E Visits)       Self-schedules and keeps appointments       Take all medications as ordered      Understand CHF action plan. .CHF Protocol:   Daily weights, BP checks, monitor edema in lower legs and SOB. Limit sodium intake, avoid foods high in sodium. Monitor fluid intake as per PCP directions.    Notify PCP office for wt gain 3 lbs in 2 days or 5 lbs In 1 week ( wt 204 today )                  Future Appointments   Date Time Provider Korina Lyn   6/15/2023 11:40 AM Isis Mckeon MD [facility] BS AMB   9/11/2023  1:20 PM Duane Trent MD [facility] BS AMB

## 2023-06-08 NOTE — TELEPHONE ENCOUNTER
PCP: Dustin Robertson MD    Last appt: [unfilled]  Future Appointments   Date Time Provider Korina Lyn   6/15/2023 11:40 AM Dustin Robertson MD [facility]   9/11/2023  1:20 PM Janis Sesay MD [facility] AMB       Requested Prescriptions     Pending Prescriptions Disp Refills    apixaban (ELIQUIS) 2.5 MG TABS tablet 180 tablet 1     Sig: Take 1 tablet by mouth 2 times daily

## 2023-06-15 ENCOUNTER — OFFICE VISIT (OUTPATIENT)
Facility: CLINIC | Age: 88
End: 2023-06-15

## 2023-06-15 VITALS
OXYGEN SATURATION: 97 % | HEIGHT: 65 IN | BODY MASS INDEX: 33.49 KG/M2 | SYSTOLIC BLOOD PRESSURE: 110 MMHG | WEIGHT: 201 LBS | HEART RATE: 69 BPM | DIASTOLIC BLOOD PRESSURE: 65 MMHG | RESPIRATION RATE: 16 BRPM | TEMPERATURE: 98 F

## 2023-06-15 DIAGNOSIS — E55.9 VITAMIN D DEFICIENCY, UNSPECIFIED: Primary | ICD-10-CM

## 2023-06-15 DIAGNOSIS — J30.1 SEASONAL ALLERGIC RHINITIS DUE TO POLLEN: ICD-10-CM

## 2023-06-15 DIAGNOSIS — J44.1 CHRONIC OBSTRUCTIVE PULMONARY DISEASE WITH (ACUTE) EXACERBATION (HCC): ICD-10-CM

## 2023-06-15 PROCEDURE — 99214 OFFICE O/P EST MOD 30 MIN: CPT | Performed by: STUDENT IN AN ORGANIZED HEALTH CARE EDUCATION/TRAINING PROGRAM

## 2023-06-15 PROCEDURE — 1123F ACP DISCUSS/DSCN MKR DOCD: CPT | Performed by: STUDENT IN AN ORGANIZED HEALTH CARE EDUCATION/TRAINING PROGRAM

## 2023-06-15 RX ORDER — CETIRIZINE HYDROCHLORIDE 10 MG/1
10 TABLET ORAL DAILY
Qty: 90 TABLET | Refills: 1 | Status: SHIPPED | OUTPATIENT
Start: 2023-06-15

## 2023-06-15 RX ORDER — FLUTICASONE PROPIONATE 50 MCG
1 SPRAY, SUSPENSION (ML) NASAL DAILY
Qty: 32 G | Refills: 1 | Status: SHIPPED | OUTPATIENT
Start: 2023-06-15

## 2023-06-15 RX ORDER — POTASSIUM CHLORIDE 1500 MG/1
20 TABLET, EXTENDED RELEASE ORAL ONCE
COMMUNITY

## 2023-06-15 RX ORDER — BUDESONIDE AND FORMOTEROL FUMARATE DIHYDRATE 160; 4.5 UG/1; UG/1
2 AEROSOL RESPIRATORY (INHALATION) 2 TIMES DAILY
COMMUNITY

## 2023-06-15 RX ORDER — ERGOCALCIFEROL 1.25 MG/1
50000 CAPSULE ORAL
Qty: 4 CAPSULE | Refills: 2 | Status: SHIPPED | OUTPATIENT
Start: 2023-06-15

## 2023-06-15 RX ORDER — ALBUTEROL SULFATE 90 UG/1
2 AEROSOL, METERED RESPIRATORY (INHALATION) EVERY 6 HOURS PRN
Qty: 18 G | Refills: 2 | Status: SHIPPED | OUTPATIENT
Start: 2023-06-15

## 2023-06-15 RX ORDER — BUDESONIDE AND FORMOTEROL FUMARATE DIHYDRATE 160; 4.5 UG/1; UG/1
2 AEROSOL RESPIRATORY (INHALATION) 2 TIMES DAILY
Qty: 10.2 G | Status: CANCELLED | OUTPATIENT
Start: 2023-06-15

## 2023-06-15 RX ORDER — SIMETHICONE 80 MG
TABLET,CHEWABLE ORAL
COMMUNITY
Start: 2023-05-02 | End: 2023-12-12

## 2023-06-15 RX ORDER — ALLOPURINOL 300 MG/1
300 TABLET ORAL DAILY
COMMUNITY

## 2023-06-15 RX ORDER — FLUTICASONE PROPIONATE AND SALMETEROL 100; 50 UG/1; UG/1
1 POWDER RESPIRATORY (INHALATION) EVERY 12 HOURS
Qty: 60 EACH | Refills: 2 | Status: SHIPPED | OUTPATIENT
Start: 2023-06-15

## 2023-06-15 RX ORDER — AMLODIPINE BESYLATE 5 MG/1
5 TABLET ORAL DAILY
COMMUNITY

## 2023-06-15 RX ORDER — METHIMAZOLE 5 MG/1
5 TABLET ORAL 3 TIMES DAILY
COMMUNITY

## 2023-06-15 SDOH — ECONOMIC STABILITY: HOUSING INSECURITY
IN THE LAST 12 MONTHS, WAS THERE A TIME WHEN YOU DID NOT HAVE A STEADY PLACE TO SLEEP OR SLEPT IN A SHELTER (INCLUDING NOW)?: PATIENT REFUSED

## 2023-06-15 SDOH — ECONOMIC STABILITY: FOOD INSECURITY: WITHIN THE PAST 12 MONTHS, THE FOOD YOU BOUGHT JUST DIDN'T LAST AND YOU DIDN'T HAVE MONEY TO GET MORE.: PATIENT DECLINED

## 2023-06-15 SDOH — ECONOMIC STABILITY: FOOD INSECURITY: WITHIN THE PAST 12 MONTHS, YOU WORRIED THAT YOUR FOOD WOULD RUN OUT BEFORE YOU GOT MONEY TO BUY MORE.: PATIENT DECLINED

## 2023-06-15 SDOH — ECONOMIC STABILITY: INCOME INSECURITY: HOW HARD IS IT FOR YOU TO PAY FOR THE VERY BASICS LIKE FOOD, HOUSING, MEDICAL CARE, AND HEATING?: PATIENT DECLINED

## 2023-06-15 ASSESSMENT — PATIENT HEALTH QUESTIONNAIRE - PHQ9
SUM OF ALL RESPONSES TO PHQ QUESTIONS 1-9: 0
1. LITTLE INTEREST OR PLEASURE IN DOING THINGS: 0
SUM OF ALL RESPONSES TO PHQ QUESTIONS 1-9: 0
SUM OF ALL RESPONSES TO PHQ QUESTIONS 1-9: 0
2. FEELING DOWN, DEPRESSED OR HOPELESS: 0
SUM OF ALL RESPONSES TO PHQ9 QUESTIONS 1 & 2: 0
SUM OF ALL RESPONSES TO PHQ QUESTIONS 1-9: 0

## 2023-06-26 ENCOUNTER — HOSPITAL ENCOUNTER (OUTPATIENT)
Dept: WOMENS IMAGING | Facility: HOSPITAL | Age: 88
Discharge: HOME | End: 2023-06-29
Attending: STUDENT IN AN ORGANIZED HEALTH CARE EDUCATION/TRAINING PROGRAM
Payer: MEDICARE

## 2023-06-26 DIAGNOSIS — Z12.31 VISIT FOR SCREENING MAMMOGRAM: ICD-10-CM

## 2023-06-26 PROCEDURE — 77063 BREAST TOMOSYNTHESIS BI: CPT

## 2023-06-26 ASSESSMENT — ENCOUNTER SYMPTOMS
CONSTIPATION: 0
COLOR CHANGE: 0
BACK PAIN: 0
ABDOMINAL PAIN: 0
EYE REDNESS: 0
EYE PAIN: 0
FACIAL SWELLING: 0
DIARRHEA: 0
EYE DISCHARGE: 0
VOMITING: 0
SHORTNESS OF BREATH: 0
EYE ITCHING: 0

## 2023-06-30 ENCOUNTER — CARE COORDINATION (OUTPATIENT)
Facility: CLINIC | Age: 88
End: 2023-06-30

## 2023-07-10 RX ORDER — METOPROLOL TARTRATE 50 MG/1
50 TABLET, FILM COATED ORAL 2 TIMES DAILY
Qty: 180 TABLET | Refills: 1 | Status: SHIPPED | OUTPATIENT
Start: 2023-07-10

## 2023-07-10 RX ORDER — BUMETANIDE 2 MG/1
2 TABLET ORAL 2 TIMES DAILY
Qty: 90 TABLET | Refills: 1 | Status: SHIPPED | OUTPATIENT
Start: 2023-07-10

## 2023-07-18 ENCOUNTER — TELEPHONE (OUTPATIENT)
Facility: CLINIC | Age: 88
End: 2023-07-18

## 2023-07-18 NOTE — TELEPHONE ENCOUNTER
Pt daughter, Carmencita Joe called to let you know that she was scheduled for a Ct on the 10th however the Doc decided not to do it because of her kidney levels.     Please call back either    Carmencita Joe: [phone] or   Jadon Matias: [phone]    Thank you

## 2023-07-21 NOTE — TELEPHONE ENCOUNTER
Called patient. She is unable to have CT done for her esophagus due to elevated creatinine and decreased GFR. Discussed that this would be up to nephrologist in order to see if patient would need the testing done. However, patient and daughter state that since she had the endoscopy she has been feeling much better when it comes to swallowing. Discussed that may be we could put on hold the CT scan unless she starts feeling issues with swallowing and then Dr. Mainor Albrecht and Dr. Janann Merlin can find a way to have this done. Pt recently saw endo. Will have thyroid US done. Graves disease controlled.

## 2023-08-14 ENCOUNTER — CARE COORDINATION (OUTPATIENT)
Facility: CLINIC | Age: 88
End: 2023-08-14

## 2023-08-14 NOTE — CARE COORDINATION
.Attempted to contact patient for Complex Care offer. No answer, left message with contact information provided. Will attempt to contact at a later time No ED or hospital admissions.

## 2023-08-14 NOTE — CARE COORDINATION
.Ambulatory Care Coordination Note  2023    Patient Current Location:  Home: [address] [address]     ACM contacted the patient by telephone. Verified name and  with patient as identifiers. Provided introduction to self, and explanation of the ACM role. Daughter Franklin Morse returned call. They are in [facility] visiting with family. ACM spoke with patient she is voicing she is doing well. No problems . Challenges to be reviewed by the provider   Additional needs identified to be addressed with provider: No  none               Method of communication with provider: none. ACM: Grant Petersen RN    Offered patient enrollment in the Remote Patient Monitoring (RPM) program for in-home monitoring: NA. Lab Results       None            Care Coordination Interventions    Referral from Primary Care Provider: No  Suggested Interventions and Community Resources  No Identified Needs          Goals Addressed                   This Visit's Progress     Care Coordination Self Management   On track     CC Self Management Goal  Patient Goal (What steps will patient take to achieve goal?): >>>>  Patient is able to discuss self-management of condition(s):  Pt demonstrates adherence to medications  Pt demonstrates understanding of self-monitoring  Patient is able to identify Red Flags:  Alert to potential adverse drug reactions(s) or side effects and actions to take should they arise  Discuss target symptoms and actions to take should they arise  Identify problems that require immediate PCP or specialist visit  Patient demonstrates understanding of access to PCP/Specialist:  Understands about scheduling routine Follow Up appointments   Understands about sick day appointment options for worsening of symptoms/progression (Same Day, E Visits)  23  On going  23  Patient out of town with daughters in [facility].        Self-schedules and keeps appointments    On track

## 2023-09-01 ENCOUNTER — CARE COORDINATION (OUTPATIENT)
Facility: CLINIC | Age: 88
End: 2023-09-01

## 2023-09-01 SDOH — ECONOMIC STABILITY: INCOME INSECURITY: IN THE LAST 12 MONTHS, WAS THERE A TIME WHEN YOU WERE NOT ABLE TO PAY THE MORTGAGE OR RENT ON TIME?: NO

## 2023-09-01 SDOH — ECONOMIC STABILITY: FOOD INSECURITY: WITHIN THE PAST 12 MONTHS, THE FOOD YOU BOUGHT JUST DIDN'T LAST AND YOU DIDN'T HAVE MONEY TO GET MORE.: NEVER TRUE

## 2023-09-01 SDOH — HEALTH STABILITY: PHYSICAL HEALTH: ON AVERAGE, HOW MANY MINUTES DO YOU ENGAGE IN EXERCISE AT THIS LEVEL?: 0 MIN

## 2023-09-01 SDOH — ECONOMIC STABILITY: HOUSING INSECURITY
IN THE LAST 12 MONTHS, WAS THERE A TIME WHEN YOU DID NOT HAVE A STEADY PLACE TO SLEEP OR SLEPT IN A SHELTER (INCLUDING NOW)?: NO

## 2023-09-01 SDOH — ECONOMIC STABILITY: FOOD INSECURITY: WITHIN THE PAST 12 MONTHS, YOU WORRIED THAT YOUR FOOD WOULD RUN OUT BEFORE YOU GOT MONEY TO BUY MORE.: NEVER TRUE

## 2023-09-01 SDOH — ECONOMIC STABILITY: TRANSPORTATION INSECURITY
IN THE PAST 12 MONTHS, HAS THE LACK OF TRANSPORTATION KEPT YOU FROM MEDICAL APPOINTMENTS OR FROM GETTING MEDICATIONS?: NO

## 2023-09-01 SDOH — ECONOMIC STABILITY: HOUSING INSECURITY: IN THE LAST 12 MONTHS, HOW MANY PLACES HAVE YOU LIVED?: 1

## 2023-09-01 SDOH — HEALTH STABILITY: PHYSICAL HEALTH: ON AVERAGE, HOW MANY DAYS PER WEEK DO YOU ENGAGE IN MODERATE TO STRENUOUS EXERCISE (LIKE A BRISK WALK)?: 0 DAYS

## 2023-09-01 SDOH — ECONOMIC STABILITY: TRANSPORTATION INSECURITY
IN THE PAST 12 MONTHS, HAS LACK OF TRANSPORTATION KEPT YOU FROM MEETINGS, WORK, OR FROM GETTING THINGS NEEDED FOR DAILY LIVING?: NO

## 2023-09-01 ASSESSMENT — SOCIAL DETERMINANTS OF HEALTH (SDOH)
HOW HARD IS IT FOR YOU TO PAY FOR THE VERY BASICS LIKE FOOD, HOUSING, MEDICAL CARE, AND HEATING?: NOT HARD AT ALL
WITHIN THE LAST YEAR, HAVE TO BEEN RAPED OR FORCED TO HAVE ANY KIND OF SEXUAL ACTIVITY BY YOUR PARTNER OR EX-PARTNER?: NO
HOW OFTEN DO YOU ATTEND CHURCH OR RELIGIOUS SERVICES?: MORE THAN 4 TIMES PER YEAR
WITHIN THE LAST YEAR, HAVE YOU BEEN HUMILIATED OR EMOTIONALLY ABUSED IN OTHER WAYS BY YOUR PARTNER OR EX-PARTNER?: NO
WITHIN THE LAST YEAR, HAVE YOU BEEN KICKED, HIT, SLAPPED, OR OTHERWISE PHYSICALLY HURT BY YOUR PARTNER OR EX-PARTNER?: NO
DO YOU BELONG TO ANY CLUBS OR ORGANIZATIONS SUCH AS CHURCH GROUPS UNIONS, FRATERNAL OR ATHLETIC GROUPS, OR SCHOOL GROUPS?: NO
HOW OFTEN DO YOU GET TOGETHER WITH FRIENDS OR RELATIVES?: MORE THAN THREE TIMES A WEEK
WITHIN THE LAST YEAR, HAVE YOU BEEN AFRAID OF YOUR PARTNER OR EX-PARTNER?: NO
IN A TYPICAL WEEK, HOW MANY TIMES DO YOU TALK ON THE PHONE WITH FAMILY, FRIENDS, OR NEIGHBORS?: MORE THAN THREE TIMES A WEEK

## 2023-09-01 ASSESSMENT — LIFESTYLE VARIABLES
HOW OFTEN DO YOU HAVE A DRINK CONTAINING ALCOHOL: NEVER
HOW MANY STANDARD DRINKS CONTAINING ALCOHOL DO YOU HAVE ON A TYPICAL DAY: PATIENT DOES NOT DRINK

## 2023-09-01 NOTE — CARE COORDINATION
Daughters don't allow patient to exert self, due to multiple dx.
7/10  Thyroid doctor 7/22 8/14/23  Patient has attended all doctors appointments. CT Scan completed  Has F/U with Cardiology 8/25 pacer check  PCP 10/17  Pulmonary 11/15       Take all medications as ordered   On track     8/14/23  Patient is compliant with refills, no exacerbations . Taking medications without any issues  9/1/23  Taking all medications as prescribed       Understand CHF action plan. On track     . CHF Protocol:   Daily weights, BP checks, monitor edema in lower legs and SOB. Limit sodium intake, avoid foods high in sodium. Monitor fluid intake as per PCP directions. Notify PCP office for wt gain 3 lbs in 2 days or 5 lbs In 1 week ( wt 204 today )  6/30/23  No CHF exacerbation  wt @ 201  8/14/23  No CHF exacerbations. Denies SOB,chest pains or discomfort .  In [facility] with family  9/1/23  Back from [place] no SOB or swelling               Future Appointments   Date Time Provider [address]   9/11/2023  3:00 PM Ledy Candelaria MD [address] [facility] BS AMB   10/17/2023 11:40 AM Elaine Murillo MD [facility] BS AMB

## 2023-09-07 ENCOUNTER — TELEPHONE (OUTPATIENT)
Age: 88
End: 2023-09-07

## 2023-09-07 NOTE — TELEPHONE ENCOUNTER
Office recevied a clearance letter for extraction of 5 teeth and implant replacement to support a lower denture. Verbal order and read back per Yamileth Wilson MD  Patient is cleared and can hold Eliquis 2 days prior.

## 2023-09-08 ENCOUNTER — CARE COORDINATION (OUTPATIENT)
Facility: CLINIC | Age: 88
End: 2023-09-08

## 2023-09-08 NOTE — CARE COORDINATION
.Ambulatory Care Coordination Note  2023    Patient Current Location:  Home: [address] [facility] [address]     ACM contacted the patient by telephone. Verified name and  with patient as identifiers. Provided introduction to self, and explanation of the ACM role. Patient voicing she is doing just fine. Waiting on another trip out of town laughing. Hoe with daughter denies chestpains or SOB using oxygen as prescribed. Edema is decreased as to she can put on her socks today. ACM voiced to try to keep legs elevated amublate more around the house and use pillows at feet at bed time. Monitor salt intake. Patient voiced she don't use salt. [id number] to be reviewed by the provider   Additional needs identified to be addressed with provider: No  none               Method of communication with provider: none. ACM: Mik Morley RN    Offered patient enrollment in the Remote Patient Monitoring (RPM) program for in-home monitoring: NA.     Lab Results       None                 Goals Addressed    None         Future Appointments   Date Time Provider [address] [place] [address]   2023  3:00 PM Alice Lane MD [address] [facility] BS AMB   10/17/2023 11:40 AM Marie Benoit MD [facility] BS AMB

## 2023-09-11 ENCOUNTER — OFFICE VISIT (OUTPATIENT)
Age: 88
End: 2023-09-11
Payer: MEDICARE

## 2023-09-11 VITALS
OXYGEN SATURATION: 96 % | HEART RATE: 65 BPM | SYSTOLIC BLOOD PRESSURE: 130 MMHG | BODY MASS INDEX: 36.15 KG/M2 | DIASTOLIC BLOOD PRESSURE: 66 MMHG | WEIGHT: 217 LBS | HEIGHT: 65 IN

## 2023-09-11 DIAGNOSIS — R07.9 CHEST PAIN, UNSPECIFIED TYPE: Primary | ICD-10-CM

## 2023-09-11 PROCEDURE — 1123F ACP DISCUSS/DSCN MKR DOCD: CPT | Performed by: INTERNAL MEDICINE

## 2023-09-11 PROCEDURE — G8417 CALC BMI ABV UP PARAM F/U: HCPCS | Performed by: INTERNAL MEDICINE

## 2023-09-11 PROCEDURE — G8427 DOCREV CUR MEDS BY ELIG CLIN: HCPCS | Performed by: INTERNAL MEDICINE

## 2023-09-11 PROCEDURE — 93000 ELECTROCARDIOGRAM COMPLETE: CPT | Performed by: INTERNAL MEDICINE

## 2023-09-11 PROCEDURE — 1090F PRES/ABSN URINE INCON ASSESS: CPT | Performed by: INTERNAL MEDICINE

## 2023-09-11 PROCEDURE — 1036F TOBACCO NON-USER: CPT | Performed by: INTERNAL MEDICINE

## 2023-09-11 PROCEDURE — 99214 OFFICE O/P EST MOD 30 MIN: CPT | Performed by: INTERNAL MEDICINE

## 2023-09-11 ASSESSMENT — PATIENT HEALTH QUESTIONNAIRE - PHQ9
SUM OF ALL RESPONSES TO PHQ QUESTIONS 1-9: 0
SUM OF ALL RESPONSES TO PHQ QUESTIONS 1-9: 0
SUM OF ALL RESPONSES TO PHQ9 QUESTIONS 1 & 2: 0
SUM OF ALL RESPONSES TO PHQ QUESTIONS 1-9: 0
SUM OF ALL RESPONSES TO PHQ QUESTIONS 1-9: 0
1. LITTLE INTEREST OR PLEASURE IN DOING THINGS: 0
2. FEELING DOWN, DEPRESSED OR HOPELESS: 0

## 2023-09-11 NOTE — PROGRESS NOTES
Lorena Amador presents today for   Chief Complaint   Patient presents with    Follow-up     4 month f/u     Edema     Bilateral edema in legs legs and feet    Surgical Clearance     Dental procedure       Lorena Amador preferred language for health care discussion is english/other. Is someone accompanying this pt? yes    Is the patient using any DME equipment during OV? JANETH Dumas    Depression Screening:  Depression: Not at risk (9/11/2023)    PHQ-2     PHQ-2 Score: 0        Learning Assessment:  Who is the primary learner? Patient    What is the preferred language for health care of the primary learner? ENGLISH    How does the primary learner prefer to learn new concepts? DEMONSTRATION    Answered By patient    Relationship to Learner SELF           Pt currently taking Anticoagulant therapy? no    Pt currently taking Antiplatelet therapy ? Eliquis 2.5 MG 2 time daily      Coordination of Care:  1. Have you been to the ER, urgent care clinic since your last visit? Hospitalized since your last visit? no    2. Have you seen or consulted any other health care providers outside of the [address] [place] [address] since your last visit? Include any pap smears or colon screening.  no
3. Edema, unspecified type  R60.9 782.3    4. History of echocardiogram , normal LV function with no significant valvular pathology. PA pressure estimate was elevated. Z92.89 V15.89    5  HFpEF, hospitalized with acute hypoxemic hypercarbic respiratory failure at [facility] (5/1/2022), other contributing factors asthma and acute bronchitis, COPD exacerbation. Recently required a uptick in the diuretic prescription. She will weigh daily. 6       PAF, [id number] score 5, patient on Eliquis 2.5 mg twice daily for stroke prevention. 7       Hyperthyroidism, patient taking methimazole 2.5 mg daily  8. CKD, stage IIIa, follows with Dr. Tin Harris. 9.      Permanent pacemaker in situ. Implant date 7/12/2022. [address] [facility] [id number]. For asymptomatic 12-second pause on telemetry, atrial fibrillation with RVR. Last interrogation 10/18/2022.  100% AF /AFl. DDD switched to VVIR  10     Preprocedural clearance, oral surgery. No contraindication to proposed procedure.

## 2023-09-15 ENCOUNTER — CARE COORDINATION (OUTPATIENT)
Facility: CLINIC | Age: 88
End: 2023-09-15

## 2023-09-15 NOTE — CARE COORDINATION
.Patient has graduated from the Complex Care program on 9/15/23. Patient/family has the ability to self-manage at this time. Care management goals have been completed. No further Ambulatory Care Manager follow up scheduled. Goals Addressed                   This Visit's Progress     COMPLETED: Care Coordination Self Management        CC Self Management Goal  Patient Goal (What steps will patient take to achieve goal?): >>>>  Patient is able to discuss self-management of condition(s):  Pt demonstrates adherence to medications  Pt demonstrates understanding of self-monitoring  Patient is able to identify Red Flags:  Alert to potential adverse drug reactions(s) or side effects and actions to take should they arise  Discuss target symptoms and actions to take should they arise  Identify problems that require immediate PCP or specialist visit  Patient demonstrates understanding of access to PCP/Specialist:  Understands about scheduling routine Follow Up appointments   Understands about sick day appointment options for worsening of symptoms/progression (Same Day, E Visits)  6/30/23  On going  8/14/23  Patient out of town with daughters in [facility].  9/1/23  Daughter contacted [facility] they are back and patient had a great time       COMPLETED: Self-schedules and keeps appointments         6/30/23  Patient has attended PCP and ophthalmology appointment  Has Cat scan 7/10  Thyroid doctor 7/22 8/14/23  Patient has attended all doctors appointments. CT Scan completed  Has F/U with Cardiology 8/25 pacer check  PCP 10/17  Pulmonary 11/15       COMPLETED: Take all medications as ordered        8/14/23  Patient is compliant with refills, no exacerbations . Taking medications without any issues  9/1/23  Taking all medications as prescribed       COMPLETED: Understand CHF action plan. On track     . CHF Protocol:   Daily weights, BP checks, monitor edema in lower legs and SOB.    Limit sodium intake, avoid foods high in

## 2023-10-17 ENCOUNTER — OFFICE VISIT (OUTPATIENT)
Facility: CLINIC | Age: 88
End: 2023-10-17

## 2023-10-17 VITALS
WEIGHT: 220.4 LBS | OXYGEN SATURATION: 99 % | HEART RATE: 75 BPM | RESPIRATION RATE: 16 BRPM | SYSTOLIC BLOOD PRESSURE: 122 MMHG | HEIGHT: 65 IN | BODY MASS INDEX: 36.72 KG/M2 | TEMPERATURE: 98.1 F | DIASTOLIC BLOOD PRESSURE: 72 MMHG

## 2023-10-17 DIAGNOSIS — F41.9 ANXIETY: Primary | ICD-10-CM

## 2023-10-17 DIAGNOSIS — J30.1 SEASONAL ALLERGIC RHINITIS DUE TO POLLEN: ICD-10-CM

## 2023-10-17 DIAGNOSIS — J44.1 CHRONIC OBSTRUCTIVE PULMONARY DISEASE WITH (ACUTE) EXACERBATION (HCC): ICD-10-CM

## 2023-10-17 DIAGNOSIS — M25.562 PAIN IN LATERAL PORTION OF LEFT KNEE: ICD-10-CM

## 2023-10-17 DIAGNOSIS — E66.01 SEVERE OBESITY (BMI 35.0-39.9) WITH COMORBIDITY (HCC): ICD-10-CM

## 2023-10-17 RX ORDER — FLUTICASONE PROPIONATE 50 MCG
1 SPRAY, SUSPENSION (ML) NASAL DAILY
Qty: 32 G | Refills: 1 | Status: SHIPPED | OUTPATIENT
Start: 2023-10-17

## 2023-10-17 RX ORDER — FLUTICASONE PROPIONATE AND SALMETEROL 100; 50 UG/1; UG/1
1 POWDER RESPIRATORY (INHALATION) EVERY 12 HOURS
Qty: 60 EACH | Refills: 2 | Status: SHIPPED | OUTPATIENT
Start: 2023-10-17

## 2023-10-17 RX ORDER — HYDROXYZINE HYDROCHLORIDE 10 MG/1
10 TABLET, FILM COATED ORAL 3 TIMES DAILY PRN
Qty: 30 TABLET | Refills: 1 | Status: SHIPPED | OUTPATIENT
Start: 2023-10-17 | End: 2023-10-27

## 2023-10-17 RX ORDER — CETIRIZINE HYDROCHLORIDE 10 MG/1
10 TABLET ORAL DAILY
Qty: 90 TABLET | Refills: 1 | Status: SHIPPED | OUTPATIENT
Start: 2023-10-17

## 2023-10-17 RX ORDER — OMEPRAZOLE 40 MG/1
40 CAPSULE, DELAYED RELEASE ORAL DAILY
Qty: 90 CAPSULE | Refills: 1 | Status: SHIPPED | OUTPATIENT
Start: 2023-10-17

## 2023-10-17 ASSESSMENT — PATIENT HEALTH QUESTIONNAIRE - PHQ9
1. LITTLE INTEREST OR PLEASURE IN DOING THINGS: 0
SUM OF ALL RESPONSES TO PHQ QUESTIONS 1-9: 0
SUM OF ALL RESPONSES TO PHQ QUESTIONS 1-9: 0
SUM OF ALL RESPONSES TO PHQ9 QUESTIONS 1 & 2: 0
2. FEELING DOWN, DEPRESSED OR HOPELESS: 0
SUM OF ALL RESPONSES TO PHQ QUESTIONS 1-9: 0
SUM OF ALL RESPONSES TO PHQ QUESTIONS 1-9: 0

## 2023-10-20 ASSESSMENT — ENCOUNTER SYMPTOMS
ABDOMINAL PAIN: 0
VOMITING: 0
CONSTIPATION: 0
BACK PAIN: 0
FACIAL SWELLING: 0
EYE REDNESS: 0
COLOR CHANGE: 0
EYE PAIN: 0
EYE DISCHARGE: 0
SHORTNESS OF BREATH: 0
EYE ITCHING: 0
DIARRHEA: 0

## 2023-10-20 NOTE — PROGRESS NOTES
Sheri Almaraz is a 82 y.o. presents today for   Chief Complaint   Patient presents with    Knee Pain     Left knee 9/10 worse last month     Is someone accompanying this pt? Yes      Is the patient using any DME equipment during OV? walker     Health Maintenance Due   Topic Date Due    DTaP/Tdap/Td vaccine (1 - Tdap) 08/22/2013    COVID-19 Vaccine (3 - Pfizer series) 06/26/2021    Flu vaccine (1) 08/01/2023         Coordination of Care:   1. \"Have you been to the ER, urgent care clinic since your last visit? Hospitalized since your last visit? \" No    2. \"Have you seen or consulted any other health care providers outside of the [address] [place] [address] since your last visit? \" No     3. For patients aged 43-73: Has the patient had a colonoscopy / FIT/ Cologuard? NA - based on age      If the patient is female:    4. For patients aged 43-66: Has the patient had a mammogram within the past 2 years? NA - based on age or sex      11. For patients aged 21-65: Has the patient had a pap smear?  NA - based on age or sex    Erich Reid
inhaler; Inhale 1 puff into the lungs in the morning and 1 puff in the evening. Dispense: 60 each; Refill: 2    3. Anxiety  - hydrOXYzine HCl (ATARAX) 10 MG tablet; Take 1 tablet by mouth 3 times daily as needed for Anxiety  Dispense: 30 tablet; Refill: 1    4. Severe obesity (BMI 35.0-39. 9) with comorbidity ([address])    5. Pain in lateral portion of left knee  We will see patient 2 weeks after her oral surgery to have her knee injection. - diclofenac sodium (VOLTAREN) 1 % GEL; Apply topically 4 times daily as needed for Pain  Dispense: 150 g; Refill: 2      Lab review: no lab studies available for review at time of visit        I have discussed the diagnosis with the patient and the intended plan as seen in the above orders. The patient has received an after-visit summary and questions were answered concerning future plans. I have discussed medication side effects and warnings with the patient as well. I have reviewed the plan of care with the patient, accepted their input and they are in agreement with the treatment goals.      Mendoza Guadalupe MD

## 2023-11-01 ENCOUNTER — CLINICAL DOCUMENTATION (OUTPATIENT)
Age: 88
End: 2023-11-01

## 2023-11-07 ENCOUNTER — OFFICE VISIT (OUTPATIENT)
Facility: CLINIC | Age: 88
End: 2023-11-07

## 2023-11-07 VITALS
OXYGEN SATURATION: 98 % | BODY MASS INDEX: 35.99 KG/M2 | SYSTOLIC BLOOD PRESSURE: 123 MMHG | TEMPERATURE: 98 F | HEIGHT: 65 IN | HEART RATE: 86 BPM | DIASTOLIC BLOOD PRESSURE: 73 MMHG | WEIGHT: 216 LBS | RESPIRATION RATE: 16 BRPM

## 2023-11-07 DIAGNOSIS — J01.90 ACUTE NON-RECURRENT SINUSITIS, UNSPECIFIED LOCATION: ICD-10-CM

## 2023-11-07 DIAGNOSIS — Z02.89 ENCOUNTER FOR OTHER ADMINISTRATIVE EXAMINATIONS: ICD-10-CM

## 2023-11-07 DIAGNOSIS — R06.00 ACUTE DYSPNEA: Primary | ICD-10-CM

## 2023-11-07 RX ORDER — AMOXICILLIN AND CLAVULANATE POTASSIUM 875; 125 MG/1; MG/1
1 TABLET, FILM COATED ORAL 2 TIMES DAILY
Qty: 14 TABLET | Refills: 0 | Status: SHIPPED | OUTPATIENT
Start: 2023-11-07 | End: 2023-11-14

## 2023-11-07 ASSESSMENT — PATIENT HEALTH QUESTIONNAIRE - PHQ9
1. LITTLE INTEREST OR PLEASURE IN DOING THINGS: 0
SUM OF ALL RESPONSES TO PHQ QUESTIONS 1-9: 0
SUM OF ALL RESPONSES TO PHQ9 QUESTIONS 1 & 2: 0
SUM OF ALL RESPONSES TO PHQ QUESTIONS 1-9: 0
2. FEELING DOWN, DEPRESSED OR HOPELESS: 0
SUM OF ALL RESPONSES TO PHQ QUESTIONS 1-9: 0
SUM OF ALL RESPONSES TO PHQ QUESTIONS 1-9: 0

## 2023-11-07 NOTE — PROGRESS NOTES
Rama Mix is a 82 y.o.  female and presents with    Chief Complaint   Patient presents with    Knee Pain     Left knee 6/10           Subjective:    Pt was originally here to have her L knee pain and a steroid injection. Pt however states she has been feeling SOB, she has had to increase her O2 she is currently at 3 L. She does feel she has been feeling with rhinorrhea, congested. She also woke up this morning with swelling on her left eye. She does feel some pressure in there as well. Patient Active Problem List   Diagnosis    Chronic gout of multiple sites    Edema    Severe obesity (BMI 35.0-39. 9) with comorbidity ([address])    Essential hypertension    History of echocardiogram    Uncomplicated asthma    Vitamin D deficiency    Chronic kidney disease    Arthritis    Multinodular goiter    Chronic eczema    Subclinical hyperthyroidism    Pure hypercholesterolemia    Other chest pain    Acute on chronic diastolic heart failure (HCC)    COPD exacerbation (HCC)    Paroxysmal atrial fibrillation (HCC)    Cholelithiasis without obstruction    CHF (congestive heart failure) (HCC)    Chronic anemia    Chronic gout    Diaphragmatic hernia    Disorder of liver    Diverticular disease of colon    Dysphagia    Early satiety    Eosinophilia    Epigastric pain    Flatulence    Gastroduodenitis    First degree hemorrhoids    Gastro-esophageal reflux disease without esophagitis    Osteoarthritis of right knee    S/P total knee arthroplasty    SOB (shortness of breath)    Stricture of esophagus    Suspected 2019-nCoV infection    Terminal esophageal web    HTN (hypertension)    Persistent atrial fibrillation ([address])    Morbid obesity ([address])    Hyperthyroidism    Hypothyroid      Past Medical History:   Diagnosis Date    Arthritis 9/20/2010    Asthma     Chronic eczema 3/22/2010    Chronic kidney disease     Clot 10/2018    lower left exremity, shin     COPD (chronic obstructive pulmonary disease) ([address])

## 2023-11-07 NOTE — PROGRESS NOTES
Sheri Almaraz is a 82 y.o. presents today for   Chief Complaint   Patient presents with    Follow-up     Is someone accompanying this pt? Yes      Is the patient using any DME equipment during OV? walker     Health Maintenance Due   Topic Date Due    DTaP/Tdap/Td vaccine (1 - Tdap) 08/22/2013    Flu vaccine (1) 08/01/2023    COVID-19 Vaccine (3 - 2023-24 season) 09/01/2023         Coordination of Care:   1. \"Have you been to the ER, urgent care clinic since your last visit? Hospitalized since your last visit? \" No    2. \"Have you seen or consulted any other health care providers outside of the [address] [place] [address] since your last visit? \" No     3. For patients aged 43-73: Has the patient had a colonoscopy / FIT/ Cologuard? NA - based on age      If the patient is female:    4. For patients aged 43-66: Has the patient had a mammogram within the past 2 years? NA - based on age or sex      11. For patients aged 21-65: Has the patient had a pap smear?  NA - based on age or sex    Erich Reid

## 2023-11-10 ASSESSMENT — ENCOUNTER SYMPTOMS
ABDOMINAL PAIN: 0
DIARRHEA: 0
EYE PAIN: 0
EYE ITCHING: 0
VOMITING: 0
FACIAL SWELLING: 0
EYE DISCHARGE: 0
BACK PAIN: 0
CONSTIPATION: 0
EYE REDNESS: 0
SHORTNESS OF BREATH: 1
COLOR CHANGE: 0

## 2023-11-14 NOTE — PROGRESS NOTES
Rekha Ansari presents today for evaluation of complaints of \"fluid around my heart\"  She was last seen by Dr. Gregory Barton in early Sept. 2023. She states that she saw her pulmonologist earlier this week and she was reportedly told that she had fluid buildup from her heart. Chest x-ray done on 11/13/23 showed mild pulmonary vascular congestion. She admits to feeling a little more short of breath lately and her edema appears to be better compared to previous (wearing compression socks). While reviewing her medications, she and her daughter stated that she has not been taking her bumetanide as prescribed. She has only been taking Bumetanide 2mg once a day instead of twice a day. She states that she has not been taking as prescribed for several days due to having appointments and having to go to the bathroom so much. She is followed by nephrology. Her other daughter verified that the nephrologist wrote a prescription for 1mg tablets and she is supposed to be taking 2 tablets twice a day. The tablet dose size was updated in her chart. She is an 82year old female with history of  82year-old woman that was initially referred for further evaluation of chest pain. She was seen in the ED at [facility] in March 2021. At that time she was complaining of persistent pain in her chest for the prior 3 days. She has a known history of esophagitis/biliary disease. She had an echocardiogram done on 3/24/2021. Her left ventricle was normal in size with normal systolic function. She did have moderate pulmonary hypertension with an estimated systolic pressure of 54 mmHg. It did not appear to be a complete TR regurgitant jet. She had a normal nuclear stress test in May 2021. Ejection fraction was 68%. In the office on 6/3/2022 she reported a prior [facility] hospitalization.  (5/1/2022).   She had acute hypoxemic hypercarbic respiratory failure secondary to suspected CHF, asthma/COPD exacerbation and

## 2023-11-16 ENCOUNTER — OFFICE VISIT (OUTPATIENT)
Age: 88
End: 2023-11-16
Payer: MEDICARE

## 2023-11-16 VITALS
HEIGHT: 65 IN | BODY MASS INDEX: 36.32 KG/M2 | SYSTOLIC BLOOD PRESSURE: 123 MMHG | DIASTOLIC BLOOD PRESSURE: 72 MMHG | HEART RATE: 89 BPM | OXYGEN SATURATION: 96 % | WEIGHT: 218 LBS

## 2023-11-16 DIAGNOSIS — I48.19 PERSISTENT ATRIAL FIBRILLATION (HCC): ICD-10-CM

## 2023-11-16 DIAGNOSIS — N18.31 STAGE 3A CHRONIC KIDNEY DISEASE (HCC): ICD-10-CM

## 2023-11-16 DIAGNOSIS — E78.5 HYPERLIPIDEMIA, UNSPECIFIED HYPERLIPIDEMIA TYPE: ICD-10-CM

## 2023-11-16 DIAGNOSIS — R06.02 SHORTNESS OF BREATH: ICD-10-CM

## 2023-11-16 DIAGNOSIS — I10 ESSENTIAL (PRIMARY) HYPERTENSION: Primary | ICD-10-CM

## 2023-11-16 PROCEDURE — G8484 FLU IMMUNIZE NO ADMIN: HCPCS | Performed by: NURSE PRACTITIONER

## 2023-11-16 PROCEDURE — 1090F PRES/ABSN URINE INCON ASSESS: CPT | Performed by: NURSE PRACTITIONER

## 2023-11-16 PROCEDURE — 99215 OFFICE O/P EST HI 40 MIN: CPT | Performed by: NURSE PRACTITIONER

## 2023-11-16 PROCEDURE — 1123F ACP DISCUSS/DSCN MKR DOCD: CPT | Performed by: NURSE PRACTITIONER

## 2023-11-16 PROCEDURE — 1036F TOBACCO NON-USER: CPT | Performed by: NURSE PRACTITIONER

## 2023-11-16 PROCEDURE — G8427 DOCREV CUR MEDS BY ELIG CLIN: HCPCS | Performed by: NURSE PRACTITIONER

## 2023-11-16 PROCEDURE — G8417 CALC BMI ABV UP PARAM F/U: HCPCS | Performed by: NURSE PRACTITIONER

## 2023-11-16 RX ORDER — BUMETANIDE 1 MG/1
2 TABLET ORAL 2 TIMES DAILY
Qty: 1 TABLET | Refills: 0
Start: 2023-11-16

## 2023-11-16 ASSESSMENT — ENCOUNTER SYMPTOMS
VOMITING: 0
CHEST TIGHTNESS: 0
DIARRHEA: 0
ABDOMINAL DISTENTION: 0
NAUSEA: 0
COUGH: 0
WHEEZING: 0
SHORTNESS OF BREATH: 1
CONSTIPATION: 0
BLOOD IN STOOL: 0

## 2023-11-16 ASSESSMENT — PATIENT HEALTH QUESTIONNAIRE - PHQ9
SUM OF ALL RESPONSES TO PHQ9 QUESTIONS 1 & 2: 0
SUM OF ALL RESPONSES TO PHQ QUESTIONS 1-9: 0
2. FEELING DOWN, DEPRESSED OR HOPELESS: 0
1. LITTLE INTEREST OR PLEASURE IN DOING THINGS: 0
SUM OF ALL RESPONSES TO PHQ QUESTIONS 1-9: 0

## 2023-11-16 NOTE — PATIENT INSTRUCTIONS
Echocardiogram; Dx: SOB  Please take your bumetanide 2mg twice a day as prescribed  BMP and NT pro-BNP  Follow-up with Dr. Sudha Verdugo as scheduled and as needed  Weigh daily and record  Limit sodium intake to 1500-2000mg per day  Limit fluid intake to no more than 6, eight ounce glasses of any type of fluids per day (total of 48 ounces per day)  Call if you notice sudden or progressive weight gain (3-5 pounds in 2-3 days), increasing shortness of breath, abdominal bloating, increasing lower extremity edema, inability to lie flat or on your normal number of pillows, having to sit up to catch your breath, fatigue, increased somnolence (sleeping more), poor appetite

## 2023-11-16 NOTE — PROGRESS NOTES
Lavona Ahumada presents today for   Chief Complaint   Patient presents with    Follow-up     Build up fluid around the heart       Lavona Ahumada preferred language for health care discussion is english/other. Is someone accompanying this pt? no    Is the patient using any DME equipment during OV? no    Depression Screening:  Depression: Not at risk (11/7/2023)    PHQ-2     PHQ-2 Score: 0        Learning Assessment:  Who is the primary learner? Patient    What is the preferred language for health care of the primary learner? ENGLISH    How does the primary learner prefer to learn new concepts? DEMONSTRATION    Answered By patient    Relationship to Learner SELF           Pt currently taking Anticoagulant therapy? no    Pt currently taking Antiplatelet therapy ? eliquis      Coordination of Care:  1. Have you been to the ER, urgent care clinic since your last visit? Hospitalized since your last visit? no    2. Have you seen or consulted any other health care providers outside of the [address] [place] [address] since your last visit? Include any pap smears or colon screening.  no

## 2023-11-28 ENCOUNTER — HOSPITAL ENCOUNTER (OUTPATIENT)
Facility: HOSPITAL | Age: 88
Setting detail: SPECIMEN
Discharge: HOME | End: 2023-12-01
Payer: MEDICARE

## 2023-11-28 DIAGNOSIS — I10 ESSENTIAL (PRIMARY) HYPERTENSION: ICD-10-CM

## 2023-11-28 DIAGNOSIS — R06.02 SHORTNESS OF BREATH: ICD-10-CM

## 2023-11-28 LAB
ANION GAP SERPL CALC-SCNC: 2 MMOL/L (ref 3–18)
BUN SERPL-MCNC: 20 MG/DL (ref 7–18)
BUN/CREAT SERPL: 12 (ref 12–20)
CALCIUM SERPL-MCNC: 9.7 MG/DL (ref 8.5–10.1)
CHLORIDE SERPL-SCNC: 103 MMOL/L (ref 100–111)
CO2 SERPL-SCNC: 36 MMOL/L (ref 21–32)
CREAT SERPL-MCNC: 1.63 MG/DL (ref 0.6–1.3)
GLUCOSE SERPL-MCNC: 92 MG/DL (ref 74–99)
NT PRO BNP: 2590 PG/ML (ref 0–1800)
POTASSIUM SERPL-SCNC: 3.4 MMOL/L (ref 3.5–5.5)
SODIUM SERPL-SCNC: 141 MMOL/L (ref 136–145)

## 2023-11-28 PROCEDURE — 36415 COLL VENOUS BLD VENIPUNCTURE: CPT

## 2023-11-28 PROCEDURE — 83880 ASSAY OF NATRIURETIC PEPTIDE: CPT

## 2023-11-28 PROCEDURE — 80048 BASIC METABOLIC PNL TOTAL CA: CPT

## 2023-12-05 ENCOUNTER — TELEPHONE (OUTPATIENT)
Age: 88
End: 2023-12-05

## 2023-12-05 NOTE — TELEPHONE ENCOUNTER
Verbal order and read back per MARTA Reardon NP    Please call and let her know that her BNP was slightly elevated at 2590 (normal range 0 to 1800). When I saw her the other day, she admitted that she was not taking her bumetanide as prescribed by nephrology and she has been drinking a lot of fluids lately. I instructed her to resume taking the prescribed dose of 2mg BID and limit her sodium and fluid intake. If she resumed the diuretic as instructed, her symptoms should improve and her BNP will also come down as she will not be retaining as much fluid once she starts diuresing more. This has been fully explained to the patient, who indicates understanding.

## 2023-12-05 NOTE — TELEPHONE ENCOUNTER
----- Message from MARTA Geiger NP sent at 12/1/2023  3:59 PM EST -----  Please call and let her know that her BNP was slightly elevated at 2590 (normal range 0 to 1800). When I saw her the other day, she admitted that she was not taking her bumetanide as prescribed by nephrology and she has been drinking a lot of fluids lately. I instructed her to resume taking the prescribed dose of 2mg BID and limit her sodium and fluid intake. If she resumed the diuretic as instructed, her symptoms should improve and her BNP will also come down as she will not be retaining as much fluid once she starts diuresing more.      Thank you,  Marbin Waite  ----- Message -----  From: Batsheva Sandoval Incoming Lab For Copath Pdfs  Sent: 11/28/2023   7:45 PM EST  To: MARTA Geiger NP

## 2024-01-10 ENCOUNTER — TELEPHONE (OUTPATIENT)
Facility: CLINIC | Age: 89
End: 2024-01-10

## 2024-01-10 NOTE — TELEPHONE ENCOUNTER
Called pt and daughter. No answer. Since the type of paperwork was not specifies, I would advise for the appointment, and if she needs one sooner to let me know and I can find a place.

## 2024-01-10 NOTE — TELEPHONE ENCOUNTER
----- Message from Shruti Franklyn sent at 1/10/2024 10:57 AM EST -----  Subject: Message to Provider    QUESTIONS  Information for Provider? Patient's daughter would like for someone to   call her and let her know the best way to have her paper work completed.   She wants to know if she should come in and drop the paper work off and   come back later to pick it up, or schedule an appointment to have it   completed.  ---------------------------------------------------------------------------  --------------  CALL BACK INFO  [phone]; OK to leave message on voicemail  ---------------------------------------------------------------------------  --------------  SCRIPT ANSWERS  Relationship to Patient? Other/Third Party  Representative Name? Linda  Is the representative on the Communication Release of Information (TRAMAINE)   form in Epic? Yes

## 2024-01-12 ENCOUNTER — OFFICE VISIT (OUTPATIENT)
Facility: CLINIC | Age: 89
End: 2024-01-12

## 2024-01-12 DIAGNOSIS — I48.19 PERSISTENT ATRIAL FIBRILLATION (HCC): ICD-10-CM

## 2024-01-12 DIAGNOSIS — Z02.89 ENCOUNTER FOR OTHER ADMINISTRATIVE EXAMINATIONS: Primary | ICD-10-CM

## 2024-01-12 DIAGNOSIS — N18.4 CHRONIC KIDNEY DISEASE, STAGE 4 (SEVERE) (HCC): ICD-10-CM

## 2024-01-12 DIAGNOSIS — J44.1 CHRONIC OBSTRUCTIVE PULMONARY DISEASE WITH (ACUTE) EXACERBATION (HCC): ICD-10-CM

## 2024-01-12 DIAGNOSIS — I50.33 ACUTE ON CHRONIC DIASTOLIC (CONGESTIVE) HEART FAILURE (HCC): ICD-10-CM

## 2024-01-12 NOTE — PROGRESS NOTES
Sho Anderson is a 88 y.o.  female and presents with    No chief complaint on file.          Subjective:  That presents today because she needs paperwork filled for her mother in the order for patient to have an aide when daughter has to be out.  Daughter currently is the caregiver for patient.    Patient has several medical issues, including A-fib, heart failure, COPD, asthma, arthritis, CKD, among others.  She does follow with different specialist.    Patient Active Problem List   Diagnosis    Chronic gout of multiple sites    Edema    Severe obesity (BMI 35.0-39.9) with comorbidity (HCC)    Essential hypertension    History of echocardiogram    Uncomplicated asthma    Vitamin D deficiency    Chronic kidney disease, stage 4 (severe) (HCC)    Arthritis    Multinodular goiter    Chronic eczema    Subclinical hyperthyroidism    Pure hypercholesterolemia    Other chest pain    Acute on chronic diastolic heart failure (HCC)    COPD exacerbation (HCC)    Paroxysmal atrial fibrillation (HCC)    Cholelithiasis without obstruction    CHF (congestive heart failure) (HCC)    Chronic anemia    Chronic gout    Diaphragmatic hernia    Disorder of liver    Diverticular disease of colon    Dysphagia    Early satiety    Eosinophilia    Epigastric pain    Flatulence    Gastroduodenitis    First degree hemorrhoids    Gastro-esophageal reflux disease without esophagitis    Osteoarthritis of right knee    S/P total knee arthroplasty    SOB (shortness of breath)    Stricture of esophagus    Suspected 2019-nCoV infection    Terminal esophageal web    HTN (hypertension)    Persistent atrial fibrillation (HCC)    Morbid obesity (HCC)    Hyperthyroidism    Hypothyroid      Past Medical History:   Diagnosis Date    Arthritis 9/20/2010    Asthma     Chronic eczema 3/22/2010    Chronic kidney disease     Clot 10/2018    lower left exremity, shin     COPD (chronic obstructive pulmonary disease) (HCC)     Edema 3/22/2010

## 2024-02-13 NOTE — PROGRESS NOTES
Sho Anderson presents today for follow-up.  I last saw her in Nov. 2023.  She was last seen by Dr. Thomas in early Sept. 2023. When I saw her in Nov. 2023, she admitted that she had only been taking Bumetanide 2mg once a day instead of twice a day.  She states that she has not been taking as prescribed for several days due to having appointments and having to go to the bathroom so much.  She is followed by nephrology.  She is now taking bumetanide 1.5mg twice a day as prescribed by nephrology and her Eliquis was also decreased to 2.5mg BID (renal dosing).   She had an echo done on 12/23/23 and it showed an EF of 60-65%, normal wall motion, RV and RA size has increased, mild MR, moderate TR, RVSP 40mm Hg (down from 54 mmHg in 2022).    She presented to [facility] ED on 1/22/24 for complaints of chest pain which \"felt like gas\" and resolved after taking Mylanta at home.  Her EKG showed no ischemic changes, Troponins were 26, 25, 24, not consistent with ACS.  Chest x-ray was clear.  She was asymptomatic in the ER and the pain was suspected to be of GI etiology.   She was then hospitalized at [facility] from 1/30/24 thorugh 2/3/24 for influenza, acute respiratory failure with hypoxia, and acute on chronic CHF (combined).  She was treated with IV diuretics and her blood pressure medications were adjusted.      Overall, she states that she has been feeling fairly well.  She was accompanied by her daughter.  She reports compliance with her medications except she is not consistently taking her potassium supplementation.  She no longer takes a potassium tablet but instead, has potassium in liquid form (we do not have this documented in her chart).     She is an 88 year old female with history of  88-year-old woman that was initially referred for further evaluation of chest pain.  She was seen in the ED at [facility] in March 2021.  At that time she was complaining of persistent pain in her chest for the prior 3

## 2024-02-21 ENCOUNTER — OFFICE VISIT (OUTPATIENT)
Age: 89
End: 2024-02-21
Payer: MEDICARE

## 2024-02-21 VITALS
WEIGHT: 213 LBS | OXYGEN SATURATION: 96 % | BODY MASS INDEX: 35.49 KG/M2 | HEART RATE: 73 BPM | HEIGHT: 65 IN | SYSTOLIC BLOOD PRESSURE: 140 MMHG | DIASTOLIC BLOOD PRESSURE: 88 MMHG

## 2024-02-21 DIAGNOSIS — I50.32 CHRONIC DIASTOLIC CONGESTIVE HEART FAILURE (HCC): ICD-10-CM

## 2024-02-21 DIAGNOSIS — I48.19 PERSISTENT ATRIAL FIBRILLATION (HCC): ICD-10-CM

## 2024-02-21 DIAGNOSIS — E78.5 HYPERLIPIDEMIA, UNSPECIFIED HYPERLIPIDEMIA TYPE: ICD-10-CM

## 2024-02-21 DIAGNOSIS — I10 ESSENTIAL (PRIMARY) HYPERTENSION: Primary | ICD-10-CM

## 2024-02-21 DIAGNOSIS — N18.31 STAGE 3A CHRONIC KIDNEY DISEASE (HCC): ICD-10-CM

## 2024-02-21 PROCEDURE — 1123F ACP DISCUSS/DSCN MKR DOCD: CPT | Performed by: NURSE PRACTITIONER

## 2024-02-21 PROCEDURE — 99214 OFFICE O/P EST MOD 30 MIN: CPT | Performed by: NURSE PRACTITIONER

## 2024-02-21 RX ORDER — BUMETANIDE 0.5 MG/1
3 TABLET ORAL 2 TIMES DAILY
COMMUNITY

## 2024-02-21 ASSESSMENT — PATIENT HEALTH QUESTIONNAIRE - PHQ9
SUM OF ALL RESPONSES TO PHQ9 QUESTIONS 1 & 2: 0
SUM OF ALL RESPONSES TO PHQ QUESTIONS 1-9: 0
1. LITTLE INTEREST OR PLEASURE IN DOING THINGS: 0
SUM OF ALL RESPONSES TO PHQ QUESTIONS 1-9: 0
2. FEELING DOWN, DEPRESSED OR HOPELESS: 0
SUM OF ALL RESPONSES TO PHQ QUESTIONS 1-9: 0
SUM OF ALL RESPONSES TO PHQ QUESTIONS 1-9: 0

## 2024-02-21 ASSESSMENT — ANXIETY QUESTIONNAIRES
7. FEELING AFRAID AS IF SOMETHING AWFUL MIGHT HAPPEN: 0
4. TROUBLE RELAXING: 0
GAD7 TOTAL SCORE: 0
5. BEING SO RESTLESS THAT IT IS HARD TO SIT STILL: 0
1. FEELING NERVOUS, ANXIOUS, OR ON EDGE: 0
2. NOT BEING ABLE TO STOP OR CONTROL WORRYING: 0
3. WORRYING TOO MUCH ABOUT DIFFERENT THINGS: 0
6. BECOMING EASILY ANNOYED OR IRRITABLE: 0

## 2024-02-21 NOTE — PATIENT INSTRUCTIONS
Continue present medication regimen  Please call us with the dose of your potassium liquid that you have at home.  Please take the potassium consistently- daily as prescribed  Follow-up with Analilia in 3 months  Weigh daily and record  Limit sodium intake to 1500-2000mg per day  Limit fluid intake to no more than 6-7, eight ounce glasses of any type of fluids per day (total of 48 to 56 ounces per day)  Call if you notice sudden or progressive weight gain (3-5 pounds in 2-3 days), increasing shortness of breath, abdominal bloating, increasing lower extremity edema, inability to lie flat or on your normal number of pillows, having to sit up to catch your breath, fatigue, increased somnolence (sleeping more), poor appetite

## 2024-02-21 NOTE — PROGRESS NOTES
Sho Anderson presents today for   Chief Complaint   Patient presents with    Follow-up     4 month       Sho Anderson preferred language for health care discussion is english/other.    Is someone accompanying this pt? yes    Is the patient using any DME equipment during OV? walker    Depression Screening:  Depression: Not at risk (2/21/2024)    PHQ-2     PHQ-2 Score: 0        Learning Assessment:  Who is the primary learner? Patient    What is the preferred language for health care of the primary learner? ENGLISH    How does the primary learner prefer to learn new concepts? DEMONSTRATION    Answered By patient    Relationship to Learner SELF           Pt currently taking Anticoagulant therapy? Eliquis 2.5 mg bid    Pt currently taking Antiplatelet therapy ? no      Coordination of Care:  1. Have you been to the ER, urgent care clinic since your last visit? Hospitalized since your last visit? no    2. Have you seen or consulted any other health care providers outside of the [facility] since your last visit? Include any pap smears or colon screening. no

## 2024-02-23 RX ORDER — POTASSIUM CHLORIDE 20MEQ/15ML
20 LIQUID (ML) ORAL DAILY
Qty: 1 ML | Refills: 0
Start: 2024-02-23

## 2024-03-22 DIAGNOSIS — J30.1 SEASONAL ALLERGIC RHINITIS DUE TO POLLEN: ICD-10-CM

## 2024-03-22 RX ORDER — CETIRIZINE HYDROCHLORIDE 10 MG/1
10 TABLET ORAL DAILY
Qty: 90 TABLET | Refills: 1 | Status: SHIPPED | OUTPATIENT
Start: 2024-03-22

## 2024-03-22 NOTE — TELEPHONE ENCOUNTER
Patient's daughter contacted the office to request a refill on the following medication: cetirizine (ZYRTEC) 10 MG tablet   Please advise, thank you.     LOV: 1/12/24

## 2024-03-27 NOTE — TELEPHONE ENCOUNTER
A user error has taken place: encounter opened in error, closed for administrative reasons. Report given to Lisa JACKSON.

## 2024-04-02 DIAGNOSIS — J30.1 SEASONAL ALLERGIC RHINITIS DUE TO POLLEN: ICD-10-CM

## 2024-04-03 RX ORDER — CETIRIZINE HYDROCHLORIDE 10 MG/1
10 TABLET ORAL DAILY
Qty: 90 TABLET | Refills: 1 | Status: SHIPPED | OUTPATIENT
Start: 2024-04-03

## 2024-04-09 ENCOUNTER — OFFICE VISIT (OUTPATIENT)
Facility: CLINIC | Age: 89
End: 2024-04-09
Payer: MEDICARE

## 2024-04-09 VITALS
BODY MASS INDEX: 36.15 KG/M2 | HEIGHT: 65 IN | WEIGHT: 217 LBS | OXYGEN SATURATION: 99 % | HEART RATE: 65 BPM | SYSTOLIC BLOOD PRESSURE: 120 MMHG | TEMPERATURE: 97.5 F | RESPIRATION RATE: 12 BRPM | DIASTOLIC BLOOD PRESSURE: 70 MMHG

## 2024-04-09 DIAGNOSIS — E05.90 SUBCLINICAL HYPERTHYROIDISM: ICD-10-CM

## 2024-04-09 DIAGNOSIS — I48.0 PAROXYSMAL ATRIAL FIBRILLATION (HCC): ICD-10-CM

## 2024-04-09 DIAGNOSIS — R13.10 DYSPHAGIA, UNSPECIFIED TYPE: ICD-10-CM

## 2024-04-09 DIAGNOSIS — Z09 HOSPITAL DISCHARGE FOLLOW-UP: Primary | ICD-10-CM

## 2024-04-09 DIAGNOSIS — K64.9 BLEEDING HEMORRHOID: ICD-10-CM

## 2024-04-09 DIAGNOSIS — N18.4 CHRONIC KIDNEY DISEASE, STAGE 4 (SEVERE) (HCC): ICD-10-CM

## 2024-04-09 PROCEDURE — 1123F ACP DISCUSS/DSCN MKR DOCD: CPT | Performed by: STUDENT IN AN ORGANIZED HEALTH CARE EDUCATION/TRAINING PROGRAM

## 2024-04-09 PROCEDURE — 99214 OFFICE O/P EST MOD 30 MIN: CPT | Performed by: STUDENT IN AN ORGANIZED HEALTH CARE EDUCATION/TRAINING PROGRAM

## 2024-04-09 PROCEDURE — 1111F DSCHRG MED/CURRENT MED MERGE: CPT | Performed by: STUDENT IN AN ORGANIZED HEALTH CARE EDUCATION/TRAINING PROGRAM

## 2024-04-09 RX ORDER — OMEPRAZOLE 40 MG/1
40 CAPSULE, DELAYED RELEASE ORAL DAILY
Qty: 90 CAPSULE | Refills: 1 | Status: SHIPPED | OUTPATIENT
Start: 2024-04-09

## 2024-04-09 RX ORDER — HYDROCORTISONE 25 MG/G
CREAM TOPICAL
Qty: 28 G | Refills: 1 | Status: SHIPPED | OUTPATIENT
Start: 2024-04-09

## 2024-04-09 NOTE — PROGRESS NOTES
Sho Anderson is a 89 y.o. year old female who presents today for   Chief Complaint   Patient presents with    Follow-Up from Hospital       Is someone accompanying this pt? Yes     Is the patient using any DME equipment during OV? Yes     Depression Screenin/21/2024    10:43 AM 2023     2:48 PM 2023     3:31 PM 2023    12:13 PM 10/17/2023    12:09 PM 2023     1:52 PM 6/15/2023    12:01 PM   PHQ-9 Questionaire   Little interest or pleasure in doing things 0 0 0 0 0 0 0   Feeling down, depressed, or hopeless 0 0 0 0 0 0 0   PHQ-9 Total Score 0 0 0 0 0 0 0       Abuse Screenin/10/2023     1:00 PM   AMB Abuse Screening   Do you ever feel afraid of your partner? N   Are you in a relationship with someone who physically or mentally threatens you? N   Is it safe for you to go home? Y       Learning Assessment:  No question data found.    Fall Risk:      2024    10:43 AM 2023     2:48 PM 2023     3:31 PM 2023    12:13 PM 10/17/2023    12:09 PM 2023     1:52 PM 6/15/2023    12:01 PM   Fall Risk   2 or more falls in past year? no no no no no no no   Fall with injury in past year? no no no no no no no           Coordination of Care:   1. \"Have you been to the ER, urgent care clinic since your last visit?  Hospitalized since your last visit?\" Yes    2. \"Have you seen or consulted any other health care providers outside of the [facility] since your last visit?\" Yes     3. For patients aged 45-75: Has the patient had a colonoscopy / FIT/ Cologuard? Not due     If the patient is female:    4. For patients aged 40-74: Has the patient had a mammogram within the past 2 years? Not due     5. For patients aged 21-65: Has the patient had a pap smear? Not due     Health Maintenance: reviewed and discussed and ordered per Provider.    Health Maintenance Due   Topic Date Due    Respiratory Syncytial Virus (RSV) Pregnant or age 60 yrs+ (1 - 1-dose 60+

## 2024-04-09 NOTE — PROGRESS NOTES
Sho Anderson is a 89 y.o.  female and presents with    Chief Complaint   Patient presents with    Follow-Up from Hospital           Subjective:    Pt was admitted to the hospital on 04/02 and discharge on 04/06/2024. Has history of A-fib on Eliquis, hypertension, chronic respiratory failure on 3 L HOT, obese hypoventilation syndrome, thyroid disease. She went d/t being SOB.  CXR showed pulmonary edema.  At discharge patient was given antibiotics, and steroids.    On March 19 she had gum surgery.  Since then, patient states that she has been able to put her lower teeth back on due to pain.    She will be having stress test form pulm in May, and will also see Dr. Pham then    Patient Active Problem List   Diagnosis    Chronic gout of multiple sites    Edema    Severe obesity (BMI 35.0-39.9) with comorbidity (HCC)    Essential hypertension    History of echocardiogram    Uncomplicated asthma    Vitamin D deficiency    Chronic kidney disease, stage 4 (severe) (HCC)    Arthritis    Multinodular goiter    Chronic eczema    Subclinical hyperthyroidism    Pure hypercholesterolemia    Other chest pain    Acute on chronic diastolic heart failure (HCC)    COPD exacerbation (HCC)    Paroxysmal atrial fibrillation (HCC)    Cholelithiasis without obstruction    CHF (congestive heart failure) (HCC)    Chronic anemia    Chronic gout    Diaphragmatic hernia    Disorder of liver    Diverticular disease of colon    Dysphagia    Early satiety    Eosinophilia    Epigastric pain    Flatulence    Gastroduodenitis    First degree hemorrhoids    Gastro-esophageal reflux disease without esophagitis    Osteoarthritis of right knee    S/P total knee arthroplasty    SOB (shortness of breath)    Stricture of esophagus    Suspected 2019-nCoV infection    Terminal esophageal web    HTN (hypertension)    Persistent atrial fibrillation (HCC)    Morbid obesity (HCC)    Hyperthyroidism    Hypothyroid      Past Medical History:

## 2024-04-10 ASSESSMENT — ENCOUNTER SYMPTOMS
EYE DISCHARGE: 0
EYE PAIN: 0
EYE REDNESS: 0
BACK PAIN: 0
VOMITING: 0
EYE ITCHING: 0
DIARRHEA: 0
SHORTNESS OF BREATH: 0
COLOR CHANGE: 0
FACIAL SWELLING: 0
ABDOMINAL PAIN: 0
CONSTIPATION: 0

## 2024-04-24 LAB
ALBUMIN: 3.7 G/DL (ref 3.5–5)
ANION GAP SERPL CALCULATED.3IONS-SCNC: 8 MMOL/L (ref 3–15)
BASOPHILS # BLD: 0 % (ref 0–2)
BASOPHILS ABSOLUTE: 0 K/UL (ref 0–0.2)
BUN BLDV-MCNC: 13 MG/DL (ref 6–22)
CALCIUM SERPL-MCNC: 10.2 MG/DL (ref 8.4–10.5)
CHLORIDE BLD-SCNC: 100 MMOL/L (ref 98–110)
CO2: 35 MMOL/L (ref 20–32)
CORRECTED SERUM CREATININE: 1.43 MG/DL
CREAT SERPL-MCNC: 1.4 MG/DL (ref 0.8–1.4)
EOSINOPHIL # BLD: 4 % (ref 0–6)
EOSINOPHILS ABSOLUTE: 0.4 K/UL (ref 0–0.5)
GLOMERULAR FILTRATION RATE: 35.4 ML/MIN/1.73 SQ.M.
GLUCOSE: 103 MG/DL (ref 70–99)
HCT VFR BLD CALC: 44 % (ref 35.1–48.3)
HEMOGLOBIN: 13.1 G/DL (ref 11.7–16.1)
LYMPHOCYTES # BLD: 33 % (ref 20–45)
LYMPHOCYTES ABSOLUTE: 3.1 K/UL (ref 1–4.8)
MCH RBC QN AUTO: 26 PG (ref 26–34)
MCHC RBC AUTO-ENTMCNC: 30 G/DL (ref 31–36)
MCV RBC AUTO: 88 FL (ref 80–99)
MONOCYTES ABSOLUTE: 0.9 K/UL (ref 0.1–1)
MONOCYTES: 10 % (ref 3–12)
NEUTROPHILS ABSOLUTE: 4.9 K/UL (ref 1.8–7.7)
NEUTROPHILS: 52 % (ref 40–75)
PDW BLD-RTO: 15.1 % (ref 10–15.5)
PHOSPHORUS: 1.8 MG/DL (ref 2.5–4.5)
PLATELET # BLD: 129 K/UL (ref 140–440)
PMV BLD AUTO: 13.2 FL (ref 9–13)
POTASSIUM SERPL-SCNC: 3.4 MMOL/L (ref 3.5–5.5)
RBC: 5.01 M/UL (ref 3.8–5.2)
SODIUM BLD-SCNC: 143 MMOL/L (ref 133–145)
T4 FREE: 1.1 NG/DL (ref 0.9–1.8)
TSH SERPL DL<=0.05 MIU/L-ACNC: 0.1 MCU/ML (ref 0.27–4.2)
WBC: 9.4 K/UL (ref 4–11)

## 2024-04-26 ENCOUNTER — OFFICE VISIT (OUTPATIENT)
Facility: CLINIC | Age: 89
End: 2024-04-26
Payer: MEDICARE

## 2024-04-26 VITALS
DIASTOLIC BLOOD PRESSURE: 69 MMHG | HEIGHT: 65 IN | SYSTOLIC BLOOD PRESSURE: 124 MMHG | RESPIRATION RATE: 15 BRPM | OXYGEN SATURATION: 100 % | WEIGHT: 219.4 LBS | HEART RATE: 67 BPM | BODY MASS INDEX: 36.55 KG/M2 | TEMPERATURE: 97.7 F

## 2024-04-26 DIAGNOSIS — E66.01 MORBID OBESITY (HCC): Primary | ICD-10-CM

## 2024-04-26 DIAGNOSIS — F41.9 ANXIETY: ICD-10-CM

## 2024-04-26 DIAGNOSIS — E05.90 HYPERTHYROIDISM: ICD-10-CM

## 2024-04-26 DIAGNOSIS — I10 ESSENTIAL HYPERTENSION: ICD-10-CM

## 2024-04-26 DIAGNOSIS — I50.32 CHRONIC DIASTOLIC CONGESTIVE HEART FAILURE (HCC): ICD-10-CM

## 2024-04-26 DIAGNOSIS — N18.4 CHRONIC KIDNEY DISEASE, STAGE 4 (SEVERE) (HCC): ICD-10-CM

## 2024-04-26 DIAGNOSIS — E87.6 HYPOKALEMIA: ICD-10-CM

## 2024-04-26 DIAGNOSIS — I48.0 PAROXYSMAL ATRIAL FIBRILLATION (HCC): ICD-10-CM

## 2024-04-26 PROCEDURE — 99214 OFFICE O/P EST MOD 30 MIN: CPT | Performed by: STUDENT IN AN ORGANIZED HEALTH CARE EDUCATION/TRAINING PROGRAM

## 2024-04-26 PROCEDURE — 1123F ACP DISCUSS/DSCN MKR DOCD: CPT | Performed by: STUDENT IN AN ORGANIZED HEALTH CARE EDUCATION/TRAINING PROGRAM

## 2024-04-26 RX ORDER — HYDROXYZINE HYDROCHLORIDE 10 MG/1
10 TABLET, FILM COATED ORAL 3 TIMES DAILY PRN
Qty: 30 TABLET | Refills: 3 | Status: SHIPPED | OUTPATIENT
Start: 2024-04-26 | End: 2024-06-05

## 2024-04-26 ASSESSMENT — ENCOUNTER SYMPTOMS
EYE REDNESS: 0
DIARRHEA: 0
COLOR CHANGE: 0
SHORTNESS OF BREATH: 0
EYE PAIN: 0
FACIAL SWELLING: 0
BACK PAIN: 0
ABDOMINAL PAIN: 0
CONSTIPATION: 0
EYE DISCHARGE: 0
EYE ITCHING: 0
VOMITING: 0

## 2024-04-26 NOTE — PROGRESS NOTES
Sho Anderson is a 89 y.o. year old female who presents today for   Chief Complaint   Patient presents with    Follow-up       Is someone accompanying this pt? Yes    Is the patient using any DME equipment during OV? Yes     Depression Screenin/21/2024    10:43 AM 2023     2:48 PM 2023     3:31 PM 2023    12:13 PM 10/17/2023    12:09 PM 2023     1:52 PM 6/15/2023    12:01 PM   PHQ-9 Questionaire   Little interest or pleasure in doing things 0 0 0 0 0 0 0   Feeling down, depressed, or hopeless 0 0 0 0 0 0 0   PHQ-9 Total Score 0 0 0 0 0 0 0       Abuse Screenin/10/2023     1:00 PM   AMB Abuse Screening   Do you ever feel afraid of your partner? N   Are you in a relationship with someone who physically or mentally threatens you? N   Is it safe for you to go home? Y       Learning Assessment:  No question data found.    Fall Risk:      2024    10:43 AM 2023     2:48 PM 2023     3:31 PM 2023    12:13 PM 10/17/2023    12:09 PM 2023     1:52 PM 6/15/2023    12:01 PM   Fall Risk   2 or more falls in past year? no no no no no no no   Fall with injury in past year? no no no no no no no           Coordination of Care:   1. \"Have you been to the ER, urgent care clinic since your last visit?  Hospitalized since your last visit?\" No     2. \"Have you seen or consulted any other health care providers outside of the [facility] since your last visit?\" Yes     3. For patients aged 45-75: Has the patient had a colonoscopy / FIT/ Cologuard? Not due     If the patient is female:    4. For patients aged 40-74: Has the patient had a mammogram within the past 2 years? Not due     5. For patients aged 21-65: Has the patient had a pap smear? Not due     Health Maintenance: reviewed and discussed and ordered per Provider.    Health Maintenance Due   Topic Date Due    Respiratory Syncytial Virus (RSV) Pregnant or age 60 yrs+ (1 - 1-dose 60+ series) Never done

## 2024-04-27 NOTE — PROGRESS NOTES
Sho Anderson is a 89 y.o.  female and presents with    Chief Complaint   Patient presents with    Follow-up           Subjective:    Has history of A-fib on Eliquis, hypertension, chronic respiratory failure on 3 L HOT, obese hypoventilation syndrome, CKD stage 4, thyroid disease.     Will be seeing endo soon. Recently she had some of her medication adjusted.     She will be having stress test form pulm in May, and will also see Dr. Pham then     Wants refill on her anxiety medication. She is about taking 1 hydroxyzine pills a day. Denies side effects if she takes them like that, otherwise if she takes more then she gets jittery.     Patient had blood work done.  Patient states that she has not been taking on a daily basis her potassium.  She takes it about 4 times per week.  She states that she does not take it unless she has food with it.  Patient Active Problem List   Diagnosis    Chronic gout of multiple sites    Edema    Severe obesity (BMI 35.0-39.9) with comorbidity (HCC)    Essential hypertension    History of echocardiogram    Uncomplicated asthma    Vitamin D deficiency    Chronic kidney disease, stage 4 (severe) (HCC)    Arthritis    Multinodular goiter    Chronic eczema    Subclinical hyperthyroidism    Pure hypercholesterolemia    Other chest pain    Acute on chronic diastolic heart failure (HCC)    COPD exacerbation (HCC)    Paroxysmal atrial fibrillation (HCC)    Cholelithiasis without obstruction    CHF (congestive heart failure) (HCC)    Chronic anemia    Chronic gout    Diaphragmatic hernia    Disorder of liver    Diverticular disease of colon    Dysphagia    Early satiety    Eosinophilia    Epigastric pain    Flatulence    Gastroduodenitis    First degree hemorrhoids    Gastro-esophageal reflux disease without esophagitis    Osteoarthritis of right knee    S/P total knee arthroplasty    SOB (shortness of breath)    Stricture of esophagus    Suspected 2019-nCoV infection

## 2024-05-15 ENCOUNTER — TELEPHONE (OUTPATIENT)
Facility: CLINIC | Age: 89
End: 2024-05-15

## 2024-05-15 RX ORDER — MONTELUKAST SODIUM 10 MG/1
10 TABLET ORAL NIGHTLY
Qty: 30 TABLET | Refills: 2 | Status: SHIPPED | OUTPATIENT
Start: 2024-05-15

## 2024-05-15 NOTE — TELEPHONE ENCOUNTER
Pt is needing a refill for the medication listed below. It's not showing on the med list.    RX: Montelukast 10 mg- 1 tablet by mouth every day at bedtime    Please send into [facility]    LOV: 4/26/2024  NOV: 7/29/2024

## 2024-05-22 ENCOUNTER — OFFICE VISIT (OUTPATIENT)
Age: 89
End: 2024-05-22
Payer: MEDICARE

## 2024-05-22 VITALS
DIASTOLIC BLOOD PRESSURE: 68 MMHG | SYSTOLIC BLOOD PRESSURE: 108 MMHG | OXYGEN SATURATION: 98 % | WEIGHT: 214 LBS | HEIGHT: 65 IN | HEART RATE: 60 BPM | BODY MASS INDEX: 35.65 KG/M2

## 2024-05-22 DIAGNOSIS — I48.0 PAROXYSMAL ATRIAL FIBRILLATION (HCC): ICD-10-CM

## 2024-05-22 DIAGNOSIS — I50.32 CHRONIC DIASTOLIC CONGESTIVE HEART FAILURE (HCC): ICD-10-CM

## 2024-05-22 DIAGNOSIS — E78.5 HYPERLIPIDEMIA, UNSPECIFIED HYPERLIPIDEMIA TYPE: ICD-10-CM

## 2024-05-22 DIAGNOSIS — I10 ESSENTIAL (PRIMARY) HYPERTENSION: Primary | ICD-10-CM

## 2024-05-22 DIAGNOSIS — R06.02 SHORTNESS OF BREATH: ICD-10-CM

## 2024-05-22 DIAGNOSIS — N18.31 STAGE 3A CHRONIC KIDNEY DISEASE (HCC): ICD-10-CM

## 2024-05-22 PROCEDURE — 1123F ACP DISCUSS/DSCN MKR DOCD: CPT | Performed by: NURSE PRACTITIONER

## 2024-05-22 PROCEDURE — 99215 OFFICE O/P EST HI 40 MIN: CPT | Performed by: NURSE PRACTITIONER

## 2024-05-22 RX ORDER — METOPROLOL TARTRATE 50 MG/1
50 TABLET, FILM COATED ORAL 2 TIMES DAILY
Qty: 180 TABLET | Refills: 3 | Status: SHIPPED | OUTPATIENT
Start: 2024-05-22

## 2024-05-22 RX ORDER — POTASSIUM CHLORIDE 20MEQ/15ML
20 LIQUID (ML) ORAL DAILY
Qty: 15 ML | Refills: 6 | Status: CANCELLED | OUTPATIENT
Start: 2024-05-22

## 2024-05-22 RX ORDER — POTASSIUM CHLORIDE 20MEQ/15ML
20 LIQUID (ML) ORAL 2 TIMES DAILY
Qty: 473 ML | Refills: 4 | Status: SHIPPED | OUTPATIENT
Start: 2024-05-22

## 2024-05-22 RX ORDER — BUMETANIDE 2 MG/1
2 TABLET ORAL DAILY
Qty: 180 TABLET | Refills: 3 | Status: SHIPPED | OUTPATIENT
Start: 2024-05-22

## 2024-05-22 RX ORDER — BUMETANIDE 0.5 MG/1
3 TABLET ORAL 2 TIMES DAILY
Qty: 30 TABLET | Status: CANCELLED | OUTPATIENT
Start: 2024-05-22

## 2024-05-22 NOTE — PATIENT INSTRUCTIONS
Increase bumetanide (Bumex) to 2mg twice a day (new prescription for the new dose)  Increase potassium chloride to 20meq twice a day (since you take the liquid formulation, 20meq is in 15 mL)  BMP and Mg level to be done in 1 week  All other medications to remain same  Follow-up with Analilia as scheduled and as needed  Weigh daily and record  Limit sodium intake to 2000mg per day  Limit fluid intake to no more than 6 to 7, eight ounce glasses of any type of fluids per day (total of 48 to 56 ounces per day)  Call if you notice sudden or progressive weight gain (3-5 pounds in 2-3 days), increasing shortness of breath, abdominal bloating, increasing lower extremity edema, inability to lie flat or on your normal number of pillows, having to sit up to catch your breath, fatigue, increased somnolence (sleeping more), poor appetite

## 2024-05-29 DIAGNOSIS — I10 ESSENTIAL (PRIMARY) HYPERTENSION: ICD-10-CM

## 2024-05-29 DIAGNOSIS — N18.31 STAGE 3A CHRONIC KIDNEY DISEASE (HCC): ICD-10-CM

## 2024-05-30 LAB
ANION GAP SERPL CALCULATED.3IONS-SCNC: 7 MMOL/L (ref 3–15)
BUN BLDV-MCNC: 10 MG/DL (ref 6–22)
CALCIUM SERPL-MCNC: 10 MG/DL (ref 8.4–10.5)
CHLORIDE BLD-SCNC: 102 MMOL/L (ref 98–110)
CO2: 34 MMOL/L (ref 20–32)
CREAT SERPL-MCNC: 1.4 MG/DL (ref 0.8–1.4)
GFR, ESTIMATED: 35.4 ML/MIN/1.73 SQ.M.
GLUCOSE: 77 MG/DL (ref 70–99)
MAGNESIUM: 2.1 MG/DL (ref 1.6–2.5)
POTASSIUM SERPL-SCNC: 4 MMOL/L (ref 3.5–5.5)
SODIUM BLD-SCNC: 143 MMOL/L (ref 133–145)

## 2024-05-31 ENCOUNTER — TELEPHONE (OUTPATIENT)
Age: 89
End: 2024-05-31

## 2024-05-31 NOTE — TELEPHONE ENCOUNTER
Called patient with results from Basic Metabolic Panel and Magnesium.  Patient verbalized understanding.     Patient taking potassium chloride 20 MEQ/15ML (10%) oral solution 2 times daily. They asked if this should be discontinued.     Verbal order and read back per MARTA Neff - NP  Continue potassium chloride 20 MEQ/15ML (10%) oral solution 2 times daily instead of potassium chloride 10meq once a day.

## 2024-05-31 NOTE — TELEPHONE ENCOUNTER
----- Message from MARTA Neff NP sent at 5/31/2024  2:28 PM EDT -----  Please call this patient and let her know that her labs looked good.  Her magnesium is normal at 2.1.  Her potassium is within the normal range at 4.0 but because we increased her bumex the other day, I am going to go ahead and add a small dose of potassium for her to take once a day (potassium chloride 10meq once a day).    Thank you,  Analilia  ----- Message -----  From: Batsheva Sandoval Incoming Amb Reference Lab Results [facility]  Sent: 5/31/2024   1:17 AM EDT  To: MARTA Neff NP

## 2024-06-11 ENCOUNTER — TRANSCRIBE ORDERS (OUTPATIENT)
Facility: HOSPITAL | Age: 89
End: 2024-06-11

## 2024-06-11 DIAGNOSIS — Z12.31 VISIT FOR SCREENING MAMMOGRAM: Primary | ICD-10-CM

## 2024-06-12 NOTE — PROGRESS NOTES
Sho Anderson presents today for a 1 month follow-up of CHF.  When I saw her on 5/22/24, her bumetanide was increased to 2mg BID (per discharge instructions) and her potassium was increased to 20meq BID as her potassium level was only 3.4.   She reports that her weights have been stable at home at 218 or 219 lbs and that she has been compliant with sodium and fluid restriction.  She states that she feels fairly well and denies any worsening shortness of breath.  She notices fluctuations in her edema depending on whether or not she is able to keep her legs elevated more often or if she is able to lie down in her bed versus sleeping in her recliner chair.     She was last seen by Dr. Thomas in early Sept. 2023.  She was hospitalized at [facility] from 4/2/24 through 4/6/24 for acute on chronic CHF, acute pulmonary edema, and acute on chronic hypoxic respiratory failure.  Chest x-ray was consistent with pulmonary edema.  She had an echo done on 4/3/24 and it showed an EF of 65%, normal wall motion, mild concentric LVH, mild pulmonary hypertension with PASP of 40 mmHg, mild MR, aortic valve sclerosis without stenosis.  She was diuresed with IV lasix and discharged home on bumetanide 2mg BID.  However, she states that she did not receive a prescription for the new dose and did not know that the dose was increased from what she was taking before which was 1.5mg BID.     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      She had an echo done on 12/23/23 and it showed an EF of 60-65%, normal wall motion, RV and RA size has increased, mild MR, moderate TR, RVSP 40mm Hg (down from 54 mmHg in 2022).    She presented to [facility]

## 2024-06-18 ENCOUNTER — OFFICE VISIT (OUTPATIENT)
Age: 89
End: 2024-06-18
Payer: MEDICARE

## 2024-06-18 VITALS
DIASTOLIC BLOOD PRESSURE: 70 MMHG | HEIGHT: 65 IN | HEART RATE: 66 BPM | BODY MASS INDEX: 36.99 KG/M2 | WEIGHT: 222 LBS | SYSTOLIC BLOOD PRESSURE: 120 MMHG | OXYGEN SATURATION: 98 %

## 2024-06-18 DIAGNOSIS — R06.02 SHORTNESS OF BREATH: ICD-10-CM

## 2024-06-18 DIAGNOSIS — Z95.0 PRESENCE OF CARDIAC PACEMAKER: ICD-10-CM

## 2024-06-18 DIAGNOSIS — I50.32 CHRONIC DIASTOLIC CONGESTIVE HEART FAILURE (HCC): Primary | ICD-10-CM

## 2024-06-18 DIAGNOSIS — I10 ESSENTIAL (PRIMARY) HYPERTENSION: ICD-10-CM

## 2024-06-18 DIAGNOSIS — I48.19 PERSISTENT ATRIAL FIBRILLATION (HCC): ICD-10-CM

## 2024-06-18 DIAGNOSIS — N18.31 STAGE 3A CHRONIC KIDNEY DISEASE (HCC): ICD-10-CM

## 2024-06-18 DIAGNOSIS — E78.5 HYPERLIPIDEMIA, UNSPECIFIED HYPERLIPIDEMIA TYPE: ICD-10-CM

## 2024-06-18 PROCEDURE — 93000 ELECTROCARDIOGRAM COMPLETE: CPT | Performed by: NURSE PRACTITIONER

## 2024-06-18 PROCEDURE — 99214 OFFICE O/P EST MOD 30 MIN: CPT | Performed by: NURSE PRACTITIONER

## 2024-06-18 PROCEDURE — 1123F ACP DISCUSS/DSCN MKR DOCD: CPT | Performed by: NURSE PRACTITIONER

## 2024-06-18 RX ORDER — BUMETANIDE 2 MG/1
2 TABLET ORAL 2 TIMES DAILY
Qty: 1 TABLET | Refills: 0
Start: 2024-06-18

## 2024-06-18 NOTE — PATIENT INSTRUCTIONS
Continue bumetanide 2mg twice a day  Continue potassium 20meq twice a day (15mL)  All other medications to remain the same  Labs per nephrology and follow-up with them as scheduled  Follow-up with Dr. Thomas as scheduled and as needed

## 2024-07-10 ENCOUNTER — HOSPITAL ENCOUNTER (OUTPATIENT)
Dept: WOMENS IMAGING | Facility: HOSPITAL | Age: 89
Discharge: HOME | End: 2024-07-13
Attending: STUDENT IN AN ORGANIZED HEALTH CARE EDUCATION/TRAINING PROGRAM
Payer: MEDICARE

## 2024-07-10 DIAGNOSIS — Z12.31 VISIT FOR SCREENING MAMMOGRAM: ICD-10-CM

## 2024-07-10 PROCEDURE — 77063 BREAST TOMOSYNTHESIS BI: CPT

## 2024-07-29 ENCOUNTER — OFFICE VISIT (OUTPATIENT)
Facility: CLINIC | Age: 89
End: 2024-07-29
Payer: MEDICARE

## 2024-07-29 VITALS
DIASTOLIC BLOOD PRESSURE: 74 MMHG | WEIGHT: 228 LBS | OXYGEN SATURATION: 98 % | HEIGHT: 65 IN | BODY MASS INDEX: 37.99 KG/M2 | HEART RATE: 60 BPM | SYSTOLIC BLOOD PRESSURE: 115 MMHG | RESPIRATION RATE: 14 BRPM | TEMPERATURE: 97.9 F

## 2024-07-29 DIAGNOSIS — M17.11 PRIMARY OSTEOARTHRITIS OF RIGHT KNEE: ICD-10-CM

## 2024-07-29 DIAGNOSIS — I50.32 CHRONIC DIASTOLIC CONGESTIVE HEART FAILURE (HCC): Primary | ICD-10-CM

## 2024-07-29 DIAGNOSIS — R60.0 LOCALIZED EDEMA: ICD-10-CM

## 2024-07-29 DIAGNOSIS — N18.4 CHRONIC KIDNEY DISEASE, STAGE 4 (SEVERE) (HCC): ICD-10-CM

## 2024-07-29 DIAGNOSIS — K64.9 BLEEDING HEMORRHOID: ICD-10-CM

## 2024-07-29 PROCEDURE — 1123F ACP DISCUSS/DSCN MKR DOCD: CPT | Performed by: STUDENT IN AN ORGANIZED HEALTH CARE EDUCATION/TRAINING PROGRAM

## 2024-07-29 PROCEDURE — 99214 OFFICE O/P EST MOD 30 MIN: CPT | Performed by: STUDENT IN AN ORGANIZED HEALTH CARE EDUCATION/TRAINING PROGRAM

## 2024-07-29 RX ORDER — BUMETANIDE 2 MG/1
2 TABLET ORAL 2 TIMES DAILY
Qty: 180 TABLET | Refills: 3 | Status: SHIPPED | OUTPATIENT
Start: 2024-07-29

## 2024-07-29 RX ORDER — HYDROCORTISONE 25 MG/G
CREAM TOPICAL
Qty: 28 G | Refills: 1 | Status: SHIPPED | OUTPATIENT
Start: 2024-07-29

## 2024-07-29 ASSESSMENT — ENCOUNTER SYMPTOMS
DIARRHEA: 0
ABDOMINAL PAIN: 0
SHORTNESS OF BREATH: 0
EYE ITCHING: 0
COLOR CHANGE: 0
BACK PAIN: 0
EYE DISCHARGE: 0
VOMITING: 0
CONSTIPATION: 0
FACIAL SWELLING: 0
EYE PAIN: 0
EYE REDNESS: 0

## 2024-07-29 NOTE — PROGRESS NOTES
Respiratory Syncytial Virus (RSV) Pregnant or age 60 yrs+ (1 - 1-dose 60+ series) Never done    DTaP/Tdap/Td vaccine (1 - Tdap) 08/22/2013    COVID-19 Vaccine (3 - 2023-24 season) 09/01/2023        - XOCHITL Daley  [facility]  Phone: [phone]  Fax: [phone]  
Outpatient Medications   Medication Sig Dispense Refill    bumetanide (BUMEX) 2 MG tablet Take 1 tablet by mouth 2 times daily 1 tablet 0    apixaban (ELIQUIS) 2.5 MG TABS tablet Take 1 tablet by mouth 2 times daily 180 tablet 3    metoprolol tartrate (LOPRESSOR) 50 MG tablet Take 1 tablet by mouth 2 times daily 180 tablet 3    potassium chloride 20 MEQ/15ML (10%) oral solution Take 15 mLs by mouth 2 times daily 473 mL 4    montelukast (SINGULAIR) 10 MG tablet Take 1 tablet by mouth nightly 30 tablet 2    hydrocortisone (PROCTOSOL HC) 2.5 % CREA rectal cream Apply sparingly, up to twice daily 28 g 1    omeprazole (PRILOSEC) 40 MG delayed release capsule Take 1 capsule by mouth daily 90 capsule 1    cetirizine (ZYRTEC) 10 MG tablet Take 1 tablet by mouth daily 90 tablet 1    fluticasone (FLONASE) 50 MCG/ACT nasal spray 1 spray by Each Nostril route daily 32 g 1    fluticasone-salmeterol (ADVAIR) 100-50 MCG/ACT AEPB diskus inhaler Inhale 1 puff into the lungs in the morning and 1 puff in the evening. 60 each 2    budesonide-formoterol (SYMBICORT) 160-4.5 MCG/ACT AERO Inhale 2 puffs into the lungs 2 times daily      methIMAzole (TAPAZOLE) 5 MG tablet Take 1 tablet by mouth 3 times daily      albuterol sulfate HFA (PROVENTIL;VENTOLIN;PROAIR) 108 (90 Base) MCG/ACT inhaler Inhale 2 puffs into the lungs every 6 hours as needed for Wheezing or Shortness of Breath 18 g 2    ergocalciferol (ERGOCALCIFEROL) 1.25 MG (72248 UT) capsule Take 1 capsule by mouth every 7 days 4 capsule 2    ferrous sulfate (IRON 325) 325 (65 Fe) MG tablet Take 1 tablet by mouth daily (with breakfast)       No current facility-administered medications for this visit.        ROS   Review of Systems   Constitutional:  Negative for activity change and appetite change.   HENT:  Negative for congestion, drooling, ear discharge, ear pain and facial swelling.    Eyes:  Negative for pain, discharge, redness and itching.   Respiratory:  Negative for shortness

## 2024-08-15 ENCOUNTER — OFFICE VISIT (OUTPATIENT)
Facility: CLINIC | Age: 89
End: 2024-08-15
Payer: MEDICARE

## 2024-08-15 VITALS
HEIGHT: 65 IN | SYSTOLIC BLOOD PRESSURE: 118 MMHG | TEMPERATURE: 97.6 F | BODY MASS INDEX: 36.79 KG/M2 | RESPIRATION RATE: 14 BRPM | HEART RATE: 69 BPM | OXYGEN SATURATION: 100 % | DIASTOLIC BLOOD PRESSURE: 78 MMHG | WEIGHT: 220.8 LBS

## 2024-08-15 DIAGNOSIS — E66.01 MORBID OBESITY (HCC): ICD-10-CM

## 2024-08-15 DIAGNOSIS — M17.11 PRIMARY OSTEOARTHRITIS OF RIGHT KNEE: ICD-10-CM

## 2024-08-15 DIAGNOSIS — F41.9 ANXIETY: ICD-10-CM

## 2024-08-15 DIAGNOSIS — I50.32 CHRONIC DIASTOLIC CONGESTIVE HEART FAILURE (HCC): ICD-10-CM

## 2024-08-15 DIAGNOSIS — J45.41 MODERATE PERSISTENT ASTHMA WITH ACUTE EXACERBATION: Primary | ICD-10-CM

## 2024-08-15 DIAGNOSIS — R60.0 LOCALIZED EDEMA: ICD-10-CM

## 2024-08-15 PROCEDURE — 1123F ACP DISCUSS/DSCN MKR DOCD: CPT | Performed by: STUDENT IN AN ORGANIZED HEALTH CARE EDUCATION/TRAINING PROGRAM

## 2024-08-15 PROCEDURE — 99214 OFFICE O/P EST MOD 30 MIN: CPT | Performed by: STUDENT IN AN ORGANIZED HEALTH CARE EDUCATION/TRAINING PROGRAM

## 2024-08-15 RX ORDER — ACETAMINOPHEN 500 MG
1000 TABLET ORAL 2 TIMES DAILY PRN
Qty: 540 TABLET | Refills: 1 | Status: SHIPPED | OUTPATIENT
Start: 2024-08-15

## 2024-08-15 RX ORDER — HYDROXYZINE HYDROCHLORIDE 10 MG/1
10 TABLET, FILM COATED ORAL EVERY 8 HOURS PRN
COMMUNITY
Start: 2024-07-18 | End: 2024-08-15 | Stop reason: SDUPTHER

## 2024-08-15 RX ORDER — MONTELUKAST SODIUM 10 MG/1
10 TABLET ORAL NIGHTLY
Qty: 90 TABLET | Refills: 2 | Status: SHIPPED | OUTPATIENT
Start: 2024-08-15

## 2024-08-15 RX ORDER — HYDROXYZINE HYDROCHLORIDE 10 MG/1
10 TABLET, FILM COATED ORAL EVERY 8 HOURS PRN
Qty: 90 TABLET | Refills: 2 | Status: SHIPPED | OUTPATIENT
Start: 2024-08-15

## 2024-08-15 NOTE — PROGRESS NOTES
Sho Anderson is a 89 y.o. year old female who presents today for   Chief Complaint   Patient presents with    Follow-up     18 day f/u    Other     Toes numbing, and tingling        Is someone accompanying this pt? Yes    Is the patient using any DME equipment during OV? Yes, walker    Depression Screenin/21/2024    10:43 AM 2023     2:48 PM 2023     3:31 PM 2023    12:13 PM 10/17/2023    12:09 PM 2023     1:52 PM 6/15/2023    12:01 PM   PHQ-9 Questionaire   Little interest or pleasure in doing things 0 0 0 0 0 0 0   Feeling down, depressed, or hopeless 0 0 0 0 0 0 0   PHQ-9 Total Score 0 0 0 0 0 0 0       Abuse Screenin/10/2023     1:00 PM   AMB Abuse Screening   Do you ever feel afraid of your partner? N   Are you in a relationship with someone who physically or mentally threatens you? N   Is it safe for you to go home? Y       Learning Assessment:  No question data found.    Fall Risk:      2024    10:43 AM 2023     2:48 PM 2023     3:31 PM 2023    12:13 PM 10/17/2023    12:09 PM 2023     1:52 PM 6/15/2023    12:01 PM   Fall Risk   Do you feel unsteady or are you worried about falling?  no yes no yes no no yes   2 or more falls in past year? no no no no no no no   Fall with injury in past year? no no no no no no no           Coordination of Care:   1. \"Have you been to the ER, urgent care clinic since your last visit?  Hospitalized since your last visit?\" No    2. \"Have you seen or consulted any other health care providers outside of the [facility] since your last visit?\" Yes    3. For patients aged 45-75: Has the patient had a colonoscopy / FIT/ Cologuard? N/A    If the patient is female:    4. For patients aged 40-74: Has the patient had a mammogram within the past 2 years? N/A    5. For patients aged 21-65: Has the patient had a pap smear? N/A    Health Maintenance: reviewed and discussed and ordered per Provider.    Health

## 2024-08-15 NOTE — PROGRESS NOTES
Sho Anderson is a 89 y.o.  female and presents with    Chief Complaint   Patient presents with    Follow-up     18 day f/u    Other     Toes numbing, and tingling            Subjective:    Hypertension follow up:  Taking medications as prescribed: Yes  Checking BP at home: Yes  Symptoms:  leg edema  Low sodium diet: No  Exercise: No    Patient is here to follow-up on the fluid retention that was noted on the last visit after patient had run out of Bumex 2 mg twice daily.  Patient states that she has been taking the medication, she has also been using her compression socks, and elevating her legs when she gets a chance.  She has been doing well with her diet.  She has been monitoring her weight.  From her last visit she has lost 8 pounds in 2 weeks.    Patient Active Problem List   Diagnosis    Chronic gout of multiple sites    Edema    Severe obesity (BMI 35.0-39.9) with comorbidity (HCC)    Essential hypertension    History of echocardiogram    Uncomplicated asthma    Vitamin D deficiency    Chronic kidney disease, stage 4 (severe) (HCC)    Arthritis    Multinodular goiter    Chronic eczema    Subclinical hyperthyroidism    Pure hypercholesterolemia    Other chest pain    Acute on chronic diastolic heart failure (HCC)    COPD exacerbation (HCC)    Paroxysmal atrial fibrillation (HCC)    Cholelithiasis without obstruction    CHF (congestive heart failure) (HCC)    Chronic anemia    Chronic gout    Diaphragmatic hernia    Disorder of liver    Diverticular disease of colon    Dysphagia    Early satiety    Eosinophilia    Epigastric pain    Flatulence    Gastroduodenitis    First degree hemorrhoids    Gastro-esophageal reflux disease without esophagitis    Osteoarthritis of right knee    S/P total knee arthroplasty    SOB (shortness of breath)    Stricture of esophagus    Suspected 2019-nCoV infection    Terminal esophageal web    HTN (hypertension)    Persistent atrial fibrillation (HCC)    Morbid

## 2024-08-16 ASSESSMENT — ENCOUNTER SYMPTOMS
FACIAL SWELLING: 0
EYE DISCHARGE: 0
EYE REDNESS: 0
ABDOMINAL PAIN: 0
SHORTNESS OF BREATH: 0
BACK PAIN: 0
CONSTIPATION: 0
EYE PAIN: 0
EYE ITCHING: 0
COLOR CHANGE: 0
VOMITING: 0
DIARRHEA: 0

## 2024-09-27 ENCOUNTER — TELEPHONE (OUTPATIENT)
Age: 89
End: 2024-09-27

## 2024-10-07 ENCOUNTER — OFFICE VISIT (OUTPATIENT)
Age: 89
End: 2024-10-07

## 2024-10-07 VITALS
HEIGHT: 65 IN | WEIGHT: 219 LBS | OXYGEN SATURATION: 99 % | SYSTOLIC BLOOD PRESSURE: 123 MMHG | HEART RATE: 72 BPM | DIASTOLIC BLOOD PRESSURE: 72 MMHG | BODY MASS INDEX: 36.49 KG/M2

## 2024-10-07 DIAGNOSIS — K64.9 BLEEDING HEMORRHOID: ICD-10-CM

## 2024-10-07 RX ORDER — HYDROCORTISONE 25 MG/G
CREAM TOPICAL
Qty: 28 G | Refills: 1 | Status: CANCELLED | OUTPATIENT
Start: 2024-10-07

## 2024-10-07 ASSESSMENT — PATIENT HEALTH QUESTIONNAIRE - PHQ9
2. FEELING DOWN, DEPRESSED OR HOPELESS: NOT AT ALL
SUM OF ALL RESPONSES TO PHQ QUESTIONS 1-9: 0
1. LITTLE INTEREST OR PLEASURE IN DOING THINGS: NOT AT ALL
SUM OF ALL RESPONSES TO PHQ QUESTIONS 1-9: 0
SUM OF ALL RESPONSES TO PHQ9 QUESTIONS 1 & 2: 0

## 2024-10-07 NOTE — PATIENT INSTRUCTIONS
New Medication/Medication Changes/Medication Refill      **please allow 24-48 hrs for medication to be escribed to pharmacy** If you need any refills on medications please contact your pharmacy so that the request can be escribed to the provider for review seven to 10 days prior to being out of medication.

## 2024-10-11 ENCOUNTER — TELEPHONE (OUTPATIENT)
Facility: CLINIC | Age: 89
End: 2024-10-11

## 2024-10-11 DIAGNOSIS — K64.9 BLEEDING HEMORRHOID: ICD-10-CM

## 2024-10-11 NOTE — TELEPHONE ENCOUNTER
Pt called requesting to have a referral placed to see a Neuropathy specialist. Pt has complaints of numbness in her feet.    Please advise    LOV: 8/15/24  NOV: 12/16/24

## 2024-10-15 RX ORDER — HYDROCORTISONE 25 MG/G
CREAM TOPICAL
Qty: 28 G | Refills: 3 | Status: SHIPPED | OUTPATIENT
Start: 2024-10-15

## 2024-10-18 ENCOUNTER — OFFICE VISIT (OUTPATIENT)
Facility: CLINIC | Age: 89
End: 2024-10-18
Payer: MEDICARE

## 2024-10-18 ENCOUNTER — HOSPITAL ENCOUNTER (OUTPATIENT)
Facility: HOSPITAL | Age: 89
Setting detail: SPECIMEN
Discharge: HOME | End: 2024-10-21

## 2024-10-18 VITALS
DIASTOLIC BLOOD PRESSURE: 76 MMHG | TEMPERATURE: 97.2 F | BODY MASS INDEX: 36.32 KG/M2 | RESPIRATION RATE: 14 BRPM | WEIGHT: 218 LBS | HEIGHT: 65 IN | SYSTOLIC BLOOD PRESSURE: 127 MMHG | HEART RATE: 60 BPM | OXYGEN SATURATION: 100 %

## 2024-10-18 DIAGNOSIS — I10 ESSENTIAL HYPERTENSION: Primary | ICD-10-CM

## 2024-10-18 DIAGNOSIS — G62.9 NEUROPATHY: ICD-10-CM

## 2024-10-18 DIAGNOSIS — M19.90 ARTHRITIS: ICD-10-CM

## 2024-10-18 DIAGNOSIS — M17.11 PRIMARY OSTEOARTHRITIS OF RIGHT KNEE: ICD-10-CM

## 2024-10-18 LAB — SENTARA SPECIMEN COLLECTION: NORMAL

## 2024-10-18 PROCEDURE — 99001 SPECIMEN HANDLING PT-LAB: CPT

## 2024-10-18 PROCEDURE — 99214 OFFICE O/P EST MOD 30 MIN: CPT | Performed by: STUDENT IN AN ORGANIZED HEALTH CARE EDUCATION/TRAINING PROGRAM

## 2024-10-18 PROCEDURE — 1123F ACP DISCUSS/DSCN MKR DOCD: CPT | Performed by: STUDENT IN AN ORGANIZED HEALTH CARE EDUCATION/TRAINING PROGRAM

## 2024-10-18 RX ORDER — GABAPENTIN 100 MG/1
100 CAPSULE ORAL 3 TIMES DAILY
Qty: 90 CAPSULE | Refills: 5 | Status: SHIPPED | OUTPATIENT
Start: 2024-10-18 | End: 2025-04-16

## 2024-10-18 SDOH — ECONOMIC STABILITY: FOOD INSECURITY: WITHIN THE PAST 12 MONTHS, THE FOOD YOU BOUGHT JUST DIDN'T LAST AND YOU DIDN'T HAVE MONEY TO GET MORE.: NEVER TRUE

## 2024-10-18 SDOH — ECONOMIC STABILITY: FOOD INSECURITY: WITHIN THE PAST 12 MONTHS, YOU WORRIED THAT YOUR FOOD WOULD RUN OUT BEFORE YOU GOT MONEY TO BUY MORE.: NEVER TRUE

## 2024-10-18 SDOH — ECONOMIC STABILITY: INCOME INSECURITY: HOW HARD IS IT FOR YOU TO PAY FOR THE VERY BASICS LIKE FOOD, HOUSING, MEDICAL CARE, AND HEATING?: NOT HARD AT ALL

## 2024-10-18 ASSESSMENT — ENCOUNTER SYMPTOMS
VOMITING: 0
CONSTIPATION: 0
EYE DISCHARGE: 0
COLOR CHANGE: 0
EYE PAIN: 0
ABDOMINAL PAIN: 0
EYE REDNESS: 0
DIARRHEA: 0
FACIAL SWELLING: 0
BACK PAIN: 0
EYE ITCHING: 0
SHORTNESS OF BREATH: 0

## 2024-10-18 NOTE — PROGRESS NOTES
Sho Anderson is a 89 y.o. year old female who presents today for   Chief Complaint   Patient presents with    Foot Pain     Both, feels like walking on starch and aching and cold at night.        \"Have you been to the ER, urgent care clinic since your last visit?  Hospitalized since your last visit?\"   NO     “Have you seen or consulted any other health care providers outside our system since your last visit?”   YES - When: approximately 1 months ago.  Where and Why: CARDIOLOGY.             - XOCHITL Daley [facility]  Phone: [phone]  Fax: [phone]

## 2024-10-19 LAB
ALBUMIN: 3.9 G/DL (ref 3.5–5)
ANION GAP SERPL CALCULATED.3IONS-SCNC: 7 MMOL/L (ref 3–15)
BASOPHILS ABSOLUTE: 0.1 K/UL (ref 0–0.2)
BASOPHILS RELATIVE PERCENT: 1 % (ref 0–2)
BUN BLDV-MCNC: 16 MG/DL (ref 6–22)
CALCIUM SERPL-MCNC: 10.5 MG/DL (ref 8.4–10.5)
CHLORIDE BLD-SCNC: 99 MMOL/L (ref 98–110)
CO2: 34 MMOL/L (ref 20–32)
CORRECTED SERUM CREATININE: 1.53 MG/DL
CREAT SERPL-MCNC: 1.5 MG/DL (ref 0.8–1.4)
EOSINOPHIL # BLD: 4 % (ref 0–6)
EOSINOPHILS ABSOLUTE: 0.4 K/UL (ref 0–0.5)
GFR, ESTIMATED: 32.7 ML/MIN/1.73 SQ.M.
GLUCOSE: 90 MG/DL (ref 70–99)
HCT VFR BLD CALC: 41.1 % (ref 35.1–48.3)
HEMOGLOBIN: 12.9 G/DL (ref 11.7–16.1)
LYMPHOCYTES # BLD: 34 % (ref 20–45)
LYMPHOCYTES ABSOLUTE: 3.3 K/UL (ref 1–4.8)
MCH RBC QN AUTO: 27 PG (ref 26–34)
MCHC RBC AUTO-ENTMCNC: 31 G/DL (ref 31–36)
MCV RBC AUTO: 85 FL (ref 80–99)
MONOCYTES ABSOLUTE: 0.9 K/UL (ref 0.1–1)
MONOCYTES: 9 % (ref 3–12)
NEUTROPHILS ABSOLUTE: 5.2 K/UL (ref 1.8–7.7)
NEUTROPHILS: 52 % (ref 40–75)
PDW BLD-RTO: 13.6 % (ref 10–15.5)
PHOSPHORUS: 2 MG/DL (ref 2.5–4.5)
PLATELET # BLD: 186 K/UL (ref 140–440)
PMV BLD AUTO: 11.9 FL (ref 9–13)
POTASSIUM SERPL-SCNC: 4.1 MMOL/L (ref 3.5–5.5)
RBC # BLD: 4.82 M/UL (ref 3.8–5.2)
SODIUM BLD-SCNC: 140 MMOL/L (ref 133–145)
VITAMIN B-12: 537 PG/ML (ref 211–911)
WBC # BLD: 9.9 K/UL (ref 4–11)

## 2024-10-19 NOTE — PROGRESS NOTES
Sho Anderson is a 89 y.o.  female and presents with    Chief Complaint   Patient presents with    Foot Pain     Both, feels like walking on starch and aching and cold at night.           Subjective:    Patient has noted that she has been having more issues when it comes to her feet bilaterally.  She has noticed the swelling has gone up.  She also feels like there is numbness, and burning sensation in her feet bilaterally, and sometimes even on her hands.  She has been feeling like both her feet have been more cold as well than usual.  Like to have something so that she does not have as much pain when walking.      Patient Active Problem List   Diagnosis    Chronic gout of multiple sites    Edema    Severe obesity (BMI 35.0-39.9) with comorbidity    Essential hypertension    History of echocardiogram    Uncomplicated asthma    Vitamin D deficiency    Chronic kidney disease, stage 4 (severe) (HCC)    Arthritis    Multinodular goiter    Chronic eczema    Subclinical hyperthyroidism    Pure hypercholesterolemia    Other chest pain    Acute on chronic diastolic heart failure (HCC)    COPD exacerbation (HCC)    Paroxysmal atrial fibrillation (HCC)    Cholelithiasis without obstruction    CHF (congestive heart failure) (HCC)    Chronic anemia    Chronic gout    Diaphragmatic hernia    Disorder of liver    Diverticular disease of colon    Dysphagia    Early satiety    Eosinophilia    Epigastric pain    Flatulence    Gastroduodenitis    First degree hemorrhoids    Gastro-esophageal reflux disease without esophagitis    Osteoarthritis of right knee    S/P total knee arthroplasty    SOB (shortness of breath)    Stricture of esophagus    Suspected 2019-nCoV infection    Terminal esophageal web    HTN (hypertension)    Persistent atrial fibrillation (HCC)    Morbid obesity    Hyperthyroidism    Hypothyroid      Past Medical History:   Diagnosis Date    Arthritis 9/20/2010    Asthma     Chronic eczema 3/22/2010

## 2024-10-19 NOTE — PROGRESS NOTES
Sho Anderson presents today for   Chief Complaint   Patient presents with    Follow-up     overdue       Sho Anderson preferred language for health care discussion is english/other.    Is someone accompanying this pt? no    Is the patient using any DME equipment during OV? no    Depression Screening:  Depression: Not at risk (10/7/2024)    PHQ-2     PHQ-2 Score: 0        Learning Assessment:  Who is the primary learner? Patient    What is the preferred language for health care of the primary learner? ENGLISH    How does the primary learner prefer to learn new concepts? DEMONSTRATION    Answered By patient    Relationship to Learner SELF           Pt currently taking Anticoagulant therapy? eliquis    Pt currently taking Antiplatelet therapy ? no      Coordination of Care:  1. Have you been to the ER, urgent care clinic since your last visit? Hospitalized since your last visit? no    2. Have you seen or consulted any other health care providers outside of the [facility] since your last visit? Include any pap smears or colon screening. no    
Subjective:      Sho is in the office today for cardiac reevaluation.  She is a 89-year-old woman that was initially referred for further evaluation of chest pain.  She was seen in the ED at [facility] in March 2021.  At that time she was complaining of persistent pain in her chest for the prior 3 days.  She has a known history of esophagitis/biliary disease.  2 troponins were obtained both which were not significantly elevated.  She thought that she was referred to gastroenterology at that time.    She had an echocardiogram done on 3/24/2021.  Her left ventricle was normal in size with normal systolic function.  She did have moderate pulmonary hypertension with an estimated systolic pressure of 54 mmHg.  It did not appear to be a complete TR regurgitant jet.    She had a normal nuclear stress test in May 2021.  Ejection fraction was 68%.      In the office on 6/3/2022 she reported a prior [facility] hospitalization.  (5/1/2022).  She had acute hypoxemic hypercarbic respiratory failure secondary to suspected CHF, asthma/COPD exacerbation and acute bronchitis.  An echocardiogram during her hospitalization demonstrated an ejection fraction of 61%.  Left ventricular diastolic pressure was indeterminate.      She had an additional [facility] hospitalization (8/30/2022).  She was felt to have had acute on chronic diastolic heart failure.  She also was felt to have Klebsiella PNA and an underlying obstructive sleep apnea.  She was intubated from 8/31-9/4.  She was continued on the metoprolol and Eliquis for her paroxysmal atrial fibrillation with her indwelling pacemaker.  She is using a BiPAP machine at home.   Her Eliquis was reduced from 5 to 2.5 mg during her hospitalization.  At her most recent pacemaker interrogation, she was found to be in % of the time.  She was switched to VVIR.  Longevity was 5.5 years.    In office today she reports that she has been getting along \"pretty good \".  Her 
English

## 2024-10-23 DIAGNOSIS — R13.10 DYSPHAGIA, UNSPECIFIED TYPE: ICD-10-CM

## 2024-10-23 NOTE — TELEPHONE ENCOUNTER
Med Refill Requests    Medication(s) requesting:   Requested Prescriptions        Pending Prescriptions Disp Refills   Omeprazole (PRILOSEC) 40 MG delayed release capsule 90 caps     1     Last OV: 10/18/2024  Next Appt: 12/16/2024

## 2024-10-23 NOTE — TELEPHONE ENCOUNTER
Medication(s) requesting:   Requested Prescriptions     Pending Prescriptions Disp Refills    omeprazole (PRILOSEC) 40 MG delayed release capsule 90 capsule 1     Sig: Take 1 capsule by mouth daily       Last office visit:  10/18/2024  Next office visit DMA: 12/16/2024

## 2024-10-24 RX ORDER — OMEPRAZOLE 40 MG/1
40 CAPSULE, DELAYED RELEASE ORAL DAILY
Qty: 90 CAPSULE | Refills: 1 | Status: SHIPPED | OUTPATIENT
Start: 2024-10-24

## 2024-10-24 RX ORDER — POTASSIUM CHLORIDE 20MEQ/15ML
20 LIQUID (ML) ORAL 2 TIMES DAILY
Qty: 473 ML | Refills: 4 | Status: SHIPPED | OUTPATIENT
Start: 2024-10-24

## 2024-12-09 ENCOUNTER — TELEPHONE (OUTPATIENT)
Facility: CLINIC | Age: 89
End: 2024-12-09

## 2024-12-09 NOTE — TELEPHONE ENCOUNTER
[employer] ([employer] called stating they received a fax on 11/26/24, and after reading the documents, they are not clear if the documents were sent in error.    Phone:  [phone].    Please call to advise.  Thank you.

## 2024-12-16 ENCOUNTER — OFFICE VISIT (OUTPATIENT)
Facility: CLINIC | Age: 89
End: 2024-12-16

## 2024-12-16 VITALS
WEIGHT: 223.5 LBS | OXYGEN SATURATION: 99 % | HEIGHT: 65 IN | BODY MASS INDEX: 37.24 KG/M2 | TEMPERATURE: 97.6 F | DIASTOLIC BLOOD PRESSURE: 74 MMHG | SYSTOLIC BLOOD PRESSURE: 123 MMHG | HEART RATE: 70 BPM | RESPIRATION RATE: 14 BRPM

## 2024-12-16 DIAGNOSIS — K64.9 BLEEDING HEMORRHOID: ICD-10-CM

## 2024-12-16 DIAGNOSIS — J45.41 MODERATE PERSISTENT ASTHMA WITH ACUTE EXACERBATION: ICD-10-CM

## 2024-12-16 DIAGNOSIS — I10 ESSENTIAL HYPERTENSION: Primary | ICD-10-CM

## 2024-12-16 DIAGNOSIS — G89.29 CHRONIC PAIN OF LEFT KNEE: ICD-10-CM

## 2024-12-16 DIAGNOSIS — M25.562 CHRONIC PAIN OF LEFT KNEE: ICD-10-CM

## 2024-12-16 RX ORDER — TRIAMCINOLONE ACETONIDE 40 MG/ML
40 INJECTION, SUSPENSION INTRA-ARTICULAR; INTRAMUSCULAR ONCE
Status: COMPLETED | OUTPATIENT
Start: 2024-12-16 | End: 2024-12-16

## 2024-12-16 RX ORDER — HYDROCORTISONE 25 MG/G
CREAM TOPICAL
Qty: 28 G | Refills: 3 | Status: SHIPPED | OUTPATIENT
Start: 2024-12-16

## 2024-12-16 RX ORDER — MONTELUKAST SODIUM 10 MG/1
10 TABLET ORAL NIGHTLY
Qty: 90 TABLET | Refills: 2 | Status: SHIPPED | OUTPATIENT
Start: 2024-12-16

## 2024-12-16 RX ADMIN — TRIAMCINOLONE ACETONIDE 40 MG: 40 INJECTION, SUSPENSION INTRA-ARTICULAR; INTRAMUSCULAR at 14:59

## 2024-12-16 ASSESSMENT — ENCOUNTER SYMPTOMS
COLOR CHANGE: 0
DIARRHEA: 0
EYE REDNESS: 0
CONSTIPATION: 0
EYE PAIN: 0
SHORTNESS OF BREATH: 0
VOMITING: 0
BACK PAIN: 0
FACIAL SWELLING: 0
ABDOMINAL PAIN: 0
EYE DISCHARGE: 0
EYE ITCHING: 0

## 2024-12-16 NOTE — PROGRESS NOTES
Sho Anderson is a 89 y.o. year old female who presents today for   Chief Complaint   Patient presents with    Discuss Medications     4 MONTH F/U        \"Have you been to the ER, urgent care clinic since your last visit?  Hospitalized since your last visit?\"   NO     “Have you seen or consulted any other health care providers outside our system since your last visit?”   YES - When: approximately 2  weeks ago.  Where and Why: PULMONOLOGY.             - XOCHITL Daley [facility]  Phone: [phone]  Fax: [phone]  
   Smokeless tobacco: Never   Vaping Use    Vaping status: Not on file   Substance and Sexual Activity    Alcohol use: Not Currently     Alcohol/week: 1.0 standard drink of alcohol    Drug use: No    Sexual activity: Not on file   Other Topics Concern    Not on file   Social History Narrative    Not on file     Social Determinants of Health     Financial Resource Strain: Low Risk  (10/18/2024)    Overall Financial Resource Strain (CARDIA)     Difficulty of Paying Living Expenses: Not hard at all   Food Insecurity: No Food Insecurity (10/18/2024)    Hunger Vital Sign     Worried About Running Out of Food in the Last Year: Never true     Ran Out of Food in the Last Year: Never true   Transportation Needs: Unknown (10/18/2024)    PRAPARE - Transportation     Lack of Transportation (Medical): Not on file     Lack of Transportation (Non-Medical): No   Physical Activity: Inactive (12/21/2023)    Exercise Vital Sign     Days of Exercise per Week: 0 days     Minutes of Exercise per Session: 0 min   Stress: No Stress Concern Present (9/1/2023)    [employer] [place] [employer] - Occupational Stress Questionnaire     Feeling of Stress : Not at all   Social Connections: Moderately Isolated (9/1/2023)    Social Connection and Isolation Panel [NHANES]     Frequency of Communication with Friends and Family: More than three times a week     Frequency of Social Gatherings with Friends and Family: More than three times a week     Attends Zoroastrian Services: More than 4 times per year     Active Member of Clubs or Organizations: No     Attends Club or Organization Meetings: Not on file     Marital Status:    Intimate Partner Violence: Not At Risk (4/4/2024)    Received from [facility] [employer]    Humiliation, Afraid, Rape, and Kick questionnaire     Fear of Current or Ex-Partner: No     Emotionally Abused: No     Physically Abused: No     Sexually Abused: No   Housing Stability: Unknown (10/18/2024)

## 2024-12-20 ENCOUNTER — TELEPHONE (OUTPATIENT)
Facility: CLINIC | Age: 89
End: 2024-12-20

## 2024-12-20 DIAGNOSIS — L30.9 DERMATITIS: Primary | ICD-10-CM

## 2024-12-20 RX ORDER — FLUOCINONIDE 0.5 MG/G
CREAM TOPICAL
Qty: 60 G | Refills: 2 | Status: SHIPPED | OUTPATIENT
Start: 2024-12-20

## 2024-12-20 NOTE — TELEPHONE ENCOUNTER
Tatiana Gaston called stating pt was seen in office on 12/16/24 and Dr. Henry was supposed to send a Rx for Fluocinonide 0.05% cream to pharmacy, but they have not received the Rx yet.  Please send.    Also, she informed me that pt's medication is being sent to the office today between 11:00am - 5:00pm.  Advised her that the office will be closing today at noon for our holiday party, but we will be in the office.    Thank you.

## 2025-01-13 ENCOUNTER — OFFICE VISIT (OUTPATIENT)
Age: 89
End: 2025-01-13
Payer: MEDICARE

## 2025-01-13 VITALS
HEART RATE: 59 BPM | WEIGHT: 231 LBS | HEIGHT: 65 IN | SYSTOLIC BLOOD PRESSURE: 128 MMHG | OXYGEN SATURATION: 98 % | DIASTOLIC BLOOD PRESSURE: 80 MMHG | BODY MASS INDEX: 38.49 KG/M2

## 2025-01-13 DIAGNOSIS — R60.0 BILATERAL EDEMA OF LOWER EXTREMITY: Primary | ICD-10-CM

## 2025-01-13 PROCEDURE — 1126F AMNT PAIN NOTED NONE PRSNT: CPT | Performed by: INTERNAL MEDICINE

## 2025-01-13 PROCEDURE — 1123F ACP DISCUSS/DSCN MKR DOCD: CPT | Performed by: INTERNAL MEDICINE

## 2025-01-13 PROCEDURE — 99214 OFFICE O/P EST MOD 30 MIN: CPT | Performed by: INTERNAL MEDICINE

## 2025-01-13 ASSESSMENT — PATIENT HEALTH QUESTIONNAIRE - PHQ9
SUM OF ALL RESPONSES TO PHQ QUESTIONS 1-9: 0
1. LITTLE INTEREST OR PLEASURE IN DOING THINGS: NOT AT ALL
SUM OF ALL RESPONSES TO PHQ QUESTIONS 1-9: 0
2. FEELING DOWN, DEPRESSED OR HOPELESS: NOT AT ALL
SUM OF ALL RESPONSES TO PHQ9 QUESTIONS 1 & 2: 0

## 2025-01-13 NOTE — PATIENT INSTRUCTIONS
Verbal order with read back Miky Thomas MD.  Follow-up in 4-Months  Continue Eliquis 2.5 mg   Obtain compression stockings

## 2025-01-13 NOTE — PROGRESS NOTES
Identified pt with two pt identifiers(name and ). Reviewed record in preparation for visit and have obtained necessary documentation.    Sho Anderson presents today for   Chief Complaint   Patient presents with    Follow-up     3m     Patient c/o HEADACHES & SWELLING.             Sho Anderson preferred language for health care discussion is english/other.    Personal Protective Equipment:   Personal Protective Equipment was used including: mask-surgical and hands-gloves. Patient was placed on no precaution(s). Patient was not  masked.    Precautions:   Patient currently on None  Patient currently roomed with door closed.    Is someone accompanying this pt? Sister     Is the patient using any DME equipment during OV? Walker     Depression Screenin/13/2025    12:02 PM 10/7/2024    11:36 AM 2024    10:43 AM 2023     2:48 PM 2023     3:31 PM 2023    12:13 PM 10/17/2023    12:09 PM   PHQ-9 Questionaire   Little interest or pleasure in doing things 0 0 0 0 0 0 0   Feeling down, depressed, or hopeless 0 0 0 0 0 0 0   PHQ-9 Total Score 0 0 0 0 0 0 0        Learning Assessment:  Who is the primary learner? Patient    What is the preferred language for health care of the primary learner? ENGLISH    How does the primary learner prefer to learn new concepts? DEMONSTRATION    Answered By pateint    Relationship to Learner SELF        Abuse Screenin/13/2025    12:00 PM 5/10/2023     1:00 PM   AMB Abuse Screening   Do you ever feel afraid of your partner? N N   Are you in a relationship with someone who physically or mentally threatens you? N N   Is it safe for you to go home? Y Y          Fall Risk      2025    12:03 PM 10/7/2024    11:36 AM 2024    10:43 AM 2023     2:48 PM 2023     3:31 PM 2023    12:13 PM 10/17/2023    12:09 PM   Fall Risk   Do you feel unsteady or are you worried about falling?  no no no yes no yes no   2 or more falls in past year?

## 2025-01-13 NOTE — TELEPHONE ENCOUNTER
PCP: Elaine Mccallum MD    Last appt:  1/13/2025   Future Appointments   Date Time Provider Department Center   4/16/2025  2:00 PM Elaine Mccallum MD [facility] DEP       Requested Prescriptions     Pending Prescriptions Disp Refills    apixaban (ELIQUIS) 2.5 MG TABS tablet 180 tablet 3     Sig: Take 1 tablet by mouth 2 times daily       Request for a 30 or 90 day supply? Provider Discretion    Pharmacy: confirmed     Other Comments: n/a

## 2025-01-22 NOTE — PROGRESS NOTES
Subjective:      Sho is in the office today for cardiac reevaluation.  She is a 89-year-old woman that was initially referred for further evaluation of chest pain.  She was seen in the ED at [facility] in March 2021.  At that time she was complaining of persistent pain in her chest for the prior 3 days.  She has a known history of esophagitis/biliary disease.  2 troponins were obtained both which were not significantly elevated.  She thought that she was referred to gastroenterology at that time.    She had an echocardiogram done on 3/24/2021.  Her left ventricle was normal in size with normal systolic function.  She did have moderate pulmonary hypertension with an estimated systolic pressure of 54 mmHg.  It did not appear to be a complete TR regurgitant jet.    She had a normal nuclear stress test in May 2021.  Ejection fraction was 68%.      In the office on 6/3/2022 she reported a prior [facility] hospitalization.  (5/1/2022).  She had acute hypoxemic hypercarbic respiratory failure secondary to suspected CHF, asthma/COPD exacerbation and acute bronchitis.  An echocardiogram during her hospitalization demonstrated an ejection fraction of 61%.  Left ventricular diastolic pressure was indeterminate.      She had an additional [facility] hospitalization (8/30/2022).  She was felt to have had acute on chronic diastolic heart failure.  She also was felt to have Klebsiella PNA and an underlying obstructive sleep apnea.  She was intubated from 8/31-9/4.  She was continued on the metoprolol and Eliquis for her paroxysmal atrial fibrillation with her indwelling pacemaker.  She is using a BiPAP machine at home.   Her Eliquis was reduced from 5 to 2.5 mg during her hospitalization.  At her most recent pacemaker interrogation, she was found to be in % of the time.  She was switched to VVIR.  Longevity was 5.5 years.    In office today she reports that she has been coming in along \"pretty good \".  She has

## 2025-03-18 ENCOUNTER — TELEPHONE (OUTPATIENT)
Facility: CLINIC | Age: 89
End: 2025-03-18

## 2025-03-18 NOTE — TELEPHONE ENCOUNTER
Dominiknura, daughter of pt, called for SD appt b/c pt is experiencing chest pains.  Advised her to take pt to the ED but she stated if the chest pains were bad she would take her, but they are not.   No availability.    Also, Linda stated pt s/w Dr. Henry before regarding the chest pains and pt wants to s/w her ASAP.    Please call. Thank you.

## 2025-03-24 ENCOUNTER — OFFICE VISIT (OUTPATIENT)
Age: 89
End: 2025-03-24

## 2025-03-24 VITALS
BODY MASS INDEX: 37.32 KG/M2 | DIASTOLIC BLOOD PRESSURE: 80 MMHG | HEIGHT: 65 IN | SYSTOLIC BLOOD PRESSURE: 126 MMHG | HEART RATE: 61 BPM | WEIGHT: 224 LBS | OXYGEN SATURATION: 100 %

## 2025-03-24 DIAGNOSIS — I50.32 CHRONIC DIASTOLIC CONGESTIVE HEART FAILURE (HCC): Primary | ICD-10-CM

## 2025-03-24 DIAGNOSIS — I10 HYPERTENSION, UNSPECIFIED TYPE: ICD-10-CM

## 2025-03-24 DIAGNOSIS — I48.19 PERSISTENT ATRIAL FIBRILLATION (HCC): ICD-10-CM

## 2025-03-24 RX ORDER — CALCIUM CARBONATE/VITAMIN D3 600 MG-10
TABLET ORAL
COMMUNITY
Start: 2025-03-18

## 2025-03-24 RX ORDER — FERROUS SULFATE 325(65) MG
TABLET ORAL
COMMUNITY
Start: 2025-03-11

## 2025-03-24 RX ORDER — BIMATOPROST 0.1 MG/ML
SOLUTION/ DROPS OPHTHALMIC
COMMUNITY
Start: 2025-03-04

## 2025-03-24 ASSESSMENT — PATIENT HEALTH QUESTIONNAIRE - PHQ9
SUM OF ALL RESPONSES TO PHQ QUESTIONS 1-9: 0
1. LITTLE INTEREST OR PLEASURE IN DOING THINGS: NOT AT ALL
SUM OF ALL RESPONSES TO PHQ QUESTIONS 1-9: 0
1. LITTLE INTEREST OR PLEASURE IN DOING THINGS: NOT AT ALL
SUM OF ALL RESPONSES TO PHQ QUESTIONS 1-9: 0
2. FEELING DOWN, DEPRESSED OR HOPELESS: NOT AT ALL
2. FEELING DOWN, DEPRESSED OR HOPELESS: NOT AT ALL

## 2025-03-24 NOTE — TELEPHONE ENCOUNTER
PCP: Elaine Mccallum MD    Last appt:  3/24/2025   Future Appointments   Date Time Provider Department Center   2025  2:00 PM Elaine Mccallum MD [facility]\""   2025  1:20 PM Miky Thomas MD [facility]       Requested Prescriptions     Pending Prescriptions Disp Refills    azithromycin (ZITHROMAX) 250 MG tablet 6 tablet 0     Simg on day 1 followed by 250mg on days 2 - 5       Request for a 30 or 90 day supply? Provider Discretion    Pharmacy: confirmed    Other Comments:n/a

## 2025-03-24 NOTE — PROGRESS NOTES
Identified pt with two pt identifiers(name and ). Reviewed record in preparation for visit and have obtained necessary documentation.    Sho Anderson presents today for   Chief Complaint   Patient presents with    Follow-up      Patient having sharp paints in her chest, going towards her back.           Pt c/o DIZZINESS, SOB, CHEST PAIN/ PRESSURE, FATIGUE/WEAKNESS, HEADACHES, SWELLING.             Sho Anderson preferred language for health care discussion is english/other.    Personal Protective Equipment:   Personal Protective Equipment was used including: mask-surgical and hands-gloves. Patient was placed on no precaution(s). Patient was masked.    Precautions:   Patient currently on None  Patient currently roomed with door closed.    Is someone accompanying this pt? daughter    Is the patient using any DME equipment during OV? walker    Depression Screening:      3/24/2025     2:49 PM 3/24/2025     2:41 PM 2025    12:02 PM 10/7/2024    11:36 AM 2024    10:43 AM 2023     2:48 PM 2023     3:31 PM   PHQ-9 Questionaire   Little interest or pleasure in doing things 0 0 0 0 0 0 0   Feeling down, depressed, or hopeless 0 0 0 0 0 0 0   PHQ-9 Total Score 0 0 0 0 0 0 0        Learning Assessment:  completed  Abuse Screening:      3/24/2025     2:00 PM 2025    12:00 PM 5/10/2023     1:00 PM   AMB Abuse Screening   Do you ever feel afraid of your partner? N N N   Are you in a relationship with someone who physically or mentally threatens you? N N N   Is it safe for you to go home? Y Y Y          Fall Risk      3/24/2025     2:42 PM 2025    12:03 PM 10/7/2024    11:36 AM 2024    10:43 AM 2023     2:48 PM 2023     3:31 PM 2023    12:13 PM   Fall Risk   Do you feel unsteady or are you worried about falling?  no no no no yes no yes   2 or more falls in past year? no no no no no no no   Fall with injury in past year? no no no no no no no         Pt currently taking

## 2025-03-25 RX ORDER — AZITHROMYCIN 250 MG/1
TABLET, FILM COATED ORAL
Qty: 6 TABLET | Refills: 0 | Status: SHIPPED | OUTPATIENT
Start: 2025-03-25 | End: 2025-04-03

## 2025-03-26 NOTE — TELEPHONE ENCOUNTER
Called pt. She seems to be having costochondritis, was seen by cardiology.  Taking abx at this time

## 2025-04-16 ENCOUNTER — OFFICE VISIT (OUTPATIENT)
Facility: CLINIC | Age: 89
End: 2025-04-16
Payer: MEDICARE

## 2025-04-16 VITALS
OXYGEN SATURATION: 95 % | HEIGHT: 65 IN | HEART RATE: 58 BPM | RESPIRATION RATE: 16 BRPM | DIASTOLIC BLOOD PRESSURE: 78 MMHG | SYSTOLIC BLOOD PRESSURE: 124 MMHG | WEIGHT: 223 LBS | TEMPERATURE: 97.9 F | BODY MASS INDEX: 37.15 KG/M2

## 2025-04-16 DIAGNOSIS — F41.9 ANXIETY: ICD-10-CM

## 2025-04-16 DIAGNOSIS — L30.9 DERMATITIS: ICD-10-CM

## 2025-04-16 DIAGNOSIS — R13.10 DYSPHAGIA, UNSPECIFIED TYPE: ICD-10-CM

## 2025-04-16 DIAGNOSIS — E87.6 HYPOKALEMIA: ICD-10-CM

## 2025-04-16 DIAGNOSIS — J30.1 SEASONAL ALLERGIC RHINITIS DUE TO POLLEN: ICD-10-CM

## 2025-04-16 DIAGNOSIS — Z00.00 MEDICARE ANNUAL WELLNESS VISIT, SUBSEQUENT: Primary | ICD-10-CM

## 2025-04-16 DIAGNOSIS — G62.9 NEUROPATHY: ICD-10-CM

## 2025-04-16 PROCEDURE — 99214 OFFICE O/P EST MOD 30 MIN: CPT | Performed by: STUDENT IN AN ORGANIZED HEALTH CARE EDUCATION/TRAINING PROGRAM

## 2025-04-16 PROCEDURE — 1125F AMNT PAIN NOTED PAIN PRSNT: CPT | Performed by: STUDENT IN AN ORGANIZED HEALTH CARE EDUCATION/TRAINING PROGRAM

## 2025-04-16 PROCEDURE — G0439 PPPS, SUBSEQ VISIT: HCPCS | Performed by: STUDENT IN AN ORGANIZED HEALTH CARE EDUCATION/TRAINING PROGRAM

## 2025-04-16 PROCEDURE — 1159F MED LIST DOCD IN RCRD: CPT | Performed by: STUDENT IN AN ORGANIZED HEALTH CARE EDUCATION/TRAINING PROGRAM

## 2025-04-16 PROCEDURE — 1123F ACP DISCUSS/DSCN MKR DOCD: CPT | Performed by: STUDENT IN AN ORGANIZED HEALTH CARE EDUCATION/TRAINING PROGRAM

## 2025-04-16 RX ORDER — GABAPENTIN 100 MG/1
200 CAPSULE ORAL 3 TIMES DAILY
Qty: 180 CAPSULE | Refills: 2 | Status: SHIPPED | OUTPATIENT
Start: 2025-04-16 | End: 2025-07-15

## 2025-04-16 RX ORDER — SODIUM PHOSPHATE, DIBASIC, ANHYDROUS, POTASSIUM PHOSPHATE, MONOBASIC, AND SODIUM PHOSPHATE, MONOBASIC, MONOHYDRATE 852; 155; 130 MG/1; MG/1; MG/1
1 TABLET, COATED ORAL 2 TIMES DAILY
COMMUNITY
Start: 2025-04-14

## 2025-04-16 RX ORDER — POTASSIUM CHLORIDE 20MEQ/15ML
20 LIQUID (ML) ORAL 2 TIMES DAILY
Qty: 3800 ML | Refills: 1 | Status: SHIPPED | OUTPATIENT
Start: 2025-04-16

## 2025-04-16 RX ORDER — HYDROXYZINE HYDROCHLORIDE 10 MG/1
10 TABLET, FILM COATED ORAL EVERY 8 HOURS PRN
Qty: 90 TABLET | Refills: 2 | Status: SHIPPED | OUTPATIENT
Start: 2025-04-16

## 2025-04-16 RX ORDER — OMEPRAZOLE 40 MG/1
40 CAPSULE, DELAYED RELEASE ORAL DAILY
Qty: 90 CAPSULE | Refills: 1 | Status: SHIPPED | OUTPATIENT
Start: 2025-04-16

## 2025-04-16 RX ORDER — CETIRIZINE HYDROCHLORIDE 10 MG/1
10 TABLET ORAL DAILY
Qty: 90 TABLET | Refills: 1 | Status: SHIPPED | OUTPATIENT
Start: 2025-04-16

## 2025-04-16 RX ORDER — HYDROXYZINE HYDROCHLORIDE 10 MG/1
10 TABLET, FILM COATED ORAL EVERY 8 HOURS PRN
Qty: 90 TABLET | Refills: 2 | Status: SHIPPED | OUTPATIENT
Start: 2025-04-16 | End: 2025-04-16

## 2025-04-16 RX ORDER — FLUOCINONIDE 0.5 MG/G
CREAM TOPICAL
Qty: 60 G | Refills: 2 | Status: SHIPPED | OUTPATIENT
Start: 2025-04-16

## 2025-04-16 SDOH — ECONOMIC STABILITY: FOOD INSECURITY: WITHIN THE PAST 12 MONTHS, THE FOOD YOU BOUGHT JUST DIDN'T LAST AND YOU DIDN'T HAVE MONEY TO GET MORE.: NEVER TRUE

## 2025-04-16 SDOH — ECONOMIC STABILITY: FOOD INSECURITY: WITHIN THE PAST 12 MONTHS, YOU WORRIED THAT YOUR FOOD WOULD RUN OUT BEFORE YOU GOT MONEY TO BUY MORE.: NEVER TRUE

## 2025-04-16 ASSESSMENT — ENCOUNTER SYMPTOMS
COLOR CHANGE: 0
CONSTIPATION: 0
BACK PAIN: 0
EYE ITCHING: 0
DIARRHEA: 0
ABDOMINAL PAIN: 0
EYE REDNESS: 0
SHORTNESS OF BREATH: 0
EYE PAIN: 0
FACIAL SWELLING: 0
VOMITING: 0
EYE DISCHARGE: 0

## 2025-04-16 ASSESSMENT — PATIENT HEALTH QUESTIONNAIRE - PHQ9
SUM OF ALL RESPONSES TO PHQ QUESTIONS 1-9: 0
SUM OF ALL RESPONSES TO PHQ QUESTIONS 1-9: 0
2. FEELING DOWN, DEPRESSED OR HOPELESS: NOT AT ALL
1. LITTLE INTEREST OR PLEASURE IN DOING THINGS: NOT AT ALL
SUM OF ALL RESPONSES TO PHQ QUESTIONS 1-9: 0
SUM OF ALL RESPONSES TO PHQ QUESTIONS 1-9: 0

## 2025-04-16 ASSESSMENT — LIFESTYLE VARIABLES
HOW MANY STANDARD DRINKS CONTAINING ALCOHOL DO YOU HAVE ON A TYPICAL DAY: PATIENT DOES NOT DRINK
HOW OFTEN DO YOU HAVE A DRINK CONTAINING ALCOHOL: NEVER

## 2025-04-16 NOTE — PROGRESS NOTES
Sho Anderson is a 89 y.o. year old female who presents today for   Chief Complaint   Patient presents with    Hypertension     4 Month Follow Up          \"Have you been to the ER, urgent care clinic since your last visit?  Hospitalized since your last visit?\"   NO     “Have you seen or consulted any other health care providers outside our system since your last visit?”   NO             - XOCHITL Daley  [facility]  Phone: [phone]  Fax: [phone]

## 2025-04-16 NOTE — PROGRESS NOTES
Sho Anderson is a 89 y.o.  female and presents with    Chief Complaint   Patient presents with    Hypertension     4 Month Follow Up      Medicare AWV    Other     Finger tips are hurting             Subjective:    Has history of A-fib on Eliquis, hypertension, chronic respiratory failure on 3 L HOT, obese hypoventilation syndrome, CKD stage 4, thyroid disease.      Hypertension follow up:  Taking medications as prescribed: Yes  Checking BP at home: Yes  Symptoms:  leg edema  Low sodium diet: No  Exercise: No     Saw Dr. Pham and has been doing well on K+ BID, had phosphorus added to her regimen. Per pt she was told CKD stable     She has been feeling like she has been having pain in the tips of her fingers. She has only been taking th gabapentin BID 100mg.     Patient Active Problem List   Diagnosis    Chronic gout of multiple sites    Edema    Severe obesity (BMI 35.0-39.9) with comorbidity    Essential hypertension    History of echocardiogram    Uncomplicated asthma    Vitamin D deficiency    Chronic kidney disease, stage 4 (severe) (HCC)    Arthritis    Multinodular goiter    Chronic eczema    Subclinical hyperthyroidism    Pure hypercholesterolemia    Other chest pain    Acute on chronic diastolic heart failure (HCC)    COPD exacerbation (HCC)    Paroxysmal atrial fibrillation (HCC)    Cholelithiasis without obstruction    CHF (congestive heart failure) (HCC)    Chronic anemia    Chronic gout    Diaphragmatic hernia    Disorder of liver    Diverticular disease of colon    Dysphagia    Early satiety    Eosinophilia    Epigastric pain    Flatulence    Gastroduodenitis    First degree hemorrhoids    Gastro-esophageal reflux disease without esophagitis    Osteoarthritis of right knee    S/P total knee arthroplasty    SOB (shortness of breath)    Stricture of esophagus    Suspected 2019-nCoV infection    Terminal esophageal web    HTN (hypertension)    Persistent atrial fibrillation (HCC)

## 2025-04-16 NOTE — PATIENT INSTRUCTIONS
Learning About Being Active as an Older Adult  Why is being active important as you get older?     Being active is one of the best things you can do for your health. And it's never too late to start. Being active--or getting active, if you aren't already--has definite benefits. It can:  Give you more energy,  Keep your mind sharp.  Improve balance to reduce your risk of falls.  Help you manage chronic illness with fewer medicines.  No matter how old you are, how fit you are, or what health problems you have, there is a form of activity that will work for you. And the more physical activity you can do, the better your overall health will be.  What kinds of activity can help you stay healthy?  Being more active will make your daily activities easier. Physical activity includes planned exercise and things you do in daily life. There are four types of activity:  Aerobic.  Doing aerobic activity makes your heart and lungs strong.  Includes walking, dancing, and gardening.  Aim for at least 2½ hours spread throughout the week.  It improves your energy and can help you sleep better.  Muscle-strengthening.  This type of activity can help maintain muscle and strengthen bones.  Includes climbing stairs, using resistance bands, and lifting or carrying heavy loads.  Aim for at least twice a week.  It can help protect the knees and other joints.  Stretching.  Stretching gives you better range of motion in joints and muscles.  Includes upper arm stretches, calf stretches, and gentle yoga.  Aim for at least twice a week, preferably after your muscles are warmed up from other activities.  It can help you function better in daily life.  Balancing.  This helps you stay coordinated and have good posture.  Includes heel-to-toe walking, khushboo chi, and certain types of yoga.  Aim for at least 3 days a week.  It can reduce your risk of falling.  Even if you have a hard time meeting the recommendations, it's better to be more active  None

## 2025-04-17 ENCOUNTER — TELEPHONE (OUTPATIENT)
Facility: CLINIC | Age: 89
End: 2025-04-17

## 2025-04-17 NOTE — TELEPHONE ENCOUNTER
Officer Dusty George from the [employer] called regarding a letter they received to allow [place] shop [place].    He stated the letter is 2 years old, so he needs to confirm that pt's condition is still the same.    Please call ASAP.  Phone:  [phone]    Thank you.

## 2025-04-21 NOTE — TELEPHONE ENCOUNTER
Office Doris called back to speak with Dr. Henry regarding letter.    Per Dr. Henry's message, informed him that:  \"I tried calling the officer on 04.18, did not answer LVM. Letter is still good for what pt has\".     Officer Doris thanked her for confirming letter.  Thank you.

## 2025-05-14 ENCOUNTER — OFFICE VISIT (OUTPATIENT)
Age: 89
End: 2025-05-14
Payer: MEDICARE

## 2025-05-14 VITALS
WEIGHT: 222 LBS | TEMPERATURE: 97 F | HEART RATE: 61 BPM | HEIGHT: 65 IN | SYSTOLIC BLOOD PRESSURE: 118 MMHG | OXYGEN SATURATION: 99 % | BODY MASS INDEX: 36.99 KG/M2 | DIASTOLIC BLOOD PRESSURE: 70 MMHG

## 2025-05-14 DIAGNOSIS — I48.19 PERSISTENT ATRIAL FIBRILLATION (HCC): Primary | ICD-10-CM

## 2025-05-14 PROCEDURE — 1126F AMNT PAIN NOTED NONE PRSNT: CPT | Performed by: INTERNAL MEDICINE

## 2025-05-14 PROCEDURE — 99214 OFFICE O/P EST MOD 30 MIN: CPT | Performed by: INTERNAL MEDICINE

## 2025-05-14 PROCEDURE — 1123F ACP DISCUSS/DSCN MKR DOCD: CPT | Performed by: INTERNAL MEDICINE

## 2025-05-14 RX ORDER — LYSINE HCL 500 MG
1 TABLET ORAL
COMMUNITY
Start: 2025-03-18

## 2025-05-14 ASSESSMENT — PATIENT HEALTH QUESTIONNAIRE - PHQ9
SUM OF ALL RESPONSES TO PHQ QUESTIONS 1-9: 0
1. LITTLE INTEREST OR PLEASURE IN DOING THINGS: NOT AT ALL
SUM OF ALL RESPONSES TO PHQ QUESTIONS 1-9: 0
2. FEELING DOWN, DEPRESSED OR HOPELESS: NOT AT ALL
SUM OF ALL RESPONSES TO PHQ QUESTIONS 1-9: 0
SUM OF ALL RESPONSES TO PHQ QUESTIONS 1-9: 0

## 2025-05-14 NOTE — PROGRESS NOTES
Identified pt with two pt identifiers(name and ). Reviewed record in preparation for visit and have obtained necessary documentation.    Sho Anderson presents today for   Chief Complaint   Patient presents with    Follow-up     4month       Pt c/o DIZZINESS, SLIGHT HEADACHES.             Sho Anderson preferred language for health care discussion is english/other.    Personal Protective Equipment:   Personal Protective Equipment was used including: mask-surgical and hands-gloves. Patient was placed on no precaution(s). Patient was NOT masked.    Precautions:   Patient currently on None  Patient currently roomed with door closed.    Is someone accompanying this pt? Daughter    Is the patient using any DME equipment during OV? Rollator    Depression Screenin/14/2025     1:16 PM 2025     2:16 PM 3/24/2025     2:49 PM 3/24/2025     2:41 PM 2025    12:02 PM 10/7/2024    11:36 AM 2024    10:43 AM   PHQ-9 Questionaire   Little interest or pleasure in doing things 0 0 0 0 0 0 0   Feeling down, depressed, or hopeless 0 0 0 0 0 0 0   PHQ-9 Total Score 0 0 0 0 0 0 0        Learning Assessment:  Who is the primary learner? Patient    What is the preferred language for health care of the primary learner? ENGLISH    How does the primary learner prefer to learn new concepts? DEMONSTRATION    Answered By self    Relationship to Learner SELF        Abuse Screenin/14/2025     1:00 PM 3/24/2025     2:00 PM 2025    12:00 PM 5/10/2023     1:00 PM   AMB Abuse Screening   Do you ever feel afraid of your partner? N N N N   Are you in a relationship with someone who physically or mentally threatens you? N N N N   Is it safe for you to go home? Y Y Y Y          Fall Risk      2025     1:16 PM 2025     2:17 PM 3/24/2025     2:42 PM 2025    12:03 PM 10/7/2024    11:36 AM 2024    10:43 AM 2023     2:48 PM   Fall Risk   Do you feel unsteady or are you worried about falling?

## 2025-05-19 ENCOUNTER — TELEPHONE (OUTPATIENT)
Facility: CLINIC | Age: 89
End: 2025-05-19

## 2025-05-19 NOTE — TELEPHONE ENCOUNTER
Pt's daughter, Ana Luisa called in to request an antibiotic be sent in for the pt, pt has frequent coughing spells & congestion. Pt's daughter requesting a c/b from provider    LOV: 4.16.25    NOV: 7.16.25

## 2025-06-10 ENCOUNTER — OFFICE VISIT (OUTPATIENT)
Facility: CLINIC | Age: 89
End: 2025-06-10
Payer: MEDICARE

## 2025-06-10 VITALS
BODY MASS INDEX: 36.37 KG/M2 | TEMPERATURE: 97.3 F | RESPIRATION RATE: 15 BRPM | DIASTOLIC BLOOD PRESSURE: 73 MMHG | HEART RATE: 76 BPM | WEIGHT: 218.3 LBS | HEIGHT: 65 IN | SYSTOLIC BLOOD PRESSURE: 124 MMHG | OXYGEN SATURATION: 92 %

## 2025-06-10 DIAGNOSIS — N30.01 ACUTE CYSTITIS WITH HEMATURIA: Primary | ICD-10-CM

## 2025-06-10 LAB
BILIRUBIN, URINE, POC: ABNORMAL
BLOOD URINE, POC: ABNORMAL
GLUCOSE URINE, POC: NEGATIVE
KETONES, URINE, POC: ABNORMAL
LEUKOCYTE ESTERASE, URINE, POC: ABNORMAL
NITRITE, URINE, POC: POSITIVE
PH, URINE, POC: 5.5 (ref 4.6–8)
PROTEIN,URINE, POC: ABNORMAL
SPECIFIC GRAVITY, URINE, POC: 1.02 (ref 1–1.03)
URINALYSIS CLARITY, POC: ABNORMAL
URINALYSIS COLOR, POC: ABNORMAL
UROBILINOGEN, POC: ABNORMAL MG/DL

## 2025-06-10 PROCEDURE — 1123F ACP DISCUSS/DSCN MKR DOCD: CPT | Performed by: STUDENT IN AN ORGANIZED HEALTH CARE EDUCATION/TRAINING PROGRAM

## 2025-06-10 PROCEDURE — 99214 OFFICE O/P EST MOD 30 MIN: CPT | Performed by: STUDENT IN AN ORGANIZED HEALTH CARE EDUCATION/TRAINING PROGRAM

## 2025-06-10 PROCEDURE — 1159F MED LIST DOCD IN RCRD: CPT | Performed by: STUDENT IN AN ORGANIZED HEALTH CARE EDUCATION/TRAINING PROGRAM

## 2025-06-10 PROCEDURE — 81001 URINALYSIS AUTO W/SCOPE: CPT | Performed by: STUDENT IN AN ORGANIZED HEALTH CARE EDUCATION/TRAINING PROGRAM

## 2025-06-10 RX ORDER — SULFAMETHOXAZOLE AND TRIMETHOPRIM 800; 160 MG/1; MG/1
1 TABLET ORAL 2 TIMES DAILY
Qty: 6 TABLET | Refills: 0 | Status: SHIPPED | OUTPATIENT
Start: 2025-06-10 | End: 2025-06-13

## 2025-06-10 ASSESSMENT — ENCOUNTER SYMPTOMS
BACK PAIN: 0
EYE PAIN: 0
EYE ITCHING: 0
COLOR CHANGE: 0
ABDOMINAL PAIN: 0
FACIAL SWELLING: 0
DIARRHEA: 0
CONSTIPATION: 0
EYE DISCHARGE: 0
SHORTNESS OF BREATH: 0
VOMITING: 0
EYE REDNESS: 0

## 2025-06-10 NOTE — PROGRESS NOTES
Sho Anderson is a 89 y.o. year old female who presents today for   Chief Complaint   Patient presents with    Urinary Tract Infection        \"Have you been to the ER, urgent care clinic since your last visit?  Hospitalized since your last visit?\"   NO     “Have you seen or consulted any other health care providers outside our system since your last visit?”   NO             - XOCHITL Daley  [facility]  Phone: [phone]  Fax: [phone]

## 2025-06-10 NOTE — PROGRESS NOTES
Sho Anderson is a 89 y.o.  female and presents with    Chief Complaint   Patient presents with    Urinary Tract Infection     BURNING AND ITCHY.     Peripheral Neuropathy     Foot and hand pain, numbness on bottom of feet and aching fingers.           Subjective:    Patient states that for the last few days she has been experiencing burning, and pelvic pain.  Patient states that she has never had a UTI, so she is not sure if this was what she is having, however wanted to come and get checked out.    Patient also states that she had some imaging done, per GI, does not have an appointment at this time to go over the results, but will be calling to make one.    Patient Active Problem List   Diagnosis    Chronic gout of multiple sites    Edema    Severe obesity (BMI 35.0-39.9) with comorbidity (HCC)    Essential hypertension    History of echocardiogram    Uncomplicated asthma    Vitamin D deficiency    Chronic kidney disease, stage 4 (severe) (HCC)    Arthritis    Multinodular goiter    Chronic eczema    Subclinical hyperthyroidism    Pure hypercholesterolemia    Other chest pain    Acute on chronic diastolic heart failure (HCC)    COPD exacerbation (HCC)    Paroxysmal atrial fibrillation (HCC)    Cholelithiasis without obstruction    CHF (congestive heart failure) (HCC)    Chronic anemia    Chronic gout    Diaphragmatic hernia    Disorder of liver    Diverticular disease of colon    Dysphagia    Early satiety    Eosinophilia    Epigastric pain    Flatulence    Gastroduodenitis    First degree hemorrhoids    Gastro-esophageal reflux disease without esophagitis    Osteoarthritis of right knee    S/P total knee arthroplasty    SOB (shortness of breath)    Stricture of esophagus    Suspected 2019-nCoV infection    Terminal esophageal web    HTN (hypertension)    Persistent atrial fibrillation (HCC)    Morbid obesity (HCC)    Hyperthyroidism    Hypothyroid      Past Medical History:   Diagnosis Date

## 2025-06-13 ENCOUNTER — RESULTS FOLLOW-UP (OUTPATIENT)
Facility: CLINIC | Age: 89
End: 2025-06-13

## 2025-06-13 LAB
URINE CULTURE RESULT: ABNORMAL
URINE CULTURE RESULT: ABNORMAL

## 2025-06-27 ENCOUNTER — TRANSCRIBE ORDERS (OUTPATIENT)
Facility: HOSPITAL | Age: 89
End: 2025-06-27

## 2025-06-27 DIAGNOSIS — Z12.31 ENCOUNTER FOR SCREENING MAMMOGRAM FOR MALIGNANT NEOPLASM OF BREAST: Primary | ICD-10-CM

## 2025-07-11 ENCOUNTER — HOSPITAL ENCOUNTER (OUTPATIENT)
Dept: WOMENS IMAGING | Facility: HOSPITAL | Age: 89
Discharge: HOME | End: 2025-07-14
Attending: STUDENT IN AN ORGANIZED HEALTH CARE EDUCATION/TRAINING PROGRAM
Payer: MEDICARE

## 2025-07-11 DIAGNOSIS — Z12.31 ENCOUNTER FOR SCREENING MAMMOGRAM FOR MALIGNANT NEOPLASM OF BREAST: ICD-10-CM

## 2025-07-11 PROCEDURE — 77063 BREAST TOMOSYNTHESIS BI: CPT

## 2025-07-16 ENCOUNTER — OFFICE VISIT (OUTPATIENT)
Facility: CLINIC | Age: 89
End: 2025-07-16
Payer: MEDICARE

## 2025-07-16 VITALS
TEMPERATURE: 97.3 F | SYSTOLIC BLOOD PRESSURE: 122 MMHG | RESPIRATION RATE: 14 BRPM | WEIGHT: 225.6 LBS | DIASTOLIC BLOOD PRESSURE: 75 MMHG | OXYGEN SATURATION: 98 % | HEART RATE: 66 BPM | HEIGHT: 65 IN | BODY MASS INDEX: 37.59 KG/M2

## 2025-07-16 DIAGNOSIS — G62.9 NEUROPATHY: ICD-10-CM

## 2025-07-16 DIAGNOSIS — R93.89 ULTRASOUND SCAN ABNORMAL: ICD-10-CM

## 2025-07-16 DIAGNOSIS — K64.9 BLEEDING HEMORRHOID: ICD-10-CM

## 2025-07-16 DIAGNOSIS — F41.9 ANXIETY: ICD-10-CM

## 2025-07-16 DIAGNOSIS — I10 ESSENTIAL HYPERTENSION: ICD-10-CM

## 2025-07-16 DIAGNOSIS — L30.9 DERMATITIS: ICD-10-CM

## 2025-07-16 DIAGNOSIS — K76.9 LIVER LESION, RIGHT LOBE: Primary | ICD-10-CM

## 2025-07-16 PROCEDURE — 1123F ACP DISCUSS/DSCN MKR DOCD: CPT | Performed by: STUDENT IN AN ORGANIZED HEALTH CARE EDUCATION/TRAINING PROGRAM

## 2025-07-16 PROCEDURE — 1159F MED LIST DOCD IN RCRD: CPT | Performed by: STUDENT IN AN ORGANIZED HEALTH CARE EDUCATION/TRAINING PROGRAM

## 2025-07-16 PROCEDURE — 99214 OFFICE O/P EST MOD 30 MIN: CPT | Performed by: STUDENT IN AN ORGANIZED HEALTH CARE EDUCATION/TRAINING PROGRAM

## 2025-07-16 PROCEDURE — 1126F AMNT PAIN NOTED NONE PRSNT: CPT | Performed by: STUDENT IN AN ORGANIZED HEALTH CARE EDUCATION/TRAINING PROGRAM

## 2025-07-16 RX ORDER — FLUOCINONIDE 0.5 MG/G
CREAM TOPICAL
Qty: 60 G | Refills: 2 | Status: SHIPPED | OUTPATIENT
Start: 2025-07-16

## 2025-07-16 RX ORDER — GABAPENTIN 100 MG/1
200 CAPSULE ORAL 3 TIMES DAILY
Qty: 180 CAPSULE | Refills: 2 | Status: SHIPPED | OUTPATIENT
Start: 2025-07-16 | End: 2025-10-14

## 2025-07-16 RX ORDER — HYDROCORTISONE 25 MG/G
CREAM TOPICAL
Qty: 28 G | Refills: 3 | Status: SHIPPED | OUTPATIENT
Start: 2025-07-16

## 2025-07-16 RX ORDER — HYDROXYZINE HYDROCHLORIDE 10 MG/1
10 TABLET, FILM COATED ORAL EVERY 8 HOURS PRN
Qty: 90 TABLET | Refills: 2 | Status: SHIPPED | OUTPATIENT
Start: 2025-07-16

## 2025-07-16 SDOH — ECONOMIC STABILITY: FOOD INSECURITY: WITHIN THE PAST 12 MONTHS, YOU WORRIED THAT YOUR FOOD WOULD RUN OUT BEFORE YOU GOT MONEY TO BUY MORE.: NEVER TRUE

## 2025-07-16 SDOH — ECONOMIC STABILITY: FOOD INSECURITY: WITHIN THE PAST 12 MONTHS, THE FOOD YOU BOUGHT JUST DIDN'T LAST AND YOU DIDN'T HAVE MONEY TO GET MORE.: NEVER TRUE

## 2025-07-16 ASSESSMENT — ENCOUNTER SYMPTOMS
EYE PAIN: 0
COLOR CHANGE: 0
DIARRHEA: 0
VOMITING: 0
SHORTNESS OF BREATH: 0
EYE DISCHARGE: 0
FACIAL SWELLING: 0
EYE REDNESS: 0
EYE ITCHING: 0
BACK PAIN: 0
ABDOMINAL PAIN: 0
CONSTIPATION: 0

## 2025-07-16 ASSESSMENT — PATIENT HEALTH QUESTIONNAIRE - PHQ9
SUM OF ALL RESPONSES TO PHQ QUESTIONS 1-9: 0
1. LITTLE INTEREST OR PLEASURE IN DOING THINGS: NOT AT ALL
SUM OF ALL RESPONSES TO PHQ QUESTIONS 1-9: 0
SUM OF ALL RESPONSES TO PHQ QUESTIONS 1-9: 0
2. FEELING DOWN, DEPRESSED OR HOPELESS: NOT AT ALL
SUM OF ALL RESPONSES TO PHQ QUESTIONS 1-9: 0

## 2025-07-16 NOTE — PROGRESS NOTES
Sho Anderson is a 89 y.o. year old female who presents today for   Chief Complaint   Patient presents with    Follow-up       \"Have you been to the ER, urgent care clinic since your last visit?  Hospitalized since your last visit?\"    No    “Have you seen or consulted any other health care providers outside our system since your last visit?”    No          Click Here for Release of Records Request    Dot Mondragon [address] [address] [phone]

## 2025-07-16 NOTE — PROGRESS NOTES
Sho Anderson is a 89 y.o.  female and presents with    Chief Complaint   Patient presents with    Follow-up           Subjective:    Has history of A-fib on Eliquis, hypertension, chronic respiratory failure on 3 L HOT, obese hypoventilation syndrome, CKD stage 4, thyroid disease.      Hypertension follow up:  Taking medications as prescribed: Yes  Checking BP at home: Yes  Symptoms:  leg edema  Low sodium diet: No  Exercise: No     Saw Dr. Pham and had been doing well on K+ BID, had phosphorus added to her regimen. He     Saw Dr. Weldon - requested for me to order the MRI for her liver after the abdominal US showed liver lesion and recommendation for liver mass protocol MRI was ordered.     She has been feeling like she has been having pain in the tips of her fingers. She has only been taking th gabapentin BID 100mg.     Patient Active Problem List   Diagnosis    Chronic gout of multiple sites    Edema    Severe obesity (BMI 35.0-39.9) with comorbidity (HCC)    Essential hypertension    History of echocardiogram    Uncomplicated asthma    Vitamin D deficiency    Chronic kidney disease, stage 4 (severe) (HCC)    Arthritis    Multinodular goiter    Chronic eczema    Subclinical hyperthyroidism    Pure hypercholesterolemia    Other chest pain    Acute on chronic diastolic heart failure (HCC)    COPD exacerbation (HCC)    Paroxysmal atrial fibrillation (HCC)    Cholelithiasis without obstruction    CHF (congestive heart failure) (HCC)    Chronic anemia    Chronic gout    Diaphragmatic hernia    Disorder of liver    Diverticular disease of colon    Dysphagia    Early satiety    Eosinophilia    Epigastric pain    Flatulence    Gastroduodenitis    First degree hemorrhoids    Gastro-esophageal reflux disease without esophagitis    Osteoarthritis of right knee    S/P total knee arthroplasty    SOB (shortness of breath)    Stricture of esophagus    Suspected 2019-nCoV infection    Terminal esophageal

## 2025-07-24 ENCOUNTER — TELEPHONE (OUTPATIENT)
Age: 89
End: 2025-07-24

## 2025-07-24 NOTE — TELEPHONE ENCOUNTER
Patient is asking Due to swelling In her feet patient is she increase her fluid pills for a couple of days she is taking 2 a day now and is wondering if she can take 3 or 4 over the next few days. Please advise patient at phone number file.

## 2025-07-25 NOTE — TELEPHONE ENCOUNTER
Contacted pts friend at  number. Two patient Identifiers confirmed. Informed that we could not advise taking extra tabs of bumex due to renal function panel being abnormal. Inquired pt was using compression socks, elevating legs and using salt restrictions. Per pt she was. Informed provider could not advised to take extra tab. Defer to PCP/urology (if applicable).  Pts caregiver verbalized understanding.       Re: provider not in clinic today

## 2025-07-25 NOTE — TELEPHONE ENCOUNTER
Attempted to contact pt at  number, no answer. Lvm for pt to return call to office at [phone]. Will continue to try to contact pt.